# Patient Record
Sex: MALE | Race: WHITE | NOT HISPANIC OR LATINO | Employment: FULL TIME | URBAN - METROPOLITAN AREA
[De-identification: names, ages, dates, MRNs, and addresses within clinical notes are randomized per-mention and may not be internally consistent; named-entity substitution may affect disease eponyms.]

---

## 2017-01-20 ENCOUNTER — TELEPHONE (OUTPATIENT)
Dept: INTERNAL MEDICINE | Facility: CLINIC | Age: 27
End: 2017-01-20

## 2017-01-20 NOTE — TELEPHONE ENCOUNTER
Pt is curerntly at an insulin dose of 4 units he forgot to take this yesterday and did not notice much of a difference in his blood sugars from the high 70's to low 80's . Pt was told to contact MD prior to stopping the medication . Please advise.Edna Li RN

## 2017-01-23 NOTE — TELEPHONE ENCOUNTER
When are these # taken? Fasting? Pre-meal?  If am fasting take off 2 units - then check # bid to tid for a week on this dose and let me see these # via [employer] before completely stopping but looks as if that is the direction we are going.

## 2017-01-27 ENCOUNTER — MYC MEDICAL ADVICE (OUTPATIENT)
Dept: INTERNAL MEDICINE | Facility: CLINIC | Age: 27
End: 2017-01-27

## 2017-01-31 ENCOUNTER — DOCUMENTATION ONLY (OUTPATIENT)
Dept: LAB | Facility: CLINIC | Age: 27
End: 2017-01-31

## 2017-01-31 DIAGNOSIS — Z79.4 TYPE 2 DIABETES MELLITUS WITH HYPERGLYCEMIA, WITH LONG-TERM CURRENT USE OF INSULIN (H): Primary | ICD-10-CM

## 2017-01-31 DIAGNOSIS — E11.65 TYPE 2 DIABETES MELLITUS WITH HYPERGLYCEMIA, WITH LONG-TERM CURRENT USE OF INSULIN (H): Primary | ICD-10-CM

## 2017-02-02 DIAGNOSIS — Z79.4 TYPE 2 DIABETES MELLITUS WITH HYPERGLYCEMIA, WITH LONG-TERM CURRENT USE OF INSULIN (H): ICD-10-CM

## 2017-02-02 DIAGNOSIS — E11.65 TYPE 2 DIABETES MELLITUS WITH HYPERGLYCEMIA, WITH LONG-TERM CURRENT USE OF INSULIN (H): ICD-10-CM

## 2017-02-02 LAB
ANION GAP SERPL CALCULATED.3IONS-SCNC: 9 MMOL/L (ref 3–14)
BUN SERPL-MCNC: 13 MG/DL (ref 7–30)
CALCIUM SERPL-MCNC: 9.8 MG/DL (ref 8.5–10.1)
CHLORIDE SERPL-SCNC: 106 MMOL/L (ref 94–109)
CHOLEST SERPL-MCNC: 140 MG/DL
CO2 SERPL-SCNC: 26 MMOL/L (ref 20–32)
CREAT SERPL-MCNC: 0.98 MG/DL (ref 0.66–1.25)
CREAT UR-MCNC: 282 MG/DL
GFR SERPL CREATININE-BSD FRML MDRD: NORMAL ML/MIN/1.7M2
GLUCOSE SERPL-MCNC: 86 MG/DL (ref 70–99)
HBA1C MFR BLD: 5 % (ref 4.3–6)
HDLC SERPL-MCNC: 42 MG/DL
LDLC SERPL CALC-MCNC: 71 MG/DL
MICROALBUMIN UR-MCNC: 392 MG/L
MICROALBUMIN/CREAT UR: 139.01 MG/G CR (ref 0–17)
NONHDLC SERPL-MCNC: 98 MG/DL
POTASSIUM SERPL-SCNC: 4 MMOL/L (ref 3.4–5.3)
SODIUM SERPL-SCNC: 141 MMOL/L (ref 133–144)
TRIGL SERPL-MCNC: 135 MG/DL
TSH SERPL DL<=0.005 MIU/L-ACNC: 1.88 MU/L (ref 0.4–4)

## 2017-02-02 PROCEDURE — 36415 COLL VENOUS BLD VENIPUNCTURE: CPT | Performed by: INTERNAL MEDICINE

## 2017-02-02 PROCEDURE — 80061 LIPID PANEL: CPT | Performed by: INTERNAL MEDICINE

## 2017-02-02 PROCEDURE — 83036 HEMOGLOBIN GLYCOSYLATED A1C: CPT | Performed by: INTERNAL MEDICINE

## 2017-02-02 PROCEDURE — 80048 BASIC METABOLIC PNL TOTAL CA: CPT | Performed by: INTERNAL MEDICINE

## 2017-02-02 PROCEDURE — 82043 UR ALBUMIN QUANTITATIVE: CPT | Performed by: INTERNAL MEDICINE

## 2017-02-02 PROCEDURE — 84443 ASSAY THYROID STIM HORMONE: CPT | Performed by: INTERNAL MEDICINE

## 2017-09-01 ENCOUNTER — TRANSFERRED RECORDS (OUTPATIENT)
Dept: HEALTH INFORMATION MANAGEMENT | Facility: CLINIC | Age: 27
End: 2017-09-01

## 2017-09-06 ENCOUNTER — TRANSFERRED RECORDS (OUTPATIENT)
Dept: HEALTH INFORMATION MANAGEMENT | Facility: CLINIC | Age: 27
End: 2017-09-06

## 2017-10-17 ENCOUNTER — TELEPHONE (OUTPATIENT)
Dept: INTERNAL MEDICINE | Facility: CLINIC | Age: 27
End: 2017-10-17

## 2017-10-17 DIAGNOSIS — Z79.4 TYPE 2 DIABETES MELLITUS WITH HYPERGLYCEMIA, WITH LONG-TERM CURRENT USE OF INSULIN (H): Primary | ICD-10-CM

## 2017-10-17 DIAGNOSIS — E11.65 TYPE 2 DIABETES MELLITUS WITH HYPERGLYCEMIA, WITH LONG-TERM CURRENT USE OF INSULIN (H): Primary | ICD-10-CM

## 2017-10-17 NOTE — TELEPHONE ENCOUNTER
Patient stated that he is overdue for labs. His MyChart shows that he needs to get an A1C done but there are no standing orders. Please put in order as needed. If patient needs to see a provider, please call him to discuss.    Ansley FRANK  Central Scheduler

## 2017-10-18 NOTE — TELEPHONE ENCOUNTER
Yes- he needs to get labs done and then see me for follow up. Remind pt to bring meter- if not checking regularly do so prior to appt (1x/day) so we can have some # to review

## 2017-10-24 DIAGNOSIS — Z79.4 TYPE 2 DIABETES MELLITUS WITH HYPERGLYCEMIA, WITH LONG-TERM CURRENT USE OF INSULIN (H): ICD-10-CM

## 2017-10-24 DIAGNOSIS — E11.65 TYPE 2 DIABETES MELLITUS WITH HYPERGLYCEMIA, WITH LONG-TERM CURRENT USE OF INSULIN (H): ICD-10-CM

## 2017-10-24 LAB
ALT SERPL W P-5'-P-CCNC: 42 U/L (ref 0–70)
ANION GAP SERPL CALCULATED.3IONS-SCNC: 10 MMOL/L (ref 3–14)
BUN SERPL-MCNC: 20 MG/DL (ref 7–30)
CALCIUM SERPL-MCNC: 9.6 MG/DL (ref 8.5–10.1)
CHLORIDE SERPL-SCNC: 106 MMOL/L (ref 94–109)
CHOLEST SERPL-MCNC: 157 MG/DL
CO2 SERPL-SCNC: 23 MMOL/L (ref 20–32)
CREAT SERPL-MCNC: 1.22 MG/DL (ref 0.66–1.25)
CREAT UR-MCNC: 229 MG/DL
GFR SERPL CREATININE-BSD FRML MDRD: 70 ML/MIN/1.7M2
GLUCOSE SERPL-MCNC: 83 MG/DL (ref 70–99)
HBA1C MFR BLD: 5.1 % (ref 4.3–6)
HDLC SERPL-MCNC: 45 MG/DL
LDLC SERPL CALC-MCNC: 97 MG/DL
MICROALBUMIN UR-MCNC: 1210 MG/L
MICROALBUMIN/CREAT UR: 528.38 MG/G CR (ref 0–17)
NONHDLC SERPL-MCNC: 112 MG/DL
POTASSIUM SERPL-SCNC: 3.9 MMOL/L (ref 3.4–5.3)
SODIUM SERPL-SCNC: 139 MMOL/L (ref 133–144)
TRIGL SERPL-MCNC: 77 MG/DL

## 2017-10-24 PROCEDURE — 80061 LIPID PANEL: CPT | Performed by: INTERNAL MEDICINE

## 2017-10-24 PROCEDURE — 36415 COLL VENOUS BLD VENIPUNCTURE: CPT | Performed by: INTERNAL MEDICINE

## 2017-10-24 PROCEDURE — 80048 BASIC METABOLIC PNL TOTAL CA: CPT | Performed by: INTERNAL MEDICINE

## 2017-10-24 PROCEDURE — 84460 ALANINE AMINO (ALT) (SGPT): CPT | Performed by: INTERNAL MEDICINE

## 2017-10-24 PROCEDURE — 82043 UR ALBUMIN QUANTITATIVE: CPT | Performed by: INTERNAL MEDICINE

## 2017-10-24 PROCEDURE — 83036 HEMOGLOBIN GLYCOSYLATED A1C: CPT | Performed by: INTERNAL MEDICINE

## 2017-10-26 ENCOUNTER — OFFICE VISIT (OUTPATIENT)
Dept: INTERNAL MEDICINE | Facility: CLINIC | Age: 27
End: 2017-10-26
Payer: COMMERCIAL

## 2017-10-26 VITALS
OXYGEN SATURATION: 100 % | WEIGHT: 208.7 LBS | BODY MASS INDEX: 27.66 KG/M2 | TEMPERATURE: 98.1 F | SYSTOLIC BLOOD PRESSURE: 130 MMHG | DIASTOLIC BLOOD PRESSURE: 82 MMHG | HEART RATE: 88 BPM | HEIGHT: 73 IN

## 2017-10-26 DIAGNOSIS — I10 ESSENTIAL HYPERTENSION, BENIGN: ICD-10-CM

## 2017-10-26 DIAGNOSIS — Z86.39 HX OF DIABETES MELLITUS: ICD-10-CM

## 2017-10-26 DIAGNOSIS — R80.9 MICROALBUMINURIA: Primary | ICD-10-CM

## 2017-10-26 PROCEDURE — 99214 OFFICE O/P EST MOD 30 MIN: CPT | Performed by: INTERNAL MEDICINE

## 2017-10-26 RX ORDER — LISINOPRIL 10 MG/1
10 TABLET ORAL DAILY
Qty: 30 TABLET | Refills: 5 | Status: SHIPPED | OUTPATIENT
Start: 2017-10-26 | End: 2018-03-06

## 2017-10-26 NOTE — NURSING NOTE
"Chief Complaint   Patient presents with     Hypertension     Diabetes       Initial /76  Pulse 88  Temp 98.1  F (36.7  C) (Oral)  Ht 6' 1\" (1.854 m)  Wt 208 lb 11.2 oz (94.7 kg)  SpO2 100%  BMI 27.53 kg/m2 Estimated body mass index is 27.53 kg/(m^2) as calculated from the following:    Height as of this encounter: 6' 1\" (1.854 m).    Weight as of this encounter: 208 lb 11.2 oz (94.7 kg).  Medication Reconciliation: complete      "

## 2017-10-26 NOTE — PATIENT INSTRUCTIONS
Diet for Chronic Kidney Disease  Following a special diet when you have kidney disease can help you stay as healthy as possible. Your healthcare provider or dietitian should make a special diet plan just for you.    Eating right  Here are some good eating rules to follow:    Protein. Eating protein is important for your body. But too much protein can put a strain on your kidneys. Eating less protein may slow the progression of chronic kidney disease. Foods high in protein include meat, fish, eggs, cheese, and other dairy products. A registered dietitian can help you plan a diet that has the right amount of protein for you.    Sodium. Having too much salt in your diet can make your body hold onto (retain) water. Ask your provider or dietitian how much sodium per day you are allowed. This will help you avoid fluid buildup in your body (fluid retention). It can also help control high blood pressure. Learn to read food labels to know how much sodium is in one serving. Foods high in salt include processed meats, canned and boxed foods, sauces, salted chips and snacks, pickled foods, frozen dinners, and restaurant and fast food.    Fluids. If you have advanced kidney disease, you will need to limit the water and fluids you drink. If you don t, then too much water will build up in your body. The exact amount of fluid you can drink depends on how well your kidneys are working. Ask your provider how much water you can safely drink each day.    Potassium. In advanced kidney disease, your potassium level can go dangerously high. This affects your heart. It can cause an irregular heartbeat (arrhythmia). Ask your provider or dietitian if you should limit potassium in your diet. Foods high in potassium include dairy products (milk, yogurt, cheese), dried beans, bananas, oranges, potatoes, tomatoes, spinach, cantaloupe, honeydew melon, dried fruits, and nuts.     Calcium. Calcium is important to build strong bones. But foods  high in calcium are also high in phosphorus, which can take calcium from your bones. Limiting foods high in phosphorus will help keep calcium in your bones. Ask your provider how much calcium you should get each day.    Phosphorus. In advanced kidney disease, your phosphorus level can go dangerously high. This affects many systems in the body and can damage your heart. Limit your intake of phosphorus-rich foods. These include dried beans and peas, nuts, peanut butter, cocoa, beer, cola drinks, and dairy products.  Date Last Reviewed: 8/1/2016 2000-2017 [employer]. [address] [place] [address]. All rights reserved. This information is not intended as a substitute for professional medical care. Always follow your healthcare professional's instructions.

## 2017-10-26 NOTE — MR AVS SNAPSHOT
After Visit Summary   10/26/2017    Sarbjit Perez    MRN: [id number]           Patient Information     Date Of Birth          1990        Visit Information        Provider Department      10/26/2017 9:20 AM Aram Martin MD [facility]        Today's Diagnoses     Microalbuminuria    -  1    Essential hypertension, benign          Care Instructions      Diet for Chronic Kidney Disease  Following a special diet when you have kidney disease can help you stay as healthy as possible. Your healthcare provider or dietitian should make a special diet plan just for you.    Eating right  Here are some good eating rules to follow:    Protein. Eating protein is important for your body. But too much protein can put a strain on your kidneys. Eating less protein may slow the progression of chronic kidney disease. Foods high in protein include meat, fish, eggs, cheese, and other dairy products. A registered dietitian can help you plan a diet that has the right amount of protein for you.    Sodium. Having too much salt in your diet can make your body hold onto (retain) water. Ask your provider or dietitian how much sodium per day you are allowed. This will help you avoid fluid buildup in your body (fluid retention). It can also help control high blood pressure. Learn to read food labels to know how much sodium is in one serving. Foods high in salt include processed meats, canned and boxed foods, sauces, salted chips and snacks, pickled foods, frozen dinners, and restaurant and fast food.    Fluids. If you have advanced kidney disease, you will need to limit the water and fluids you drink. If you don t, then too much water will build up in your body. The exact amount of fluid you can drink depends on how well your kidneys are working. Ask your provider how much water you can safely drink each day.    Potassium. In advanced kidney disease, your potassium level can go dangerously high.  This affects your heart. It can cause an irregular heartbeat (arrhythmia). Ask your provider or dietitian if you should limit potassium in your diet. Foods high in potassium include dairy products (milk, yogurt, cheese), dried beans, bananas, oranges, potatoes, tomatoes, spinach, cantaloupe, honeydew melon, dried fruits, and nuts.     Calcium. Calcium is important to build strong bones. But foods high in calcium are also high in phosphorus, which can take calcium from your bones. Limiting foods high in phosphorus will help keep calcium in your bones. Ask your provider how much calcium you should get each day.    Phosphorus. In advanced kidney disease, your phosphorus level can go dangerously high. This affects many systems in the body and can damage your heart. Limit your intake of phosphorus-rich foods. These include dried beans and peas, nuts, peanut butter, cocoa, beer, cola drinks, and dairy products.  Date Last Reviewed: 8/1/2016 2000-2017 [employer]. [address] [place] [address]. All rights reserved. This information is not intended as a substitute for professional medical care. Always follow your healthcare professional's instructions.                Follow-ups after your visit        Future tests that were ordered for you today     Open Future Orders        Priority Expected Expires Ordered    Albumin Random Urine Quantitative with Creat Ratio Routine 12/25/2017 2/23/2018 10/26/2017            Who to contact     If you have questions or need follow up information about today's clinic visit or your schedule please contact [facility] directly at [phone].  Normal or non-critical lab and imaging results will be communicated to you by MyChart, letter or phone within 4 business days after the clinic has received the results. If you do not hear from us within 7 days, please contact the clinic through MyChart or phone. If you have a critical or abnormal  "lab result, we will notify you by phone as soon as possible.  Submit refill requests through [employer] or call your pharmacy and they will forward the refill request to us. Please allow 3 business days for your refill to be completed.          Additional Information About Your Visit        [employer] Information     [employer] gives you secure access to your electronic health record. If you see a primary care provider, you can also send messages to your care team and make appointments. If you have questions, please call your primary care clinic.  If you do not have a primary care provider, please call [phone] and they will assist you.        Care EveryWhere ID     This is your Care EveryWhere ID. This could be used by other organizations to access your [facility] medical records  [id number]        Your Vitals Were     Pulse Temperature Height Pulse Oximetry BMI (Body Mass Index)       88 98.1  F (36.7  C) (Oral) 6' 1\" (1.854 m) 100% 27.53 kg/m2        Blood Pressure from Last 3 Encounters:   10/26/17 140/76   12/20/16 132/76   11/22/16 114/70    Weight from Last 3 Encounters:   10/26/17 208 lb 11.2 oz (94.7 kg)   12/20/16 243 lb 4.8 oz (110.4 kg)   12/14/16 244 lb (110.7 kg)                 Where to get your medicines      These medications were sent to [facility]/pharmacy #[id number] - [facility] [address] [facility]  [address] [address] [facility] [address]     Phone:  [phone]     lisinopril 10 MG tablet          Primary Care Provider    Physician No Ref-Primary       NO REF-PRIMARY PHYSICIAN        Equal Access to Services     [facility]: Hadii aad ku hadasho Soomaali, waaxda luqadaha, qaybta kaalmada benji francis . So [facility] [phone].    ATENCIÓN: Si habla español, tiene a little disposición servicios gratuitos de asistencia lingüística. Llame al [phone].    We comply with applicable federal civil rights laws and [place] laws. We do not discriminate on the basis of race, " color, national origin, age, disability, sex, sexual orientation, or gender identity.            Thank you!     Thank you for choosing [facility]  for your care. Our goal is always to provide you with excellent care. Hearing back from our patients is one way we can continue to improve our services. Please take a few minutes to complete the written survey that you may receive in the mail after your visit with us. Thank you!             Your Updated Medication List - Protect others around you: Learn how to safely use, store and throw away your medicines at www.disposemymeds.org.          This list is accurate as of: 10/26/17 10:16 AM.  Always use your most recent med list.                   Brand Name Dispense Instructions for use Diagnosis    blood glucose calibration solution    no brand specified    1 each    Use to calibrate blood glucose monitor as directed.    Type 2 diabetes mellitus with hyperosmolarity without coma, with long-term current use of insulin (H)       blood glucose monitoring test strip    no brand specified    50 Box    Use to test blood sugar 2  times daily or as directed.    Type 2 diabetes mellitus with hyperosmolarity without coma, with long-term current use of insulin (H)       GLUCOSOURCE LANCETS Misc     1 each    1 each 2 times daily    Type 2 diabetes mellitus with hyperosmolarity without coma, with long-term current use of insulin (H)       lisinopril 10 MG tablet    PRINIVIL/ZESTRIL    30 tablet    Take 1 tablet (10 mg) by mouth daily    Essential hypertension, benign       multivitamin, therapeutic with minerals Tabs tablet      Take 1 tablet by mouth daily

## 2017-10-26 NOTE — PROGRESS NOTES
"  SUBJECTIVE:   Sarbjit Perez is a 27 year old male who presents to clinic today for the following health issues:      Diabetes Follow-up  - started post alcoholic pancreatitis. Initially on insulin but this was weaned off and then only on metformin until spring of this year at which time we stopped everything. Since then has been off all meds  Lab Results   Component Value Date    A1C 5.1 10/24/2017    A1C 5.0 02/02/2017    A1C 9.5 10/20/2016        Patient is checking blood sugars: once daily.  Results are as follows:         am - 65-80s (no meds)    Diabetic concerns: None     Symptoms of hypoglycemia (low blood sugar): none     Paresthesias (numbness or burning in feet) or sores: No     Date of last diabetic eye exam: 9/17      Hypertension Follow-up  Given 6 mo supply of meds in 12/06- staets has taken daily but only ran out of this supply a week or so ago  BP Readings from Last 3 Encounters:   10/26/17 140/76   12/20/16 132/76   11/22/16 114/70        Outpatient blood pressures are not being checked.    Low Salt Diet: low salt          Amount of exercise or physical activity: None    Problems taking medications regularly: No    Medication side effects: none  Diet: regular (no restrictions)      Problem list and histories reviewed & adjusted, as indicated.  Additional history: as documented    Labs reviewed in EPIC    Reviewed and updated as needed this visit by clinical staff     Reviewed and updated as needed this visit by Provider         ROS:  Constitutional, HEENT, cardiovascular, pulmonary, gi and gu systems are negative, except as otherwise noted.      OBJECTIVE:                                                    /76  Pulse 88  Temp 98.1  F (36.7  C) (Oral)  Ht 6' 1\" (1.854 m)  Wt 208 lb 11.2 oz (94.7 kg)  SpO2 100%  BMI 27.53 kg/m2  Body mass index is 27.53 kg/(m^2).  GENERAL APPEARANCE: healthy, alert and no distress  HENT: nose and mouth without ulcers or lesions  NECK: no adenopathy, no " asymmetry, masses, or scars and thyroid normal to palpation  RESP: lungs clear to auscultation - no rales, rhonchi or wheezes  CV: regular rates and rhythm, normal S1 S2, no S3 or S4 and no murmur, click or rub  MS: extremities normal- no gross deformities noted  SKIN: no suspicious lesions or rashes    Diagnostic test results:  Results for orders placed or performed in visit on 10/24/17   ALT   Result Value Ref Range    ALT 42 0 - 70 U/L   Albumin Random Urine Quantitative with Creat Ratio   Result Value Ref Range    Creatinine Urine 229 mg/dL    Albumin Urine mg/L 1210 mg/L    Albumin Urine mg/g Cr 528.38 (H) 0 - 17 mg/g Cr   Hemoglobin A1c   Result Value Ref Range    Hemoglobin A1C 5.1 4.3 - 6.0 %   Lipid panel reflex to direct LDL   Result Value Ref Range    Cholesterol 157 <200 mg/dL    Triglycerides 77 <150 mg/dL    HDL Cholesterol 45 >39 mg/dL    LDL Cholesterol Calculated 97 <100 mg/dL    Non HDL Cholesterol 112 <130 mg/dL   Basic metabolic panel   Result Value Ref Range    Sodium 139 133 - 144 mmol/L    Potassium 3.9 3.4 - 5.3 mmol/L    Chloride 106 94 - 109 mmol/L    Carbon Dioxide 23 20 - 32 mmol/L    Anion Gap 10 3 - 14 mmol/L    Glucose 83 70 - 99 mg/dL    Urea Nitrogen 20 7 - 30 mg/dL    Creatinine 1.22 0.66 - 1.25 mg/dL    GFR Estimate 70 >60 mL/min/1.7m2    GFR Estimate If Black 85 >60 mL/min/1.7m2    Calcium 9.6 8.5 - 10.1 mg/dL          ASSESSMENT/PLAN:                                                    1. Microalbuminuria  Worse since last checked. Restart ACEi and repeat in 3 mo. BP today is actually not bad even off med. If the proteinuria still noted in 3 mo would consider having see renal.   - Albumin Random Urine Quantitative with Creat Ratio; Future    2. Essential hypertension, benign  - lisinopril (PRINIVIL/ZESTRIL) 10 MG tablet; Take 1 tablet (10 mg) by mouth daily  Dispense: 30 tablet; Refill: 5    3. 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  Would recommend yearly a1c check but this seems resolved at this time.     Follow up with Provider - 3 mo labs , BP recheck 6 mo      Aram Martin MD  [facility]

## 2017-10-26 NOTE — LETTER
[address] [address]  [phone]        February 28, 2018    Sarbjit Perez  [address] [facility] [address]              Dear Sarbjit Perez    This is to remind you that your Urinalysis is due.    You may call our office at [phone] to schedule an appointment.    Please disregard this notice if you have already had your labs drawn or made an appointment.        Sincerely,        Aram Martin MD

## 2017-10-26 NOTE — Clinical Note
Please abstract the following data from this visit with this patient into the appropriate field in Epic:  Eye exam with ophthalmology on this date: 9-17

## 2018-03-06 ENCOUNTER — OFFICE VISIT (OUTPATIENT)
Dept: FAMILY MEDICINE | Facility: CLINIC | Age: 28
End: 2018-03-06
Payer: COMMERCIAL

## 2018-03-06 VITALS
WEIGHT: 206 LBS | TEMPERATURE: 98.4 F | HEIGHT: 73 IN | HEART RATE: 76 BPM | RESPIRATION RATE: 16 BRPM | SYSTOLIC BLOOD PRESSURE: 132 MMHG | BODY MASS INDEX: 27.3 KG/M2 | DIASTOLIC BLOOD PRESSURE: 82 MMHG

## 2018-03-06 DIAGNOSIS — I10 ESSENTIAL HYPERTENSION, BENIGN: ICD-10-CM

## 2018-03-06 DIAGNOSIS — K40.90 LEFT INGUINAL HERNIA: ICD-10-CM

## 2018-03-06 DIAGNOSIS — Z00.00 ROUTINE GENERAL MEDICAL EXAMINATION AT A HEALTH CARE FACILITY: Primary | ICD-10-CM

## 2018-03-06 DIAGNOSIS — R80.9 MICROALBUMINURIA: ICD-10-CM

## 2018-03-06 DIAGNOSIS — Z86.39 HX OF DIABETES MELLITUS: ICD-10-CM

## 2018-03-06 LAB
ANION GAP SERPL CALCULATED.3IONS-SCNC: 8 MMOL/L (ref 3–14)
BUN SERPL-MCNC: 15 MG/DL (ref 7–30)
CALCIUM SERPL-MCNC: 9.3 MG/DL (ref 8.5–10.1)
CHLORIDE SERPL-SCNC: 104 MMOL/L (ref 94–109)
CHOLEST SERPL-MCNC: 145 MG/DL
CO2 SERPL-SCNC: 27 MMOL/L (ref 20–32)
CREAT SERPL-MCNC: 0.98 MG/DL (ref 0.66–1.25)
CREAT UR-MCNC: 222 MG/DL
GFR SERPL CREATININE-BSD FRML MDRD: 90 ML/MIN/1.7M2
GLUCOSE SERPL-MCNC: 85 MG/DL (ref 70–99)
HBA1C MFR BLD: 5.1 % (ref 4.3–6)
HDLC SERPL-MCNC: 48 MG/DL
LDLC SERPL CALC-MCNC: 83 MG/DL
MICROALBUMIN UR-MCNC: 1200 MG/L
MICROALBUMIN/CREAT UR: 540.54 MG/G CR (ref 0–17)
NONHDLC SERPL-MCNC: 97 MG/DL
POTASSIUM SERPL-SCNC: 3.8 MMOL/L (ref 3.4–5.3)
SODIUM SERPL-SCNC: 139 MMOL/L (ref 133–144)
TRIGL SERPL-MCNC: 72 MG/DL

## 2018-03-06 PROCEDURE — 99213 OFFICE O/P EST LOW 20 MIN: CPT | Mod: 25 | Performed by: PHYSICIAN ASSISTANT

## 2018-03-06 PROCEDURE — 83036 HEMOGLOBIN GLYCOSYLATED A1C: CPT | Performed by: PHYSICIAN ASSISTANT

## 2018-03-06 PROCEDURE — 99395 PREV VISIT EST AGE 18-39: CPT | Performed by: PHYSICIAN ASSISTANT

## 2018-03-06 PROCEDURE — 36415 COLL VENOUS BLD VENIPUNCTURE: CPT | Performed by: PHYSICIAN ASSISTANT

## 2018-03-06 PROCEDURE — 80061 LIPID PANEL: CPT | Performed by: PHYSICIAN ASSISTANT

## 2018-03-06 PROCEDURE — 80048 BASIC METABOLIC PNL TOTAL CA: CPT | Performed by: PHYSICIAN ASSISTANT

## 2018-03-06 PROCEDURE — 82043 UR ALBUMIN QUANTITATIVE: CPT | Performed by: PHYSICIAN ASSISTANT

## 2018-03-06 RX ORDER — LISINOPRIL 10 MG/1
10 TABLET ORAL DAILY
Qty: 90 TABLET | Refills: 1 | Status: SHIPPED | OUTPATIENT
Start: 2018-03-06 | End: 2019-10-07

## 2018-03-06 NOTE — MR AVS SNAPSHOT
After Visit Summary   3/6/2018    Sarbjit Perez    MRN: [id number]           Patient Information     Date Of Birth          1990        Visit Information        Provider Department      3/6/2018 8:15 AM Bill Mg PA-C [facility]        Today's Diagnoses     Routine general medical examination at a health care facility    -  1    Hx of diabetes mellitus        Essential hypertension, benign        Microalbuminuria        Left inguinal hernia          Care Instructions      Preventive Health Recommendations  Male Ages 26 - 39    Yearly exam:             See your health care provider every year in order to  o   Review health changes.   o   Discuss preventive care.    o   Review your medicines if your doctor has prescribed any.    You should be tested each year for STDs (sexually transmitted diseases), if you re at risk.     After age 35, talk to your provider about cholesterol testing. If you are at risk for heart disease, have your cholesterol tested at least every 5 years.     If you are at risk for diabetes, you should have a diabetes test (fasting glucose).  Shots: Get a flu shot each year. Get a tetanus shot every 10 years.     Nutrition:    Eat at least 5 servings of fruits and vegetables daily.     Eat whole-grain bread, whole-wheat pasta and brown rice instead of white grains and rice.     Talk to your provider about Calcium and Vitamin D.     Lifestyle    Exercise for at least 150 minutes a week (30 minutes a day, 5 days a week). This will help you control your weight and prevent disease.     Limit alcohol to one drink per day.     No smoking.     Wear sunscreen to prevent skin cancer.     See your dentist every six months for an exam and cleaning.     Hernia (Adult)    A hernia can happen when there is a weakness or defect in the wall of the abdomen or groin. Intestines or nearby tissues may move from their usual location and push through the weakness in the  wall. This can cause a hernia (bulge) you may see or feel.  Causes and Risk Factors   A hernia may be present at birth. Or it may be caused by the wear and tear of daily living. Certain factors can make a hernia more likely. These can include:    Heavy lifting    Straining, whether from lifting, movement, or constipation    Chronic cough    Injury to the abdominal wall    Excess weight    Pregnancy    Prior surgery    Older age    Family history of hernia  Symptoms  Symptoms of a hernia may come on suddenly. Or they may appear slowly over time. Some common symptoms include:    Bulge in the groin area, around the navel, or in the scrotum (the bulge may get bigger when you stand and go away when you lie down)    Pain or pressure around the bulge    Pain during activities such as lifting, coughing, or sneezing    A feeling of weakness or pressure in the groin    Pain or swelling in the scrotum  Types of hernias  There are different types of hernia. The type you have depends on its location:    Inguinal: This type is in the groin or scrotum. It is more common in men.    Femoral: This type is in the groin, upper thigh (where the leg bends), or labia. It is more common in women.    Ventral: This type is in the abdominal wall.    Umbilical: This type occurs around the navel (belly button).    Incisional: This type occurs at the site of a previous surgery.  The condition of the hernia can help determine how urgently it needs to be treated.    Reducible: It goes back in by itself, or it can be pushed back in.    Irreducible: It can t be pushed back in.    Incarcerated/Strangulated: The intestine is trapped (incarcerated). If this happens, you won t be able to push the bulge back in. If the incarcerated hernia isn t treated, it may become strangulated. This means the area loses blood supply and the tissue may die. This requires emergency surgery! Treatment is needed right away!  In most cases, a hernia will not heal on its  own. Surgery is usually needed to repair the defect in the abdominal wall or groin. You ll be told more about surgery, if needed.  If your symptoms are not severe, treatment may sometimes be delayed. In such cases, regular follow-up visits with the provider will be needed. You ll be asked to keep track of your symptoms and to watch for signs of more serious problems. You may also be given guidelines similar to the home care instructions below.  Home Care  To help keep a hernia from getting worse, you may be advised to:    Avoid heavy lifting and straining as directed.    Take steps to prevent constipation, such as eating more fiber and drinking more water. This may help reduce straining that can occur when having a bowel movement. Reducing straining may help keep your symptoms from getting worse.    Maintain a healthy weight or lose excess weight. This can help reduce strain on abdominal muscles and tissues.    Stop smoking. This can help prevent coughing that may also strain abdominal muscles and tissues.  Follow-up care  Follow up with your healthcare provider, or as directed. If imaging tests were done, they will be reviewed a doctor. You will be told the results and any new findings that may affect your care.  When to seek medical advice  Call your healthcare provider right away if any of these occur:    Hernia hardens, swells, or grows larger    Hernia can no longer be pushed back in    Pain moves to the lower right abdomen (just below the waistline), or spreads to the back  Call 911  Call 911 right away if any of these occur:    Nausea and vomiting    Severe pain, redness, or tenderness in the area near the hernia    Pain worsens quickly and doesn t get better    Inability to have a bowel movement or pass gas    Fever of 100.4 F (38 C) or higher    Trouble breathing    Fainting    Rapid heart rate    Vomiting blood    Large amounts of blood in stool  Date Last Reviewed: 6/9/2015 2000-2017 [employer]. [address] [place] [address]. All rights reserved. This information is not intended as a substitute for professional medical care. Always follow your healthcare professional's instructions.                Follow-ups after your visit        Additional Services     GENERAL SURG ADULT REFERRAL       Your provider has referred you to: ASHELY: [facility] [place] [phone]   [email or web address]    Please be aware that coverage of these services is subject to the terms and limitations of your health insurance plan.  Call member services at your health plan with any benefit or coverage questions.      Please bring the following with you to your appointment:    (1) Any X-Rays, CTs or MRIs which have been performed.  Contact the facility where they were done to arrange for  prior to your scheduled appointment.   (2) List of current medications   (3) This referral request   (4) Any documents/labs given to you for this referral                  Who to contact     If you have questions or need follow up information about today's clinic visit or your schedule please contact [facility] directly at [phone].  Normal or non-critical lab and imaging results will be communicated to you by MyChart, letter or phone within 4 business days after the clinic has received the results. If you do not hear from us within 7 days, please contact the clinic through [employer] or phone. If you have a critical or abnormal lab result, we will notify you by phone as soon as possible.  Submit refill requests through [email or web address] or call your pharmacy and they will forward the refill request to us. Please allow 3 business days for your refill to be completed.          Additional Information About Your Visit        [employer] Information     [email or web address] gives you secure access to your electronic health record. If you see a primary care provider, you can  "also send messages to your care team and make appointments. If you have questions, please call your primary care clinic.  If you do not have a primary care provider, please call [phone] and they will assist you.        Care EveryWhere ID     This is your Care EveryWhere ID. This could be used by other organizations to access your [place] medical records  [id number]        Your Vitals Were     Pulse Temperature Respirations Height BMI (Body Mass Index)       76 98.4  F (36.9  C) (Oral) 16 6' 1\" (1.854 m) 27.18 kg/m2        Blood Pressure from Last 3 Encounters:   03/06/18 132/82   10/26/17 130/82   12/20/16 132/76    Weight from Last 3 Encounters:   03/06/18 206 lb (93.4 kg)   10/26/17 208 lb 11.2 oz (94.7 kg)   12/20/16 243 lb 4.8 oz (110.4 kg)              We Performed the Following     Albumin Random Urine Quantitative with Creat Ratio     Basic metabolic panel     GENERAL SURG ADULT REFERRAL     Hemoglobin A1c     Lipid panel reflex to direct LDL Fasting          Today's Medication Changes          These changes are accurate as of 3/6/18  9:04 AM.  If you have any questions, ask your nurse or doctor.               Stop taking these medicines if you haven't already. Please contact your care team if you have questions.     multivitamin, therapeutic with minerals Tabs tablet   Stopped by:  Bill Mg PA-C                    Primary Care Provider Office Phone # Fax #    Bill Mg PA-C [phone] [address] [facility] [address]        Equal Access to Services     [facility] : Hadii mann tuttleo [employer], waaxda luqadaha, qaybta kaalmada benji francis. So [facility] [phone].    ATENCIÓN: Si habla español, tiene a little disposición servicios gratuitos de asistencia lingüística. Llame al [phone].    We comply with applicable federal civil rights laws and [place] laws. We do not discriminate on the basis of race, " color, national origin, age, disability, sex, sexual orientation, or gender identity.            Thank you!     Thank you for choosing [facility]  for your care. Our goal is always to provide you with excellent care. Hearing back from our patients is one way we can continue to improve our services. Please take a few minutes to complete the written survey that you may receive in the mail after your visit with us. Thank you!             Your Updated Medication List - Protect others around you: Learn how to safely use, store and throw away your medicines at www.disposemymeds.org.          This list is accurate as of 3/6/18  9:04 AM.  Always use your most recent med list.                   Brand Name Dispense Instructions for use Diagnosis    blood glucose calibration solution    no brand specified    1 each    Use to calibrate blood glucose monitor as directed.    Type 2 diabetes mellitus with hyperosmolarity without coma, with long-term current use of insulin (H)       blood glucose monitoring test strip    no brand specified    50 Box    Use to test blood sugar 2  times daily or as directed.    Type 2 diabetes mellitus with hyperosmolarity without coma, with long-term current use of insulin (H)       GLUCOSOURCE LANCETS Misc     1 each    1 each 2 times daily    Type 2 diabetes mellitus with hyperosmolarity without coma, with long-term current use of insulin (H)       lisinopril 10 MG tablet    PRINIVIL/ZESTRIL    30 tablet    Take 1 tablet (10 mg) by mouth daily    Essential hypertension, benign       VITAMIN C PO

## 2018-03-06 NOTE — PATIENT INSTRUCTIONS
Preventive Health Recommendations  Male Ages 26 - 39    Yearly exam:             See your health care provider every year in order to  o   Review health changes.   o   Discuss preventive care.    o   Review your medicines if your doctor has prescribed any.    You should be tested each year for STDs (sexually transmitted diseases), if you re at risk.     After age 35, talk to your provider about cholesterol testing. If you are at risk for heart disease, have your cholesterol tested at least every 5 years.     If you are at risk for diabetes, you should have a diabetes test (fasting glucose).  Shots: Get a flu shot each year. Get a tetanus shot every 10 years.     Nutrition:    Eat at least 5 servings of fruits and vegetables daily.     Eat whole-grain bread, whole-wheat pasta and brown rice instead of white grains and rice.     Talk to your provider about Calcium and Vitamin D.     Lifestyle    Exercise for at least 150 minutes a week (30 minutes a day, 5 days a week). This will help you control your weight and prevent disease.     Limit alcohol to one drink per day.     No smoking.     Wear sunscreen to prevent skin cancer.     See your dentist every six months for an exam and cleaning.     Hernia (Adult)    A hernia can happen when there is a weakness or defect in the wall of the abdomen or groin. Intestines or nearby tissues may move from their usual location and push through the weakness in the wall. This can cause a hernia (bulge) you may see or feel.  Causes and Risk Factors   A hernia may be present at birth. Or it may be caused by the wear and tear of daily living. Certain factors can make a hernia more likely. These can include:    Heavy lifting    Straining, whether from lifting, movement, or constipation    Chronic cough    Injury to the abdominal wall    Excess weight    Pregnancy    Prior surgery    Older age    Family history of hernia  Symptoms  Symptoms of a hernia may come on suddenly. Or they may  appear slowly over time. Some common symptoms include:    Bulge in the groin area, around the navel, or in the scrotum (the bulge may get bigger when you stand and go away when you lie down)    Pain or pressure around the bulge    Pain during activities such as lifting, coughing, or sneezing    A feeling of weakness or pressure in the groin    Pain or swelling in the scrotum  Types of hernias  There are different types of hernia. The type you have depends on its location:    Inguinal: This type is in the groin or scrotum. It is more common in men.    Femoral: This type is in the groin, upper thigh (where the leg bends), or labia. It is more common in women.    Ventral: This type is in the abdominal wall.    Umbilical: This type occurs around the navel (belly button).    Incisional: This type occurs at the site of a previous surgery.  The condition of the hernia can help determine how urgently it needs to be treated.    Reducible: It goes back in by itself, or it can be pushed back in.    Irreducible: It can t be pushed back in.    Incarcerated/Strangulated: The intestine is trapped (incarcerated). If this happens, you won t be able to push the bulge back in. If the incarcerated hernia isn t treated, it may become strangulated. This means the area loses blood supply and the tissue may die. This requires emergency surgery! Treatment is needed right away!  In most cases, a hernia will not heal on its own. Surgery is usually needed to repair the defect in the abdominal wall or groin. You ll be told more about surgery, if needed.  If your symptoms are not severe, treatment may sometimes be delayed. In such cases, regular follow-up visits with the provider will be needed. You ll be asked to keep track of your symptoms and to watch for signs of more serious problems. You may also be given guidelines similar to the home care instructions below.  Home Care  To help keep a hernia from getting worse, you may be advised  to:    Avoid heavy lifting and straining as directed.    Take steps to prevent constipation, such as eating more fiber and drinking more water. This may help reduce straining that can occur when having a bowel movement. Reducing straining may help keep your symptoms from getting worse.    Maintain a healthy weight or lose excess weight. This can help reduce strain on abdominal muscles and tissues.    Stop smoking. This can help prevent coughing that may also strain abdominal muscles and tissues.  Follow-up care  Follow up with your healthcare provider, or as directed. If imaging tests were done, they will be reviewed a doctor. You will be told the results and any new findings that may affect your care.  When to seek medical advice  Call your healthcare provider right away if any of these occur:    Hernia hardens, swells, or grows larger    Hernia can no longer be pushed back in    Pain moves to the lower right abdomen (just below the waistline), or spreads to the back  Call 911  Call 911 right away if any of these occur:    Nausea and vomiting    Severe pain, redness, or tenderness in the area near the hernia    Pain worsens quickly and doesn t get better    Inability to have a bowel movement or pass gas    Fever of 100.4 F (38 C) or higher    Trouble breathing    Fainting    Rapid heart rate    Vomiting blood    Large amounts of blood in stool  Date Last Reviewed: 6/9/2015 2000-2017 [employer]. [address] [place] [address], [place] [address]. All rights reserved. This information is not intended as a substitute for professional medical care. Always follow your healthcare professional's instructions.

## 2018-03-06 NOTE — PROGRESS NOTES
SUBJECTIVE:   CC: aSrbjit Perez is an 27 year old male who presents for preventative health visit.     Physical   Annual:     Getting at least 3 servings of Calcium per day::  Yes    Bi-annual eye exam::  Yes    Dental care twice a year::  NO    Sleep apnea or symptoms of sleep apnea::  None    Diet::  Regular (no restrictions)    Frequency of exercise::  2-3 days/week    Duration of exercise::  30-45 minutes    Taking medications regularly::  Yes    Medication side effects::  None    Additional concerns today::  YES            Testicular mass left side. Comes and goes. Not present in the AM. At times has pain in stomach. No pain with urination or testicular pain.   Today's PHQ-2 Score:   PHQ-2 ( 1999 [employer]) 3/5/2018   Q1: Little interest or pleasure in doing things 0   Q2: Feeling down, depressed or hopeless 0   PHQ-2 Score 0   Q1: Little interest or pleasure in doing things Not at all   Q2: Feeling down, depressed or hopeless Not at all   PHQ-2 Score 0       Abuse: Current or Past(Physical, Sexual or Emotional)- No  Do you feel safe in your environment - Yes    Social History   Substance Use Topics     Smoking status: Never Smoker     Smokeless tobacco: Never Used     Alcohol use 0.0 oz/week     0 Standard drinks or equivalent per week      Comment: 1/2 L daily of rum.       No flowsheet data found.    Last PSA: No results found for: PSA    Reviewed orders with patient. Reviewed health maintenance and updated orders accordingly - Yes  BP Readings from Last 3 Encounters:   03/06/18 132/82   10/26/17 130/82   12/20/16 132/76    Wt Readings from Last 3 Encounters:   03/06/18 206 lb (93.4 kg)   10/26/17 208 lb 11.2 oz (94.7 kg)   12/20/16 243 lb 4.8 oz (110.4 kg)                  Patient Active Problem List   Diagnosis     Hx of diabetes mellitus     Alcohol dependence in remission (H)     Microalbuminuria     Essential hypertension, benign     History reviewed. No pertinent surgical history.    Social History    Substance Use Topics     Smoking status: Never Smoker     Smokeless tobacco: Never Used     Alcohol use 0.0 oz/week     0 Standard drinks or equivalent per week      Comment: 1/2 L daily of rum.       Family History   Problem Relation Age of Onset     Breast Cancer Mother      Hypertension Father      Type 2 Diabetes Paternal Grandfather          Current Outpatient Prescriptions   Medication Sig Dispense Refill     Ascorbic Acid (VITAMIN C PO)        lisinopril (PRINIVIL/ZESTRIL) 10 MG tablet Take 1 tablet (10 mg) by mouth daily 90 tablet 1     blood glucose monitoring (NO BRAND SPECIFIED) test strip Use to test blood sugar 2  times daily or as directed. 50 Box 1     GLUCOSOURCE LANCETS MISC 1 each 2 times daily 1 each 1     blood glucose calibration (NO BRAND SPECIFIED) solution Use to calibrate blood glucose monitor as directed. 1 each 1     [DISCONTINUED] lisinopril (PRINIVIL/ZESTRIL) 10 MG tablet Take 1 tablet (10 mg) by mouth daily 30 tablet 5     No Known Allergies    Reviewed and updated as needed this visit by clinical staff  Tobacco  Allergies  Meds  Med Hx  Surg Hx  Fam Hx  Soc Hx        Reviewed and updated as needed this visit by Provider        Past Medical History:   Diagnosis Date     Acute pancreatitis 10/20/2016     Alcohol abuse      Hx of diabetes mellitus 10/26/2016    Occurred post alcoholic pancreatitis. Resolved 1 yr later after alcohol cessation.        History reviewed. No pertinent surgical history.    Review of Systems: Other than above,   C: NEGATIVE for fever, chills, change in weight  I: NEGATIVE for worrisome rashes, moles or lesions  E: NEGATIVE for vision changes or irritation  ENT: NEGATIVE for ear, mouth and throat problems  R: NEGATIVE for significant cough or SOB  CV: NEGATIVE for chest pain, palpitations or peripheral edema  GI: NEGATIVE for nausea, abdominal pain, heartburn, or change in bowel habits   male: negative for dysuria, hematuria, decreased urinary stream,  "erectile dysfunction, urethral discharge  M: NEGATIVE for significant arthralgias or myalgia  N: NEGATIVE for weakness, dizziness or paresthesias  P: NEGATIVE for changes in mood or affect    OBJECTIVE:   /82 (BP Location: Right arm, Patient Position: Chair, Cuff Size: Adult Large)  Pulse 76  Temp 98.4  F (36.9  C) (Oral)  Resp 16  Ht 6' 1\" (1.854 m)  Wt 206 lb (93.4 kg)  BMI 27.18 kg/m2    Physical Exam  GENERAL: healthy, alert and no distress  EYES: Eyes grossly normal to inspection, PERRL and conjunctivae and sclerae normal  HENT: ear canals and TM's normal, nose and mouth without ulcers or lesions  NECK: no adenopathy, no asymmetry, masses, or scars and thyroid normal to palpation  RESP: lungs clear to auscultation - no rales, rhonchi or wheezes  CV: regular rate and rhythm, normal S1 S2, no S3 or S4, no murmur, click or rub, no peripheral edema and peripheral pulses strong  ABDOMEN: soft, nontender, no hepatosplenomegaly, no masses and bowel sounds normal   (male): testicles normal without atrophy or masses and large hernia on left.   MS: no gross musculoskeletal defects noted, no edema  SKIN: no suspicious lesions or rashes  NEURO: Normal strength and tone, mentation intact and speech normal  PSYCH: mentation appears normal, affect normal/bright  LYMPH: no cervical adenopathy    ASSESSMENT/PLAN:   (Z00.00) Routine general medical examination at a health care facility  (primary encounter diagnosis)  Comment: normal exam. Patient fasting today. Would like to have all labs rechecked.   Plan: Basic metabolic panel, Lipid panel reflex to         direct LDL Fasting            (Z86.39) Hx of diabetes mellitus  Comment: well controlled without medication. Will continue to monitor.   Plan: Albumin Random Urine Quantitative with Creat         Ratio, Basic metabolic panel, Hemoglobin A1c            (I10) Essential hypertension, benign  Comment: stable   Plan: Basic metabolic panel, lisinopril         " "(PRINIVIL/ZESTRIL) 10 MG tablet            (R80.9) Microalbuminuria  Comment: noted at last visit. Will recheck today. If still severely elevated, will have see nephrology.   Plan: Albumin Random Urine Quantitative with Creat         Ratio, Basic metabolic panel            (K40.90) Left inguinal hernia  Comment: noted on exam. Do not feel this is hydrocele. Will have see gen surg for evaluation.   Plan: GENERAL SURG ADULT REFERRAL              COUNSELING:   Reviewed preventive health counseling, as reflected in patient instructions       Regular exercise       Healthy diet/nutrition       Alcohol Use         reports that he has never smoked. He has never used smokeless tobacco.    Estimated body mass index is 27.18 kg/(m^2) as calculated from the following:    Height as of this encounter: 6' 1\" (1.854 m).    Weight as of this encounter: 206 lb (93.4 kg).   Weight management plan: Discussed healthy diet and exercise guidelines and patient will follow up in 12 months in clinic to re-evaluate.    Counseling Resources:  ATP IV Guidelines  Pooled Cohorts Equation Calculator  FRAX Risk Assessment  ICSI Preventive Guidelines  Dietary Guidelines for Americans, 2010  USDA's MyPlate  ASA Prophylaxis  Lung CA Screening    Bill Mg PA-C  [facility]  Answers for HPI/ROS submitted by the patient on 3/5/2018   PHQ-2 Score: 0    "

## 2018-03-07 ENCOUNTER — MYC MEDICAL ADVICE (OUTPATIENT)
Dept: FAMILY MEDICINE | Facility: CLINIC | Age: 28
End: 2018-03-07

## 2018-03-07 DIAGNOSIS — R80.9 MICROALBUMINURIA: Primary | ICD-10-CM

## 2018-03-07 NOTE — TELEPHONE ENCOUNTER
[facility]-see mychart message below.  Please advise.  Perla Schmitt RN    3/6/18  ASSESSMENT/PLAN:   (Z00.00) Routine general medical examination at a health care facility  (primary encounter diagnosis)  Comment: normal exam. Patient fasting today. Would like to have all labs rechecked.   Plan: Basic metabolic panel, Lipid panel reflex to         direct LDL Fasting         (Z86.39) Hx of diabetes mellitus  Comment: well controlled without medication. Will continue to monitor.   Plan: Albumin Random Urine Quantitative with Creat         Ratio, Basic metabolic panel, Hemoglobin A1c         (I10) Essential hypertension, benign  Comment: stable   Plan: Basic metabolic panel, lisinopril         (PRINIVIL/ZESTRIL) 10 MG tablet         (R80.9) Microalbuminuria  Comment: noted at last visit. Will recheck today. If still severely elevated, will have see nephrology.   Plan: Albumin Random Urine Quantitative with Creat         Ratio, Basic metabolic panel         (K40.90) Left inguinal hernia  Comment: noted on exam. Do not feel this is hydrocele. Will have see gen surg for evaluation.   Plan: GENERAL SURG ADULT REFERRAL

## 2018-03-12 ENCOUNTER — OFFICE VISIT (OUTPATIENT)
Dept: SURGERY | Facility: CLINIC | Age: 28
End: 2018-03-12
Payer: COMMERCIAL

## 2018-03-12 ENCOUNTER — TELEPHONE (OUTPATIENT)
Dept: SURGERY | Facility: CLINIC | Age: 28
End: 2018-03-12

## 2018-03-12 VITALS
HEART RATE: 77 BPM | OXYGEN SATURATION: 100 % | DIASTOLIC BLOOD PRESSURE: 68 MMHG | RESPIRATION RATE: 16 BRPM | BODY MASS INDEX: 27.3 KG/M2 | WEIGHT: 206 LBS | SYSTOLIC BLOOD PRESSURE: 124 MMHG | HEIGHT: 73 IN

## 2018-03-12 DIAGNOSIS — K40.90 LEFT INGUINAL HERNIA: Primary | ICD-10-CM

## 2018-03-12 PROCEDURE — 99203 OFFICE O/P NEW LOW 30 MIN: CPT | Performed by: SURGERY

## 2018-03-12 ASSESSMENT — ENCOUNTER SYMPTOMS: ARTHRALGIAS: 1

## 2018-03-12 NOTE — MR AVS SNAPSHOT
After Visit Summary   3/12/2018    Sarbjit Perez    MRN: [id number]           Patient Information     Date Of Birth          1990        Visit Information        Provider Department      3/12/2018 11:30 AM Eduard Amaral MD [facility] Hern[facility]      Today's Diagnoses     Left inguinal hernia    -  1       Follow-ups after your visit        Your next 10 appointments already scheduled     Mar 12, 2018 11:30 AM CDT   CONSULT with Eduard Amaral MD   [facility]    [address] [facility] [address]  [facility] [address]   [phone]            Mar 30, 2018  7:30 AM CDT   [facility] Same Day Surgery with Eduard Amaral MD   [facility]    [facility]   [phone]              Who to contact     If you have questions or need follow up information about today's clinic visit or your schedule please contact [facility] directly at [phone].  Normal or non-critical lab and imaging results will be communicated to you by LinguaSyshart, letter or phone within 4 business days after the clinic has received the results. If you do not hear from us within 7 days, please contact the clinic through [employer] or phone. If you have a critical or abnormal lab result, we will notify you by phone as soon as possible.  Submit refill requests through THE NOCKLIST or call your pharmacy and they will forward the refill request to us. Please allow 3 business days for your refill to be completed.          Additional Information About Your Visit        LinguaSyshart Information     THE [employer]OCKLIST gives you secure access to your electronic health record. If you see a primary care provider, you can also send messages to your care team and make appointments. If you have questions, please call your  "primary care clinic.  If you do not have a primary care provider, please call [phone] and they will assist you.        Care EveryWhere ID     This is your Care EveryWhere ID. This could be used by other organizations to access your [facility] medical records  [id number]        Your Vitals Were     Pulse Respirations Height Pulse Oximetry BMI (Body Mass Index)       77 16 6' 1\" (1.854 m) 100% 27.18 kg/m2        Blood Pressure from Last 3 Encounters:   03/12/18 124/68   03/06/18 132/82   10/26/17 130/82    Weight from Last 3 Encounters:   03/12/18 206 lb (93.4 kg)   03/06/18 206 lb (93.4 kg)   10/26/17 208 lb 11.2 oz (94.7 kg)              Today, you had the following     No orders found for display       Primary Care Provider Office Phone # Fax #    Bill Galindo Mg PA-C [phone]       [address] [facility] [address]        Equal Access to Services     [facility]: Hadii aad ku hadasho Soomaali, waaxda luqadaha, qaybta kaalmada adeegyada, benji sexton hayatif brown . So [facility] [phone].    ATENCIÓN: Si habla español, tiene a little disposición servicios gratuitos de asistencia lingüística. Llame al [phone].    We comply with applicable federal civil rights laws and [place] laws. We do not discriminate on the basis of race, color, national origin, age, disability, sex, sexual orientation, or gender identity.            Thank you!     Thank you for choosing [facility]  for your care. Our goal is always to provide you with excellent care. Hearing back from our patients is one way we can continue to improve our services. Please take a few minutes to complete the written survey that you may receive in the mail after your visit with us. Thank you!             Your Updated Medication List - Protect others around you: Learn how to safely use, store and throw away your medicines at www.disposemymeds.org.          This list is accurate as of 3/12/18 " 11:27 AM.  Always use your most recent med list.                   Brand Name Dispense Instructions for use Diagnosis    blood glucose calibration solution    no brand specified    1 each    Use to calibrate blood glucose monitor as directed.    Type 2 diabetes mellitus with hyperosmolarity without coma, with long-term current use of insulin (H)       blood glucose monitoring test strip    no brand specified    50 Box    Use to test blood sugar 2  times daily or as directed.    Type 2 diabetes mellitus with hyperosmolarity without coma, with long-term current use of insulin (H)       GLUCOSOURCE LANCETS Misc     1 each    1 each 2 times daily    Type 2 diabetes mellitus with hyperosmolarity without coma, with long-term current use of insulin (H)       lisinopril 10 MG tablet    PRINIVIL/ZESTRIL    90 tablet    Take 1 tablet (10 mg) by mouth daily    Essential hypertension, benign       VITAMIN C PO

## 2018-03-12 NOTE — PROGRESS NOTES
HPI:  Sarbjit is a 27 year old male who presents for evaluation of left groin bulge.  Symptoms began many years ago.  This is described as pressure.  The pain occurs with prolonged standing.  Associated symptoms include none. The patient has noticed a bulge. The patient has not had a previous herniorrhaphy in this location. Employment does not require heavy lifting.    Constipation: No  Colonoscopy: No   Dysuria: No  Cough: No  Diabetes: Borderline    Past Medical History:   has a past medical history of Acute pancreatitis (10/20/2016); Alcohol abuse; and diabetes mellitus (10/26/2016).    Past Surgical History:  No past surgical history on file.   Additional abdominal operations: none    Social History:  Social History     Social History     Marital status: Single     Spouse name: N/A     Number of children: 0     Years of education: N/A     Occupational History     controller      Social History Main Topics     Smoking status: Never Smoker     Smokeless tobacco: Never Used     Alcohol use 0.0 oz/week     0 Standard drinks or equivalent per week      Comment: 1/2 L daily of rum.       Drug use: No     Sexual activity: Yes     Partners: Female     Other Topics Concern     Not on file     Social History Narrative        Family History:  Family History   Problem Relation Age of Onset     Breast Cancer Mother      Hypertension Father      Type 2 Diabetes Paternal Grandfather      Hernias: No    ROS:  The 10 point review of systems is negative other than noted in the HPI and above.    PE:    General- Well-developed, well-nourished, patient able to get up on table without difficulty.  HEENT- Normocephalic and atraumatic. Pupils equal and round.  Mucous membranes moist.  Sclera are nonicteric.  Neck- No lymphadenopathy or masses   Respirations- are regular and non labored  Abdomen is abdomen is soft without significant tenderness, masses, organomegaly or guarding  Hernia- Left inguinal hernia is present, large, extending  down to the testicle              Right inguinal hernia is not present with valsalva              The hernia is reducible while supine, with effort              Testicles are normal              Varix- left present    Assesment: Large, reducible, left inguinal hernia    Plan:    We have discussed observation, reduction techniques and importance, incarceration and strangulation signs, symptoms and importance as well as need to seek emergency treatment.    We have discussed surgery in detail, including risk, benefits, complications, mesh, infection, nerve and cord damage and their sequelae including chronic pain and testicular loss, lifting and activity limits after surgery. He has been given literature to review. We will schedule surgery at patient's convenience.    Time spent with the patient with greater that 50% of the time in discussion was 30 minutes.     Eduard Amaral MD    Please route or send letter to:  Primary Care Provider (PCP) and Include Progress Note

## 2018-03-12 NOTE — TELEPHONE ENCOUNTER
Type of surgery: LEFT INGUINAL HERNIA REPAIR WITH MESH   Location of surgery: [facility]  Date and time of surgery: 3-30-18 AT 7:30 AM   Surgeon: DR. ARNOLD  Pre-Op Appt Date: PATIENT TO SCHEDULE   Post-Op Appt Date: PATIENT TO SCHEDULE    Packet sent out: GIVEN TO PATIENT   Pre-cert/Authorization completed:  Not Applicable  Date: 3-12-18       LEFT INGUINAL HERNIA REPAIR WITH MESH  GENERAL   PT INST TO HAVE H&P WITH DR. VILLAGOMEZ  2 HRS REQ   PA ASSIST CELINA

## 2018-03-12 NOTE — LETTER
2018    RE: Sarbjit Perez, : 1990        Sarbjit is a 27 year old male who presents for evaluation of left groin bulge.  Symptoms began many years ago.  This is described as pressure.  The pain occurs with prolonged standing.  Associated symptoms include none. The patient has noticed a bulge. The patient has not had a previous herniorrhaphy in this location. Employment does not require heavy lifting.     Constipation: No  Colonoscopy: No   Dysuria: No  Cough: No  Diabetes: Borderline     Past Medical History:   has a past medical history of Acute pancreatitis (10/20/2016); Alcohol abuse; and diabetes mellitus (10/26/2016).     Additional abdominal operations: none     Hernias: No     ROS:  The 10 point review of systems is negative other than noted in the HPI and above.     PE:    General- Well-developed, well-nourished, patient able to get up on table without difficulty.  HEENT- Normocephalic and atraumatic. Pupils equal and round.  Mucous membranes moist.  Sclera are nonicteric.  Neck- No lymphadenopathy or masses   Respirations- are regular and non labored  Abdomen is abdomen is soft without significant tenderness, masses, organomegaly or guarding  Hernia- Left inguinal hernia is present, large, extending down to the testicle              Right inguinal hernia is not present with valsalva              The hernia is reducible while supine, with effort                  Testicles are normal              Varix- left present     Assesment: Large, reducible, left inguinal hernia     Plan:    We have discussed observation, reduction techniques and importance, incarceration and strangulation signs, symptoms and importance as well as need to seek emergency treatment.    We have discussed surgery in detail, including risk, benefits, complications, mesh, infection, nerve and cord damage and their sequelae including chronic pain and testicular loss, lifting and activity limits after surgery. He has been  given literature to review. We will schedule surgery at patient's convenience.              Eduard Amaral MD

## 2018-03-12 NOTE — PROGRESS NOTES
HPI      ROS (Review of Systems):      Positive for diabetes and DM controlled with Diet.   Cardiovascular: Positive for hypertension.   MUSCULOSKELETAL: Positive for arthralgias.          Physical Exam

## 2018-03-20 ENCOUNTER — OFFICE VISIT (OUTPATIENT)
Dept: FAMILY MEDICINE | Facility: CLINIC | Age: 28
End: 2018-03-20
Payer: COMMERCIAL

## 2018-03-20 VITALS
TEMPERATURE: 98.1 F | DIASTOLIC BLOOD PRESSURE: 73 MMHG | HEART RATE: 67 BPM | SYSTOLIC BLOOD PRESSURE: 119 MMHG | BODY MASS INDEX: 27.31 KG/M2 | RESPIRATION RATE: 18 BRPM | WEIGHT: 207 LBS

## 2018-03-20 DIAGNOSIS — Z01.818 PREOP GENERAL PHYSICAL EXAM: Primary | ICD-10-CM

## 2018-03-20 DIAGNOSIS — K40.90 LEFT INGUINAL HERNIA: ICD-10-CM

## 2018-03-20 LAB — HGB BLD-MCNC: 12.9 G/DL (ref 13.3–17.7)

## 2018-03-20 PROCEDURE — 85018 HEMOGLOBIN: CPT | Performed by: PHYSICIAN ASSISTANT

## 2018-03-20 PROCEDURE — 99214 OFFICE O/P EST MOD 30 MIN: CPT | Performed by: PHYSICIAN ASSISTANT

## 2018-03-20 PROCEDURE — 36415 COLL VENOUS BLD VENIPUNCTURE: CPT | Performed by: PHYSICIAN ASSISTANT

## 2018-03-20 NOTE — PROGRESS NOTES
[facility]  [address] [facility] [address]  [phone]  Dept: [phone]    PRE-OP EVALUATION:  Today's date: 3/20/2018    Sarbjit Perez (: 1990) presents for pre-operative evaluation assessment as requested by Dr. Amaral.  He requires evaluation and anesthesia risk assessment prior to undergoing surgery/procedure for treatment of hernia .      Primary Physician: Bill Mg  Type of Anesthesia Anticipated: General    Patient has a Health Care Directive or Living Will:  NO    Preop Questions 3/20/2018   Who is doing your surgery? surgical consultants -Dr. Amaral   What are you having done? Hernia Surgery   Date of Surgery/Procedure:    Facility or Hospital where procedure/surgery will be performed: [facility]   1.  Do you have a history of Heart attack, stroke, stent, coronary bypass surgery, or other heart surgery? No   2.  Do you ever have any pain or discomfort in your chest? No   3.  Do you have a history of  Heart Failure? No   4.   Are you troubled by shortness of breath when:  walking on a level surface, or up a slight hill, or at night? No   5.  Do you currently have a cold, bronchitis or other respiratory infection? No   6.  Do you have a cough, shortness of breath, or wheezing? No   7.  Do you sometimes get pains in the calves of your legs when you walk? No   8. Do you or anyone in your family have previous history of blood clots? No   9.  Do you or does anyone in your family have a serious bleeding problem such as prolonged bleeding following surgeries or cuts? No   10. Have you ever had problems with anemia or been told to take iron pills? No   11. Have you had any abnormal blood loss such as black, tarry or bloody stools? No   12. Have you ever had a blood transfusion? No   13. Have you or any of your relatives ever had problems with anesthesia? No   14. Do you have sleep apnea, excessive snoring or daytime drowsiness? No   15.  Do you have any prosthetic heart valves? No   16. Do you have prosthetic joints? No         HPI:     HPI related to upcoming procedure: history of left inguinal hernia. The above procedure was deemed the next best step in management.       See problem list for active medical problems.  Problems all longstanding and stable, except as noted/documented.  See ROS for pertinent symptoms related to these conditions.                                                                                                  .    MEDICAL HISTORY:     Patient Active Problem List    Diagnosis Date Noted     Microalbuminuria 10/26/2017     Priority: Medium     Essential hypertension, benign 10/26/2017     Priority: Medium     Hx of diabetes mellitus 10/26/2016     Priority: Medium     Occurred post alcoholic pancreatitis. Resolved 1 yr later after alcohol cessation.         Alcohol dependence in remission (H) 10/26/2016     Priority: Medium      Past Medical History:   Diagnosis Date     Acute pancreatitis 10/20/2016     Alcohol abuse      Diabetes (H)      Hx of diabetes mellitus 10/26/2016    Occurred post alcoholic pancreatitis. Resolved 1 yr later after alcohol cessation.       History reviewed. No pertinent surgical history.  Current Outpatient Prescriptions   Medication Sig Dispense Refill     Ascorbic Acid (VITAMIN C PO)        lisinopril (PRINIVIL/ZESTRIL) 10 MG tablet Take 1 tablet (10 mg) by mouth daily 90 tablet 1     blood glucose monitoring (NO BRAND SPECIFIED) test strip Use to test blood sugar 2  times daily or as directed. 50 Box 1     GLUCOSOURCE LANCETS MISC 1 each 2 times daily 1 each 1     blood glucose calibration (NO BRAND SPECIFIED) solution Use to calibrate blood glucose monitor as directed. 1 each 1     OTC products: no recent use of OTC ASA, NSAIDS or Steroids    No Known Allergies   Latex Allergy: NO    Social History   Substance Use Topics     Smoking status: Never Smoker     Smokeless tobacco: Never Used      Alcohol use No      Comment: 1/2 L daily of rum.       History   Drug Use No       REVIEW OF SYSTEMS:   CONSTITUTIONAL: NEGATIVE for fever, chills, change in weight  INTEGUMENTARY/SKIN: NEGATIVE for worrisome rashes, moles or lesions  EYES: NEGATIVE for vision changes or irritation  ENT/MOUTH: NEGATIVE for ear, mouth and throat problems  RESP: NEGATIVE for significant cough or SOB  CV: NEGATIVE for chest pain, palpitations or peripheral edema  GI: NEGATIVE for nausea, abdominal pain, heartburn, or change in bowel habits  : left hernia. Otherwise, NEGATIVE for frequency, dysuria, or hematuria  MUSCULOSKELETAL: NEGATIVE for significant arthralgias or myalgia  NEURO: NEGATIVE for weakness, dizziness or paresthesias  ENDOCRINE: NEGATIVE for temperature intolerance, skin/hair changes  HEME: NEGATIVE for bleeding problems  PSYCHIATRIC: NEGATIVE for changes in mood or affect    EXAM:   /73 (BP Location: Right arm, Patient Position: Chair, Cuff Size: Adult Large)  Pulse 67  Temp 98.1  F (36.7  C) (Oral)  Resp 18  Wt 207 lb (93.9 kg)  BMI 27.31 kg/m2    GENERAL APPEARANCE: healthy, alert and no distress     EYES: EOMI,  PERRL     HENT: ear canals and TM's normal and nose and mouth without ulcers or lesions     NECK: no adenopathy, no asymmetry, masses, or scars and thyroid normal to palpation     RESP: lungs clear to auscultation - no rales, rhonchi or wheezes     CV: regular rates and rhythm, normal S1 S2, no S3 or S4 and no murmur, click or rub     ABDOMEN:  soft, nontender, no HSM or masses and bowel sounds normal     MS: extremities normal- no gross deformities noted, no evidence of inflammation in joints, FROM in all extremities.     SKIN: no suspicious lesions or rashes     NEURO: Normal strength and tone, sensory exam grossly normal, mentation intact and speech normal     PSYCH: mentation appears normal. and affect normal/bright     LYMPHATICS: No cervical adenopathy    DIAGNOSTICS:     EKG: Not indicated  due to non-vascular surgery and low risk of event (age <65 and without cardiac risk factors)  Labs Resulted Today:   Results for orders placed or performed in visit on 03/06/18   Albumin Random Urine Quantitative with Creat Ratio   Result Value Ref Range    Creatinine Urine 222 mg/dL    Albumin Urine mg/L 1200 mg/L    Albumin Urine mg/g Cr 540.54 (H) 0 - 17 mg/g Cr   Basic metabolic panel   Result Value Ref Range    Sodium 139 133 - 144 mmol/L    Potassium 3.8 3.4 - 5.3 mmol/L    Chloride 104 94 - 109 mmol/L    Carbon Dioxide 27 20 - 32 mmol/L    Anion Gap 8 3 - 14 mmol/L    Glucose 85 70 - 99 mg/dL    Urea Nitrogen 15 7 - 30 mg/dL    Creatinine 0.98 0.66 - 1.25 mg/dL    GFR Estimate 90 >60 mL/min/1.7m2    GFR Estimate If Black >90 >60 mL/min/1.7m2    Calcium 9.3 8.5 - 10.1 mg/dL   Lipid panel reflex to direct LDL Fasting   Result Value Ref Range    Cholesterol 145 <200 mg/dL    Triglycerides 72 <150 mg/dL    HDL Cholesterol 48 >39 mg/dL    LDL Cholesterol Calculated 83 <100 mg/dL    Non HDL Cholesterol 97 <130 mg/dL   Hemoglobin A1c   Result Value Ref Range    Hemoglobin A1C 5.1 4.3 - 6.0 %       Recent Labs   Lab Test  03/06/18   0909  10/24/17   0921   10/23/16   0905   10/21/16   0745   HGB   --    --    --   12.9*   --   14.2   PLT   --    --    --   205   --   152   NA  139  139   < >   --    < >  143   POTASSIUM  3.8  3.9   < >   --    < >  3.5   CR  0.98  1.22   < >   --    < >  0.89   A1C  5.1  5.1   < >   --    --    --     < > = values in this interval not displayed.        IMPRESSION:   Reason for surgery/procedure: left inguinal hernia  Diagnosis/reason for consult: preop exam for the above procedure.     The proposed surgical procedure is considered LOW risk.    REVISED CARDIAC RISK INDEX  The patient has the following serious cardiovascular risks for perioperative complications such as (MI, PE, VFib and 3  AV Block):  No serious cardiac risks  INTERPRETATION: 0 risks: Class I (very low risk - 0.4%  complication rate)    The patient has the following additional risks for perioperative complications:  No identified additional risks      ICD-10-CM    1. Preop general physical exam Z01.818 Hemoglobin   2. Left inguinal hernia K40.90 Hemoglobin       RECOMMENDATIONS:     NPO after midnight. No nsaids until after procedure.     --Patient is to take all scheduled medications on the day of surgery    APPROVAL GIVEN to proceed with proposed procedure, without further diagnostic evaluation       Signed Electronically by: Bill Mg PA-C    Copy of this evaluation report is provided to requesting physician.    [facility] Preop Guidelines

## 2018-03-20 NOTE — MR AVS SNAPSHOT
After Visit Summary   3/20/2018    Sarbjit Perez    MRN: [id number]           Patient Information     Date Of Birth          1990        Visit Information        Provider Department      3/20/2018 8:30 AM Bill Mg PA-C [facility]        Today's Diagnoses     Preop general physical exam    -  1    Left inguinal hernia          Care Instructions      Before Your Surgery      Call your surgeon if there is any change in your health. This includes signs of a cold or flu (such as a sore throat, runny nose, cough, rash or fever).    Do not smoke, drink alcohol or take over the counter medicine (unless your surgeon or primary care doctor tells you to) for the 24 hours before and after surgery.    If you take prescribed drugs: Follow your doctor s orders about which medicines to take and which to stop until after surgery.    Eating and drinking prior to surgery: follow the instructions from your surgeon    Take a shower or bath the night before surgery. Use the soap your surgeon gave you to gently clean your skin. If you do not have soap from your surgeon, use your regular soap. Do not shave or scrub the surgery site.  Wear clean pajamas and have clean sheets on your bed.           Follow-ups after your visit        Your next 10 appointments already scheduled     Mar 30, 2018  7:30 AM CDT   [facility] Same Day Surgery with Eduard Amaral MD, Marilyn Loomis PA-C   [facility]uling ([facility])    [facility] Surgery Scheduling ([facility])   [phone]            Mar 30, 2018   Procedure with Eduard Amaral MD   [facility]iOp [facility] (--)    [address] [facility] [id number] [phone]              Who to contact     If you have questions or need follow up information about today's clinic visit or your schedule please contact [facility] directly at  [phone].  Normal or non-critical lab and imaging results will be communicated to you by MyChart, letter or phone within 4 business days after the clinic has received the results. If you do not hear from us within 7 days, please contact the clinic through [email or web address] or phone. If you have a critical or abnormal lab result, we will notify you by phone as soon as possible.  Submit refill requests through [employer] or call your pharmacy and they will forward the refill request to us. Please allow 3 business days for your refill to be completed.          Additional Information About Your Visit        [email or web address] Information     [employer] gives you secure access to your electronic health record. If you see a primary care provider, you can also send messages to your care team and make appointments. If you have questions, please call your primary care clinic.  If you do not have a primary care provider, please call [phone] and they will assist you.        Care EveryWhere ID     This is your Care EveryWhere ID. This could be used by other organizations to access your [facility] medical records  [id number]        Your Vitals Were     Pulse Temperature Respirations BMI (Body Mass Index)          67 98.1  F (36.7  C) (Oral) 18 27.31 kg/m2         Blood Pressure from Last 3 Encounters:   03/20/18 119/73   03/12/18 124/68   03/06/18 132/82    Weight from Last 3 Encounters:   03/20/18 207 lb (93.9 kg)   03/12/18 206 lb (93.4 kg)   03/06/18 206 lb (93.4 kg)              We Performed the Following     Hemoglobin        Primary Care Provider Office Phone # Fax #    Bill Galindo Mg PA-C [phone] [address] [facility] [address]        Equal Access to Services     JONG BRADSHAW : Hadii mann Andino, casimiro garcia, qaakash verdugoalmatenisha francis, benji carranza. So [facility] [phone].    ATENCIÓN: Si habla español, tiene a little disposición servicios gratuitos de asistencia  lingüística. Jalil al [phone].    We comply with applicable federal civil rights laws and [place] laws. We do not discriminate on the basis of race, color, national origin, age, disability, sex, sexual orientation, or gender identity.            Thank you!     Thank you for choosing [facility]  for your care. Our goal is always to provide you with excellent care. Hearing back from our patients is one way we can continue to improve our services. Please take a few minutes to complete the written survey that you may receive in the mail after your visit with us. Thank you!             Your Updated Medication List - Protect others around you: Learn how to safely use, store and throw away your medicines at www.disposemymeds.org.          This list is accurate as of 3/20/18  8:49 AM.  Always use your most recent med list.                   Brand Name Dispense Instructions for use Diagnosis    blood glucose calibration solution    no brand specified    1 each    Use to calibrate blood glucose monitor as directed.    Type 2 diabetes mellitus with hyperosmolarity without coma, with long-term current use of insulin (H)       blood glucose monitoring test strip    no brand specified    50 Box    Use to test blood sugar 2  times daily or as directed.    Type 2 diabetes mellitus with hyperosmolarity without coma, with long-term current use of insulin (H)       GLUCOSOURCE LANCETS Misc     1 each    1 each 2 times daily    Type 2 diabetes mellitus with hyperosmolarity without coma, with long-term current use of insulin (H)       lisinopril 10 MG tablet    PRINIVIL/ZESTRIL    90 tablet    Take 1 tablet (10 mg) by mouth daily    Essential hypertension, benign       VITAMIN C PO

## 2018-03-29 NOTE — H&P (VIEW-ONLY)
[facility]  [address] [facility] [address]  [phone]  Dept: [phone]    PRE-OP EVALUATION:  Today's date: 3/20/2018    Sarbjit Perez (: 1990) presents for pre-operative evaluation assessment as requested by Dr. Amaral.  He requires evaluation and anesthesia risk assessment prior to undergoing surgery/procedure for treatment of hernia .      Primary Physician: Bill Mg  Type of Anesthesia Anticipated: General    Patient has a Health Care Directive or Living Will:  NO    Preop Questions 3/20/2018   Who is doing your surgery? surgical consultants -Dr. Amaral   What are you having done? Hernia Surgery   Date of Surgery/Procedure:    Facility or Hospital where procedure/surgery will be performed: [facility]   1.  Do you have a history of Heart attack, stroke, stent, coronary bypass surgery, or other heart surgery? No   2.  Do you ever have any pain or discomfort in your chest? No   3.  Do you have a history of  Heart Failure? No   4.   Are you troubled by shortness of breath when:  walking on a level surface, or up a slight hill, or at night? No   5.  Do you currently have a cold, bronchitis or other respiratory infection? No   6.  Do you have a cough, shortness of breath, or wheezing? No   7.  Do you sometimes get pains in the calves of your legs when you walk? No   8. Do you or anyone in your family have previous history of blood clots? No   9.  Do you or does anyone in your family have a serious bleeding problem such as prolonged bleeding following surgeries or cuts? No   10. Have you ever had problems with anemia or been told to take iron pills? No   11. Have you had any abnormal blood loss such as black, tarry or bloody stools? No   12. Have you ever had a blood transfusion? No   13. Have you or any of your relatives ever had problems with anesthesia? No   14. Do you have sleep apnea, excessive snoring or daytime drowsiness? No   15.  Do you have any prosthetic heart valves? No   16. Do you have prosthetic joints? No         HPI:     HPI related to upcoming procedure: history of left inguinal hernia. The above procedure was deemed the next best step in management.       See problem list for active medical problems.  Problems all longstanding and stable, except as noted/documented.  See ROS for pertinent symptoms related to these conditions.                                                                                                  .    MEDICAL HISTORY:     Patient Active Problem List    Diagnosis Date Noted     Microalbuminuria 10/26/2017     Priority: Medium     Essential hypertension, benign 10/26/2017     Priority: Medium     Hx of diabetes mellitus 10/26/2016     Priority: Medium     Occurred post alcoholic pancreatitis. Resolved 1 yr later after alcohol cessation.         Alcohol dependence in remission (H) 10/26/2016     Priority: Medium      Past Medical History:   Diagnosis Date     Acute pancreatitis 10/20/2016     Alcohol abuse      Diabetes (H)      Hx of diabetes mellitus 10/26/2016    Occurred post alcoholic pancreatitis. Resolved 1 yr later after alcohol cessation.       History reviewed. No pertinent surgical history.  Current Outpatient Prescriptions   Medication Sig Dispense Refill     Ascorbic Acid (VITAMIN C PO)        lisinopril (PRINIVIL/ZESTRIL) 10 MG tablet Take 1 tablet (10 mg) by mouth daily 90 tablet 1     blood glucose monitoring (NO BRAND SPECIFIED) test strip Use to test blood sugar 2  times daily or as directed. 50 Box 1     GLUCOSOURCE LANCETS MISC 1 each 2 times daily 1 each 1     blood glucose calibration (NO BRAND SPECIFIED) solution Use to calibrate blood glucose monitor as directed. 1 each 1     OTC products: no recent use of OTC ASA, NSAIDS or Steroids    No Known Allergies   Latex Allergy: NO    Social History   Substance Use Topics     Smoking status: Never Smoker     Smokeless tobacco: Never Used      Alcohol use No      Comment: 1/2 L daily of rum.       History   Drug Use No       REVIEW OF SYSTEMS:   CONSTITUTIONAL: NEGATIVE for fever, chills, change in weight  INTEGUMENTARY/SKIN: NEGATIVE for worrisome rashes, moles or lesions  EYES: NEGATIVE for vision changes or irritation  ENT/MOUTH: NEGATIVE for ear, mouth and throat problems  RESP: NEGATIVE for significant cough or SOB  CV: NEGATIVE for chest pain, palpitations or peripheral edema  GI: NEGATIVE for nausea, abdominal pain, heartburn, or change in bowel habits  : left hernia. Otherwise, NEGATIVE for frequency, dysuria, or hematuria  MUSCULOSKELETAL: NEGATIVE for significant arthralgias or myalgia  NEURO: NEGATIVE for weakness, dizziness or paresthesias  ENDOCRINE: NEGATIVE for temperature intolerance, skin/hair changes  HEME: NEGATIVE for bleeding problems  PSYCHIATRIC: NEGATIVE for changes in mood or affect    EXAM:   /73 (BP Location: Right arm, Patient Position: Chair, Cuff Size: Adult Large)  Pulse 67  Temp 98.1  F (36.7  C) (Oral)  Resp 18  Wt 207 lb (93.9 kg)  BMI 27.31 kg/m2    GENERAL APPEARANCE: healthy, alert and no distress     EYES: EOMI,  PERRL     HENT: ear canals and TM's normal and nose and mouth without ulcers or lesions     NECK: no adenopathy, no asymmetry, masses, or scars and thyroid normal to palpation     RESP: lungs clear to auscultation - no rales, rhonchi or wheezes     CV: regular rates and rhythm, normal S1 S2, no S3 or S4 and no murmur, click or rub     ABDOMEN:  soft, nontender, no HSM or masses and bowel sounds normal     MS: extremities normal- no gross deformities noted, no evidence of inflammation in joints, FROM in all extremities.     SKIN: no suspicious lesions or rashes     NEURO: Normal strength and tone, sensory exam grossly normal, mentation intact and speech normal     PSYCH: mentation appears normal. and affect normal/bright     LYMPHATICS: No cervical adenopathy    DIAGNOSTICS:     EKG: Not indicated  due to non-vascular surgery and low risk of event (age <65 and without cardiac risk factors)  Labs Resulted Today:   Results for orders placed or performed in visit on 03/06/18   Albumin Random Urine Quantitative with Creat Ratio   Result Value Ref Range    Creatinine Urine 222 mg/dL    Albumin Urine mg/L 1200 mg/L    Albumin Urine mg/g Cr 540.54 (H) 0 - 17 mg/g Cr   Basic metabolic panel   Result Value Ref Range    Sodium 139 133 - 144 mmol/L    Potassium 3.8 3.4 - 5.3 mmol/L    Chloride 104 94 - 109 mmol/L    Carbon Dioxide 27 20 - 32 mmol/L    Anion Gap 8 3 - 14 mmol/L    Glucose 85 70 - 99 mg/dL    Urea Nitrogen 15 7 - 30 mg/dL    Creatinine 0.98 0.66 - 1.25 mg/dL    GFR Estimate 90 >60 mL/min/1.7m2    GFR Estimate If Black >90 >60 mL/min/1.7m2    Calcium 9.3 8.5 - 10.1 mg/dL   Lipid panel reflex to direct LDL Fasting   Result Value Ref Range    Cholesterol 145 <200 mg/dL    Triglycerides 72 <150 mg/dL    HDL Cholesterol 48 >39 mg/dL    LDL Cholesterol Calculated 83 <100 mg/dL    Non HDL Cholesterol 97 <130 mg/dL   Hemoglobin A1c   Result Value Ref Range    Hemoglobin A1C 5.1 4.3 - 6.0 %       Recent Labs   Lab Test  03/06/18   0909  10/24/17   0921   10/23/16   0905   10/21/16   0745   HGB   --    --    --   12.9*   --   14.2   PLT   --    --    --   205   --   152   NA  139  139   < >   --    < >  143   POTASSIUM  3.8  3.9   < >   --    < >  3.5   CR  0.98  1.22   < >   --    < >  0.89   A1C  5.1  5.1   < >   --    --    --     < > = values in this interval not displayed.        IMPRESSION:   Reason for surgery/procedure: left inguinal hernia  Diagnosis/reason for consult: preop exam for the above procedure.     The proposed surgical procedure is considered LOW risk.    REVISED CARDIAC RISK INDEX  The patient has the following serious cardiovascular risks for perioperative complications such as (MI, PE, VFib and 3  AV Block):  No serious cardiac risks  INTERPRETATION: 0 risks: Class I (very low risk - 0.4%  complication rate)    The patient has the following additional risks for perioperative complications:  No identified additional risks      ICD-10-CM    1. Preop general physical exam Z01.818 Hemoglobin   2. Left inguinal hernia K40.90 Hemoglobin       RECOMMENDATIONS:     NPO after midnight. No nsaids until after procedure.     --Patient is to take all scheduled medications on the day of surgery    APPROVAL GIVEN to proceed with proposed procedure, without further diagnostic evaluation       Signed Electronically by: Bill Mg PA-C    Copy of this evaluation report is provided to requesting physician.    [facility] Preop Guidelines

## 2018-03-30 ENCOUNTER — ANESTHESIA (OUTPATIENT)
Dept: SURGERY | Facility: CLINIC | Age: 28
End: 2018-03-30
Payer: COMMERCIAL

## 2018-03-30 ENCOUNTER — ANESTHESIA EVENT (OUTPATIENT)
Dept: SURGERY | Facility: CLINIC | Age: 28
End: 2018-03-30
Payer: COMMERCIAL

## 2018-03-30 ENCOUNTER — HOSPITAL ENCOUNTER (OUTPATIENT)
Facility: CLINIC | Age: 28
Discharge: HOME | End: 2018-03-30
Attending: SURGERY | Admitting: SURGERY
Payer: COMMERCIAL

## 2018-03-30 ENCOUNTER — APPOINTMENT (OUTPATIENT)
Dept: SURGERY | Facility: PHYSICIAN GROUP | Age: 28
End: 2018-03-30
Payer: COMMERCIAL

## 2018-03-30 ENCOUNTER — SURGERY (OUTPATIENT)
Age: 28
End: 2018-03-30

## 2018-03-30 VITALS
TEMPERATURE: 97.5 F | WEIGHT: 202 LBS | HEIGHT: 73 IN | OXYGEN SATURATION: 98 % | BODY MASS INDEX: 26.77 KG/M2 | SYSTOLIC BLOOD PRESSURE: 139 MMHG | DIASTOLIC BLOOD PRESSURE: 88 MMHG | RESPIRATION RATE: 16 BRPM

## 2018-03-30 DIAGNOSIS — K40.90 LEFT INGUINAL HERNIA: Primary | ICD-10-CM

## 2018-03-30 LAB
GLUCOSE BLDC GLUCOMTR-MCNC: 86 MG/DL (ref 70–99)
GLUCOSE BLDC GLUCOMTR-MCNC: 93 MG/DL (ref 70–99)
INTERPRETATION ECG - MUSE: NORMAL

## 2018-03-30 PROCEDURE — 37000009 ZZH ANESTHESIA TECHNICAL FEE, EACH ADDTL 15 MIN: Performed by: SURGERY

## 2018-03-30 PROCEDURE — 36000052 ZZH SURGERY LEVEL 2 EA 15 ADDTL MIN: Performed by: SURGERY

## 2018-03-30 PROCEDURE — C1781 MESH (IMPLANTABLE): HCPCS | Performed by: SURGERY

## 2018-03-30 PROCEDURE — 37000008 ZZH ANESTHESIA TECHNICAL FEE, 1ST 30 MIN: Performed by: SURGERY

## 2018-03-30 PROCEDURE — 49505 PRP I/HERN INIT REDUC >5 YR: CPT | Mod: AS | Performed by: PHYSICIAN ASSISTANT

## 2018-03-30 PROCEDURE — 49505 PRP I/HERN INIT REDUC >5 YR: CPT | Mod: LT | Performed by: SURGERY

## 2018-03-30 PROCEDURE — 40000306 ZZH STATISTIC PRE PROC ASSESS II: Performed by: SURGERY

## 2018-03-30 PROCEDURE — 25000125 ZZHC RX 250: Performed by: SURGERY

## 2018-03-30 PROCEDURE — 25000128 H RX IP 250 OP 636: Performed by: NURSE ANESTHETIST, CERTIFIED REGISTERED

## 2018-03-30 PROCEDURE — 93010 ELECTROCARDIOGRAM REPORT: CPT | Performed by: INTERNAL MEDICINE

## 2018-03-30 PROCEDURE — 27210794 ZZH OR GENERAL SUPPLY STERILE: Performed by: SURGERY

## 2018-03-30 PROCEDURE — 25000566 ZZH SEVOFLURANE, EA 15 MIN: Performed by: SURGERY

## 2018-03-30 PROCEDURE — 71000027 ZZH RECOVERY PHASE 2 EACH 15 MINS: Performed by: SURGERY

## 2018-03-30 PROCEDURE — 36000050 ZZH SURGERY LEVEL 2 1ST 30 MIN: Performed by: SURGERY

## 2018-03-30 PROCEDURE — 25000125 ZZHC RX 250: Performed by: NURSE ANESTHETIST, CERTIFIED REGISTERED

## 2018-03-30 PROCEDURE — 71000012 ZZH RECOVERY PHASE 1 LEVEL 1 FIRST HR: Performed by: SURGERY

## 2018-03-30 PROCEDURE — 25000128 H RX IP 250 OP 636: Performed by: ANESTHESIOLOGY

## 2018-03-30 PROCEDURE — 25000128 H RX IP 250 OP 636: Performed by: SURGERY

## 2018-03-30 PROCEDURE — 82962 GLUCOSE BLOOD TEST: CPT

## 2018-03-30 DEVICE — MESH ULTRAPRO HERNIA 2.4X4.7" LARGE UHSL: Type: IMPLANTABLE DEVICE | Site: INGUINAL | Status: FUNCTIONAL

## 2018-03-30 RX ORDER — PROPOFOL 10 MG/ML
INJECTION, EMULSION INTRAVENOUS PRN
Status: DISCONTINUED | OUTPATIENT
Start: 2018-03-30 | End: 2018-03-30

## 2018-03-30 RX ORDER — LIDOCAINE HYDROCHLORIDE 10 MG/ML
INJECTION, SOLUTION INFILTRATION; PERINEURAL PRN
Status: DISCONTINUED | OUTPATIENT
Start: 2018-03-30 | End: 2018-03-30

## 2018-03-30 RX ORDER — BUPIVACAINE HYDROCHLORIDE AND EPINEPHRINE 2.5; 5 MG/ML; UG/ML
30 INJECTION, SOLUTION EPIDURAL; INFILTRATION; INTRACAUDAL; PERINEURAL ONCE
Status: COMPLETED | OUTPATIENT
Start: 2018-03-30 | End: 2018-03-30

## 2018-03-30 RX ORDER — CEFAZOLIN SODIUM 1 G/3ML
1 INJECTION, POWDER, FOR SOLUTION INTRAMUSCULAR; INTRAVENOUS SEE ADMIN INSTRUCTIONS
Status: DISCONTINUED | OUTPATIENT
Start: 2018-03-30 | End: 2018-03-30 | Stop reason: HOSPADM

## 2018-03-30 RX ORDER — OXYCODONE HYDROCHLORIDE 5 MG/1
5 TABLET ORAL
Status: COMPLETED | OUTPATIENT
Start: 2018-03-30 | End: 2018-03-30

## 2018-03-30 RX ORDER — NEOSTIGMINE METHYLSULFATE 1 MG/ML
VIAL (ML) INJECTION PRN
Status: DISCONTINUED | OUTPATIENT
Start: 2018-03-30 | End: 2018-03-30

## 2018-03-30 RX ORDER — GLYCOPYRROLATE 0.2 MG/ML
INJECTION, SOLUTION INTRAMUSCULAR; INTRAVENOUS PRN
Status: DISCONTINUED | OUTPATIENT
Start: 2018-03-30 | End: 2018-03-30

## 2018-03-30 RX ORDER — FENTANYL CITRATE 50 UG/ML
25-50 INJECTION, SOLUTION INTRAMUSCULAR; INTRAVENOUS
Status: DISCONTINUED | OUTPATIENT
Start: 2018-03-30 | End: 2018-03-30 | Stop reason: HOSPADM

## 2018-03-30 RX ORDER — HYDROMORPHONE HYDROCHLORIDE 1 MG/ML
.3-.5 INJECTION, SOLUTION INTRAMUSCULAR; INTRAVENOUS; SUBCUTANEOUS EVERY 10 MIN PRN
Status: DISCONTINUED | OUTPATIENT
Start: 2018-03-30 | End: 2018-03-30 | Stop reason: HOSPADM

## 2018-03-30 RX ORDER — FENTANYL CITRATE 50 UG/ML
INJECTION, SOLUTION INTRAMUSCULAR; INTRAVENOUS PRN
Status: DISCONTINUED | OUTPATIENT
Start: 2018-03-30 | End: 2018-03-30

## 2018-03-30 RX ORDER — DEXAMETHASONE SODIUM PHOSPHATE 4 MG/ML
INJECTION, SOLUTION INTRA-ARTICULAR; INTRALESIONAL; INTRAMUSCULAR; INTRAVENOUS; SOFT TISSUE PRN
Status: DISCONTINUED | OUTPATIENT
Start: 2018-03-30 | End: 2018-03-30

## 2018-03-30 RX ORDER — NALOXONE HYDROCHLORIDE 0.4 MG/ML
.1-.4 INJECTION, SOLUTION INTRAMUSCULAR; INTRAVENOUS; SUBCUTANEOUS
Status: DISCONTINUED | OUTPATIENT
Start: 2018-03-30 | End: 2018-03-30 | Stop reason: HOSPADM

## 2018-03-30 RX ORDER — OXYCODONE HYDROCHLORIDE 5 MG/1
5-10 TABLET ORAL
Qty: 30 TABLET | Refills: 0 | Status: SHIPPED | OUTPATIENT
Start: 2018-03-30 | End: 2018-04-16

## 2018-03-30 RX ORDER — CEFAZOLIN SODIUM 2 G/100ML
2 INJECTION, SOLUTION INTRAVENOUS
Status: COMPLETED | OUTPATIENT
Start: 2018-03-30 | End: 2018-03-30

## 2018-03-30 RX ORDER — SODIUM CHLORIDE, SODIUM LACTATE, POTASSIUM CHLORIDE, CALCIUM CHLORIDE 600; 310; 30; 20 MG/100ML; MG/100ML; MG/100ML; MG/100ML
INJECTION, SOLUTION INTRAVENOUS CONTINUOUS
Status: DISCONTINUED | OUTPATIENT
Start: 2018-03-30 | End: 2018-03-30 | Stop reason: HOSPADM

## 2018-03-30 RX ORDER — MEPERIDINE HYDROCHLORIDE 50 MG/ML
12.5 INJECTION INTRAMUSCULAR; INTRAVENOUS; SUBCUTANEOUS
Status: DISCONTINUED | OUTPATIENT
Start: 2018-03-30 | End: 2018-03-30 | Stop reason: HOSPADM

## 2018-03-30 RX ORDER — ONDANSETRON 2 MG/ML
INJECTION INTRAMUSCULAR; INTRAVENOUS PRN
Status: DISCONTINUED | OUTPATIENT
Start: 2018-03-30 | End: 2018-03-30

## 2018-03-30 RX ORDER — ONDANSETRON 4 MG/1
4 TABLET, ORALLY DISINTEGRATING ORAL EVERY 30 MIN PRN
Status: DISCONTINUED | OUTPATIENT
Start: 2018-03-30 | End: 2018-03-30 | Stop reason: HOSPADM

## 2018-03-30 RX ORDER — LABETALOL HYDROCHLORIDE 5 MG/ML
10 INJECTION, SOLUTION INTRAVENOUS
Status: DISCONTINUED | OUTPATIENT
Start: 2018-03-30 | End: 2018-03-30 | Stop reason: HOSPADM

## 2018-03-30 RX ORDER — ONDANSETRON 2 MG/ML
4 INJECTION INTRAMUSCULAR; INTRAVENOUS EVERY 30 MIN PRN
Status: DISCONTINUED | OUTPATIENT
Start: 2018-03-30 | End: 2018-03-30 | Stop reason: HOSPADM

## 2018-03-30 RX ORDER — HYDRALAZINE HYDROCHLORIDE 20 MG/ML
2.5-5 INJECTION INTRAMUSCULAR; INTRAVENOUS EVERY 10 MIN PRN
Status: DISCONTINUED | OUTPATIENT
Start: 2018-03-30 | End: 2018-03-30 | Stop reason: HOSPADM

## 2018-03-30 RX ORDER — LIDOCAINE 40 MG/G
CREAM TOPICAL
Status: DISCONTINUED | OUTPATIENT
Start: 2018-03-30 | End: 2018-03-30 | Stop reason: HOSPADM

## 2018-03-30 RX ORDER — GLYCINE 1.5 G/100ML
SOLUTION IRRIGATION PRN
Status: DISCONTINUED | OUTPATIENT
Start: 2018-03-30 | End: 2018-03-30 | Stop reason: HOSPADM

## 2018-03-30 RX ADMIN — GLYCOPYRROLATE 0.4 MG: 0.2 INJECTION, SOLUTION INTRAMUSCULAR; INTRAVENOUS at 08:47

## 2018-03-30 RX ADMIN — GLYCOPYRROLATE 0.2 MG: 0.2 INJECTION, SOLUTION INTRAMUSCULAR; INTRAVENOUS at 07:31

## 2018-03-30 RX ADMIN — BUPIVACAINE HYDROCHLORIDE AND EPINEPHRINE BITARTRATE 34 ML: 2.5; .0091 INJECTION, SOLUTION EPIDURAL; INFILTRATION; INTRACAUDAL; PERINEURAL at 08:40

## 2018-03-30 RX ADMIN — FENTANYL CITRATE 50 MCG: 50 INJECTION, SOLUTION INTRAMUSCULAR; INTRAVENOUS at 07:45

## 2018-03-30 RX ADMIN — Medication 3 MG: at 08:47

## 2018-03-30 RX ADMIN — CEFAZOLIN SODIUM 2 G: 2 INJECTION, SOLUTION INTRAVENOUS at 07:37

## 2018-03-30 RX ADMIN — FENTANYL CITRATE 100 MCG: 50 INJECTION, SOLUTION INTRAMUSCULAR; INTRAVENOUS at 07:40

## 2018-03-30 RX ADMIN — GLYCINE 50 ML: 1.5 SOLUTION IRRIGATION at 08:44

## 2018-03-30 RX ADMIN — ONDANSETRON 4 MG: 2 INJECTION INTRAMUSCULAR; INTRAVENOUS at 08:45

## 2018-03-30 RX ADMIN — GLYCOPYRROLATE 0.2 MG: 0.2 INJECTION, SOLUTION INTRAMUSCULAR; INTRAVENOUS at 07:56

## 2018-03-30 RX ADMIN — OXYCODONE HYDROCHLORIDE 5 MG: 5 TABLET ORAL at 09:22

## 2018-03-30 RX ADMIN — SODIUM CHLORIDE, POTASSIUM CHLORIDE, SODIUM LACTATE AND CALCIUM CHLORIDE: 600; 310; 30; 20 INJECTION, SOLUTION INTRAVENOUS at 07:00

## 2018-03-30 RX ADMIN — MIDAZOLAM 2 MG: 1 INJECTION INTRAMUSCULAR; INTRAVENOUS at 07:27

## 2018-03-30 RX ADMIN — DEXAMETHASONE SODIUM PHOSPHATE 4 MG: 4 INJECTION, SOLUTION INTRA-ARTICULAR; INTRALESIONAL; INTRAMUSCULAR; INTRAVENOUS; SOFT TISSUE at 07:31

## 2018-03-30 RX ADMIN — PROPOFOL 180 MG: 10 INJECTION, EMULSION INTRAVENOUS at 07:31

## 2018-03-30 RX ADMIN — ROCURONIUM BROMIDE 40 MG: 10 INJECTION INTRAVENOUS at 07:31

## 2018-03-30 RX ADMIN — LIDOCAINE HYDROCHLORIDE 40 MG: 10 INJECTION, SOLUTION INFILTRATION; PERINEURAL at 07:31

## 2018-03-30 RX ADMIN — FENTANYL CITRATE 100 MCG: 50 INJECTION, SOLUTION INTRAMUSCULAR; INTRAVENOUS at 07:31

## 2018-03-30 NOTE — OP NOTE
General Surgery Operative Note      Pre-operative diagnosis: Left inguinal hernia   Post-operative diagnosis: Left inguinal hernia, indirect    Procedure: Indirect left inguinal hernia repair with Ultrapro hernia system (large size)   Surgeon: Eduard Amaral MD   Assistant(s): Brian Cornelius PA-C  A PA was medically necessary for their expertise in prepping, retracting, exposure, and suctioning.   Anesthesia: General    Estimated blood loss:  Complications: 5 cc  None   Specimens: * No specimens in log *     DESCRIPTION OF PROCEDURE:  The patient was placed on the table in supine position.  General endotracheal anesthesia was induced and the abdomen was prepped and draped in sterile fashion.  A pause was performed, the site had been marked with the patient in pre-induction.  An inguinal incision was made on the left side and was carried down through the subcutaneous tissue.  The external oblique aponeurosis was cleared of subcutaneous tissue.  The external oblique aponeurosis was incised parallel to its fibers through the external ring.  Flaps were developed superomedially and inferolaterally.  The cord was encircled with a penrose drain at the level of the pubic tubercle. This allowed for good exposure of the inguinal canal.  This revealed an intact floor of the inguinal canal.  We then performed a thorough evaluation of the spermatic cord. Cremaster fibers were split and the nerve was preserved.  There was a large indirect hernia defect noted.  The sac was mobilized from normal cord structures and divided. The distal sac was slit but was not dissected as it extended to the testis and extensive dissection allong the cord would increase the risk of ischemic orchitis. The proximal sac was closed with 2-0 PDS after returning a large portion of the omentum to the peritoneal cavity.  The pre-peritoneal space was developed to accept the inner layer of the Ultrapro hernia system. The operative field and the Ultrapro  mesh were irrigated with irricept and rinsed. The inner layer of the mesh was carefully deployed in the preperitoneal space. The outer layer was deployed. A slit had been cut for egress of the cord and nerve. This was coapted adjacent to the cord, thus re-enforcing the internal ring. The medial portion of the outer layer was sutured to the inguinal ligament (wrapping the ligament with the mesh), overlapped and sutured adjacent to the pubic tubercle and then sutured to the anterior rectus sheath as far medially as possible. The other portion of the outer layer was tucked up under the external oblique aponeurosis.  The cord was evaluated with a sterile doppler and excellent arterial and venous signals were noted. There was no venous engorgement of the cord.   Local anesthetic was injected for post op pain relief. .  We inspected for hemostasis and irrigated with sterile saline.  We closed the external oblique aponeurosis with a 2-0 PDS suture in running fashion.  The Anjana's fascia was closed with 2-0 PDS sutures.  The skin was closed with a running 4-0 Vicryl subcuticular suture and dressings were applied.  The patient tolerated the procedure well.  Sponge, needle and instrument counts were correct at the end of the case. The testicle position was verified after the procedure.    Eduard Amaral MD

## 2018-03-30 NOTE — DISCHARGE INSTRUCTIONS
HOME CARE FOLLOWING INGUINAL/FEMORAL HERNIA REPAIR  SRINIVAS Spaulding, MYRA Salinas, CHERYL Aguilar    DIET:  No restrictions.  Increased fluid intake is recommended. While taking pain medications, increase dietary fiber or add a fiber supplementation like Metamucil or Citrucel to help prevent constipation - a possible side effect of pain medications.    NAUSEA:  If nauseated from the anesthetic/pain meds; rest in bed, get up cautiously with assistance, and drink clear liquids (juice, tea, broth).    ACTIVITY:  Light Activity -- you may immediately be up and about as tolerated.  Driving -- you may drive when comfortable and off narcotic pain medications.  Light Work -- resume when comfortable off pain medications.  (If you can drive, you probably can work.)  Strenuous Work/Activity -- limit lifting to 20 pounds for 3 weeks.  Active Sports (running, biking, etc.) -- cautiously resume after 4 weeks.    INCISIONAL CARE:    If you have a dressing in place, keep clean and dry for 48 hours; you may replace the gauze if it becomes soiled.    After 48 hours you may remove the dressing and shower.  Do not submerse incision in water for 1 week.    Sutures will absorb and need not be removed.    If present, leave the steri-strips (white paper tapes) in place for 14 days after surgery.    Expect a variable amount of swelling/black and blue discoloration that may involve the penis/scrotum or labia.    Some numbness around the incision is common.    A lump/ridge under the incision is normal and will gradually resolve.    DISCOMFORT:  Local anesthetic placed at surgery should provide relief for 4-8 hours.  Begin taking pain pills before discomfort is severe.  Take the pain medication with some food, when possible, to minimize side effects.  Intermittent use of ice packs to the hernia repair site may help during the first 1-3 weeks after surgery.  Expect gradual improvement.    Over-the-counter anti-inflammatory  medications (i.e. Ibuprofen/Advil/Motrin or Naprosyn/Aleve) may be used per package instructions in addition to or while tapering off the narcotic pain medications to decrease swelling and sensitivity at the repair site.  DO NOT TAKE these Anti-inflammatory medications if your primary physician has advised against doing so, or if you have acid reflux, ulcer, or bleeding disorder, or take blood-thinner medications.  Call your primary physician or the surgery office if you have medication questions.    RETURN APPOINTMENT:  Schedule a follow-up visit 2-3 weeks post-op.  Office Phone:  [phone]     CONTACT US IF THE FOLLOWING DEVELOPS:   1. A fever that is above 101     2. If there is a large amount of drainage, bleeding, or swelling.   3. Severe pain that is not relieved by your prescription.   4. Drainage that is thick, cloudy, yellow, green or white.   5. Any other questions not answered by  Frequently Asked Questions  sheet.      FREQUENTLY ASKED QUESTIONS:    Q:  How should my incision look?    A:  Normally your incision will appear slightly swollen with light redness directly along the incision itself as it heals.  It may feel like a bump or ridge as the healing/scarring happens, and over time (3-4 months) this bump or ridge feeling should slowly go away.  In general, clear or pink watery drainage can be normal at first as your incision heals, but should decrease over time.    Q:  How do I know if my incision is infected?  A:  Look at your incision for signs of infection, like redness around the incision spreading to surrounding skin, or drainage of cloudy or foul-smelling drainage.  If you feel warm, check your temperature to see if you are running a fever.    **If any of these things occur, please notify the nurse at our office.  We may need you to come into the office for an incision check.      Q:  How do I take care of my incision?  A:  If you have a dressing in place - Starting the day after surgery,  replace the dressing 1-2 times a day until there is no further drainage from the incision.  At that time, a dressing is no longer needed.  Try to minimize tape on the skin if irritation is occurring at the tape sites.  If you have significant irritation from tape on the skin, please call the office to discuss other method of dressing your incision.    Small pieces of tape called  steri-strips  may be present directly overlying your incision; these may be removed 10 days after surgery unless otherwise specified by your surgeon.  If these tapes start to loosen at the ends, you may trim them back until they fall off or are removed.      Q:  There is a piece of tape or a sticky  lead  still on my skin.  Can I remove this?  A:  Sometimes the sticky  leads  used for monitoring during surgery or for evaluation in the emergency department are not all removed while you are in the hospital.  These sometimes have a tab or metal dot on them.  You can easily remove these on your own, like taking off a band-aid.  If there is a gel substance under the  lead , simply wipe/clean it off with a washcloth or paper towel.      Q:  What can I do to minimize constipation (very hard stools, or lack of stools)?  A:  Stay well hydrated.  Increase your dietary fiber intake or take a fiber supplement -with plenty of water.  Walk around frequently.  You may consider an over-the-counter stool-softener.  Your Pharmacist can assist you with choosing one that is stocked at your pharmacy.  Constipation is also one of the most common side effects of pain medication.  If you are using pain medication, be pro-active and try to PREVENT problems with constipation by taking the steps above BEFORE constipation becomes a problem.    Q:  What do I do if I need more pain medications?  A:  Call the office to receive refills.  Be aware that certain pain meds cannot be called into a pharmacy and actually require a paper prescription.  A change may be made in  your pain med as you progress thru your recovery period or if you have side effects to certain meds.    --Pain meds are NOT refilled after 5pm on weekdays, and NOT AT ALL on the weekends, so please look ahead to prevent problems.      Q:  Why am I having a hard time sleeping now that I am at home?  A:  Many medications you receive while you are in the hospital can impact your sleep for a number of days after your surgery/hospitalization.  Decreased level of activity and naps during the day may also make sleeping at night difficult.  Try to minimize day-time naps, and get up frequently during the day to walk around your home during your recovery time.  Sleep aides may be of some help, but are not recommended for long-term use.      Q:  I am having some back discomfort.  What should I do?  A:  This may be related to certain positioning that was required for your surgery, extended periods of time in bed, or other changes in your overall activity level.  You may try ice, heat, acetaminophen, or ibuprofen to treat this temporarily.  Note that many pain medications have acetaminophen in them and would state this on the prescription bottle.  Be sure not to exceed the maximum of 4000mg per day of acetaminophen.     **If the pain you are having does not resolve, is severe, or is a flare of back pain you have had on other occasions prior to surgery, please contact your primary physician for further recommendations or for an appointment to be examined at their office.    Q:  Why am I having headaches?  A:  Headaches can be caused by many things:  caffeine withdrawal, use of pain meds, dehydration, high blood pressure, lack of sleep, over-activity/exhaustion, flare-up of usual migraine headaches.  If you feel this is related to muscle tension (a band-like feeling around the head, or a pressure at the low-back of the head) you may try ice or heat to this area.  You may need to drink more fluids (try electrolyte drink like  Gatorade), rest, or take your usual migraine medications.   **If your headaches do not resolve, worsen, are accompanied by other symptoms, or if your blood pressure is high, please call your primary physician for recommendation and/or examination.    Q:  I am unable to urinate.  What do I do?  A:  A small percentage of people can have difficulty urinating initially after surgery.  This includes being able to urinate only a very small amount at a time and feeling discomfort or pressure in the very low abdomen.  This is called  urinary retention , and is actually an urgent situation.  Proceed to your nearest Emergency department for evaluation (not an Urgent Care Center).  Sometimes the bladder does not work correctly after certain medications you receive during surgery, or related to certain procedures.  You may need to have a catheter placed until your bladder recovers.  When planning to go to an Emergency department, it may help to call the ER to let them know you are coming in for this problem after a surgery.  This may help you get in quicker to be evaluated.  **If you have symptoms of a urinary tract infection, please contact your primary physician for the proper evaluation and treatment.          If you have other questions, please call the office Monday thru Friday between 8am and 5pm to discuss with the nurse or physician assistant.  #[phone]    There is a surgeon ON CALL on weekday evenings and over the weekend in case of urgent need only, and may be contacted at the same number.    If you are having an emergency, call 911 or proceed to your nearest emergency department.    GENERAL ANESTHESIA OR SEDATION ADULT DISCHARGE INSTRUCTIONS   SPECIAL PRECAUTIONS FOR 24 HOURS AFTER SURGERY    IT IS NOT UNUSUAL TO FEEL LIGHT-HEADED OR FAINT, UP TO 24 HOURS AFTER SURGERY OR WHILE TAKING PAIN MEDICATION.  IF YOU HAVE THESE SYMPTOMS; SIT FOR A FEW MINUTES BEFORE STANDING AND HAVE SOMEONE ASSIST YOU WHEN YOU GET UP  TO WALK OR USE THE BATHROOM.    YOU SHOULD REST AND RELAX FOR THE NEXT 24 HOURS AND YOU MUST MAKE ARRANGEMENTS TO HAVE SOMEONE STAY WITH YOU FOR AT LEAST 24 HOURS AFTER YOUR DISCHARGE.  AVOID HAZARDOUS AND STRENUOUS ACTIVITIES.  DO NOT MAKE IMPORTANT DECISIONS FOR 24 HOURS.    DO NOT DRIVE ANY VEHICLE OR OPERATE MECHANICAL EQUIPMENT FOR 24 HOURS FOLLOWING THE END OF YOUR SURGERY.  EVEN THOUGH YOU MAY FEEL NORMAL, YOUR REACTIONS MAY BE AFFECTED BY THE MEDICATION YOU HAVE RECEIVED.    DO NOT DRINK ALCOHOLIC BEVERAGES FOR 24 HOURS FOLLOWING YOUR SURGERY.    DRINK CLEAR LIQUIDS (APPLE JUICE, GINGER ALE, 7-UP, BROTH, ETC.).  PROGRESS TO YOUR REGULAR DIET AS YOU FEEL ABLE.    YOU MAY HAVE A DRY MOUTH, A SORE THROAT, MUSCLES ACHES OR TROUBLE SLEEPING.  THESE SHOULD GO AWAY AFTER 24 HOURS.    CALL YOUR DOCTOR FOR ANY OF THE FOLLOWING:  SIGNS OF INFECTION (FEVER, GROWING TENDERNESS AT THE SURGERY SITE, A LARGE AMOUNT OF DRAINAGE OR BLEEDING, SEVERE PAIN, FOUL-SMELLING DRAINAGE, REDNESS OR SWELLING.    IT HAS BEEN OVER 8 TO 10 HOURS SINCE SURGERY AND YOU ARE STILL NOT ABLE TO URINATE (PASS WATER).

## 2018-03-30 NOTE — IP AVS SNAPSHOT
[facility] PreOP/PostOP    [address]    [facility] [address]    Phone:  [phone]    Fax:  [phone]                                       After Visit Summary   3/30/2018    Sarbjit Perez    MRN: [id number]           After Visit Summary Signature Page     I have received my discharge instructions, and my questions have been answered. I have discussed any challenges I see with this plan with the nurse or doctor.    ..........................................................................................................................................  Patient/Patient Representative Signature      ..........................................................................................................................................  Patient Representative Print Name and Relationship to Patient    ..................................................               ................................................  Date                                            Time    ..........................................................................................................................................  Reviewed by Signature/Title    ...................................................              ..............................................  Date                                                            Time

## 2018-03-30 NOTE — IP AVS SNAPSHOT
MRN:[id number]                      After Visit Summary   3/30/2018    Sarbjit Perez    MRN: [id number]           Thank you!     Thank you for choosing [facility] for your care. Our goal is always to provide you with excellent care. Hearing back from our patients is one way we can continue to improve our services. Please take a few minutes to complete the written survey that you may receive in the mail after you visit. If you would like to speak to someone directly about your visit please contact Patient Relations at [phone]. Thank you!          Patient Information     Date Of Birth          1990        About your hospital stay     You were admitted on:  March 30, 2018 You last received care in the:  [facility] PreOP/PostOP    You were discharged on:  March 30, 2018       Who to Call     For medical emergencies, please call 911.  For non-urgent questions about your medical care, please call your primary care provider or clinic, [phone]  For questions related to your surgery, please call your surgery clinic        Attending Provider     Provider Specialty    Eduard Amaral MD General Surgery       Primary Care Provider Office Phone # Fax #    Bill Galindo Mg PA-C [phone]      Further instructions from your care team       HOME CARE FOLLOWING INGUINAL/FEMORAL HERNIA REPAIR  SRINIVAS Spaulding, MYRA Salinas, CHERYL Aguilar    DIET:  No restrictions.  Increased fluid intake is recommended. While taking pain medications, increase dietary fiber or add a fiber supplementation like Metamucil or Citrucel to help prevent constipation - a possible side effect of pain medications.    NAUSEA:  If nauseated from the anesthetic/pain meds; rest in bed, get up cautiously with assistance, and drink clear liquids (juice, tea, broth).    ACTIVITY:  Light Activity -- you may immediately be up and about as tolerated.  Driving -- you may drive  Outside orders.    when comfortable and off narcotic pain medications.  Light Work -- resume when comfortable off pain medications.  (If you can drive, you probably can work.)  Strenuous Work/Activity -- limit lifting to 20 pounds for 3 weeks.  Active Sports (running, biking, etc.) -- cautiously resume after 4 weeks.    INCISIONAL CARE:    If you have a dressing in place, keep clean and dry for 48 hours; you may replace the gauze if it becomes soiled.    After 48 hours you may remove the dressing and shower.  Do not submerse incision in water for 1 week.    Sutures will absorb and need not be removed.    If present, leave the steri-strips (white paper tapes) in place for 14 days after surgery.    Expect a variable amount of swelling/black and blue discoloration that may involve the penis/scrotum or labia.    Some numbness around the incision is common.    A lump/ridge under the incision is normal and will gradually resolve.    DISCOMFORT:  Local anesthetic placed at surgery should provide relief for 4-8 hours.  Begin taking pain pills before discomfort is severe.  Take the pain medication with some food, when possible, to minimize side effects.  Intermittent use of ice packs to the hernia repair site may help during the first 1-3 weeks after surgery.  Expect gradual improvement.    Over-the-counter anti-inflammatory medications (i.e. Ibuprofen/Advil/Motrin or Naprosyn/Aleve) may be used per package instructions in addition to or while tapering off the narcotic pain medications to decrease swelling and sensitivity at the repair site.  DO NOT TAKE these Anti-inflammatory medications if your primary physician has advised against doing so, or if you have acid reflux, ulcer, or bleeding disorder, or take blood-thinner medications.  Call your primary physician or the surgery office if you have medication questions.    RETURN APPOINTMENT:  Schedule a follow-up visit 2-3 weeks post-op.  Office Phone:  [phone]     CONTACT US IF THE  FOLLOWING DEVELOPS:   1. A fever that is above 101     2. If there is a large amount of drainage, bleeding, or swelling.   3. Severe pain that is not relieved by your prescription.   4. Drainage that is thick, cloudy, yellow, green or white.   5. Any other questions not answered by  Frequently Asked Questions  sheet.      FREQUENTLY ASKED QUESTIONS:    Q:  How should my incision look?    A:  Normally your incision will appear slightly swollen with light redness directly along the incision itself as it heals.  It may feel like a bump or ridge as the healing/scarring happens, and over time (3-4 months) this bump or ridge feeling should slowly go away.  In general, clear or pink watery drainage can be normal at first as your incision heals, but should decrease over time.    Q:  How do I know if my incision is infected?  A:  Look at your incision for signs of infection, like redness around the incision spreading to surrounding skin, or drainage of cloudy or foul-smelling drainage.  If you feel warm, check your temperature to see if you are running a fever.    **If any of these things occur, please notify the nurse at our office.  We may need you to come into the office for an incision check.      Q:  How do I take care of my incision?  A:  If you have a dressing in place - Starting the day after surgery, replace the dressing 1-2 times a day until there is no further drainage from the incision.  At that time, a dressing is no longer needed.  Try to minimize tape on the skin if irritation is occurring at the tape sites.  If you have significant irritation from tape on the skin, please call the office to discuss other method of dressing your incision.    Small pieces of tape called  steri-strips  may be present directly overlying your incision; these may be removed 10 days after surgery unless otherwise specified by your surgeon.  If these tapes start to loosen at the ends, you may trim them back until they fall off or are  removed.      Q:  There is a piece of tape or a sticky  lead  still on my skin.  Can I remove this?  A:  Sometimes the sticky  leads  used for monitoring during surgery or for evaluation in the emergency department are not all removed while you are in the hospital.  These sometimes have a tab or metal dot on them.  You can easily remove these on your own, like taking off a band-aid.  If there is a gel substance under the  lead , simply wipe/clean it off with a washcloth or paper towel.      Q:  What can I do to minimize constipation (very hard stools, or lack of stools)?  A:  Stay well hydrated.  Increase your dietary fiber intake or take a fiber supplement -with plenty of water.  Walk around frequently.  You may consider an over-the-counter stool-softener.  Your Pharmacist can assist you with choosing one that is stocked at your pharmacy.  Constipation is also one of the most common side effects of pain medication.  If you are using pain medication, be pro-active and try to PREVENT problems with constipation by taking the steps above BEFORE constipation becomes a problem.    Q:  What do I do if I need more pain medications?  A:  Call the office to receive refills.  Be aware that certain pain meds cannot be called into a pharmacy and actually require a paper prescription.  A change may be made in your pain med as you progress thru your recovery period or if you have side effects to certain meds.    --Pain meds are NOT refilled after 5pm on weekdays, and NOT AT ALL on the weekends, so please look ahead to prevent problems.      Q:  Why am I having a hard time sleeping now that I am at home?  A:  Many medications you receive while you are in the hospital can impact your sleep for a number of days after your surgery/hospitalization.  Decreased level of activity and naps during the day may also make sleeping at night difficult.  Try to minimize day-time naps, and get up frequently during the day to walk around your home  during your recovery time.  Sleep aides may be of some help, but are not recommended for long-term use.      Q:  I am having some back discomfort.  What should I do?  A:  This may be related to certain positioning that was required for your surgery, extended periods of time in bed, or other changes in your overall activity level.  You may try ice, heat, acetaminophen, or ibuprofen to treat this temporarily.  Note that many pain medications have acetaminophen in them and would state this on the prescription bottle.  Be sure not to exceed the maximum of 4000mg per day of acetaminophen.     **If the pain you are having does not resolve, is severe, or is a flare of back pain you have had on other occasions prior to surgery, please contact your primary physician for further recommendations or for an appointment to be examined at their office.    Q:  Why am I having headaches?  A:  Headaches can be caused by many things:  caffeine withdrawal, use of pain meds, dehydration, high blood pressure, lack of sleep, over-activity/exhaustion, flare-up of usual migraine headaches.  If you feel this is related to muscle tension (a band-like feeling around the head, or a pressure at the low-back of the head) you may try ice or heat to this area.  You may need to drink more fluids (try electrolyte drink like Gatorade), rest, or take your usual migraine medications.   **If your headaches do not resolve, worsen, are accompanied by other symptoms, or if your blood pressure is high, please call your primary physician for recommendation and/or examination.    Q:  I am unable to urinate.  What do I do?  A:  A small percentage of people can have difficulty urinating initially after surgery.  This includes being able to urinate only a very small amount at a time and feeling discomfort or pressure in the very low abdomen.  This is called  urinary retention , and is actually an urgent situation.  Proceed to your nearest Emergency department for  evaluation (not an Urgent Care Center).  Sometimes the bladder does not work correctly after certain medications you receive during surgery, or related to certain procedures.  You may need to have a catheter placed until your bladder recovers.  When planning to go to an Emergency department, it may help to call the ER to let them know you are coming in for this problem after a surgery.  This may help you get in quicker to be evaluated.  **If you have symptoms of a urinary tract infection, please contact your primary physician for the proper evaluation and treatment.          If you have other questions, please call the office Monday thru Friday between 8am and 5pm to discuss with the nurse or physician assistant.  #[phone]    There is a surgeon ON CALL on weekday evenings and over the weekend in case of urgent need only, and may be contacted at the same number.    If you are having an emergency, call 911 or proceed to your nearest emergency department.    GENERAL ANESTHESIA OR SEDATION ADULT DISCHARGE INSTRUCTIONS   SPECIAL PRECAUTIONS FOR 24 HOURS AFTER SURGERY    IT IS NOT UNUSUAL TO FEEL LIGHT-HEADED OR FAINT, UP TO 24 HOURS AFTER SURGERY OR WHILE TAKING PAIN MEDICATION.  IF YOU HAVE THESE SYMPTOMS; SIT FOR A FEW MINUTES BEFORE STANDING AND HAVE SOMEONE ASSIST YOU WHEN YOU GET UP TO WALK OR USE THE BATHROOM.    YOU SHOULD REST AND RELAX FOR THE NEXT 24 HOURS AND YOU MUST MAKE ARRANGEMENTS TO HAVE SOMEONE STAY WITH YOU FOR AT LEAST 24 HOURS AFTER YOUR DISCHARGE.  AVOID HAZARDOUS AND STRENUOUS ACTIVITIES.  DO NOT MAKE IMPORTANT DECISIONS FOR 24 HOURS.    DO NOT DRIVE ANY VEHICLE OR OPERATE MECHANICAL EQUIPMENT FOR 24 HOURS FOLLOWING THE END OF YOUR SURGERY.  EVEN THOUGH YOU MAY FEEL NORMAL, YOUR REACTIONS MAY BE AFFECTED BY THE MEDICATION YOU HAVE RECEIVED.    DO NOT DRINK ALCOHOLIC BEVERAGES FOR 24 HOURS FOLLOWING YOUR SURGERY.    DRINK CLEAR LIQUIDS (APPLE JUICE, GINGER ALE, 7-UP, BROTH, ETC.).  PROGRESS  "TO YOUR REGULAR DIET AS YOU FEEL ABLE.    YOU MAY HAVE A DRY MOUTH, A SORE THROAT, MUSCLES ACHES OR TROUBLE SLEEPING.  THESE SHOULD GO AWAY AFTER 24 HOURS.    CALL YOUR DOCTOR FOR ANY OF THE FOLLOWING:  SIGNS OF INFECTION (FEVER, GROWING TENDERNESS AT THE SURGERY SITE, A LARGE AMOUNT OF DRAINAGE OR BLEEDING, SEVERE PAIN, FOUL-SMELLING DRAINAGE, REDNESS OR SWELLING.    IT HAS BEEN OVER 8 TO 10 HOURS SINCE SURGERY AND YOU ARE STILL NOT ABLE TO URINATE (PASS WATER).         Pending Results     No orders found from 3/28/2018 to 3/31/2018.            Admission Information     Date & Time Provider Department Dept. Phone    3/30/2018 Eduard Amaral MD [facility] PreOP/PostOP [phone]      Your Vitals Were     Blood Pressure Temperature Respirations Height Weight Pulse Oximetry    140/77 98.2  F (36.8  C) (Temporal) 16 1.854 m (6' 0.99\") 91.6 kg (202 lb) 98%    BMI (Body Mass Index)                   26.66 kg/m2           [facility] Information     [employer] gives you secure access to your electronic health record. If you see a primary care provider, you can also send messages to your care team and make appointments. If you have questions, please call your primary care clinic.  If you do not have a primary care provider, please call [phone] and they will assist you.        Care EveryWhere ID     This is your Care EveryWhere ID. This could be used by other organizations to access your [facility] medical records  [id number]        Equal Access to Services     GAIL BRADSHAW : Hadii mann tuttleo Soletyali, waaxda luqadaha, qaybta kaalmada tito, benji carranza. So [facility] [phone].    ATENCIÓN: Si habla denys, tiene a little disposición servicios gratuitos de asistencia lingüística. Llame al [phone].    We comply with applicable federal civil rights laws and [place] laws. We do not discriminate on the basis of race, color, national origin, age, disability, sex, sexual orientation, " or gender identity.               Review of your medicines      START taking        Dose / Directions    oxyCODONE IR 5 MG tablet   Commonly known as:  ROXICODONE   Used for:  Left inguinal hernia        Dose:  5-10 mg   Take 1-2 tablets (5-10 mg) by mouth every 3 hours as needed for pain or other (Moderate to Severe)   Quantity:  30 tablet   Refills:  0         CONTINUE these medicines which have NOT CHANGED        Dose / Directions    blood glucose calibration solution   Commonly known as:  no brand specified   Used for:  Type 2 diabetes mellitus with hyperosmolarity without coma, with long-term current use of insulin (H)        Use to calibrate blood glucose monitor as directed.   Quantity:  1 each   Refills:  1       blood glucose monitoring test strip   Commonly known as:  no brand specified   Used for:  Type 2 diabetes mellitus with hyperosmolarity without coma, with long-term current use of insulin (H)        Use to test blood sugar 2  times daily or as directed.   Quantity:  50 Box   Refills:  1       GLUCOSOURCE LANCETS Misc   Used for:  Type 2 diabetes mellitus with hyperosmolarity without coma, with long-term current use of insulin (H)        Dose:  1 each   1 each 2 times daily   Quantity:  1 each   Refills:  1       lisinopril 10 MG tablet   Commonly known as:  PRINIVIL/ZESTRIL   Used for:  Essential hypertension, benign        Dose:  10 mg   Take 1 tablet (10 mg) by mouth daily   Quantity:  90 tablet   Refills:  1            Where to get your medicines      Some of these will need a paper prescription and others can be bought over the counter. Ask your nurse if you have questions.     Bring a paper prescription for each of these medications     oxyCODONE IR 5 MG tablet                Protect others around you: Learn how to safely use, store and throw away your medicines at www.disposemymeds.org.        Information about OPIOIDS     PRESCRIPTION OPIOIDS: WHAT YOU NEED TO KNOW    Prescription opioids can  be used to help relieve moderate to severe pain and are often prescribed following a surgery or injury, or for certain health conditions. These medications can be an important part of treatment but also come with serious risks. It is important to work with your health care provider to make sure you are getting the safest, most effective care.    WHAT ARE THE RISKS AND SIDE EFFECTS OF OPIOID USE?  Prescription opioids carry serious risks of addiction and overdose, especially with prolonged use. An opioid overdose, often marked by slowed breathing can cause sudden death. The use of prescription opioids can have a number of side effects as well, even when taken as directed:      Tolerance - meaning you might need to take more of a medication for the same pain relief    Physical dependence - meaning you have symptoms of withdrawal when a medication is stopped    Increased sensitivity to pain    Constipation    Nausea, vomiting, and dry mouth    Sleepiness and dizziness    Confusion    Depression    Low levels of testosterone that can result in lower sex drive, energy, and strength    Itching and sweating    RISKS ARE GREATER WITH:    History of drug misuse, substance use disorder, or overdose    Mental health conditions (such as depression or anxiety)    Sleep apnea    Older age (65 years or older)    Pregnancy    Avoid alcohol while taking prescription opioids.   Also, unless specifically advised by your health care provider, medications to avoid include:    Benzodiazepines (such as Xanax or Valium)    Muscle relaxants (such as Soma or Flexeril)    Hypnotics (such as Ambien or Lunesta)    Other prescription opioids    KNOW YOUR OPTIONS:  Talk to your health care provider about ways to manage your pain that do not involve prescription opioids. Some of these options may actually work better and have fewer risks and side effects:    Pain relievers such as acetaminophen, ibuprofen, and naproxen    Some medications that are  also used for depression or seizures    Physical therapy and exercise    Cognitive behavioral therapy, a psychological, goal-directed approach, in which patients learn how to modify physical, behavioral, and emotional triggers of pain and stress    IF YOU ARE PRESCRIBED OPIOIDS FOR PAIN:    Never take opioids in greater amounts or more often than prescribed    Follow up with your primary health care provider and work together to create a plan on how to manage your pain.    Talk about ways to help manage your pain that do not involve prescription opioids    Talk about all concerns and side effects    Help prevent misuse and abuse    Never sell or share prescription opioids    Never use another person's prescription opioids    Store prescription opioids in a secure place and out of reach of others (this may include visitors, children, friends, and family)    Visit www.cdc.gov/drugoverdose to learn about risks of opioid abuse and overdose    If you believe you may be struggling with addiction, tell your health care provider and ask for guidance or call [facility]'s National Helpline at 1-227-753-HELP    LEARN MORE / www.cdc.gov/drugoverdose/prescribing/guideline.html    Safely dispose of unused prescription opioids: Find your local drug take-back programs and more information about the importance of safe disposal at www.doseofreality.mn.gov             Medication List: This is a list of all your medications and when to take them. Check marks below indicate your daily home schedule. Keep this list as a reference.      Medications           Morning Afternoon Evening Bedtime As Needed    blood glucose calibration solution   Commonly known as:  no brand specified   Use to calibrate blood glucose monitor as directed.                                blood glucose monitoring test strip   Commonly known as:  no brand specified   Use to test blood sugar 2  times daily or as directed.                                [employer]  LANCETS Misc   1 each 2 times daily                                lisinopril 10 MG tablet   Commonly known as:  PRINIVIL/ZESTRIL   Take 1 tablet (10 mg) by mouth daily                                oxyCODONE IR 5 MG tablet   Commonly known as:  ROXICODONE   Take 1-2 tablets (5-10 mg) by mouth every 3 hours as needed for pain or other (Moderate to Severe)   Last time this was given:  5 mg on 3/30/2018  9:22 AM

## 2018-03-30 NOTE — ANESTHESIA PREPROCEDURE EVALUATION
Anesthesia Evaluation     . Pt has had prior anesthetic.            ROS/MED HX    ENT/Pulmonary:       Neurologic:       Cardiovascular:     (+) hypertension----. : . . . :. .       METS/Exercise Tolerance:     Hematologic:         Musculoskeletal:         GI/Hepatic:     (+) Other GI/Hepatic h/o etoh, pancreatitis      Renal/Genitourinary:         Endo:     (+) type II DM .      Psychiatric:         Infectious Disease:         Malignancy:         Other:                     Physical Exam  Normal systems: cardiovascular and pulmonary    Airway   Mallampati: II  TM distance: >3 FB  Neck ROM: full    Dental     Cardiovascular       Pulmonary                     Anesthesia Plan      History & Physical Review  History and physical reviewed and following examination; no interval change.    ASA Status:  2 .    NPO Status:  > 8 hours    Plan for General and ETT with Intravenous and Propofol induction. Maintenance will be Balanced.    PONV prophylaxis:  Ondansetron (or other 5HT-3) and Dexamethasone or Solumedrol       Postoperative Care  Postoperative pain management:  IV analgesics.      Consents  Anesthetic plan, risks, benefits and alternatives discussed with:  Patient.  Use of blood products discussed: Yes.   Use of blood products discussed with Patient.  Consented to blood products.  .                          .

## 2018-03-30 NOTE — ANESTHESIA POSTPROCEDURE EVALUATION
Patient: Sarbjit Naslund    Procedure(s):  Left Inguinal hernia repair with Mesh  - Wound Class: I-Clean    Diagnosis:Inguinal hernia   Diagnosis Additional Information: Pre-operative diagnosis: Left inguinal hernia  Post-operative diagnosis: Left inguinal hernia, indirect   Procedure: Indirect left inguinal hernia repair with Ultrapro hernia system (large size)  Surgeon: Eduard Amaral MD  Assistant(s): eve Baldwin PA-C  A PA was medically necessary for their expertise in prepping, retracting, exposure, and suctioning.  Anesthesia: General   Estimated blood loss:  Complications: 5 cc  None  Specimens: * No specimens in log *  DESCRIPTION OF PROCEDURE:      Anesthesia Type:  General, ETT    Note:  Anesthesia Post Evaluation    Patient location during evaluation: PACU  Patient participation: Able to fully participate in evaluation  Level of consciousness: awake  Pain management: adequate  Airway patency: patent  Cardiovascular status: acceptable  Respiratory status: acceptable  Hydration status: acceptable  PONV: none     Anesthetic complications: None          Last vitals:  Vitals:    03/30/18 0905 03/30/18 0910 03/30/18 0915   BP: 142/89  140/77   Resp: 13 12 12   Temp:      SpO2: 100% 96% 97%         Electronically Signed By: Alex Granado MD  March 30, 2018  9:18 AM

## 2018-03-30 NOTE — ANESTHESIA CARE TRANSFER NOTE
Patient: Sarbjit Naslund    Procedure(s):  Left Inguinal hernia repair with Mesh  - Wound Class: I-Clean    Diagnosis: Inguinal hernia   Diagnosis Additional Information: No value filed.    Anesthesia Type:   General, ETT     Note:  Airway :Face Mask  Patient transferred to:PACU  Comments: Report and sign off to RN in PACU.  Good resps, skin pink, monitors on, VSS,  O2 via Face Tent.Handoff Report: Identifed the Patient, Identified the Reponsible Provider, Reviewed the pertinent medical history, Discussed the surgical course, Reviewed Intra-OP anesthesia mangement and issues during anesthesia, Set expectations for post-procedure period and Allowed opportunity for questions and acknowledgement of understanding      Vitals: (Last set prior to Anesthesia Care Transfer)    CRNA VITALS  3/30/2018 0827 - 3/30/2018 0903      3/30/2018             Pulse: 113    SpO2: 99 %    Resp Rate (observed): 12                Electronically Signed By: IGGY Mackey CRNA  March 30, 2018  9:03 AM

## 2018-04-16 ENCOUNTER — OFFICE VISIT (OUTPATIENT)
Dept: SURGERY | Facility: CLINIC | Age: 28
End: 2018-04-16
Payer: COMMERCIAL

## 2018-04-16 VITALS
DIASTOLIC BLOOD PRESSURE: 82 MMHG | HEART RATE: 80 BPM | SYSTOLIC BLOOD PRESSURE: 128 MMHG | WEIGHT: 200 LBS | HEIGHT: 73 IN | BODY MASS INDEX: 26.51 KG/M2

## 2018-04-16 DIAGNOSIS — Z09 SURGERY FOLLOW-UP: Primary | ICD-10-CM

## 2018-04-16 PROCEDURE — 99024 POSTOP FOLLOW-UP VISIT: CPT | Performed by: PHYSICIAN ASSISTANT

## 2018-04-16 NOTE — PROGRESS NOTES
2018    Re:  Sarbjit Perez   :  1990      Dear Dr. Mg,    I had the pleasure of seeing Sarbjit today in follow-up after his left inguinal  hernia repair with mesh on 3/30/2018 by Dr. Amaral. The patient tolerated the procedure well. Sarbjit is happy to report that his preoperative symptoms have improved. He is now tolerating a regular diet and having normal bowel movements. He denies abdominal pain today.    On exam, the patient's incision is healing well without signs of infection. A normal healing ridge is present, as expected. His abdomen is soft and nontender.     Sarbjit is recovering well postoperatively. We will be happy to see him in the future as needed. He was encouraged to call us with any questions or concerns.      Sincerely,           Cleopatra Lazo PA-C      Please route or send letter to:  Primary Care Provider (PCP)

## 2018-04-16 NOTE — MR AVS SNAPSHOT
"              After Visit Summary   4/16/2018    Sarbjit Perez    MRN: [id number]           Patient Information     Date Of Birth          1990        Visit Information        Provider Department      4/16/2018 1:30 PM Cleopatra Lazo PA-C [facility]      Today's Diagnoses     Surgery follow-up    -  1       Follow-ups after your visit        Who to contact     If you have questions or need follow up information about today's clinic visit or your schedule please contact [facility] directly at [phone].  Normal or non-critical lab and imaging results will be communicated to you by WemoLabhart, letter or phone within 4 business days after the clinic has received the results. If you do not hear from us within 7 days, please contact the clinic through Pockett or phone. If you have a critical or abnormal lab result, we will notify you by phone as soon as possible.  Submit refill requests through Mondokio or call your pharmacy and they will forward the refill request to us. Please allow 3 business days for your refill to be completed.          Additional Information About Your Visit        MyChart Information     Mondokio gives you secure access to your electronic health record. If you see a primary care provider, you can also send messages to your care team and make appointments. If you have questions, please call your primary care clinic.  If you do not have a primary care provider, please call [phone] and they will assist you.        Care EveryWhere ID     This is your Care EveryWhere ID. This could be used by other organizations to access your [facility] medical records  [id number]        Your Vitals Were     Pulse Height BMI (Body Mass Index)             80 1.854 m (6' 1\") 26.39 kg/m2          Blood Pressure from Last 3 Encounters:   04/16/18 128/82   03/30/18 139/88   03/20/18 119/73    Weight from Last 3 Encounters: "   04/16/18 90.7 kg (200 lb)   03/30/18 91.6 kg (202 lb)   03/20/18 93.9 kg (207 lb)              Today, you had the following     No orders found for display         Today's Medication Changes          These changes are accurate as of 4/16/18  1:44 PM.  If you have any questions, ask your nurse or doctor.               Stop taking these medicines if you haven't already. Please contact your care team if you have questions.     blood glucose calibration solution   Commonly known as:  no brand specified   Stopped by:  Cleopatra Lazo PA-C           blood glucose monitoring test strip   Commonly known as:  no brand specified   Stopped by:  Cleopatra Lazo PA-C           GLUCOSOURCE LANCETS Misc   Stopped by:  Cleopatra Lazo PA-C           oxyCODONE IR 5 MG tablet   Commonly known as:  ROXICODONE   Stopped by:  Cleopatra Lazo PA-C                    Primary Care Provider Office Phone # Fax #    Bill Medley MAYELA Mg [phone]  [phone]        Equal Access to Services     [facility]: Hadii aad ku hadasho Soomaali, waaxda luqadaha, qaybta kaalmada adeegyada, benji brown . So [facility] [place] [facility] [phone].    ATENCIÓN: Si habla español, tiene a little disposición servicios gratuitos de asistencia lingüística. Jalil al [phone].    We comply with applicable federal civil rights laws and [place] laws. We do not discriminate on the basis of race, color, national origin, age, disability, sex, sexual orientation, or gender identity.            Thank you!     Thank you for choosing [facility]  for your care. Our goal is always to provide you with excellent care. Hearing back from our patients is one way we can continue to improve our services. Please take a few minutes to complete the written survey that you may receive in the mail after your visit with us. Thank you!             Your Updated  Medication List - Protect others around you: Learn how to safely use, store and throw away your medicines at www.disposemymeds.org.          This list is accurate as of 4/16/18  1:44 PM.  Always use your most recent med list.                   Brand Name Dispense Instructions for use Diagnosis    lisinopril 10 MG tablet    PRINIVIL/ZESTRIL    90 tablet    Take 1 tablet (10 mg) by mouth daily    Essential hypertension, benign

## 2018-06-04 ENCOUNTER — MYC MEDICAL ADVICE (OUTPATIENT)
Dept: FAMILY MEDICINE | Facility: CLINIC | Age: 28
End: 2018-06-04

## 2018-06-08 ENCOUNTER — HOSPITAL ENCOUNTER (OUTPATIENT)
Dept: LAB | Facility: CLINIC | Age: 28
Discharge: HOME | End: 2018-06-08
Attending: PHYSICIAN ASSISTANT | Admitting: INTERNAL MEDICINE
Payer: COMMERCIAL

## 2018-06-08 ENCOUNTER — RADIANT APPOINTMENT (OUTPATIENT)
Dept: GENERAL RADIOLOGY | Facility: CLINIC | Age: 28
End: 2018-06-08
Attending: PHYSICIAN ASSISTANT
Payer: COMMERCIAL

## 2018-06-08 ENCOUNTER — TRANSFERRED RECORDS (OUTPATIENT)
Dept: HEALTH INFORMATION MANAGEMENT | Facility: CLINIC | Age: 28
End: 2018-06-08

## 2018-06-08 ENCOUNTER — OFFICE VISIT (OUTPATIENT)
Dept: FAMILY MEDICINE | Facility: CLINIC | Age: 28
End: 2018-06-08
Payer: COMMERCIAL

## 2018-06-08 VITALS
HEART RATE: 68 BPM | HEIGHT: 73 IN | DIASTOLIC BLOOD PRESSURE: 68 MMHG | BODY MASS INDEX: 25.71 KG/M2 | RESPIRATION RATE: 12 BRPM | TEMPERATURE: 98.3 F | WEIGHT: 194 LBS | SYSTOLIC BLOOD PRESSURE: 110 MMHG

## 2018-06-08 DIAGNOSIS — E11.9 DIABETES MELLITUS (H): ICD-10-CM

## 2018-06-08 DIAGNOSIS — M25.552 HIP PAIN, LEFT: ICD-10-CM

## 2018-06-08 DIAGNOSIS — Z86.39 HX OF DIABETES MELLITUS: ICD-10-CM

## 2018-06-08 DIAGNOSIS — I10 ESSENTIAL HYPERTENSION, MALIGNANT: Primary | ICD-10-CM

## 2018-06-08 DIAGNOSIS — M25.552 HIP PAIN, LEFT: Primary | ICD-10-CM

## 2018-06-08 DIAGNOSIS — I10 ESSENTIAL HYPERTENSION, BENIGN: ICD-10-CM

## 2018-06-08 DIAGNOSIS — R80.9 MICROALBUMINURIA: ICD-10-CM

## 2018-06-08 DIAGNOSIS — F10.21 ALCOHOL DEPENDENCE IN REMISSION (H): ICD-10-CM

## 2018-06-08 LAB
ALBUMIN SERPL-MCNC: 4.2 G/DL (ref 3.4–5)
ALBUMIN UR-MCNC: 100 MG/DL
ANION GAP SERPL CALCULATED.3IONS-SCNC: 11 MMOL/L (ref 3–14)
APPEARANCE UR: CLEAR
BILIRUB UR QL STRIP: NEGATIVE
BUN SERPL-MCNC: 15 MG/DL (ref 7–30)
CALCIUM SERPL-MCNC: 9.3 MG/DL (ref 8.5–10.1)
CHLORIDE SERPL-SCNC: 103 MMOL/L (ref 94–109)
CO2 SERPL-SCNC: 26 MMOL/L (ref 20–32)
COLOR UR AUTO: YELLOW
CREAT SERPL-MCNC: 0.92 MG/DL (ref 0.66–1.25)
CREAT UR-MCNC: 200 MG/DL
GFR SERPL CREATININE-BSD FRML MDRD: >90 ML/MIN/1.7M2
GLUCOSE SERPL-MCNC: 76 MG/DL (ref 70–99)
GLUCOSE UR STRIP-MCNC: NEGATIVE MG/DL
HGB UR QL STRIP: ABNORMAL
KETONES UR STRIP-MCNC: 80 MG/DL
LEUKOCYTE ESTERASE UR QL STRIP: NEGATIVE
NITRATE UR QL: NEGATIVE
PH UR STRIP: 6 PH (ref 5–7)
PHOSPHATE SERPL-MCNC: 3.2 MG/DL (ref 2.5–4.5)
POTASSIUM SERPL-SCNC: 4.1 MMOL/L (ref 3.4–5.3)
PROT UR-MCNC: 1.2 G/L
PROT/CREAT 24H UR: 0.6 G/G CR (ref 0–0.2)
RBC #/AREA URNS AUTO: 8 /HPF (ref 0–2)
SODIUM SERPL-SCNC: 140 MMOL/L (ref 133–144)
SOURCE: ABNORMAL
SP GR UR STRIP: 1.02 (ref 1–1.03)
URATE SERPL-MCNC: 5.9 MG/DL (ref 3.5–7.2)
UROBILINOGEN UR STRIP-MCNC: NORMAL MG/DL (ref 0–2)
WBC #/AREA URNS AUTO: 2 /HPF (ref 0–5)
YEAST #/AREA URNS HPF: ABNORMAL /HPF

## 2018-06-08 PROCEDURE — 99214 OFFICE O/P EST MOD 30 MIN: CPT | Performed by: PHYSICIAN ASSISTANT

## 2018-06-08 PROCEDURE — 36415 COLL VENOUS BLD VENIPUNCTURE: CPT | Performed by: INTERNAL MEDICINE

## 2018-06-08 PROCEDURE — 84156 ASSAY OF PROTEIN URINE: CPT | Performed by: INTERNAL MEDICINE

## 2018-06-08 PROCEDURE — 84550 ASSAY OF BLOOD/URIC ACID: CPT | Performed by: INTERNAL MEDICINE

## 2018-06-08 PROCEDURE — 73502 X-RAY EXAM HIP UNI 2-3 VIEWS: CPT

## 2018-06-08 PROCEDURE — 81001 URINALYSIS AUTO W/SCOPE: CPT | Performed by: INTERNAL MEDICINE

## 2018-06-08 PROCEDURE — 80069 RENAL FUNCTION PANEL: CPT | Performed by: INTERNAL MEDICINE

## 2018-06-08 RX ORDER — NAPROXEN 500 MG/1
500 TABLET ORAL 2 TIMES DAILY WITH MEALS
Qty: 60 TABLET | Refills: 1 | Status: SHIPPED | OUTPATIENT
Start: 2018-06-08 | End: 2019-10-04

## 2018-06-08 NOTE — PATIENT INSTRUCTIONS
Tendonitis  A tendon is the thick fibrous cord that joins muscle to bone and allows joints to move. When a tendon becomes inflamed, it is called tendonitis. This can occur from overuse, injury, or infection. This usually involves the shoulders, forearm, wrist, hands and foot. Symptoms include pain, swelling and tenderness to the touch. Moving the joint increases the pain.  It takes 4 to 6 weeks for tendonitis to heal. It is treated by preventing motion of the tendon with a splint or brace and the use of anti-inflammatory medicine.  Home care    Some people find relief with ice packs. These can be crushed or cubed ice in a plastic bag or a bag of frozen vegetables wrapped in a thin towel. Other people get better relief with heat. This can include a hot shower, hot bath, or a moist towel warmed in a microwave. Try each and use the method that feels best, for 15 to 20 minutes several times a day.    Rest the inflamed joint and protect it from movement.    You may use over-the-counter ibuprofen or naproxen to treat pain and inflammation, unless another medicine was prescribed. If you can't take these medicines, acetaminophen may help with the pain, but does not treat inflammation. If you have chronic liver or kidney disease or ever had a stomach ulcer or gastrointestinal bleeding, talk with your doctor before using these medicines.    As your symptoms improve, begin gradual motion at the involved joint.  Follow-up care  Follow up with your healthcare provider if you are not improving after 5 days of treatment.  When to seek medical advice  Call your healthcare provider right away if any of these occur:    Redness over the painful area    Increasing pain or swelling at the joint    Fever (1 degree above your normal temperature) lasting 24 to 48 hours Or, whatever your healthcare provider told you to report based on your condition  Date Last Reviewed: 11/21/2015 2000-2017 [employer]. [address], [place] [address]. All rights reserved. This information is not intended as a substitute for professional medical care. Always follow your healthcare professional's instructions.

## 2018-06-08 NOTE — PROGRESS NOTES
SUBJECTIVE:   Sarbjit Perez is a 28 year old male who presents to clinic today for the following health issues:      Joint Pain    Onset: x 2 months      Description:   Location: left hip  Character: Sharp    Intensity: moderate    Progression of Symptoms: worse - last week    Accompanying Signs & Symptoms:  Other symptoms: radiation of pain to knee    History:   Previous similar pain: no       Precipitating factors:   Trauma or overuse: no     Alleviating factors:  Improved by: nothing    Worse after sleeping and going to standing and also with long sitting and going to standing. Improves with activity after seconds.     Therapies Tried and outcome: tylenol - no relief      Of note, did have left inguinal hernia surgery 3 months prior.       Problem list and histories reviewed & adjusted, as indicated.  Additional history: as documented    Patient Active Problem List   Diagnosis     Hx of diabetes mellitus     Alcohol dependence in remission (H)     Microalbuminuria     Essential hypertension, benign     Past Surgical History:   Procedure Laterality Date     HERNIORRHAPHY INGUINAL Left 3/30/2018    Procedure: HERNIORRHAPHY INGUINAL;  Left Inguinal hernia repair with Mesh ;  Surgeon: Eduard Amaral MD;  Location: [facility] OR     wisdom teeth         Social History   Substance Use Topics     Smoking status: Never Smoker     Smokeless tobacco: Never Used     Alcohol use No     Family History   Problem Relation Age of Onset     Breast Cancer Mother      Hypertension Father      Type 2 Diabetes Paternal Grandfather      Hypertension Paternal Grandfather      DIABETES Paternal Grandfather      Other Cancer Maternal Grandmother          Current Outpatient Prescriptions   Medication Sig Dispense Refill     lisinopril (PRINIVIL/ZESTRIL) 10 MG tablet Take 1 tablet (10 mg) by mouth daily 90 tablet 1     naproxen (NAPROSYN) 500 MG tablet Take 1 tablet (500 mg) by mouth 2 times daily (with meals) 60 tablet 1     No Known  "Allergies  BP Readings from Last 3 Encounters:   06/08/18 110/68   04/16/18 128/82   03/30/18 139/88    Wt Readings from Last 3 Encounters:   06/08/18 194 lb (88 kg)   04/16/18 200 lb (90.7 kg)   03/30/18 202 lb (91.6 kg)                    Reviewed and updated as needed this visit by clinical staff  Tobacco  Allergies  Meds  Problems  Med Hx  Surg Hx  Fam Hx  Soc Hx        Reviewed and updated as needed this visit by Provider  Allergies  Meds  Problems         ROS:  Constitutional, HEENT, cardiovascular, pulmonary, gi and gu systems are negative, except as otherwise noted.    OBJECTIVE:     /68 (BP Location: Right arm, Patient Position: Chair, Cuff Size: Adult Large)  Pulse 68  Temp 98.3  F (36.8  C) (Oral)  Resp 12  Ht 6' 1\" (1.854 m)  Wt 194 lb (88 kg)  BMI 25.6 kg/m2  Body mass index is 25.6 kg/(m^2).  GENERAL APPEARANCE: healthy, alert and no distress   (male): testicles normal without atrophy or masses and no hernias  ORTHO: left Hip Exam: Palpation: Tender:   left iliac crest at origin of sartorius   Non-tender:  left greater trochanter, left gluteus medius, left proximal hamstring attachment  Range of Motion:  Left Hip  flexion   full, extension  full, external rotation  full, internal rotation  decreased, painful, adduction  full, abduction  full  Strength:  full strength  Special tests:  no IT band tightness, EMILIANA negative     L Knee Exam: Inspection: AP/lateral alignment normal  Tender: none   Non-tender: lateral patellar facet, medial patellar facet, inferior pole patella, patella tendon, MCL, LCL, lateral joint line, medial joint line, prepatellar bursa, popliteal region, pes anserine bursa  Active Range of Motion: full flexion and extension  Strength: quad  5/5 and Hamstrings  5/5  Special tests: normal Valgus stress test, normal Varus, no apprehension with lateral stress of the patella    Lumber/Thoracic Spine Exam: Tender:  Mild over left SI joint  Non-tender:  lumbar spinous " processes, lumbar facet joints, left para lumbar muscles, right para lumbar muscles, right SI joint, left sciatic notch, right sciatic notch  Range of Motion:  lumbar flexion  full, lumbar extension  full, left lateral lumbar bending  full, right lateral lumbar bending  full, left lateral lumbar rotation  full, right lateral lumbar rotation  full  Strength:  able to heel walk, able to toe walk, gastrocsoleus   5/5, hamstrings  5/5, quadriceps  5/5, tibialis anterior  5/5      PSYCH: mentation appears normal and affect normal/bright    Diagnostic Test Results:  Xray - left hip and pelvis two views: negative     ASSESSMENT/PLAN:       (M25.552) Hip pain, left  (primary encounter diagnosis)  Comment: radiographs negative or bony/arthritic changes. Given description and exam, possible tendonitis of hip flexor. Recommending scheduled nsaids and physical therapy given course length. If symptoms fail to improve in 2-4 weeks, will have see ortho. Of note, this is same side of previous inguinal hernia repair. It is also possible this may be due to scar tissue build up. No recurrent herniation present.   Plan: XR Pelvis w Hip Left G/E 2 Views, naproxen         (NAPROSYN) 500 MG tablet, [facility] PT, HAND, AND         CHIROPRACTIC REFERRAL              Follow up: as above     Bill Mg PA-C  [facility]

## 2018-06-08 NOTE — MR AVS SNAPSHOT
After Visit Summary   6/8/2018    Sarbjit Perez    MRN: [id number]           Patient Information     Date Of Birth          1990        Visit Information        Provider Department      6/8/2018 10:30 AM Bill Mg PA-C [facility]        Today's Diagnoses     Hip pain, left    -  1      Care Instructions      Tendonitis  A tendon is the thick fibrous cord that joins muscle to bone and allows joints to move. When a tendon becomes inflamed, it is called tendonitis. This can occur from overuse, injury, or infection. This usually involves the shoulders, forearm, wrist, hands and foot. Symptoms include pain, swelling and tenderness to the touch. Moving the joint increases the pain.  It takes 4 to 6 weeks for tendonitis to heal. It is treated by preventing motion of the tendon with a splint or brace and the use of anti-inflammatory medicine.  Home care    Some people find relief with ice packs. These can be crushed or cubed ice in a plastic bag or a bag of frozen vegetables wrapped in a thin towel. Other people get better relief with heat. This can include a hot shower, hot bath, or a moist towel warmed in a microwave. Try each and use the method that feels best, for 15 to 20 minutes several times a day.    Rest the inflamed joint and protect it from movement.    You may use over-the-counter ibuprofen or naproxen to treat pain and inflammation, unless another medicine was prescribed. If you can't take these medicines, acetaminophen may help with the pain, but does not treat inflammation. If you have chronic liver or kidney disease or ever had a stomach ulcer or gastrointestinal bleeding, talk with your doctor before using these medicines.    As your symptoms improve, begin gradual motion at the involved joint.  Follow-up care  Follow up with your healthcare provider if you are not improving after 5 days of treatment.  When to seek medical advice  Call your healthcare  provider right away if any of these occur:    Redness over the painful area    Increasing pain or swelling at the joint    Fever (1 degree above your normal temperature) lasting 24 to 48 hours Or, whatever your healthcare provider told you to report based on your condition  Date Last Reviewed: 11/21/2015 2000-2017 [employer]. [address] [place] [address]. All rights reserved. This information is not intended as a substitute for professional medical care. Always follow your healthcare professional's instructions.                Follow-ups after your visit        Additional Services     [facility] PT, HAND, AND CHIROPRACTIC REFERRAL       **This order will print in the [facility] Scheduling Office**    Physical Therapy, Hand Therapy and Chiropractic Care are available through:    *[facility]  *[facility]  *[facility]    Call one number to schedule at any of the above locations: [phone].    Your provider has referred you to: Physical Therapy at [facility] or [facility]    Indication/Reason for Referral: left hip pain  Onset of Illness: 2 months  Therapy Orders: Evaluate and Treat  Special Programs: None  Special Request: Dulce Vasquez      Additional Comments for the Therapist or Chiropractor:     Please be aware that coverage of these services is subject to the terms and limitations of your health insurance plan.  Call member services at your health plan with any benefit or coverage questions.      Please bring the following to your appointment:    *Your personal calendar for scheduling future appointments  *Comfortable clothing                  Who to contact     If you have questions or need follow up information about today's clinic visit or your schedule please contact [facility] directly at [phone].  Normal or non-critical lab and imaging results will be communicated to you by MyChart, letter or phone within 4  "business days after the clinic has received the results. If you do not hear from us within 7 days, please contact the clinic through Naytev or phone. If you have a critical or abnormal lab result, we will notify you by phone as soon as possible.  Submit refill requests through Naytev or call your pharmacy and they will forward the refill request to us. Please allow 3 business days for your refill to be completed.          Additional Information About Your Visit        Naytev Information     Naytev gives you secure access to your electronic health record. If you see a primary care provider, you can also send messages to your care team and make appointments. If you have questions, please call your primary care clinic.  If you do not have a primary care provider, please call [phone] and they will assist you.        Care EveryWhere ID     This is your Care EveryWhere ID. This could be used by other organizations to access your [facility] medical records  [id number]        Your Vitals Were     Pulse Temperature Respirations Height BMI (Body Mass Index)       68 98.3  F (36.8  C) (Oral) 12 6' 1\" (1.854 m) 25.6 kg/m2        Blood Pressure from Last 3 Encounters:   06/08/18 110/68   04/16/18 128/82   03/30/18 139/88    Weight from Last 3 Encounters:   06/08/18 194 lb (88 kg)   04/16/18 200 lb (90.7 kg)   03/30/18 202 lb (91.6 kg)              We Performed the Following     [facility] PT, HAND, AND CHIROPRACTIC REFERRAL          Today's Medication Changes          These changes are accurate as of 6/8/18 11:21 AM.  If you have any questions, ask your nurse or doctor.               Start taking these medicines.        Dose/Directions    naproxen 500 MG tablet   Commonly known as:  NAPROSYN   Used for:  Hip pain, left   Started by:  Bill Mg PA-C        Dose:  500 mg   Take 1 tablet (500 mg) by mouth 2 times daily (with meals)   Quantity:  60 tablet   Refills:  1            Where to get your medicines      These " medications were sent to [facility]/pharmacy #[id number] - [place], [place] - [address]  [address] [facility], [facility] [address]     Phone:  [phone]     naproxen 500 MG tablet                Primary Care Provider Office Phone # Fax #    Bill Galindo Mg PA-C [phone] [address]  [facility] [address]        Equal Access to Services     [facility] : Hadii aad ku hadasho Soomaali, waaxda luqadaha, qaybta kaalmada adeegyada, waxay idiin hayaan adeeg khrubash lasuzie . So [facility] [phone].    ATENCIÓN: Si habla espelizabeth, tiene a little disposición servicios gratuitos de asistencia lingüística. [facility] [phone].    We comply with applicable federal civil rights laws and [place] laws. We do not discriminate on the basis of race, color, national origin, age, disability, sex, sexual orientation, or gender identity.            Thank you!     Thank you for choosing [facility]  for your care. Our goal is always to provide you with excellent care. Hearing back from our patients is one way we can continue to improve our services. Please take a few minutes to complete the written survey that you may receive in the mail after your visit with us. Thank you!             Your Updated Medication List - Protect others around you: Learn how to safely use, store and throw away your medicines at www.disposemymeds.org.          This list is accurate as of 6/8/18 11:21 AM.  Always use your most recent med list.                   Brand Name Dispense Instructions for use Diagnosis    lisinopril 10 MG tablet    PRINIVIL/ZESTRIL    90 tablet    Take 1 tablet (10 mg) by mouth daily    Essential hypertension, benign       naproxen 500 MG tablet    NAPROSYN    60 tablet    Take 1 tablet (500 mg) by mouth 2 times daily (with meals)    Hip pain, left

## 2018-06-17 ENCOUNTER — HOSPITAL ENCOUNTER (EMERGENCY)
Facility: CLINIC | Age: 28
Discharge: HOME | End: 2018-06-17
Attending: EMERGENCY MEDICINE | Admitting: EMERGENCY MEDICINE
Payer: COMMERCIAL

## 2018-06-17 ENCOUNTER — APPOINTMENT (OUTPATIENT)
Dept: CT IMAGING | Facility: CLINIC | Age: 28
End: 2018-06-17
Attending: EMERGENCY MEDICINE
Payer: COMMERCIAL

## 2018-06-17 VITALS
OXYGEN SATURATION: 97 % | TEMPERATURE: 97.9 F | SYSTOLIC BLOOD PRESSURE: 142 MMHG | BODY MASS INDEX: 25.31 KG/M2 | WEIGHT: 191 LBS | RESPIRATION RATE: 16 BRPM | DIASTOLIC BLOOD PRESSURE: 88 MMHG | HEIGHT: 73 IN | HEART RATE: 100 BPM

## 2018-06-17 DIAGNOSIS — M87.052 AVASCULAR NECROSIS OF LEFT FEMORAL HEAD (H): ICD-10-CM

## 2018-06-17 LAB
ALBUMIN UR-MCNC: 30 MG/DL
ANION GAP SERPL CALCULATED.3IONS-SCNC: 10 MMOL/L (ref 3–14)
APPEARANCE UR: CLEAR
BASOPHILS # BLD AUTO: 0 10E9/L (ref 0–0.2)
BASOPHILS NFR BLD AUTO: 0.4 %
BILIRUB UR QL STRIP: NEGATIVE
BUN SERPL-MCNC: 18 MG/DL (ref 7–30)
CALCIUM SERPL-MCNC: 9.7 MG/DL (ref 8.5–10.1)
CHLORIDE SERPL-SCNC: 105 MMOL/L (ref 94–109)
CO2 SERPL-SCNC: 24 MMOL/L (ref 20–32)
COLOR UR AUTO: YELLOW
CREAT SERPL-MCNC: 0.98 MG/DL (ref 0.66–1.25)
DIFFERENTIAL METHOD BLD: ABNORMAL
EOSINOPHIL # BLD AUTO: 0.1 10E9/L (ref 0–0.7)
EOSINOPHIL NFR BLD AUTO: 0.9 %
ERYTHROCYTE [DISTWIDTH] IN BLOOD BY AUTOMATED COUNT: 13.5 % (ref 10–15)
GFR SERPL CREATININE-BSD FRML MDRD: >90 ML/MIN/1.7M2
GLUCOSE SERPL-MCNC: 85 MG/DL (ref 70–99)
GLUCOSE UR STRIP-MCNC: NEGATIVE MG/DL
HCT VFR BLD AUTO: 39.2 % (ref 40–53)
HGB BLD-MCNC: 12.9 G/DL (ref 13.3–17.7)
HGB UR QL STRIP: ABNORMAL
IMM GRANULOCYTES # BLD: 0 10E9/L (ref 0–0.4)
IMM GRANULOCYTES NFR BLD: 0.3 %
KETONES UR STRIP-MCNC: 5 MG/DL
LEUKOCYTE ESTERASE UR QL STRIP: NEGATIVE
LYMPHOCYTES # BLD AUTO: 1.4 10E9/L (ref 0.8–5.3)
LYMPHOCYTES NFR BLD AUTO: 14.7 %
MCH RBC QN AUTO: 28.4 PG (ref 26.5–33)
MCHC RBC AUTO-ENTMCNC: 32.9 G/DL (ref 31.5–36.5)
MCV RBC AUTO: 86 FL (ref 78–100)
MONOCYTES # BLD AUTO: 0.6 10E9/L (ref 0–1.3)
MONOCYTES NFR BLD AUTO: 6.1 %
MUCOUS THREADS #/AREA URNS LPF: PRESENT /LPF
NEUTROPHILS # BLD AUTO: 7.2 10E9/L (ref 1.6–8.3)
NEUTROPHILS NFR BLD AUTO: 77.6 %
NITRATE UR QL: NEGATIVE
NRBC # BLD AUTO: 0 10*3/UL
NRBC BLD AUTO-RTO: 0 /100
PH UR STRIP: 6 PH (ref 5–7)
PLATELET # BLD AUTO: 178 10E9/L (ref 150–450)
POTASSIUM SERPL-SCNC: 3.8 MMOL/L (ref 3.4–5.3)
RBC # BLD AUTO: 4.55 10E12/L (ref 4.4–5.9)
RBC #/AREA URNS AUTO: 3 /HPF (ref 0–2)
SODIUM SERPL-SCNC: 139 MMOL/L (ref 133–144)
SOURCE: ABNORMAL
SP GR UR STRIP: 1.01 (ref 1–1.03)
UROBILINOGEN UR STRIP-MCNC: 0 MG/DL (ref 0–2)
WBC # BLD AUTO: 9.2 10E9/L (ref 4–11)
WBC #/AREA URNS AUTO: 1 /HPF (ref 0–5)

## 2018-06-17 PROCEDURE — 80048 BASIC METABOLIC PNL TOTAL CA: CPT | Performed by: EMERGENCY MEDICINE

## 2018-06-17 PROCEDURE — 99285 EMERGENCY DEPT VISIT HI MDM: CPT | Mod: 25

## 2018-06-17 PROCEDURE — 81001 URINALYSIS AUTO W/SCOPE: CPT | Performed by: EMERGENCY MEDICINE

## 2018-06-17 PROCEDURE — 96374 THER/PROPH/DIAG INJ IV PUSH: CPT

## 2018-06-17 PROCEDURE — 25000128 H RX IP 250 OP 636: Performed by: EMERGENCY MEDICINE

## 2018-06-17 PROCEDURE — 85025 COMPLETE CBC W/AUTO DIFF WBC: CPT | Performed by: EMERGENCY MEDICINE

## 2018-06-17 PROCEDURE — 74176 CT ABD & PELVIS W/O CONTRAST: CPT

## 2018-06-17 PROCEDURE — 96375 TX/PRO/DX INJ NEW DRUG ADDON: CPT

## 2018-06-17 PROCEDURE — 96376 TX/PRO/DX INJ SAME DRUG ADON: CPT

## 2018-06-17 RX ORDER — KETOROLAC TROMETHAMINE 15 MG/ML
15 INJECTION, SOLUTION INTRAMUSCULAR; INTRAVENOUS ONCE
Status: COMPLETED | OUTPATIENT
Start: 2018-06-17 | End: 2018-06-17

## 2018-06-17 RX ORDER — HYDROMORPHONE HYDROCHLORIDE 1 MG/ML
0.5 INJECTION, SOLUTION INTRAMUSCULAR; INTRAVENOUS; SUBCUTANEOUS
Status: COMPLETED | OUTPATIENT
Start: 2018-06-17 | End: 2018-06-17

## 2018-06-17 RX ORDER — HYDROCODONE BITARTRATE AND ACETAMINOPHEN 5; 325 MG/1; MG/1
TABLET ORAL
Qty: 18 TABLET | Refills: 0 | Status: SHIPPED | OUTPATIENT
Start: 2018-06-17 | End: 2019-10-04

## 2018-06-17 RX ADMIN — KETOROLAC TROMETHAMINE 15 MG: 15 INJECTION, SOLUTION INTRAMUSCULAR; INTRAVENOUS at 15:13

## 2018-06-17 RX ADMIN — Medication 0.5 MG: at 16:19

## 2018-06-17 RX ADMIN — Medication 0.5 MG: at 15:13

## 2018-06-17 ASSESSMENT — ENCOUNTER SYMPTOMS
NAUSEA: 0
WEAKNESS: 0
COUGH: 0
VOMITING: 0
NUMBNESS: 0
HEMATURIA: 0
FEVER: 0

## 2018-06-17 NOTE — DISCHARGE INSTRUCTIONS
For pain: take naproxen according to package directions, with food. For pain beyond that, you can use the prescribed medication as instructed for uncontrolled pain. If you use this medication, you should not drive or perform other activities that require full attention as this medication may make you drowsy. All opiate pain medications are potentially habit forming and you should only use the minimum amount necessary to control your pain.    While taking any opiate pain medication (such as oxycodone, hydrocodone, hydromorphone, Vicodin, Percocet, etc) you can develop constipation. If you develop constipation, take over the counter Miralax daily according to package directions.      Understanding Osteonecrosis   Osteonecrosis is a disease of the bones. It s when blood flow to a bone is slowed or cut off. The cells in the bone start to die from a lack of blood. Over time, the bone breaks down. You may develop arthritis and have pain and trouble moving.   Osteonecrosis is most often found in the hip, knee, or shoulder. But it can affect any bone in the body, even the jaw.   What causes osteonecrosis?  There are many possible causes of osteonecrosis. These are:    Certain health problems, such as lupus, sickle-cell disease, or leukemia    Trauma, such as a broken bone    Alcohol abuse    Radiation treatment for cancer    High doses of some steroids   Osteonecrosis of the jaw is often caused by taking bisphosphonates. These medicines treat cancer and osteoporosis. Taking good care of your teeth can help prevent it.  Symptoms of osteonecrosis  The main symptom is pain in the affected bone. Once you feel pain, the bone has already started to decay. At first, you may feel the pain only when you move the bone. In time, it may hurt even when you are resting. The pain may make it hard to move. If you have osteonecrosis in your hip, you may start to limp. If it s in the jaw, you may have tooth loss, infection, pain, and  swelling.   Treatment for osteonecrosis  Treatment works best when it is started during the early stages of the disease. But often osteonecrosis is found late, after you already feel pain. So treatment focuses on slowing down bone loss and lessening pain. It may include:     Limits on movement. If you have osteonecrosis in a lower limb, you may feel better if you don t put weight on the bone. You may need crutches or bed rest.    Pain relievers. These medicines may ease pain and help keep you active.    Other medicines. Depending upon the cause of your osteonecrosis, medicines such as statins or blood thinners may slow bone loss. Or you may have to stop taking a certain medicine if it is the cause of the disease. People with osteonecrosis of the jaw may need to stop using bisphosphonates. They may instead take antibiotics and use a medicated mouthwash.    Surgery. Removing parts of the bone or replacing it with a graft may restore blood flow and lessen pain. In some cases, you may need joint replacement surgery.   Possible complications of osteonecrosis   Complications depend on what part of the bone is affected, how large an area is involved, and how well the bone rebuilds itself.  Some common complications include:     Arthritis    Loss of movement    Chronic pain      When to call your healthcare provider   Call your healthcare provider right away if you have any of these:    Fever of 100.4 F (38 C) or higher, or as directed by your healthcare provider    Redness, swelling, or fluid leaking from a surgical incision or wound that gets worse    Pain that gets worse    Symptoms that don t get better, or get worse    New symptoms   Date Last Reviewed: 9/1/2017 2000-2017 [employer]. [address] [place] [address], [place] [address]. All rights reserved. This information is not intended as a substitute for professional medical care. Always follow your healthcare professional's instructions.

## 2018-06-17 NOTE — ED AVS SNAPSHOT
[facility] Emergency Department    [address] [facility] [address]    Phone:  [phone]    Fax:  [phone]                                       Sarbjit Perez   MRN: [id number]    Department:  [facility]   Date of Visit:  6/17/2018           Patient Information     Date Of Birth          1990        Your diagnoses for this visit were:     Avascular necrosis of left femoral head (H)        You were seen by Galindo Lyon MD and Sonja Nassar MD.      Follow-up Information     Follow up with [facility]. Schedule an appointment as soon as possible for a visit in 1 day.    Why:  With a hip specialist for further management    Contact information:    [address], [address] [address]  [phone]          Go to [facility] Emergency Department.    Specialty:  EMERGENCY MEDICINE    Why:  As needed, If symptoms worsen    Contact information:    [address] [facility] [id number] [phone]        Discharge Instructions       For pain: take naproxen according to package directions, with food. For pain beyond that, you can use the prescribed medication as instructed for uncontrolled pain. If you use this medication, you should not drive or perform other activities that require full attention as this medication may make you drowsy. All opiate pain medications are potentially habit forming and you should only use the minimum amount necessary to control your pain.    While taking any opiate pain medication (such as oxycodone, hydrocodone, hydromorphone, Vicodin, Percocet, etc) you can develop constipation. If you develop constipation, take over the counter Miralax daily according to package directions.      Understanding Osteonecrosis   Osteonecrosis is a disease of the bones. It s when blood flow to a bone is slowed or cut off. The cells in the bone start to die from a  lack of blood. Over time, the bone breaks down. You may develop arthritis and have pain and trouble moving.   Osteonecrosis is most often found in the hip, knee, or shoulder. But it can affect any bone in the body, even the jaw.   What causes osteonecrosis?  There are many possible causes of osteonecrosis. These are:    Certain health problems, such as lupus, sickle-cell disease, or leukemia    Trauma, such as a broken bone    Alcohol abuse    Radiation treatment for cancer    High doses of some steroids   Osteonecrosis of the jaw is often caused by taking bisphosphonates. These medicines treat cancer and osteoporosis. Taking good care of your teeth can help prevent it.  Symptoms of osteonecrosis  The main symptom is pain in the affected bone. Once you feel pain, the bone has already started to decay. At first, you may feel the pain only when you move the bone. In time, it may hurt even when you are resting. The pain may make it hard to move. If you have osteonecrosis in your hip, you may start to limp. If it s in the jaw, you may have tooth loss, infection, pain, and swelling.   Treatment for osteonecrosis  Treatment works best when it is started during the early stages of the disease. But often osteonecrosis is found late, after you already feel pain. So treatment focuses on slowing down bone loss and lessening pain. It may include:     Limits on movement. If you have osteonecrosis in a lower limb, you may feel better if you don t put weight on the bone. You may need crutches or bed rest.    Pain relievers. These medicines may ease pain and help keep you active.    Other medicines. Depending upon the cause of your osteonecrosis, medicines such as statins or blood thinners may slow bone loss. Or you may have to stop taking a certain medicine if it is the cause of the disease. People with osteonecrosis of the jaw may need to stop using bisphosphonates. They may instead take antibiotics and use a medicated  mouthwash.    Surgery. Removing parts of the bone or replacing it with a graft may restore blood flow and lessen pain. In some cases, you may need joint replacement surgery.   Possible complications of osteonecrosis   Complications depend on what part of the bone is affected, how large an area is involved, and how well the bone rebuilds itself.  Some common complications include:     Arthritis    Loss of movement    Chronic pain      When to call your healthcare provider   Call your healthcare provider right away if you have any of these:    Fever of 100.4 F (38 C) or higher, or as directed by your healthcare provider    Redness, swelling, or fluid leaking from a surgical incision or wound that gets worse    Pain that gets worse    Symptoms that don t get better, or get worse    New symptoms   Date Last Reviewed: 9/1/2017 2000-2017 [employer]. [address] [place], [place] [id number]. All rights reserved. This information is not intended as a substitute for professional medical care. Always follow your healthcare professional's instructions.          Your next 10 appointments already scheduled     Jun 25, 2018  7:10 AM CDT   (Arrive by 6:55 AM)   [facility] with Rolando Torres, PT   [facility] ([facility]    [address]  [facility] [address] [phone]              24 Hour Appointment Hotline       To make an appointment at any [facility], call [phone] ([phone]). If you don't have a family doctor or clinic, we will help you find one. [place] clinics are conveniently located to serve the needs of you and your family.             Review of your medicines      START taking        Dose / Directions Last dose taken    HYDROcodone-acetaminophen 5-325 MG per tablet   Commonly known as:  NORCO   Quantity:  18 tablet        1-2 tabs by mouth up to every 6 hours as needed for pain.   Refills:  0           Our records show that you are taking the medicines listed below. If these are incorrect, please call your family doctor or clinic.        Dose / Directions Last dose taken    lisinopril 10 MG tablet   Commonly known as:  PRINIVIL/ZESTRIL   Dose:  10 mg   Quantity:  90 tablet        Take 1 tablet (10 mg) by mouth daily   Refills:  1        naproxen 500 MG tablet   Commonly known as:  NAPROSYN   Dose:  500 mg   Quantity:  60 tablet        Take 1 tablet (500 mg) by mouth 2 times daily (with meals)   Refills:  1                Information about OPIOIDS     PRESCRIPTION OPIOIDS: WHAT YOU NEED TO KNOW   We gave you an opioid (narcotic) pain medicine. It is important to manage your pain, but opioids are not always the best choice. You should first try all the other options your care team gave you. Take this medicine for as short a time (and as few doses) as possible.     These medicines have risks:    DO NOT drive when on new or higher doses of pain medicine. These medicines can affect your alertness and reaction times, and you could be arrested for driving under the influence (DUI). If you need to use opioids long-term, talk to your care team about driving.    DO NOT operate heave machinery    DO NOT do any other dangerous activities while taking these medicines.     DO NOT drink any alcohol while taking these medicines.      If the opioid prescribed includes acetaminophen, DO NOT take with any other medicines that contain acetaminophen. Read all labels carefully. Look for the word  acetaminophen  or  Tylenol.  Ask your pharmacist if you have questions or are unsure.    You can get addicted to pain medicines, especially if you have a history of addiction (chemical, alcohol or substance dependence). Talk to your care team about ways to reduce this risk.    Store your pills in a secure place, locked if possible. We will not replace any lost or stolen medicine. If you don t finish your medicine, please throw away (dispose)  as directed by your pharmacist. The [place] [employer] has more information about safe disposal: https://www.pca.state.[place]/living-green/managing-unwanted-medications.     All opioids tend to cause constipation. Drink plenty of water and eat foods that have a lot of fiber, such as fruits, vegetables, prune juice, apple juice and high-fiber cereal. Take a laxative (Miralax, milk of magnesia, Colace, Senna) if you don t move your bowels at least every other day.         Prescriptions were sent or printed at these locations (1 Prescription)                   Other Prescriptions                Printed at Department/Unit printer (1 of 1)         HYDROcodone-acetaminophen (NORCO) 5-325 MG per tablet                Procedures and tests performed during your visit     Basic metabolic panel    CBC with platelets differential    CT Abdomen Pelvis w/o Contrast    Peripheral IV catheter    UA reflex to Microscopic and Culture      Orders Needing Specimen Collection     None      Pending Results     No orders found from 6/15/2018 to 6/18/2018.            Pending Culture Results     No orders found from 6/15/2018 to 6/18/2018.            Pending Results Instructions     If you had any lab results that were not finalized at the time of your Discharge, you can call the ED Lab Result RN at [phone]. You will be contacted by this team for any positive Lab results or changes in treatment. The nurses are available 7 days a week from 10A to 6:30P.  You can leave a message 24 hours per day and they will return your call.        Test Results From Your Hospital Stay        6/17/2018  3:18 PM      Component Results     Component Value Ref Range & Units Status    Color Urine Yellow  Final    Appearance Urine Clear  Final    Glucose Urine Negative NEG^Negative mg/dL Final    Bilirubin Urine Negative NEG^Negative Final    Ketones Urine 5 (A) NEG^Negative mg/dL Final    Specific Gravity Urine 1.010 1.003 - 1.035 Final     Blood Urine Moderate (A) NEG^Negative Final    pH Urine 6.0 5.0 - 7.0 pH Final    Protein Albumin Urine 30 (A) NEG^Negative mg/dL Final    Urobilinogen mg/dL 0.0 0.0 - 2.0 mg/dL Final    Nitrite Urine Negative NEG^Negative Final    Leukocyte Esterase Urine Negative NEG^Negative Final    Source Midstream Urine  Final    RBC Urine 3 (H) 0 - 2 /HPF Final    WBC Urine 1 0 - 5 /HPF Final    Mucous Urine Present (A) NEG^Negative /LPF Final         6/17/2018  3:18 PM      Component Results     Component Value Ref Range & Units Status    WBC 9.2 4.0 - 11.0 10e9/L Final    RBC Count 4.55 4.4 - 5.9 10e12/L Final    Hemoglobin 12.9 (L) 13.3 - 17.7 g/dL Final    Hematocrit 39.2 (L) 40.0 - 53.0 % Final    MCV 86 78 - 100 fl Final    MCH 28.4 26.5 - 33.0 pg Final    MCHC 32.9 31.5 - 36.5 g/dL Final    RDW 13.5 10.0 - 15.0 % Final    Platelet Count 178 150 - 450 10e9/L Final    Diff Method Automated Method  Final    % Neutrophils 77.6 % Final    % Lymphocytes 14.7 % Final    % Monocytes 6.1 % Final    % Eosinophils 0.9 % Final    % Basophils 0.4 % Final    % Immature Granulocytes 0.3 % Final    Nucleated RBCs 0 0 /100 Final    Absolute Neutrophil 7.2 1.6 - 8.3 10e9/L Final    Absolute Lymphocytes 1.4 0.8 - 5.3 10e9/L Final    Absolute Monocytes 0.6 0.0 - 1.3 10e9/L Final    Absolute Eosinophils 0.1 0.0 - 0.7 10e9/L Final    Absolute Basophils 0.0 0.0 - 0.2 10e9/L Final    Abs Immature Granulocytes 0.0 0 - 0.4 10e9/L Final    Absolute Nucleated RBC 0.0  Final         6/17/2018  3:37 PM      Component Results     Component Value Ref Range & Units Status    Sodium 139 133 - 144 mmol/L Final    Potassium 3.8 3.4 - 5.3 mmol/L Final    Chloride 105 94 - 109 mmol/L Final    Carbon Dioxide 24 20 - 32 mmol/L Final    Anion Gap 10 3 - 14 mmol/L Final    Glucose 85 70 - 99 mg/dL Final    Urea Nitrogen 18 7 - 30 mg/dL Final    Creatinine 0.98 0.66 - 1.25 mg/dL Final    GFR Estimate >90 >60 mL/min/1.7m2 Final    Non African American GFR Calc     GFR Estimate If Black >90 >60 mL/min/1.7m2 Final    African American GFR Calc    Calcium 9.7 8.5 - 10.1 mg/dL Final         6/17/2018  5:22 PM      Narrative     CT ABDOMEN PELVIS WITHOUT CONTRAST   6/17/2018 4:10 PM     HISTORY: Left hip pain.    TECHNIQUE: Axial images are obtained from the lung bases to the  symphysis without oral or IV contrast. Coronal reformatted images are  also generated. Radiation dose for this scan was reduced using  automated exposure control, adjustment of the mA and/or kV according  to patient size, or iterative reconstruction technique.    FINDINGS: The lung bases are clear.    Abdomen: No urinary tract calculi or hydronephrosis. Upper abdominal  organs are otherwise within normal limits allowing for the noncontrast  technique including the liver, spleen, gallbladder, pancreas and  adrenal glands. No enlarged lymph nodes. The bowel is normal in  caliber without obstruction, diverticulitis or appendicitis.    Pelvis: The bladder, prostate and rectum are unremarkable. No enlarged  pelvic lymph nodes or free fluid. Patient is status post left inguinal  hernia repair. Bone window examination demonstrates changes suspicious  for AVN of the right and left femoral head, worse on the left. There  is some cortical collapse involving the articular surface of the left  femoral head likely accounting for patient's symptoms.        Impression     IMPRESSION:  1. Suspected AVN of the right and left femoral head, worse on the left  possibly accounting for patient's pain. Follow-up MRI of the hips  suggested for confirmation.  2. No urinary tract calculi or hydronephrosis. No bowel obstruction or  diverticulitis. Prior left inguinal hernia repair.    DAVID LAMB MD                Clinical Quality Measure: Blood Pressure Screening     Your blood pressure was checked while you were in the emergency department today. The last reading we obtained was  BP: 142/88 . Please read the guidelines below about  what these numbers mean and what you should do about them.  If your systolic blood pressure (the top number) is less than 120 and your diastolic blood pressure (the bottom number) is less than 80, then your blood pressure is normal. There is nothing more that you need to do about it.  If your systolic blood pressure (the top number) is 120-139 or your diastolic blood pressure (the bottom number) is 80-89, your blood pressure may be higher than it should be. You should have your blood pressure rechecked within a year by a primary care provider.  If your systolic blood pressure (the top number) is 140 or greater or your diastolic blood pressure (the bottom number) is 90 or greater, you may have high blood pressure. High blood pressure is treatable, but if left untreated over time it can put you at risk for heart attack, stroke, or kidney failure. You should have your blood pressure rechecked by a primary care provider within the next 4 weeks.  If your provider in the emergency department today gave you specific instructions to follow-up with your doctor or provider even sooner than that, you should follow that instruction and not wait for up to 4 weeks for your follow-up visit.        Thank you for choosing [facility]       Thank you for choosing [facility] for your care. Our goal is always to provide you with excellent care. Hearing back from our patients is one way we can continue to improve our services. Please take a few minutes to complete the written survey that you may receive in the mail after you visit with us. Thank you!        [employer] Information     [employer] gives you secure access to your electronic health record. If you see a primary care provider, you can also send messages to your care team and make appointments. If you have questions, please call your primary care clinic.  If you do not have a primary care provider, please call [phone] and they will assist you.        Care EveryWhere ID     This is  your Care EveryWhere ID. This could be used by other organizations to access your [facility] medical records  [id number]        Equal Access to Services     GAIL BRADSHAW : Dino Andino, casimiro garcia, mo francis, benji carranza. So [facility] [phone].    ATENCIÓN: Si habla español, tiene a little disposición servicios gratuitos de asistencia lingüística. Llame al [phone].    We comply with applicable federal civil rights laws and [place] laws. We do not discriminate on the basis of race, color, national origin, age, disability, sex, sexual orientation, or gender identity.            After Visit Summary       This is your record. Keep this with you and show to your community pharmacist(s) and doctor(s) at your next visit.

## 2018-06-17 NOTE — ED PROVIDER NOTES
"  History     Chief Complaint:  Hip Pain      HPI   Sarbjit Perez is a 28 year old male who presents with hip pain. The patient states that a few months ago he started having intermittent left groin pain. He states that he normally has the pain daily but notes that it would improve after walking around. Today, he went for a mile run and notes that it was the first time he has done this. A few hours later while shopping, he had increased left sided groin and hip pain. His pain has begun to shoot down his posterior leg to his knee and into his fifth toe. The patient states that he has never had the shooting pain in the past. At 1230, he did take one flexeril. He denies any groin or rectal numbness, urgency, hematuria, bowel/bladder incontinence,  fevers, nausea, vomiting, cough or cold symptoms.  On review of the patient's records, he was seen on 6/8 in clinic and had an x-ray completed. At that time, they diagnosed him with tendonitis.     Allergies:  No known drug allergies.     Medications:    Lisinopril  Naprosyn     Past Medical History:    Acute pancreatitis   Alcohol abuse  History of diabetes mellitus    Past Surgical History:    Left herniorrhaphy inguinal  [place] teeth     Family History:    Breast Cancer-Mother  Hypertension-Father    Social History:  Marital Status: Single  Presents to the ED with significant other  Tobacco Use: Never Used  Alcohol Use: No  PCP: Bill Mg      Review of Systems   Constitutional: Negative for fever.   Respiratory: Negative for cough.    Gastrointestinal: Negative for nausea and vomiting.   Genitourinary: Negative for hematuria and urgency.   Musculoskeletal:        Positive for left groin and hip pain.    Neurological: Negative for weakness and numbness.   All other systems reviewed and are negative.    Physical Exam   First Vitals:  BP: 123/84  Pulse: 100  Heart Rate: 87  Temp: 98.5  F (36.9  C)  Resp: 18  Height: 185.4 cm (6' 1\")  Weight: 86.6 kg (191 lb)  SpO2: " 100 %      Physical Exam  Constitutional:  Well developed, Well nourished, mild distress secondary to pain.    HENT:  Bilateral external ears normal, Mucous membranes moist, Nose normal. Neck- Normal range of motion, Supple  Respiratory:  Normal breath sounds, No respiratory distress, No wheezing,  Cardiovascular:  Normal heart rate, Normal rhythm, No murmurs,    GI:  Bowel sounds normal, Soft, No significant tenderness, no inguinal mass or tenderness.    Musculoskeletal:  Intact distal pulses, No edema, no tenderness over the lumbar spine, paraspinous muscles, or left SI joint, patient has pain with any range of motion of the left hip, right hip has no tenderness or pain with range of motion   Integument:  Warm, Dry   Neurologic:  Alert, attentive and appropriately oriented, neurovascularly intact in bilateral lower extremities  Psychiatric:  Anxious.     Emergency Department Course   Imaging:  CT Abdomen Pelvis without contrast:   1. Suspected AVN of the right and left femoral head, worse on the left  possibly accounting for patient's pain. Follow-up MRI of the hips  suggested for confirmation.  2. No urinary tract calculi or hydronephrosis. No bowel obstruction or  diverticulitis. Prior left inguinal hernia repair.  Preliminary radiology read.     Radiographic findings were communicated with the patient who voiced understanding of the findings.    Laboratory:  CBC:  WBC 9.2, HGB 12.9 (L),   BMP:  WNL (Creatinine 0.98)     UA: Clear yellow urine, ketones 5, Blood moderate, Protein 30, RBC 3 (H), Mucous present, otherwise WNL     Interventions:  (1513) Toradol, 15 mg, IV   (1513) Dilaudid, 0.5 mg, IV injection   (1619) Dilaudid, 0.5 mg, IV injection      Emergency Department Course:  Nursing notes and vitals reviewed.  (5724) I performed an exam of the patient as documented above.    A peripheral IV was established. Blood was drawn from the patient. This was sent for laboratory testing, findings above.     Urine sample was obtained and sent for laboratory analysis, findings above.   The patient was sent for a CT Abdomen pelvis while in the emergency department, findings above.    (1651) I consulted with Dr. Krause of orthopedics regarding the patient.   (1709) I updated the patient on results   Findings and plan explained to the patient. Patient discharged home with instructions regarding supportive care, medications, and reasons to return. The importance of close follow-up was reviewed. The patient was prescribed Norco  I personally reviewed the laboratory results with the patient and answered all related questions prior to discharge.       Impression & Plan      Medical Decision Making:  Sarbjit Perez is a 28 year old male presents for evaluation of left hip pain.   A broad differential was considered including sprain, strain, fracture, tendon rupture, nerve impingement/compromise, referred pain from kidney stone or intraabdominal process, sciatica. Ultimately, workup was significant for avascular necrosis. There is cortical collapse along the articular surface, but no fracture on the left side. Supportive outpatient management is indicated.  NWB, NSAIDs, close followup with ortho for further management was discussed with the patient. Also discussed precautions and indications for return. He and his wife were comfortable with plan.     Diagnosis:    ICD-10-CM    1. Avascular necrosis of left femoral head (H) M87.052        Disposition:  discharged to home    Discharge Medications:  New Prescriptions    HYDROCODONE-ACETAMINOPHEN (NORCO) 5-325 MG PER TABLET    1-2 tabs by mouth up to every 6 hours as needed for pain.         I, Yelitza Archuleta, am serving as a scribe on 6/17/2018 at 2:44 PM to personally document services performed by Dr. Nassar based on my observations and the provider's statements to me.    6/17/2018   [facility] EMERGENCY DEPARTMENT       Sonja Nassar MD  06/19/18 1802

## 2018-06-17 NOTE — ED AVS SNAPSHOT
[facility] Emergency Department    [address]    [place] [address]    Phone:  [phone]    Fax:  [phone]                                       Sarbjit Perez   MRN: [id number]    Department:  [facility] Emergency Department   Date of Visit:  6/17/2018           After Visit Summary Signature Page     I have received my discharge instructions, and my questions have been answered. I have discussed any challenges I see with this plan with the nurse or doctor.    ..........................................................................................................................................  Patient/Patient Representative Signature      ..........................................................................................................................................  Patient Representative Print Name and Relationship to Patient    ..................................................               ................................................  Date                                            Time    ..........................................................................................................................................  Reviewed by Signature/Title    ...................................................              ..............................................  Date                                                            Time

## 2018-06-17 NOTE — ED TRIAGE NOTES
ABCs intact. Pt c/o L hip pain for the past few months. Pt went for a mile run today. Pt c/o increased L hip pain radiating down to L knee. Pt took flexeril about 1230 PTA.

## 2018-06-17 NOTE — LETTER
June 17, 2018      To Whom It May Concern:      Sarbjit Perez was seen in our Emergency Department today, 06/17/18.  I expect his condition to improve over the next 2 days.  He may return to work/school when improved.    Sincerely,        Grant Valenzuela RN

## 2018-06-25 ENCOUNTER — HOSPITAL ENCOUNTER (OUTPATIENT)
Dept: ULTRASOUND IMAGING | Facility: CLINIC | Age: 28
Discharge: HOME | End: 2018-06-25
Attending: INTERNAL MEDICINE | Admitting: INTERNAL MEDICINE
Payer: COMMERCIAL

## 2018-06-25 DIAGNOSIS — R31.9 HEMATURIA, UNSPECIFIED TYPE: ICD-10-CM

## 2018-06-25 DIAGNOSIS — R80.9 PROTEINURIA, UNSPECIFIED TYPE: ICD-10-CM

## 2018-06-25 PROCEDURE — 76770 US EXAM ABDO BACK WALL COMP: CPT

## 2018-11-30 ENCOUNTER — TRANSFERRED RECORDS (OUTPATIENT)
Dept: HEALTH INFORMATION MANAGEMENT | Facility: CLINIC | Age: 28
End: 2018-11-30

## 2019-03-12 ENCOUNTER — TELEPHONE (OUTPATIENT)
Dept: FAMILY MEDICINE | Facility: CLINIC | Age: 29
End: 2019-03-12

## 2019-03-12 NOTE — TELEPHONE ENCOUNTER
Panel Management Review      Patient has the following on his problem list:   Diabetes    ASA: passed    Last A1C  Lab Results   Component Value Date    A1C 5.1 03/06/2018    A1C 5.1 10/24/2017    A1C 5.0 02/02/2017    A1C 9.5 10/20/2016     A1C tested: Passed    Last LDL:    Lab Results   Component Value Date    CHOL 145 03/06/2018     Lab Results   Component Value Date    HDL 48 03/06/2018     Lab Results   Component Value Date    LDL 83 03/06/2018     Lab Results   Component Value Date    TRIG 72 03/06/2018     No results found for: CHOLHDLRATIO  Lab Results   Component Value Date    NHDL 97 03/06/2018       Is the patient on a Statin? NO             Is the patient on Aspirin? NO        Last three blood pressure readings:  BP Readings from Last 3 Encounters:   06/17/18 142/88   06/08/18 110/68   04/16/18 128/82       Date of last diabetes office visit: 2017     Tobacco History:     History   Smoking Status     Never Smoker   Smokeless Tobacco     Never Used           Composite cancer screening  Chart review shows that this patient is due/due soon for the following None  Summary:    Patient is due/failing the following:   Patient has hx of diabetes    Action needed:   None needed  A1C up to date and passing.   Eye Exam passing    Type of outreach:    sent to abstract    Questions for provider review:    None                                                                                                                                    LASHONDA Matias       Chart routed to Care Team .

## 2019-04-26 ENCOUNTER — TRANSFERRED RECORDS (OUTPATIENT)
Dept: HEALTH INFORMATION MANAGEMENT | Facility: CLINIC | Age: 29
End: 2019-04-26

## 2019-05-23 ENCOUNTER — MYC MEDICAL ADVICE (OUTPATIENT)
Dept: FAMILY MEDICINE | Facility: CLINIC | Age: 29
End: 2019-05-23

## 2019-05-28 ENCOUNTER — TRANSFERRED RECORDS (OUTPATIENT)
Dept: HEALTH INFORMATION MANAGEMENT | Facility: CLINIC | Age: 29
End: 2019-05-28

## 2019-05-28 LAB
RETINOPATHY: NEGATIVE
RETINOPATHY: NEGATIVE

## 2019-06-04 NOTE — TELEPHONE ENCOUNTER
I did have an eye exam last year in August and I have another appointment scheduled for Tuesday, May 28th.  Currently, I visit [facility] at [address] , [place] [address].  The results were normal and there were no changes to my prescription.     Micaela Hennessy/[facility]  [place]---[facility]

## 2019-10-04 RX ORDER — NAPROXEN SODIUM 220 MG
220 TABLET ORAL DAILY PRN
COMMUNITY
End: 2019-10-07

## 2019-10-04 ASSESSMENT — ENCOUNTER SYMPTOMS
NAUSEA: 0
FREQUENCY: 0
ARTHRALGIAS: 0
HEARTBURN: 0
CONSTIPATION: 0
HEMATOCHEZIA: 0
HEMATURIA: 0
SORE THROAT: 0
SHORTNESS OF BREATH: 0
JOINT SWELLING: 1
WEAKNESS: 0
ABDOMINAL PAIN: 0
PARESTHESIAS: 0
DIZZINESS: 0
HEADACHES: 0
MYALGIAS: 1
EYE PAIN: 0
DYSURIA: 0
FEVER: 0
DIARRHEA: 0
NERVOUS/ANXIOUS: 0
COUGH: 0
CHILLS: 0
PALPITATIONS: 0

## 2019-10-04 NOTE — PROGRESS NOTES
"Pre-Visit Planning     Future Appointments   Date Time Provider Department Center   10/7/2019  7:00 AM Bill Mg PA-C [facility]     Appointment Notes for this encounter:   PLEASE DRAW A1C AND JIC----px-3/16/18    Patient preferred phone number: [phone]    Patient contacted.   Spoke to patient via phone.  Fasting for annual physical labs.   Declined HIV   Yes flu shot   Sergey, review pneumonia vaccine   Using MyChart. Thank you!      Visit is preventive. Reviewed purpose of visit with patient.     Health Maintenance  Health Maintenance Due   Topic Date Due     DIABETIC FOOT EXAM  1990     ANNUAL REVIEW OF HM ORDERS  1989     PNEUMOCOCCAL IMMUNIZATION 19-64 MEDIUM RISK (1 of 1 - PPSV23) 01/17/2017     A1C  09/06/2018     PHQ-2  01/01/2019     TSH W/FREE T4 REFLEX  02/02/2019     PREVENTIVE CARE VISIT  03/06/2019     LIPID  03/06/2019     MICROALBUMIN  03/06/2019     BMP  06/17/2019     INFLUENZA VACCINE (1) 09/01/2019     Call Summary  \"Thank you for your time today.  If anything comes up before your appointment, please feel free to contact us at Dept: [phone].\"  Cory Pearce RN    "

## 2019-10-07 ENCOUNTER — OFFICE VISIT (OUTPATIENT)
Dept: FAMILY MEDICINE | Facility: CLINIC | Age: 29
End: 2019-10-07
Payer: COMMERCIAL

## 2019-10-07 VITALS
HEART RATE: 73 BPM | BODY MASS INDEX: 27.59 KG/M2 | HEIGHT: 74 IN | RESPIRATION RATE: 20 BRPM | SYSTOLIC BLOOD PRESSURE: 124 MMHG | WEIGHT: 215 LBS | TEMPERATURE: 97.8 F | DIASTOLIC BLOOD PRESSURE: 77 MMHG

## 2019-10-07 DIAGNOSIS — Z23 NEED FOR PROPHYLACTIC VACCINATION AND INOCULATION AGAINST INFLUENZA: ICD-10-CM

## 2019-10-07 DIAGNOSIS — F43.0 ACUTE REACTION TO STRESS: ICD-10-CM

## 2019-10-07 DIAGNOSIS — Z86.39 HX OF DIABETES MELLITUS: ICD-10-CM

## 2019-10-07 DIAGNOSIS — Z13.29 SCREENING FOR THYROID DISORDER: ICD-10-CM

## 2019-10-07 DIAGNOSIS — Z13.220 LIPID SCREENING: ICD-10-CM

## 2019-10-07 DIAGNOSIS — Z00.00 ENCOUNTER FOR PREVENTIVE HEALTH EXAMINATION: Primary | ICD-10-CM

## 2019-10-07 DIAGNOSIS — R80.9 MICROALBUMINURIA: ICD-10-CM

## 2019-10-07 DIAGNOSIS — I10 ESSENTIAL HYPERTENSION, BENIGN: ICD-10-CM

## 2019-10-07 LAB
ANION GAP SERPL CALCULATED.3IONS-SCNC: 5 MMOL/L (ref 3–14)
BUN SERPL-MCNC: 12 MG/DL (ref 7–30)
CALCIUM SERPL-MCNC: 9.4 MG/DL (ref 8.5–10.1)
CHLORIDE SERPL-SCNC: 106 MMOL/L (ref 94–109)
CHOLEST SERPL-MCNC: 160 MG/DL
CO2 SERPL-SCNC: 27 MMOL/L (ref 20–32)
CREAT SERPL-MCNC: 0.95 MG/DL (ref 0.66–1.25)
GFR SERPL CREATININE-BSD FRML MDRD: >90 ML/MIN/{1.73_M2}
GLUCOSE SERPL-MCNC: 86 MG/DL (ref 70–99)
HBA1C MFR BLD: 5 % (ref 0–5.6)
HDLC SERPL-MCNC: 43 MG/DL
LDLC SERPL CALC-MCNC: 97 MG/DL
NONHDLC SERPL-MCNC: 117 MG/DL
POTASSIUM SERPL-SCNC: 3.9 MMOL/L (ref 3.4–5.3)
SODIUM SERPL-SCNC: 138 MMOL/L (ref 133–144)
TRIGL SERPL-MCNC: 100 MG/DL
TSH SERPL DL<=0.005 MIU/L-ACNC: 2.58 MU/L (ref 0.4–4)

## 2019-10-07 PROCEDURE — 84443 ASSAY THYROID STIM HORMONE: CPT | Performed by: PHYSICIAN ASSISTANT

## 2019-10-07 PROCEDURE — 99395 PREV VISIT EST AGE 18-39: CPT | Mod: 25 | Performed by: PHYSICIAN ASSISTANT

## 2019-10-07 PROCEDURE — 36415 COLL VENOUS BLD VENIPUNCTURE: CPT | Performed by: PHYSICIAN ASSISTANT

## 2019-10-07 PROCEDURE — 83036 HEMOGLOBIN GLYCOSYLATED A1C: CPT | Performed by: PHYSICIAN ASSISTANT

## 2019-10-07 PROCEDURE — 80061 LIPID PANEL: CPT | Performed by: PHYSICIAN ASSISTANT

## 2019-10-07 PROCEDURE — 80048 BASIC METABOLIC PNL TOTAL CA: CPT | Performed by: PHYSICIAN ASSISTANT

## 2019-10-07 PROCEDURE — 90686 IIV4 VACC NO PRSV 0.5 ML IM: CPT | Performed by: PHYSICIAN ASSISTANT

## 2019-10-07 PROCEDURE — 90471 IMMUNIZATION ADMIN: CPT | Performed by: PHYSICIAN ASSISTANT

## 2019-10-07 RX ORDER — LISINOPRIL 10 MG/1
5 TABLET ORAL DAILY
Qty: 90 TABLET | Refills: 1
Start: 2019-10-07 | End: 2020-02-04

## 2019-10-07 ASSESSMENT — ENCOUNTER SYMPTOMS
SHORTNESS OF BREATH: 0
DIARRHEA: 0
ABDOMINAL PAIN: 0
EYE PAIN: 0
FEVER: 0
DIZZINESS: 0
MYALGIAS: 1
PARESTHESIAS: 0
NERVOUS/ANXIOUS: 0
CONSTIPATION: 0
CHILLS: 0
SORE THROAT: 0
PALPITATIONS: 0
HEMATOCHEZIA: 0
WEAKNESS: 0
JOINT SWELLING: 1
HEADACHES: 0
HEARTBURN: 0
NAUSEA: 0
HEMATURIA: 0
FREQUENCY: 0
ARTHRALGIAS: 0
COUGH: 0
DYSURIA: 0

## 2019-10-07 ASSESSMENT — MIFFLIN-ST. JEOR: SCORE: 2004.98

## 2019-10-07 NOTE — PROGRESS NOTES
SUBJECTIVE:   CC: Sarbjit Perez is an 29 year old male who presents for preventative health visit.     Healthy Habits:     Getting at least 3 servings of Calcium per day:  Yes    Bi-annual eye exam:  Yes    Dental care twice a year:  NO    Sleep apnea or symptoms of sleep apnea:  None    Diet:  Carbohydrate counting    Frequency of exercise:  4-5 days/week    Duration of exercise:  30-45 minutes    Taking medications regularly:  Yes    Medication side effects:  Lightheadedness    PHQ-2 Total Score: 0    Additional concerns today:  Yes          Hypertension Follow-up      Do you check your blood pressure regularly outside of the clinic? Yes     Are you following a low salt diet? Yes    Are your blood pressures ever more than 140 on the top number (systolic) OR more   than 90 on the bottom number (diastolic), for example 140/90? No    History of diabetes induced by acute pancreatitis from alcohol abuse. Remains sober for years and has lost significant weight. Has not been diabetic for years. Follows nephrology yearly for proteinuria. On 10 mg of lisinoprirl. Feeling dizzy at times for this with standing or when doing exercises.     Also years of mild anxiety when out with friends. States this was reason for previous alcohol use. Patient states spoke to father and brother about this who have similar symptoms. No known family history of depression or anxiety.    Ongoing AVN of hip. Monitored by ortho.     Today's PHQ-2 Score:   PHQ-2 ( 1999 Pfizer) 10/7/2019   Q1: Little interest or pleasure in doing things 0   Q2: Feeling down, depressed or hopeless 0   PHQ-2 Score 0   Q1: Little interest or pleasure in doing things Not at all   Q2: Feeling down, depressed or hopeless Not at all   PHQ-2 Score 0       Abuse: Current or Past(Physical, Sexual or Emotional)- No  Do you feel safe in your environment? Yes    Social History     Tobacco Use     Smoking status: Never Smoker     Smokeless tobacco: Never Used   Substance Use  Topics     Alcohol use: No     Alcohol/week: 0.0 standard drinks     Frequency: Never     Drinks per session: Patient refused     Binge frequency: Never     If you drink alcohol do you typically have >3 drinks per day or >7 drinks per week? No    Alcohol Use 10/4/2019   Prescreen: >3 drinks/day or >7 drinks/week? Not Applicable   No flowsheet data found.    Last PSA: No results found for: PSA    Reviewed orders with patient. Reviewed health maintenance and updated orders accordingly - Yes  BP Readings from Last 3 Encounters:   10/07/19 124/77   06/17/18 142/88   06/08/18 110/68    Wt Readings from Last 3 Encounters:   10/07/19 97.5 kg (215 lb)   06/17/18 86.6 kg (191 lb)   06/08/18 88 kg (194 lb)                  Patient Active Problem List   Diagnosis     Hx of diabetes mellitus     Alcohol dependence in remission (H)     Microalbuminuria     Essential hypertension, benign     Acute reaction to stress     Past Surgical History:   Procedure Laterality Date     HERNIORRHAPHY INGUINAL Left 3/30/2018    Procedure: HERNIORRHAPHY INGUINAL;  Left Inguinal hernia repair with Mesh ;  Surgeon: Eduard Amaral MD;  Location: [facility] OR     wisdom teeth         Social History     Tobacco Use     Smoking status: Never Smoker     Smokeless tobacco: Never Used   Substance Use Topics     Alcohol use: No     Alcohol/week: 0.0 standard drinks     Frequency: Never     Drinks per session: Patient refused     Binge frequency: Never     Family History   Problem Relation Age of Onset     Breast Cancer Mother      Hypertension Father      Diabetes Type 2  Paternal Grandfather      Hypertension Paternal Grandfather      Diabetes Paternal Grandfather      Other Cancer Maternal Grandmother          Current Outpatient Medications   Medication Sig Dispense Refill     lisinopril (PRINIVIL/ZESTRIL) 10 MG tablet Take 0.5 tablets (5 mg) by mouth daily 90 tablet 1     No Known Allergies  Recent Labs   Lab Test 10/07/19  0700 06/17/18  1327  06/08/18  0930 03/06/18  0909 10/24/17  0921 02/02/17  0845  10/22/16  0929  10/20/16  0245   A1C 5.0  --   --  5.1 5.1 5.0  --   --   --  9.5*   LDL  --   --   --  83 97 71   < >  --   --   --    HDL  --   --   --  48 45 42  --   --   --   --    TRIG  --   --   --  72 77 135   < >  --   --   --    ALT  --   --   --   --  42  --   --  93*  --  286*   CR  --  0.98 0.92 0.98 1.22 0.98   < > 0.87   < > 0.82   GFRESTIMATED  --  >90 >90 90 70 >90  Non  GFR Calc     < > >90  Non  GFR Calc     < > >90  Non  GFR Calc     GFRESTBLACK  --  >90 >90 >90 85 >90  African American GFR Calc     < > >90   GFR Calc     < > >90   GFR Calc     POTASSIUM  --  3.8 4.1 3.8 3.9 4.0   < > 3.7   < > 3.7   TSH  --   --   --   --   --  1.88  --   --   --   --     < > = values in this interval not displayed.        Reviewed and updated as needed this visit by clinical staff  Tobacco  Allergies  Meds  Problems  Med Hx  Surg Hx  Fam Hx  Soc Hx          Reviewed and updated as needed this visit by Provider  Tobacco  Allergies  Meds  Problems  Med Hx  Surg Hx  Fam Hx        Past Medical History:   Diagnosis Date     Acute pancreatitis 10/20/2016     Alcohol abuse      Hx of diabetes mellitus 10/26/2016    Occurred post alcoholic pancreatitis. Resolved 1 yr later after alcohol cessation.        Past Surgical History:   Procedure Laterality Date     HERNIORRHAPHY INGUINAL Left 3/30/2018    Procedure: HERNIORRHAPHY INGUINAL;  Left Inguinal hernia repair with Mesh ;  Surgeon: Eduard Amaral MD;  Location: [facility] OR     wisdom teeth         Review of Systems   Constitutional: Negative for chills and fever.   HENT: Negative for congestion, ear pain, hearing loss and sore throat.    Eyes: Negative for pain and visual disturbance.   Respiratory: Negative for cough and shortness of breath.    Cardiovascular: Negative for chest pain, palpitations and peripheral edema.  "  Gastrointestinal: Negative for abdominal pain, constipation, diarrhea, heartburn, hematochezia and nausea.   Genitourinary: Negative for discharge, dysuria, frequency, genital sores, hematuria, impotence and urgency.   Musculoskeletal: Positive for joint swelling and myalgias. Negative for arthralgias.   Skin: Negative for rash.   Neurological: Negative for dizziness, weakness, headaches and paresthesias.   Psychiatric/Behavioral: Negative for mood changes. The patient is not nervous/anxious.        OBJECTIVE:   /77 (BP Location: Right arm, Patient Position: Chair, Cuff Size: Adult Large)   Pulse 73   Temp 97.8  F (36.6  C) (Oral)   Resp 20   Ht 1.88 m (6' 2\")   Wt 97.5 kg (215 lb)   BMI 27.60 kg/m      Physical Exam  GENERAL: healthy, alert and no distress  EYES: Eyes grossly normal to inspection, PERRL and conjunctivae and sclerae normal  HENT: ear canals and TM's normal, nose and mouth without ulcers or lesions  NECK: no adenopathy, no asymmetry, masses, or scars and thyroid normal to palpation  RESP: lungs clear to auscultation - no rales, rhonchi or wheezes  CV: regular rate and rhythm, normal S1 S2, no S3 or S4, no murmur, click or rub, no peripheral edema and peripheral pulses strong  ABDOMEN: soft, nontender, no hepatosplenomegaly, no masses and bowel sounds normal   (male): normal male genitalia without lesions or urethral discharge, no hernia  MS: no gross musculoskeletal defects noted, no edema  SKIN: no suspicious lesions or rashes  NEURO: Normal strength and tone, mentation intact and speech normal  PSYCH: mentation appears normal, affect normal/bright  LYMPH: no cervical adenopathy    Diagnostic Test Results:  Lab Results   Component Value Date    A1C 5.0 10/07/2019    A1C 5.1 03/06/2018    A1C 5.1 10/24/2017    A1C 5.0 02/02/2017    A1C 9.5 10/20/2016         ASSESSMENT/PLAN:   (Z00.00) Encounter for preventive health examination  (primary encounter diagnosis)  Comment: normal exam. " Fasting today.   Plan:     (Z86.39) Hx of diabetes mellitus  Comment: history of this. a1c controlled still. Maintain yearly monitoring. However, further routine diabetes visits not needed.   Plan: HEMOGLOBIN A1C            (R80.9) Microalbuminuria  Comment: followed by nephrology as well. microalbumin pending today. Given control of bp and dizziness side effect. Recommending cutting 10 mg of lisinopril to 5 mg and maintain nephrology follow up in 1 month.   Plan: Albumin Random Urine Quantitative with Creat         Ratio, lisinopril (PRINIVIL/ZESTRIL) 10 MG         tablet        -Medication use and side effects discussed with the patient. Patient is in complete understanding and agreement with plan.       (I10) Essential hypertension, benign  Comment: as above   Plan: BASIC METABOLIC PANEL, lisinopril         (PRINIVIL/ZESTRIL) 10 MG tablet            (Z13.220) Lipid screening  Comment:   Plan: Lipid panel reflex to direct LDL Fasting            (Z13.29) Screening for thyroid disorder  Comment:   Plan: TSH WITH FREE T4 REFLEX            (Z23) Need for prophylactic vaccination and inoculation against influenza  Comment:   Plan: INFLUENZA VACCINE IM > 6 MONTHS VALENT IIV4         [40033], Vaccine Administration, Initial         [37349]            (F43.0) Acute reaction to stress  Comment: evident on history. Mild symptoms. Discussed therapy vs medication. Recommending therapy prior to medication given mild symptoms and situational symptoms. Follow up prn as needed otherwise, yearly recheck.   Plan: MENTAL HEALTH REFERRAL  - Adult; Outpatient         Treatment; Individual/Couples/Family/Group         The[place]/Health Psychology; [facility] [place] [facility] [phone]; We will         contact you to schedule the appointment or         please call with any questions              COUNSELING:   Reviewed preventive health counseling, as reflected in patient instructions       Regular exercise       Healthy  "diet/nutrition       self testicular exams    Estimated body mass index is 27.6 kg/m  as calculated from the following:    Height as of this encounter: 1.88 m (6' 2\").    Weight as of this encounter: 97.5 kg (215 lb).     Weight management plan: Discussed healthy diet and exercise guidelines     reports that he has never smoked. He has never used smokeless tobacco.      Counseling Resources:  ATP IV Guidelines  Pooled Cohorts Equation Calculator  FRAX Risk Assessment  ICSI Preventive Guidelines  Dietary Guidelines for Americans, 2010  USDA's MyPlate  ASA Prophylaxis  Lung CA Screening    Bill Mg PA-C  [facility]  "

## 2019-10-07 NOTE — Clinical Note
Please abstract the following data from this visit with this patient into the appropriate field in Epic:Tests that can be patient reported without a hard copy:Eye exam with ophthalmology on this date: 06/2019 at  eye clinicOther Tests found in the patient's chart through Chart Review/Care Everywhere:Note to Abstraction: If this section is blank, no results were found via Chart Review/Care Everywhere.

## 2019-10-31 ENCOUNTER — TRANSFERRED RECORDS (OUTPATIENT)
Dept: HEALTH INFORMATION MANAGEMENT | Facility: CLINIC | Age: 29
End: 2019-10-31

## 2019-11-26 ENCOUNTER — OFFICE VISIT (OUTPATIENT)
Dept: BEHAVIORAL HEALTH | Facility: CLINIC | Age: 29
End: 2019-11-26
Attending: PHYSICIAN ASSISTANT
Payer: COMMERCIAL

## 2019-11-26 DIAGNOSIS — F41.1 GAD (GENERALIZED ANXIETY DISORDER): Primary | ICD-10-CM

## 2019-11-26 PROCEDURE — 90791 PSYCH DIAGNOSTIC EVALUATION: CPT | Performed by: COUNSELOR

## 2019-11-26 ASSESSMENT — COLUMBIA-SUICIDE SEVERITY RATING SCALE - C-SSRS
ATTEMPT PAST THREE MONTHS: NO
2. HAVE YOU ACTUALLY HAD ANY THOUGHTS OF KILLING YOURSELF LIFETIME?: NO
1. IN THE PAST MONTH, HAVE YOU WISHED YOU WERE DEAD OR WISHED YOU COULD GO TO SLEEP AND NOT WAKE UP?: NO
ATTEMPT LIFETIME: NO
2. HAVE YOU ACTUALLY HAD ANY THOUGHTS OF KILLING YOURSELF?: NO
1. IN THE PAST MONTH, HAVE YOU WISHED YOU WERE DEAD OR WISHED YOU COULD GO TO SLEEP AND NOT WAKE UP?: NO

## 2019-11-26 ASSESSMENT — PATIENT HEALTH QUESTIONNAIRE - PHQ9
SUM OF ALL RESPONSES TO PHQ QUESTIONS 1-9: 3
5. POOR APPETITE OR OVEREATING: SEVERAL DAYS

## 2019-11-26 ASSESSMENT — ANXIETY QUESTIONNAIRES
5. BEING SO RESTLESS THAT IT IS HARD TO SIT STILL: NOT AT ALL
3. WORRYING TOO MUCH ABOUT DIFFERENT THINGS: NEARLY EVERY DAY
2. NOT BEING ABLE TO STOP OR CONTROL WORRYING: NEARLY EVERY DAY
6. BECOMING EASILY ANNOYED OR IRRITABLE: NOT AT ALL
1. FEELING NERVOUS, ANXIOUS, OR ON EDGE: MORE THAN HALF THE DAYS
7. FEELING AFRAID AS IF SOMETHING AWFUL MIGHT HAPPEN: SEVERAL DAYS
GAD7 TOTAL SCORE: 10

## 2019-11-26 NOTE — Clinical Note
Hi Dr. Mg-Today I saw Sarbjit for an initial therapy intake session. Sarbjit presents with symptoms if anxiety. He will engage in 2x monthly therapy. I look forward to collaborating as necessary. Thank you!

## 2019-11-26 NOTE — PROGRESS NOTES
Adult Intake Structured Interview  Standard Diagnostic Assessment      CLIENT'S NAME: Sarbjit Perez  MRN:   [id number]  :   1990  ACCT. NUMBER: [id number]  DATE OF SERVICE: 19  VIDEO VISIT: No    Identifying Information:  Client is a 29 year old, ,  male. Client was referred for counseling by Dr. [facility]. Client is currently employed full time and reports @HIS@ is able to function appropriately at work. 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  Client attended the session alone. Client reported he lives in [place], [place] with his wife.       Client's Statement of Presenting Concern:  Client reports the reason for seeking therapy at this time as feeling anxious. Client reported he and his dad have explained it like crippling self doubt. Client reported that he feels constantly in his head thinking out every scenario and going with the worst one. Client stated that his symptoms have resulted in the following functional impairments: social interactions and work / vocational responsibilities. Client reported the symptoms impact social interactions as he used to depend on alcohol to help him. Client reported he finds social situations awkward. Client reported the symptoms impact work as he will second guess himself and when looking for other work will tell himself he is not qualified.       History of Presenting Concern:  Client reports that these problem(s) began three years ago when the client quit drinking. Client reported he was drinking heavily and it was a problem at the time. Client reported the drinking eased some of the nervousness and anxiety.  Client has attempted to resolve these concerns in the past through medical issues so he stopped drinking, meditation, talked to family. Client reports  that other professional(s) are involved in providing support / services.       Social History:  Client reported he grew up in [place], [place]. They were the second born of 4 children. Client reported he has one older half brother and then two younger brothers.  This is an intact family and parents remain . Client reported that his childhood was good and a lot of family and friends. Client reported he engaged in a lot of activities. Client described his current relationships with family of origin as good.Client reported his parents still live up [place] and he speaks to them weekly and texts often with his brothers. Client reported his brothers live in [place] and one in [place].     Client reported a history of 1 marriages. Client has been  for 1 years. Client reported they have been together for 13 years and met in high school. Client reported having 0 children. Client identified some stable and meaningful social connections. Client reported that his two closest friends one is a teacher who lives in [place], they talk regularly and sees him a few times a year. Client reported his other friend is a  for [employer] and lives in [place] and they see one another every few months and talk weekly. Client reported that he has not been involved with the legal system.  Client's highest education level was college graduate. Client did identify the following learning problems: concentration. There are no ethnic, cultural or Quaker factors that may be relevant for therapy. Client identified his preferred language to be English. Client reported he does not need the assistance of an  or other support involved in therapy. Modifications will not be used to assist communication in therapy. Client did not serve in the .     Client reports family history includes Breast Cancer in his mother; Diabetes in his paternal grandfather; Diabetes Type 2  in his paternal grandfather; Hypertension in his father and  paternal grandfather; Other Cancer in his maternal grandmother.    Mental Health History:  Client reported no family history of mental health issues.  Client has not been previously diagnosed with a mental health diagnosis.  Client has not received mental health services in the past.  Hospitalizations: None.  Client is not currently receiving any mental health services.      Chemical Health History:  Client reported no family history of chemical health issues. Client has not received chemical dependency treatment in the past. Client is not currently receiving any chemical dependency treatment. Client reported the following problems as a result of drinking: health issues. Client reported he had pancreatitis from untreated diabetes, high blood pressure. Client reported that his hip collapsed from avascular necrosis while he was running.     Client Reports:  Client denies using alcohol.  Client denies using tobacco.  Client denies using marijuana.  Client reports using caffeine 4 times per day and drinks 1 at a time. Patient started using caffeine at age 11.  Client denies using street drugs.  Client denies the non-medical use of prescription or over the counter drugs.     CAGE: None of the patient's responses to the CAGE screening were positive / Negative CAGE score   Based on the negative Cage-Aid score and clinical interview there  are not indications of drug or alcohol abuse.    Discussed the general effects of drugs and alcohol on health and well-being. Therapist gave client printed information about the effects of chemical use on his health and well being.      Significant Losses / Trauma / Abuse / Neglect Issues:  There are indications or report of significant loss, trauma, abuse or neglect issues related to: major medical problems pancreatitis, necrosis, diabetes.    Issues of possible neglect are not present.      Medical Issues:  Client has had a physical exam to rule out medical causes for current symptoms.  Date of last physical exam was within the past year. Client was encouraged to follow up with PCP if symptoms were to develop. The client has a [facility], who is named Bill Mg. The client reports not having a psychiatrist. Client reports the following current medical concerns: hip pain . The client reports the presence of chronic or episodic pain in the form of daily. The pain level is moderate and has a frequency of daily. There are not significant nutritional concerns.     Patient reports current meds as:   No outpatient medications have been marked as taking for the 11/26/19 encounter (Office Visit) with Madeleine Giraldo [facility].       Client Allergies:  No Known Allergies  no allergies to medications    Medical History:  Past Medical History:   Diagnosis Date     Acute pancreatitis 10/20/2016     Alcohol abuse      Hx of diabetes mellitus 10/26/2016    Occurred post alcoholic pancreatitis. Resolved 1 yr later after alcohol cessation.           Medication Adherence:  N/A - Client does not have prescribed psychiatric medications.    Client was provided recommendation to follow-up with prescribing physician.    Mental Status Assessment:  Appearance:   Appropriate   Eye Contact:   Good   Psychomotor Behavior: Normal   Attitude:   Cooperative  Guarded   Orientation:   All  Speech   Rate / Production: Normal    Volume:  Normal   Mood:    Anxious  Normal  Affect:    Appropriate   Thought Content:  Clear   Thought Form:  Coherent  Logical   Insight:    Fair       Review of Symptoms:  Depression: No symptoms Ruminations  Stephany:  No symptoms  Psychosis: No symptoms  Anxiety: Worries Nervousness muscle tension(hip) rumination, trouble controlling worry, worry a lot about different things, on edge in large crowds, concentration int he past    Panic:  No symptoms  Post Traumatic Stress Disorder: No symptoms  Obsessive Compulsive Disorder: No symptoms  Eating Disorder: No  symptoms  Oppositional Defiant Disorder: No symptoms  ADD / ADHD: No symptoms  Conduct Disorder: No symptoms      Safety Assessment:    History of Safety Concerns:   Client denied a history of suicidal ideation.    Client denied a history of suicide attempts.    Client denied a history of homicidal ideation.    Client denied a history of self-injurious ideation and behaviors.    Client denied a history of personal safety concerns.    Client denied a history of assaultive behaviors.        Current Safety Concerns:  Client denies current suicidal ideation.    Client denies current homicidal ideation and behaviors.  Client denies current self-injurious ideation and behaviors.    Client denies current concerns for personal safety.    Client reports the following protective factors: positive relationships positive social network, sober network and positive family connections, forward/future oriented thinking, restricted access to lethal means no gun in the home, dedication to family/friends, safe and stable environment, regular sleep, effectively controls impulses, regular physical activity, purpose  , abstinence from substances, living with other people, daily obligations, structured day, effective problem-solving skills, committment to well-being, positive social skills, financial stability, access to a variety of clinical interventions and pets    Client reports there are no firearms in the house.     Plan for Safety and Risk Management:  Recommended that patient call 911 or go to the local ED should there be a change in any of these risk factors.    Client's Strengths and Limitations:  Client identified the following strengths or resources that will help him succeed in counseling: friends / good social support, family support, intelligence, motivation and work ethic. Client identified the following supports: family and Adventist / spirituality. Things that may interfere with the client's success in counseling  include: none reported .      Diagnostic Criteria:  A. Excessive anxiety and worry about a number of events or activities (such as work or school performance).   B. The person finds it difficult to control the worry.  C. Select 3 or more symptoms (required for diagnosis). Only one item is required in children.   - Restlessness or feeling keyed up or on edge.    - Being easily fatigued.    - Difficulty concentrating or mind going blank.    - Muscle tension.       Functional Status:  Client's symptoms are causing reduced functional status in the following areas: Activities of Daily Living - rumination  Occupational / Vocational - self doubt  Social / Relational - nervousness      DSM5 Diagnoses: (Sustained by DSM5 Criteria Listed Above)  Diagnoses: 300.02 (F41.1) Generalized Anxiety Disorder  Psychosocial & Contextual Factors: social, work, health   WHODAS 2.0 (12 item)            This questionnaire asks about difficulties due to health conditions. Health conditions  include  disease or illnesses, other health problems that may be short or long lasting,  injuries, mental health or emotional problems, and problems with alcohol or drugs.                     Think back over the past 30 days and answer these questions, thinking about how much  difficulty you had doing the following activities. For each question, please [place] only  one response.    S1 Standing for long periods such as 30 minutes? None =         1   S2 Taking care of household responsibilities? None =         1   S3 Learning a new task, for example, learning how to get to a new place? None =         1   S4 How much of a problem do you have joining community activities (for example, festivals, Cheondoism or other activities) in the same way as anyone else can? Moderate =   3   S5 How much have you been emotionally affected by your health problems? Moderate =   3     In the past 30 days, how much difficulty did you have in:   S6 Concentrating on doing  something for ten minutes? None =         1   S7 Walking a long distance such as a kilometer (or equivalent)? Mild =           2   S8 Washing your whole body? None =         1   S9 Getting dressed? Mild =           2   S10 Dealing with people you do not know? Moderate =   3   S11 Maintaining a friendship? None =         1   S12 Your day to day work? None =         1     H1 Overall, in the past 30 days, how many days were these difficulties present? Record number of days 15   H2 In the past 30 days, for how many days were you totally unable to carry out your usual activities or work because of any health condition? Record number of days  0   H3 In the past 30 days, not counting the days that you were totally unable, for how many days did you cut back or reduce your usual activities or work because of any health condition? Record number of days 0     Attendance Agreement:  Client has signed Attendance Agreement:Yes      Collaboration:  Collaboration / coordination of treatment will be initiated with the following support professionals: primary care physician.      Preliminary Treatment Plan:  The client reports no currently identified Anabaptism, ethnic or cultural issues relevant to therapy.     services are not indicated.    Modifications to assist communication are not indicated.    The concerns identified by the client will be addressed in therapy.    Initial Treatment will focus on: Anxiety -  .    As a preliminary treatment goal, client will experience a reduction in anxiety, will develop more effective coping skills to manage anxiety symptoms, will develop healthy cognitive patterns and beliefs and will increase ability to function adaptively.    The focus of initial interventions will be to alleviate anxiety, facilitate appropriate expression of feelings, increase ability to function adaptively, increase coping skills, increase self esteem, provide homework to reinforce skill development, provide  psychoeduction regarding anxiety, teach CBT skills, teach emotional regulation, teach mindfulness skills, teach relaxation strategies, teach social skills and teach stress mangement techniques.    Referral to another professional/service is not indicated at this time..    A Release of Information is not needed at this time.    Report to child / adult protection services was NA.    Patient will have open access to their mental health medical record.    Madeleine Giraldo, [facility]  November 26, 2019

## 2019-11-27 ASSESSMENT — ANXIETY QUESTIONNAIRES: GAD7 TOTAL SCORE: 10

## 2019-12-12 ENCOUNTER — OFFICE VISIT (OUTPATIENT)
Dept: BEHAVIORAL HEALTH | Facility: CLINIC | Age: 29
End: 2019-12-12
Payer: COMMERCIAL

## 2019-12-12 DIAGNOSIS — F41.1 GAD (GENERALIZED ANXIETY DISORDER): Primary | ICD-10-CM

## 2019-12-12 PROCEDURE — 90834 PSYTX W PT 45 MINUTES: CPT | Performed by: COUNSELOR

## 2019-12-12 ASSESSMENT — PATIENT HEALTH QUESTIONNAIRE - PHQ9
SUM OF ALL RESPONSES TO PHQ QUESTIONS 1-9: 0
5. POOR APPETITE OR OVEREATING: SEVERAL DAYS

## 2019-12-12 ASSESSMENT — ANXIETY QUESTIONNAIRES
3. WORRYING TOO MUCH ABOUT DIFFERENT THINGS: SEVERAL DAYS
6. BECOMING EASILY ANNOYED OR IRRITABLE: NOT AT ALL
2. NOT BEING ABLE TO STOP OR CONTROL WORRYING: SEVERAL DAYS
5. BEING SO RESTLESS THAT IT IS HARD TO SIT STILL: NOT AT ALL
GAD7 TOTAL SCORE: 5
7. FEELING AFRAID AS IF SOMETHING AWFUL MIGHT HAPPEN: SEVERAL DAYS
1. FEELING NERVOUS, ANXIOUS, OR ON EDGE: SEVERAL DAYS

## 2019-12-12 NOTE — PROGRESS NOTES
Progress Note    Patient Name: Sarbjit Perez  Date: 12/12/2019           Service Type: Individual  Video Visit: No     Session Start Time: 9:01am  Session End Time: 9:44am     Session Length: 43 mins     Session #: 1    Attendees: Client attended alone     Treatment Plan Last Reviewed: to be completed by session 3  PHQ-9 / SERA-7 : P=0 and G=5       DATA  Interactive Complexity: No  Crisis: No       Progress Since Last Session (Related to Symptoms / Goals / Homework):   Symptoms: No change Client reported no change since last session(DA)    Homework: NA no hw assigned at DA      Episode of Care Goals: No improvement - ACTION (Actively working towards change); Intervened by reinforcing change plan / affirming steps taken     Current / Ongoing Stressors and Concerns:   Social- Client reported he often has increased anxious thoughts when engaging in social activity     Work- Client reported he started a new career recently and can work up to 6 days a week doing 12 hour days and feels like he is doing something wrong if he is not working even when he has completed his tasks    Health- Client reported int he past he has dealt with a lot of health issues related to past alcohol abuse and currently has a collapsed hip that causes pain      Treatment Objective(s) Addressed in This Session:   Introduction  to therapy ( CBT, ACT)   Information gathering about anxious thoughts and impact on clients life   Amygdala over reaction and use of calming to turn it off    Intervention:   CBT: How thoughts connect to feelings and behavior    Use of deep breathing to help calm the amygdala reactions to thoughts         ASSESSMENT: Current Emotional / Mental Status (status of significant symptoms):   Risk status (Self / Other harm or suicidal ideation)   Patient denies current fears or concerns for personal safety.   Patient denies current or recent suicidal ideation or  behaviors.   Patientdenies current or recent homicidal ideation or behaviors.   Patient denies current or recent self injurious behavior or ideation.   Patient denies other safety concerns.   Patient Patient reports there has been no change in risk factors since their last session.     PatientPatient reports there has been no change in protective factors since their last session.     Recommended that patient call 911 or go to the local ED should there be a change in any of these risk factors.     Appearance:   Appropriate    Eye Contact:   Good    Psychomotor Behavior: Normal    Attitude:   Cooperative  Guarded    Orientation:   All   Speech    Rate / Production: Normal     Volume:  Normal    Mood:    Anxious  Normal   Affect:    Appropriate    Thought Content:  Clear  Rumination    Thought Form:  Coherent  Logical    Insight:    Poor      Medication Review:   No current psychiatric medications prescribed     Medication Compliance:   NA     Changes in Health Issues:   None reported     Chemical Use Review:   Substance Use: Chemical use reviewed, no active concerns identified      Tobacco Use: No current tobacco use.      Diagnosis:  1. SERA (generalized anxiety disorder)        Collateral Reports Completed:   Not Applicable    PLAN: (Patient Tasks / Therapist Tasks / Other)  Provider assigned client to use CBT triangle hand out to document connections between his thoughts, feelings and behaviors   Provider assigned client to practice deep breathing daily       Next- goals, party metaphor, thought labeling  Madeleine Giraldo, [facility] 12/12/2019

## 2019-12-13 ASSESSMENT — ANXIETY QUESTIONNAIRES: GAD7 TOTAL SCORE: 5

## 2020-01-02 ENCOUNTER — OFFICE VISIT (OUTPATIENT)
Dept: BEHAVIORAL HEALTH | Facility: CLINIC | Age: 30
End: 2020-01-02
Payer: COMMERCIAL

## 2020-01-02 DIAGNOSIS — F41.1 GAD (GENERALIZED ANXIETY DISORDER): Primary | ICD-10-CM

## 2020-01-02 PROCEDURE — 90834 PSYTX W PT 45 MINUTES: CPT | Performed by: COUNSELOR

## 2020-01-02 NOTE — PROGRESS NOTES
Progress Note    Patient Name: Sarbjit Perez  Date: 1/2/2020           Service Type: Individual  Video Visit: No     Session Start Time: 8:00am  Session End Time: 8:43am     Session Length: 43 mins     Session #: 2    Attendees: Client attended alone     Treatment Plan Last Reviewed: 1/2/2020    PHQ-9 / SERA-7 : P=1 and G=6    DATA  Interactive Complexity: No  Crisis: No       Progress Since Last Session (Related to Symptoms / Goals / Homework):   Symptoms: No change Assessment score is similar to last session score. Client reported no change    Homework: Achieved / completed to satisfaction Provider assigned client to practice deep breathing daily       Episode of Care Goals: Minimal progress - ACTION (Actively working towards change); Intervened by reinforcing change plan / affirming steps taken     Current / Ongoing Stressors and Concerns:   Social- Client reported he often has increased anxious thoughts when engaging in social activity               Work- Client reported he started a new career recently and can work up to 6 days a week doing 12 hour days and feels like he is doing something wrong if he is not working even when he has completed his tasks               Health- Client reported int he past he has dealt with a lot of health issues related to past alcohol abuse and currently has a collapsed hip that causes pain     Treatment Objective(s) Addressed in This Session:   identify and lablel fears / thoughts that contribute to feeling anxious  Challenging cognitive distortions      Intervention:   CBT: Provider educated client on how the brain attempts to problem solve and process emotions can cause rumination and increased mental health symptoms. Provider introduced client to self talk and how it impacts our emotions. Provider modeled use of labeling thoughts helpful or unhelpful based on how they impact mood and behavior/ quality of life         ASSESSMENT:  Current Emotional / Mental Status (status of significant symptoms):   Risk status (Self / Other harm or suicidal ideation)   Patient denies current fears or concerns for personal safety.   Patient denies current or recent suicidal ideation or behaviors.   Patientdenies current or recent homicidal ideation or behaviors.   Patient denies current or recent self injurious behavior or ideation.   Patient denies other safety concerns.   Patient Patient reports there has been no change in risk factors since their last session.     PatientPatient reports there has been no change in protective factors since their last session.     Recommended that patient call 911 or go to the local ED should there be a change in any of these risk factors.     Appearance:   Appropriate    Eye Contact:   Good    Psychomotor Behavior: Normal    Attitude:   Cooperative  Guarded    Orientation:   All   Speech    Rate / Production: Normal     Volume:  Normal    Mood:    Anxious  Normal   Affect:    Appropriate    Thought Content:  Clear  Rumination    Thought Form:  Coherent  Logical    Insight:    Fair      Medication Review:   No current psychiatric medications prescribed     Medication Compliance:   NA     Changes in Health Issues:   None reported     Chemical Use Review:   Substance Use: Chemical use reviewed, no active concerns identified      Tobacco Use: No current tobacco use.      Diagnosis:  1. SERA (generalized anxiety disorder)        Collateral Reports Completed:   Not Applicable    PLAN: (Patient Tasks / Therapist Tasks / Other)  Provider assigned client to label thoughts helpful vs unhelpful   Provider assigned client to think about goals for tx       Next- thought record, goals and neuroplasticity, check in on deep breathing, work/life balance since work at home, guilt, cog distrotions   Madeleine Giraldo, [facility] 1/2/2020

## 2020-01-16 ENCOUNTER — OFFICE VISIT (OUTPATIENT)
Dept: BEHAVIORAL HEALTH | Facility: CLINIC | Age: 30
End: 2020-01-16
Payer: COMMERCIAL

## 2020-01-16 DIAGNOSIS — F41.1 GAD (GENERALIZED ANXIETY DISORDER): Primary | ICD-10-CM

## 2020-01-16 PROCEDURE — 90834 PSYTX W PT 45 MINUTES: CPT | Performed by: COUNSELOR

## 2020-01-16 NOTE — PROGRESS NOTES
Progress Note    Patient Name: Sarbjit Perez  Date: 1/16/2020           Service Type: Individual  Video Visit: No     Session Start Time: 9:00am  Session End Time: 9:45am     Session Length: 45 mins     Session #: 3    Attendees: Client attended alone     Treatment Plan Last Reviewed: 1/16/2020    PHQ-9 / SERA-7 : review in future session    DATA  Interactive Complexity: No  Crisis: No       Progress Since Last Session (Related to Symptoms / Goals / Homework):   Symptoms: No change Client reported no change since last session     Homework: Achieved / completed to satisfaction Provider assigned client to label thoughts helpful vs unhelpful   Provider assigned client to think about goals for tx   Client came up with goals and reported he attempted thought labeling practice           Episode of Care Goals: Satisfactory progress - PREPARATION (Decided to change - considering how); Intervened by negotiating a change plan and determining options / strategies for behavior change, identifying triggers, exploring social supports, and working towards setting a date to begin behavior change     Current / Ongoing Stressors and Concerns:             Social- Client reported he often has increased anxious thoughts when engaging in social activity               Work- Client reported he started a new career recently and can work up to 6 days a week doing 12 hour days and feels like he is doing something wrong if he is not working even when he has completed his tasks               Health- Client reported int he past he has dealt with a lot of health issues related to past alcohol abuse and currently has a collapsed hip that causes pain     Treatment Objective(s) Addressed in This Session:   use cognitive strategies identified in therapy to challenge anxious thoughts  Work/life boundaries   Neuroplasticity and our thoughts      Intervention:   CBT: Provider educated client about  neurplasticity and how neurons fire and form connection in the brain. Provider modeled use of thought record in session for client.   Motivational Interviewing    MI Intervention: Expressed Empathy/Understanding, Open-ended questions and Change talk (evoked)     Change Talk Expressed by the Patient: Desire to change Ability to change Reasons to change Committment to change    Provider Response to Change Talk: E - Evoked more info from patient about behavior change          ASSESSMENT: Current Emotional / Mental Status (status of significant symptoms):   Risk status (Self / Other harm or suicidal ideation)   Patient denies current fears or concerns for personal safety.   Patient denies current or recent suicidal ideation or behaviors.   Patientdenies current or recent homicidal ideation or behaviors.   Patient denies current or recent self injurious behavior or ideation.   Patient denies other safety concerns.   Patient reports there has been no change in risk factors since their last session.     Patientreports there has been no change in protective factors since their last session.     Recommended that patient call 911 or go to the local ED should there be a change in any of these risk factors.     Appearance:   Appropriate    Eye Contact:   Good    Psychomotor Behavior: Normal    Attitude:   Cooperative  Guarded    Orientation:   All   Speech    Rate / Production: Normal     Volume:  Normal    Mood:    Anxious  Normal   Affect:    Appropriate    Thought Content:  Clear  Rumination    Thought Form:  Coherent  Logical    Insight:    Fair      Medication Review:   No current psychiatric medications prescribed     Medication Compliance:   NA     Changes in Health Issues:   None reported     Chemical Use Review:   Substance Use: Chemical use reviewed, no active concerns identified      Tobacco Use: No current tobacco use.      Diagnosis:  1. SERA (generalized anxiety disorder)        Collateral Reports Completed:   Not  Applicable    PLAN: (Patient Tasks / Therapist Tasks / Other)  Provider assigned client to use thought record  Provider assigned client to think about work/life balance strategies       Next-ACT, mindfulness, perspective, guided imagery   Madeleine Giraldo, [facility] 1/16/2020                                                           ______________________________________________________________________    Treatment Plan    Patient's Name: Sarbjit Perez  YOB: 1990    Date: 1/16/2020        DSM5 Diagnoses: 300.02 (F41.1) Generalized Anxiety Disorder  Psychosocial / Contextual Factors: social, work, health   WHODAS: 20    Referral / Collaboration:  Referral to another professional/service is not indicated at this time.    Anticipated number of session or this episode of care: 8      MeasurableTreatment Goal(s) related to diagnosis / functional impairment(s)  Goal 1: Patient will score 3 or less on GAD7 (last one 6)     I will know I've met my goal when I am able to enjoy weekends and not worry about work. I would feel more self confident. I would like to be able to go with change without it feeling so impactful on my mood      Objective #A (Patient Action)    Patient will set limits and boundaries to help with work/life separation.  Status: New - Date: 1/16/2020     Intervention(s)  Therapist will teach about healthy boundaries. Provider will guide client in creating ways to separate work and personal life when working from home. Provider will assign client to practice these boundaries outside of session and report back. Provider will guide client in recognizing benefits that come from boundaries .    Objective #B  Patient will use cognitive strategies identified in therapy to challenge anxious thoughts.  Status: New - Date: 1/16/2020     Intervention(s)  Therapist will teach emotional regulation skills. Provider will utilize CBT to teach client thought labeling, use of a thought record, re-framing and  shifting perspectives.    Objective #C  Patient will identify 5 traits or charcteristics you like about yourself.  Status: New - Date: 1/16/2020     Intervention(s)  Therapist will help the client identify distorted negative beliefs about himself and the world.    Patient has reviewed and agreed to the above plan.      Madeleine Giraldo, [facility]  January 16, 2020

## 2020-02-04 ENCOUNTER — MYC REFILL (OUTPATIENT)
Dept: FAMILY MEDICINE | Facility: CLINIC | Age: 30
End: 2020-02-04

## 2020-02-04 ENCOUNTER — OFFICE VISIT (OUTPATIENT)
Dept: BEHAVIORAL HEALTH | Facility: CLINIC | Age: 30
End: 2020-02-04
Payer: COMMERCIAL

## 2020-02-04 DIAGNOSIS — R80.9 MICROALBUMINURIA: ICD-10-CM

## 2020-02-04 DIAGNOSIS — F41.1 GAD (GENERALIZED ANXIETY DISORDER): Primary | ICD-10-CM

## 2020-02-04 DIAGNOSIS — I10 ESSENTIAL HYPERTENSION, BENIGN: ICD-10-CM

## 2020-02-04 PROCEDURE — 90834 PSYTX W PT 45 MINUTES: CPT | Performed by: COUNSELOR

## 2020-02-04 ASSESSMENT — ANXIETY QUESTIONNAIRES
3. WORRYING TOO MUCH ABOUT DIFFERENT THINGS: SEVERAL DAYS
5. BEING SO RESTLESS THAT IT IS HARD TO SIT STILL: MORE THAN HALF THE DAYS
GAD7 TOTAL SCORE: 8
2. NOT BEING ABLE TO STOP OR CONTROL WORRYING: SEVERAL DAYS
6. BECOMING EASILY ANNOYED OR IRRITABLE: SEVERAL DAYS
7. FEELING AFRAID AS IF SOMETHING AWFUL MIGHT HAPPEN: NOT AT ALL
1. FEELING NERVOUS, ANXIOUS, OR ON EDGE: SEVERAL DAYS

## 2020-02-04 ASSESSMENT — PATIENT HEALTH QUESTIONNAIRE - PHQ9
SUM OF ALL RESPONSES TO PHQ QUESTIONS 1-9: 0
5. POOR APPETITE OR OVEREATING: MORE THAN HALF THE DAYS

## 2020-02-04 NOTE — PROGRESS NOTES
Progress Note    Patient Name: Sarbjit Perez  Date: 2/4/2020           Service Type: Individual  Video Visit: No     Session Start Time: 9:00am  Session End Time: 9:45am     Session Length: 45 mins     Session #: 4    Attendees: Client attended alone     Treatment Plan Last Reviewed: Jan 2020   PHQ-9 / SERA-7 : P= 0 and G= 8     DATA  Interactive Complexity: No  Crisis: No       Progress Since Last Session (Related to Symptoms / Goals / Homework):   Symptoms: Improving Client reported with use of thought record he was able to see his thoughts as distorted instead of as real     Homework: Achieved / completed to satisfaction Provider assigned client to use thought record  Provider assigned client to think about work/life balance strategies   Client reported use of thought record and attempting to not work weekends       Episode of Care Goals: Satisfactory progress - ACTION (Actively working towards change); Intervened by reinforcing change plan / affirming steps taken     Current / Ongoing Stressors and Concerns:       Social- Client reported he often has increased anxious thoughts when engaging in social activity               Work- Client reported he started a new career recently and can work up to 6 days a week doing 12 hour days and feels like he is doing something wrong if he is not working even when he has completed his tasks. Client is currently trying to ask for a raise              Health- Client reported int he past he has dealt with a lot of health issues related to past alcohol abuse and currently has a collapsed hip that causes pain    Self esteem- Client reported feeling as though he is not good enough often      Treatment Objective(s) Addressed in This Session:   use cognitive strategies identified in therapy to challenge anxious thoughts  identify two areas of life that you would like to have improved functioning       Intervention:   CBT: Provider assisted  client in discussions about challenging distorted thinking and habits that contribute to increased anxiety     Motivational Interviewing    MI Intervention: Expressed Empathy/Understanding, Open-ended questions and Change talk (evoked)     Change Talk Expressed by the Patient: Desire to change Ability to change    Provider Response to Change Talk: E - Evoked more info from patient about behavior change          ASSESSMENT: Current Emotional / Mental Status (status of significant symptoms):   Risk status (Self / Other harm or suicidal ideation)   Patient denies current fears or concerns for personal safety.   Patient denies current or recent suicidal ideation or behaviors.   Patientdenies current or recent homicidal ideation or behaviors.   Patient denies current or recent self injurious behavior or ideation.   Patient denies other safety concerns.   Patient reports there has been no change in risk factors since their last session.     Patientreports there has been no change in protective factors since their last session.     Recommended that patient call 911 or go to the local ED should there be a change in any of these risk factors.     Appearance:   Appropriate    Eye Contact:   Good    Psychomotor Behavior: Normal    Attitude:   Cooperative  Guarded    Orientation:   All   Speech    Rate / Production: Normal     Volume:  Normal    Mood:    Anxious  Normal   Affect:    Appropriate    Thought Content:  Clear  Rumination    Thought Form:  Coherent    Insight:    Fair      Medication Review:   No current psychiatric medications prescribed     Medication Compliance:   NA     Changes in Health Issues:   Yes: Pain, Associated Psychological Distress     Chemical Use Review:   Substance Use: Chemical use reviewed, no active concerns identified      Tobacco Use: No current tobacco use.      Diagnosis:  1. SERA (generalized anxiety disorder)        Collateral Reports Completed:   Not Applicable    PLAN: (Patient Tasks /  Therapist Tasks / Other)  Provider assigned client to use SUDS to document and score worry   Provider assigned client to practice no checking work from 6pm Friday until Monday morning  Provider assigned client to cont use of thought record and questions       Mindfulness, helicopter view, breathing? Been using? Guided imagery, self care, journaling for self esteem?  Madeleine Giraldo [facility] 2/4/2020                                                           ______________________________________________________________________     Treatment Plan     Patient's Name: Sarbjit Perez                      YOB: 1990     Date: 1/16/2020           DSM5 Diagnoses: 300.02 (F41.1) Generalized Anxiety Disorder  Psychosocial / Contextual Factors: social, work, health   WHODAS: 20     Referral / Collaboration:  Referral to another professional/service is not indicated at this time.     Anticipated number of session or this episode of care: 8        MeasurableTreatment Goal(s) related to diagnosis / functional impairment(s)  Goal 1: Patient will score 3 or less on GAD7 (last one 6)     I will know I've met my goal when I am able to enjoy weekends and not worry about work. I would feel more self confident. I would like to be able to go with change without it feeling so impactful on my mood       Objective #A (Patient Action)                          Patient will set limits and boundaries to help with work/life separation.  Status: New - Date: 1/16/2020      Intervention(s)  Therapist will teach about healthy boundaries. Provider will guide client in creating ways to separate work and personal life when working from home. Provider will assign client to practice these boundaries outside of session and report back. Provider will guide client in recognizing benefits that come from boundaries .     Objective #B  Patient will use cognitive strategies identified in therapy to challenge anxious thoughts.  Status: New - Date:  1/16/2020      Intervention(s)  Therapist will teach emotional regulation skills. Provider will utilize CBT to teach client thought labeling, use of a thought record, re-framing and shifting perspectives.     Objective #C  Patient will identify 5 traits or charcteristics you like about yourself.  Status: New - Date: 1/16/2020      Intervention(s)  Therapist will help the client identify distorted negative beliefs about himself and the world.     Patient has reviewed and agreed to the above plan.        Madeleine Giraldo, [facility]                  January 16, 2020

## 2020-02-05 RX ORDER — LISINOPRIL 10 MG/1
5 TABLET ORAL DAILY
Qty: 90 TABLET | Refills: 2 | Status: SHIPPED | OUTPATIENT
Start: 2020-02-05 | End: 2020-06-24

## 2020-02-05 ASSESSMENT — ANXIETY QUESTIONNAIRES: GAD7 TOTAL SCORE: 8

## 2020-02-05 NOTE — TELEPHONE ENCOUNTER
"  Prescription approved per [facility], [facility] or [facility] refill protocol.  Maisha RIOS - Registered Nurse  [facility]  Acute and Diagnostic Services        Requested Prescriptions   Pending Prescriptions Disp Refills     lisinopril (PRINIVIL/ZESTRIL) 10 MG tablet 90 tablet 1     Sig: Take 0.5 tablets (5 mg) by mouth daily       ACE Inhibitors (Including Combos) Protocol Passed - 2/4/2020  4:15 PM        Passed - Blood pressure under 140/90 in past 12 months     BP Readings from Last 3 Encounters:   10/07/19 124/77   06/17/18 142/88   06/08/18 110/68                 Passed - Recent (12 mo) or future (30 days) visit within the authorizing provider's specialty     Patient has had an office visit with the authorizing provider or a provider within the authorizing providers department within the previous 12 mos or has a future within next 30 days. See \"Patient Info\" tab in inbasket, or \"Choose Columns\" in Meds & Orders section of the refill encounter.              Passed - Medication is active on med list        Passed - Patient is age 18 or older        Passed - Normal serum creatinine on file in past 12 months     Recent Labs   Lab Test 10/07/19  0700   CR 0.95             Passed - Normal serum potassium on file in past 12 months     Recent Labs   Lab Test 10/07/19  0700   POTASSIUM 3.9               "

## 2020-02-28 ENCOUNTER — OFFICE VISIT (OUTPATIENT)
Dept: BEHAVIORAL HEALTH | Facility: CLINIC | Age: 30
End: 2020-02-28
Payer: COMMERCIAL

## 2020-02-28 DIAGNOSIS — F41.1 GAD (GENERALIZED ANXIETY DISORDER): Primary | ICD-10-CM

## 2020-02-28 PROCEDURE — 90834 PSYTX W PT 45 MINUTES: CPT | Performed by: COUNSELOR

## 2020-02-28 NOTE — PROGRESS NOTES
Progress Note    Patient Name: Sarbjit Perez  Date: 2/28/2020           Service Type: Individual  Video Visit: No     Session Start Time: 10:00am  Session End Time: 10:43am     Session Length: 43 mins     Session #: 5    Attendees: Client attended alone     Treatment Plan Last Reviewed: Jan 2020   PHQ-9 / SERA-7 : P=0 and G=3    DATA  Interactive Complexity: No  Crisis: No       Progress Since Last Session (Related to Symptoms / Goals / Homework):   Symptoms: Improving Clients assessment scores are in none range. Client reported less anxiety symptoms both mentally and physically . Client reported when he was having anxious thoughts he was able to use coping skills to extinguish them     Homework: Achieved / completed to satisfaction Provider assigned client to use SUDS to document and score worry   Provider assigned client to practice no checking work from 6pm Friday until Monday morning  Provider assigned client to cont use of thought record and questions   Client reported highest SUDS was when worried about flight and this was 4-5   Client reported he has not been checking email during the weekend   Client reported using thought record when worried about work stressor         Episode of Care Goals: Satisfactory progress - ACTION (Actively working towards change); Intervened by reinforcing change plan / affirming steps taken     Current / Ongoing Stressors and Concerns:        Social- Client reported he often has increased anxious thoughts when engaging in social activity               Work- Client reported he started a new career recently and can work up to 6 days a week doing 12 hour days and feels like he is doing something wrong if he is not working even when he has completed his tasks. Client is currently trying to ask for a raise              Health- Client reported int he past he has dealt with a lot of health issues related to past alcohol abuse and currently has a  collapsed hip that causes pain              Self esteem- Client reported feeling as though he is not good enough often         Treatment Objective(s) Addressed in This Session:   use cognitive strategies identified in therapy to challenge anxious thoughts  Practicing self care        Intervention:   CBT: Client reported he has been able to use coping skills to recognize unhelpful thoughts . Provider modeled use of helicopter view to assist client with seeing outside perspectives to help engage his logical mind   Emotion Focused Therapy: Provider discussed self care with client and its impact on emotional and physical health         ASSESSMENT: Current Emotional / Mental Status (status of significant symptoms):   Risk status (Self / Other harm or suicidal ideation)   Patient denies current fears or concerns for personal safety.   Patient denies current or recent suicidal ideation or behaviors.   Patientdenies current or recent homicidal ideation or behaviors.   Patient denies current or recent self injurious behavior or ideation.   Patient denies other safety concerns.   Patient reports there has been no change in risk factors since their last session.     Patientreports there has been no change in protective factors since their last session.     Recommended that patient call 911 or go to the local ED should there be a change in any of these risk factors.     Appearance:   Appropriate    Eye Contact:   Good    Psychomotor Behavior: Normal    Attitude:   Cooperative    Orientation:   All   Speech    Rate / Production: Normal     Volume:  Normal    Mood:    Normal   Affect:    Appropriate    Thought Content:  Clear    Thought Form:  Coherent  Logical    Insight:    Good      Medication Review:   No current psychiatric medications prescribed     Medication Compliance:   NA     Changes in Health Issues:   None reported     Chemical Use Review:   Substance Use: Chemical use reviewed, no active concerns identified       Tobacco Use: No current tobacco use.      Diagnosis:  1. SERA (generalized anxiety disorder)        Collateral Reports Completed:   Not Applicable    PLAN: (Patient Tasks / Therapist Tasks / Other)  Provider assigned client to make a list of self care activities he can engage in       NEXT=Guided imagery, self esteem, enjoying weekends? Mindfulness   Madeleine Giraldo, [facility] 2/28/2020                                                           ______________________________________________________________________     Treatment Plan     Patient's Name: Sarbjit Perez                      YOB: 1990     Date: 1/16/2020           DSM5 Diagnoses: 300.02 (F41.1) Generalized Anxiety Disorder  Psychosocial / Contextual Factors: social, work, health   WHODAS: 20     Referral / Collaboration:  Referral to another professional/service is not indicated at this time.     Anticipated number of session or this episode of care: 8        MeasurableTreatment Goal(s) related to diagnosis / functional impairment(s)  Goal 1: Patient will score 3 or less on GAD7 (last one 6)     I will know I've met my goal when I am able to enjoy weekends and not worry about work. I would feel more self confident. I would like to be able to go with change without it feeling so impactful on my mood       Objective #A (Patient Action)                          Patient will set limits and boundaries to help with work/life separation.  Status: New - Date: 1/16/2020      Intervention(s)  Therapist will teach about healthy boundaries. Provider will guide client in creating ways to separate work and personal life when working from home. Provider will assign client to practice these boundaries outside of session and report back. Provider will guide client in recognizing benefits that come from boundaries .     Objective #B  Patient will use cognitive strategies identified in therapy to challenge anxious thoughts.  Status: New -  Date: 1/16/2020      Intervention(s)  Therapist will teach emotional regulation skills. Provider will utilize CBT to teach client thought labeling, use of a thought record, re-framing and shifting perspectives.     Objective #C  Patient will identify 5 traits or charcteristics you like about yourself.  Status: New - Date: 1/16/2020      Intervention(s)  Therapist will help the client identify distorted negative beliefs about himself and the world.     Patient has reviewed and agreed to the above plan.        Madeleine Giraldo, [facility]                  January 16, 2020

## 2020-03-17 ENCOUNTER — VIRTUAL VISIT (OUTPATIENT)
Dept: BEHAVIORAL HEALTH | Facility: CLINIC | Age: 30
End: 2020-03-17
Payer: COMMERCIAL

## 2020-03-17 DIAGNOSIS — F41.1 GAD (GENERALIZED ANXIETY DISORDER): Primary | ICD-10-CM

## 2020-03-17 PROCEDURE — 90832 PSYTX W PT 30 MINUTES: CPT | Mod: TEL | Performed by: COUNSELOR

## 2020-03-17 NOTE — PROGRESS NOTES
"Sarbjit Perez is a 29 year old male who is being evaluated via a billable telephone visit.      The patient has been notified of following:     \"This telephone visit will be conducted via a call between you and your physician/provider. We have found that certain health care needs can be provided without the need for a physical exam.  This service lets us provide the care you need with a short phone conversation.  If a prescription is necessary we can send it directly to your pharmacy.  If lab work is needed we can place an order for that and you can then stop by our lab to have the test done at a later time.    If during the course of the call the physician/provider feels a telephone visit is not appropriate, you will not be charged for this service.\"       Sarbjit Perez complains of  No chief complaint on file.      I have reviewed and updated the patient's Past Medical History, Social History, Family History and Medication List.    ALLERGIES  Patient has no known allergies.    DATA:     presenting problem addressed --     interventions provided in call - Provider used CBT and EFT therapy. Provider assisted client in recognizing unhelpful thought patterns and re-framing them and provider validated clients emotions related [place] airport due [place]      Assessment:     safety concerns- no safety concerns reported     change in status since previous visit/contact - Client reported he feels anxiety has improved and that he is more able to use self talk to self soothe than previously     follow through on homework/plan - Client shared he has gotten back into reading which is something he enjoys as provider had assigned self care homework at last session   Plan:   o homework assigned - Provider assigned client to use guided imagery during breaks from work at home and to contact insurance company to inquire about ADHD testing     Next- self esteem         Additional provider notes: clients wife " job has been impacted by COVID outbreak     Assessment/Plan:  (F41.1) SERA (generalized anxiety disorder)  (primary encounter diagnosis)      I have reviewed the note as documented above.  This accurately captures the substance of my conversation with the patient.        Phone call contact time  Call Started at 9:00am  Call Ended at 9:30qam    Madeleine Giraldo [facility]

## 2020-04-10 ENCOUNTER — VIRTUAL VISIT (OUTPATIENT)
Dept: BEHAVIORAL HEALTH | Facility: CLINIC | Age: 30
End: 2020-04-10
Payer: COMMERCIAL

## 2020-04-10 DIAGNOSIS — F41.1 GAD (GENERALIZED ANXIETY DISORDER): Primary | ICD-10-CM

## 2020-04-10 PROCEDURE — 90834 PSYTX W PT 45 MINUTES: CPT | Mod: 95 | Performed by: COUNSELOR

## 2020-04-10 ASSESSMENT — PATIENT HEALTH QUESTIONNAIRE - PHQ9
5. POOR APPETITE OR OVEREATING: SEVERAL DAYS
SUM OF ALL RESPONSES TO PHQ QUESTIONS 1-9: 0

## 2020-04-10 ASSESSMENT — ANXIETY QUESTIONNAIRES
3. WORRYING TOO MUCH ABOUT DIFFERENT THINGS: NOT AT ALL
1. FEELING NERVOUS, ANXIOUS, OR ON EDGE: SEVERAL DAYS
6. BECOMING EASILY ANNOYED OR IRRITABLE: NOT AT ALL
GAD7 TOTAL SCORE: 3
7. FEELING AFRAID AS IF SOMETHING AWFUL MIGHT HAPPEN: NOT AT ALL
5. BEING SO RESTLESS THAT IT IS HARD TO SIT STILL: NOT AT ALL
2. NOT BEING ABLE TO STOP OR CONTROL WORRYING: SEVERAL DAYS

## 2020-04-10 NOTE — LETTER
The practice of mindfulness--directing all of your attention and awareness to the present--can bring many benefits to your emotional and physical health, as well as to the relationships in your life.    Among its many benefits, practicing everyday mindfulness can:    Pull you out of the negative downward spiral that can be caused by too much daily stress, too many bad moods, or the habit of rumination.  Help you make fewer errors when processing your experiences.  Help you put stressful events into perspective and build resilience so you're less overwhelmed by them in the future.    And while there are many mindfulness exercises you can practice on a regular basis, learning how to be present in the moment is also a way of life. With practice, you can learn to live a more mindful life that allows you to become more conscious of everything you are doing.    Practice Mindful Eating  Whether you eat while you scroll through your phone or you reach for food for emotional comfort, mindless eating is common. And it can contribute to a host of problems, like overeating and consuming too much sugar.    Practice becoming more mindful about how you fuel your body. Resist the urge to multi-task while you eat. When you eat, be present with your food.      Pay attention to each bite that you're taking. Chew your food slowly and savor the taste.    Notice your body's signals about when you're getting full and pay more attention to what's on your plate. When you become more intentional about what you're eating, you'll be better equipped to focus on fueling your body with the nutrition it needs.      Be Mindful in Your Interactions  Whether you're interacting with your partner, your children, or a colleague, mindful interactions are important.    Mindfulness in a relationship is about observing what the other person is doing in a non-judgmental way. It's also about staying present in the moment during your conversations.    So  rather than scroll through your phone while you're with someone, give them your undivided attention. And instead of crafting your rebuttal while they're sharing their opinion, seek to really hear their message.      You can become more mindful by paying attention to the way you're feeling, listening carefully, and learning to respond to others in a more mindful manner (rather than reacting out of anger).      Engage in Activities Mindfully  Do you ever have trouble recalling whether you washed your hair already when you're in the shower? Or do you sometimes forget why you walked into a certain room? Those are signs that you have a lot of things going on in your mind and you aren't being mindful.    Fortunately, you can improve at this and there are many opportunities to practice throughout the day.    Walking, gardening, eating chocolate, and many other activities can be opportunities to practice mindfulness. You just have to perform them with a heightened sense of awareness.    This means focusing on the present moment, tuning into physical sensations, being fully aware of everything you do, and letting go of thoughts of the future or anxiety over the past.    Take cleaning the house, for example. Start by viewing your work as a positive event--an exercise in self-understanding and stress relief, rather than simply as a chore. Then, as you clean, focus on what you are doing as you are doing it--and nothing else.    Feel the warm, soapy water on your hands as you wash dishes; experience the vibrations of the vacuum  as you push it over the floor; enjoy the warmth of fresh-from-the-dryer laundry as you fold it; feel the freedom of letting go of unneeded objects as you put them in a box for donation.    Another opportunity to practice mindfulness in your everyday life is when you're listening to music. Really focus on the sound and vibration of each note, the feelings that the music stirs up within you, and  other in-the-moment sensations.    Throughout your day, look for opportunities to be more mindful. Whether you're riding in the subway or you're taking a hot shower, try to be fully of aware of what you're doing and what's happening around you.    When your mind wanders, congratulate yourself for noticing and gently bring your attention back to the current moment.    Pause Throughout the Day  As you move from one activity to the next throughout the day, it can be tough to stay mindful. You can get back on track by pausing throughout the day to practice a few basic mindfulness exercises.    You might make it a habit to spend a few minutes being mindful at certain times of the day, like during meals or when you're getting the car. Or, you might schedule a time to practice meditation or yoga.    You can also make it a habit to practice focus on your breathing when you're upset or anxious. Breathing techniques can have a calming effect and help you stay grounded in the present moment.    Progressive muscle relaxation is another exercise you might practice throughout the day. Simply, work on tensing and relaxing your muscles, one muscle group at a time. With practice, you'll learn to recognize when you're tensing up certain parts of your body.

## 2020-04-10 NOTE — PROGRESS NOTES
Progress Note    Patient Name: Sarbjit Perez  Date: 4/10/2020           Service Type: Individual      Session Start Time: 8:00am  Session End Time: 8:40am     Session Length: 40 mins     Session #: 6    Attendees: Client attended alone    Telemedicine Visit: The patient's condition can be safely assessed and treated via synchronous audio and visual telemedicine encounter.      Reason for Telemedicine Visit: Services only offered telehealth    Originating Site (Patient Location): Patient's home    Distant Site (Provider Location): Provider Remote Setting    Consent:  The patient/guardian has verbally consented to: the potential risks and benefits of telemedicine (video visit) versus in person care; bill my insurance or make self-payment for services provided; and responsibility for payment of non-covered services.      Treatment Plan Last Reviewed: Jan 2020   PHQ-9 / SERA-7 : P=0 and G=3    DATA  Interactive Complexity: No  Crisis: No       Progress Since Last Session (Related to Symptoms / Goals / Homework):   Symptoms: Improving Client reported less symptoms of anxety int he last 3 weeks    Homework: Achieved / completed to satisfaction      Episode of Care Goals: Satisfactory progress - ACTION (Actively working towards change); Intervened by reinforcing change plan / affirming steps taken     Current / Ongoing Stressors and Concerns:   Wife pregnant (high risk pregnancy)   Social- Client reported he often has increased anxious thoughts when engaging in social activity                   Health- Client reported int he past he has dealt with a lot of health issues related to past alcohol abuse and currently has a collapsed hip that causes pain              Self esteem- Client reported feeling as though he is not good enough often        Treatment Objective(s) Addressed in This Session:   use cognitive strategies identified in therapy to challenge anxious  thoughts       Intervention:   Emotion Focused Therapy: Provider guided client in discussion about wifes high risk pregnancy and assisted client in processing and expressing emotions.         ASSESSMENT: Current Emotional / Mental Status (status of significant symptoms):   Risk status (Self / Other harm or suicidal ideation)   Patient denies current fears or concerns for personal safety.   Patient denies current or recent suicidal ideation or behaviors.   Patient denies current or recent homicidal ideation or behaviors.   Patient denies current or recent self injurious behavior or ideation.   Patient denies other safety concerns.   Patient reports there has been no change in risk factors since their last session.     Patient reports there has been no change in protective factors since their last session.     Recommended that patient call 911 or go to the local ED should there be a change in any of these risk factors.     Appearance:   Appropriate    Eye Contact:   Good    Psychomotor Behavior: Normal    Attitude:   Cooperative    Orientation:   All   Speech    Rate / Production: Normal     Volume:  Normal    Mood:    Normal   Affect:    Appropriate    Thought Content:  Clear    Thought Form:  Coherent  Logical    Insight:    Good      Medication Review:   No current psychiatric medications prescribed     Medication Compliance:   NA     Changes in Health Issues:   None reported     Chemical Use Review:   Substance Use: Chemical use reviewed, no active concerns identified      Tobacco Use: No current tobacco use.      Diagnosis:  1. SERA (generalized anxiety disorder)        Collateral Reports Completed:   Not Applicable    PLAN: (Patient Tasks / Therapist Tasks / Other)  Provider assigned client to look at mindfulness worksheet sent via [employer]       Self esteem? Managing own anxiety? Support for wife, work review/raise?   Madeleine Giraldo, [facility] 4/10/2020                                                          ______________________________________________________________________     Treatment Plan     Patient's Name: Sarbjit Perez                      YOB: 1990     Date: 1/16/2020           DSM5 Diagnoses: 300.02 (F41.1) Generalized Anxiety Disorder  Psychosocial / Contextual Factors: social, work, health   WHODAS: 20     Referral / Collaboration:  Referral to another professional/service is not indicated at this time.     Anticipated number of session or this episode of care: 8        MeasurableTreatment Goal(s) related to diagnosis / functional impairment(s)  Goal 1: Patient will score 3 or less on GAD7 (last one 6)     I will know I've met my goal when I am able to enjoy weekends and not worry about work. I would feel more self confident. I would like to be able to go with change without it feeling so impactful on my mood       Objective #A (Patient Action)                          Patient will set limits and boundaries to help with work/life separation.  Status: New - Date: 1/16/2020      Intervention(s)  Therapist will teach about healthy boundaries. Provider will guide client in creating ways to separate work and personal life when working from home. Provider will assign client to practice these boundaries outside of session and report back. Provider will guide client in recognizing benefits that come from boundaries .     Objective #B  Patient will use cognitive strategies identified in therapy to challenge anxious thoughts.  Status: New - Date: 1/16/2020      Intervention(s)  Therapist will teach emotional regulation skills. Provider will utilize CBT to teach client thought labeling, use of a thought record, re-framing and shifting perspectives.     Objective #C  Patient will identify 5 traits or charcteristics you like about yourself.  Status: New - Date: 1/16/2020      Intervention(s)  Therapist will help the client identify distorted negative beliefs about himself and the world.     Patient has  reviewed and agreed to the above plan.        Madeleine Giraldo, [facility]                  January 16, 2020

## 2020-04-11 ASSESSMENT — ANXIETY QUESTIONNAIRES: GAD7 TOTAL SCORE: 3

## 2020-04-23 ENCOUNTER — VIRTUAL VISIT (OUTPATIENT)
Dept: BEHAVIORAL HEALTH | Facility: CLINIC | Age: 30
End: 2020-04-23
Payer: COMMERCIAL

## 2020-04-23 DIAGNOSIS — F41.1 GAD (GENERALIZED ANXIETY DISORDER): Primary | ICD-10-CM

## 2020-04-23 PROCEDURE — 90834 PSYTX W PT 45 MINUTES: CPT | Mod: 95 | Performed by: COUNSELOR

## 2020-04-23 NOTE — PROGRESS NOTES
Progress Note    Patient Name: Sarbjit Perez  Date: 4/23/2020           Service Type: Individual      Session Start Time: 9:00am  Session End Time: 9:48am     Session Length: 48 mins     Session #: 7    Attendees: Client attended alone    Telemedicine Visit: The patient's condition can be safely assessed and treated via synchronous audio and visual telemedicine encounter.      Reason for Telemedicine Visit: Services only offered telehealth    Originating Site (Patient Location): Patient's home    Distant Site (Provider Location): Provider Remote Setting providers home office     Consent:  The patient/guardian has verbally consented to: the potential risks and benefits of telemedicine (video visit) versus in person care; bill my insurance or make self-payment for services provided; and responsibility for payment of non-covered services.      Treatment Plan Last Reviewed: 4/23/2020    PHQ-9 / SERA-7 : review a next session     DATA  Interactive Complexity: No  Crisis: No       Progress Since Last Session (Related to Symptoms / Goals / Homework):   Symptoms: Improving Client reported he feels his anxiety has been under control. Client reported some struggles with self esteem related to work     Homework: Achieved / completed to satisfaction      Episode of Care Goals: Satisfactory progress - ACTION (Actively working towards change); Intervened by reinforcing change plan / affirming steps taken     Current / Ongoing Stressors and Concerns:      Wife pregnant (high risk pregnancy)   Social- Client reported he often has increased anxious thoughts when engaging in social activity                   Health- Client reported int he past he has dealt with a lot of health issues related to past alcohol abuse and currently has a collapsed hip that causes pain              Self esteem- Client reported feeling as though he is not good enough often   CV19- shelter in place, cannot attend  wifes appts      Treatment Objective(s) Addressed in This Session:   identify and write down 2 positive experiences a day, bring to therapy to share with therapist  Treatment goal update      Intervention:   Emotion Focused Therapy: Provider guided client in discussion about what causes insecurity in client and client was able to identify work. Provider assisted client in talking through this an offering different perspective         ASSESSMENT: Current Emotional / Mental Status (status of significant symptoms):   Risk status (Self / Other harm or suicidal ideation)   Patient denies current fears or concerns for personal safety.   Patient denies current or recent suicidal ideation or behaviors.   Patient denies current or recent homicidal ideation or behaviors.   Patient denies current or recent self injurious behavior or ideation.   Patient denies other safety concerns.   Patient reports there has been no change in risk factors since their last session.     Patient reports there has been no change in protective factors since their last session.     Recommended that patient call 911 or go to the local ED should there be a change in any of these risk factors.     Appearance:   Appropriate    Eye Contact:   Good    Psychomotor Behavior: Normal    Attitude:   Cooperative    Orientation:   All   Speech    Rate / Production: Normal     Volume:  Normal    Mood:    Anxious  Normal   Affect:    Appropriate    Thought Content:  Clear  Rumination    Thought Form:  Coherent  Logical    Insight:    Good      Medication Review:   No current psychiatric medications prescribed     Medication Compliance:   NA     Changes in Health Issues:   None reported     Chemical Use Review:   Substance Use: Chemical use reviewed, no active concerns identified      Tobacco Use: No current tobacco use.      Diagnosis:  1. SERA (generalized anxiety disorder)        Collateral Reports Completed:   Not Applicable    PLAN: (Patient Tasks / Therapist  Tasks / Other)  Provider assigned client to document 1 good thing he does at work each day  Provider assigned client to think of any goals updates  Provider assigned client to have something small he can eat for next session(mindfulness exercise)      Do mindfulness, add/upate goals   Madeleine Giraldo [facility] 4/23/2020                                                                                                          ______________________________________________________________________     Treatment Plan     Patient's Name: Sarbjit Perez                      YOB: 1990     Date: 1/16/2020           DSM5 Diagnoses: 300.02 (F41.1) Generalized Anxiety Disorder  Psychosocial / Contextual Factors: social, work, health   WHODAS: 16     Referral / Collaboration:  Referral to another professional/service is not indicated at this time.     Anticipated number of session or this episode of care: 8        MeasurableTreatment Goal(s) related to diagnosis / functional impairment(s)  Goal 1: Patient will score 3 or less on GAD7 (last one 6)     I will know I've met my goal when I am able to enjoy weekends and not worry about work. I would feel more self confident. I would like to be able to go with change without it feeling so impactful on my mood       Objective #A (Patient Action)                          Patient will set limits and boundaries to help with work/life separation.  Status: New - Date: 1/16/2020      Intervention(s)  Therapist will teach about healthy boundaries. Provider will guide client in creating ways to separate work and personal life when working from home. Provider will assign client to practice these boundaries outside of session and report back. Provider will guide client in recognizing benefits that come from boundaries .     Objective #B  Patient will use cognitive strategies identified in therapy to challenge anxious thoughts.  Status: New -  Date: 1/16/2020      Intervention(s)  Therapist will teach emotional regulation skills. Provider will utilize CBT to teach client thought labeling, use of a thought record, re-framing and shifting perspectives.     Objective #C  Patient will identify 5 traits or charcteristics you like about yourself.  Status: New - Date: 1/16/2020      Intervention(s)  Therapist will help the client identify distorted negative beliefs about himself and the world.     Patient has reviewed and agreed to the above plan.        Madeleine Giraldo, [facility]                  January 16, 2020

## 2020-05-08 ENCOUNTER — VIRTUAL VISIT (OUTPATIENT)
Dept: BEHAVIORAL HEALTH | Facility: CLINIC | Age: 30
End: 2020-05-08
Payer: COMMERCIAL

## 2020-05-08 DIAGNOSIS — F41.1 GAD (GENERALIZED ANXIETY DISORDER): Primary | ICD-10-CM

## 2020-05-08 PROCEDURE — 90834 PSYTX W PT 45 MINUTES: CPT | Mod: 95 | Performed by: COUNSELOR

## 2020-05-08 ASSESSMENT — PATIENT HEALTH QUESTIONNAIRE - PHQ9
SUM OF ALL RESPONSES TO PHQ QUESTIONS 1-9: 0
5. POOR APPETITE OR OVEREATING: SEVERAL DAYS

## 2020-05-08 ASSESSMENT — ANXIETY QUESTIONNAIRES
GAD7 TOTAL SCORE: 4
3. WORRYING TOO MUCH ABOUT DIFFERENT THINGS: SEVERAL DAYS
5. BEING SO RESTLESS THAT IT IS HARD TO SIT STILL: SEVERAL DAYS
2. NOT BEING ABLE TO STOP OR CONTROL WORRYING: NOT AT ALL
7. FEELING AFRAID AS IF SOMETHING AWFUL MIGHT HAPPEN: NOT AT ALL
6. BECOMING EASILY ANNOYED OR IRRITABLE: NOT AT ALL
1. FEELING NERVOUS, ANXIOUS, OR ON EDGE: SEVERAL DAYS

## 2020-05-08 NOTE — PROGRESS NOTES
Progress Note    Patient Name: Sarbjit Perez  Date: 5/8/2020           Service Type: Individual      Session Start Time: 9:00am  Session End Time: 9:40am     Session Length: 40 mins     Session #: 8    Attendees: Client attended alone    Telemedicine Visit: The patient's condition can be safely assessed and treated via synchronous audio and visual telemedicine encounter.      Reason for Telemedicine Visit: Services only offered telehealth    Originating Site (Patient Location): Patient's home    Distant Site (Provider Location): Provider Remote Setting    Consent:  The patient/guardian has verbally consented to: the potential risks and benefits of telemedicine (video visit) versus in person care; bill my insurance or make self-payment for services provided; and responsibility for payment of non-covered services.      Treatment Plan Last Reviewed: reviewed 5/8/2020    PHQ-9 / SERA-7 : P= 0 and G= 4    DATA  Interactive Complexity: No  Crisis: No       Progress Since Last Session (Related to Symptoms / Goals / Homework):   Symptoms: Improving Client reported feeling better about ability at work with last weeks exercise    Homework: Achieved / completed to satisfaction      Episode of Care Goals: Satisfactory progress - MAINTENANCE (Working to maintain change, with risk of relapse); Intervened by continuing to positively reinforce healthy behavior choice      Current / Ongoing Stressors and Concerns:   Wife high risk pregnancy 15 weeks   Upcoming hip surgery and replacement    High stress work environment    [facility] [address] in [facility]      Treatment Objective(s) Addressed in This Session:   Minfulness and quality of life- reduction of rumination        Intervention:   Emotion Focused Therapy: Provider modeled use of mindfulness using mindful eating exercise         ASSESSMENT: Current Emotional / Mental Status (status of significant symptoms):   Risk status (Self / Other harm or  suicidal ideation)   Patient denies current fears or concerns for personal safety.   Patient denies current or recent suicidal ideation or behaviors.   Patient denies current or recent homicidal ideation or behaviors.   Patient denies current or recent self injurious behavior or ideation.   Patient denies other safety concerns.   Patient reports there has been no change in risk factors since their last session.     Patient reports there has been no change in protective factors since their last session.     Recommended that patient call 911 or go to the local ED should there be a change in any of these risk factors.     Appearance:   Appropriate    Eye Contact:   Good    Psychomotor Behavior: Normal    Attitude:   Cooperative    Orientation:   All   Speech    Rate / Production: Normal     Volume:  Normal    Mood:    Normal   Affect:    Appropriate    Thought Content:  Clear    Thought Form:  Coherent  Logical    Insight:    Good      Medication Review:   No current psychiatric medications prescribed     Medication Compliance:   NA     Changes in Health Issues:   None reported     Chemical Use Review:   Substance Use: Chemical use reviewed, no active concerns identified      Tobacco Use: No current tobacco use.      Diagnosis:  1. SERA (generalized anxiety disorder)        Collateral Reports Completed:   Not Applicable    PLAN: (Patient Tasks / Therapist Tasks / Other)  Provider assigned client to practice daily mindfulness      How does anxiety currently impact functioning? How did it before? Social interaction   Madeleine Giraldo, [facility]                                                                                                        ______________________________________________________________________     Treatment Plan     Patient's Name: Sarbjit Perez                      YOB: 1990     Date: cont 5/8/2020 by request of client due to upcoming situational stressors of birth of twins and hip  replacement client wants to practice maintaining current goals            DSM5 Diagnoses: 300.02 (F41.1) Generalized Anxiety Disorder  Psychosocial / Contextual Factors: social, work, health   WHODAS: 16     Referral / Collaboration:  Referral to another professional/service is not indicated at this time.     Anticipated number of session or this episode of care: 8        MeasurableTreatment Goal(s) related to diagnosis / functional impairment(s)  Goal 1: Patient will score 3 or less on GAD7 (last one 6)     I will know I've met my goal when I am able to enjoy weekends and not worry about work. I would feel more self confident. I would like to be able to go with change without it feeling so impactful on my mood  Client would like top be able to maintain current mental health during birth of twins and hip replacement surgery      Objective #A (Patient Action)                          Patient will set limits and boundaries to help with work/life separation.  Status: cont 5/8/2020       Intervention(s)  Therapist will teach about healthy boundaries. Provider will guide client in creating ways to separate work and personal life when working from home. Provider will assign client to practice these boundaries outside of session and report back. Provider will guide client in recognizing benefits that come from boundaries .     Objective #B  Patient will use cognitive strategies identified in therapy to challenge anxious thoughts.  Status: cont 5/8/2020       Intervention(s)  Therapist will teach emotional regulation skills. Provider will utilize CBT to teach client thought labeling, use of a thought record, re-framing and shifting perspectives.     Objective #C  Patient will identify 5 traits or charcteristics you like about yourself.  Status: cont 5/8/2020       Intervention(s)  Therapist will help the client identify distorted negative beliefs about himself and the world.     Patient has reviewed and agreed to the above  plan.        Madeleine Giraldo, [facility]         5/8/2020

## 2020-05-09 ASSESSMENT — ANXIETY QUESTIONNAIRES: GAD7 TOTAL SCORE: 4

## 2020-05-26 ENCOUNTER — VIRTUAL VISIT (OUTPATIENT)
Dept: BEHAVIORAL HEALTH | Facility: CLINIC | Age: 30
End: 2020-05-26
Payer: COMMERCIAL

## 2020-05-26 DIAGNOSIS — R80.9 MICROALBUMINURIA: ICD-10-CM

## 2020-05-26 DIAGNOSIS — F41.1 GAD (GENERALIZED ANXIETY DISORDER): Primary | ICD-10-CM

## 2020-05-26 DIAGNOSIS — I10 ESSENTIAL HYPERTENSION, BENIGN: ICD-10-CM

## 2020-05-26 PROCEDURE — 90834 PSYTX W PT 45 MINUTES: CPT | Mod: 95 | Performed by: COUNSELOR

## 2020-05-26 ASSESSMENT — ANXIETY QUESTIONNAIRES
1. FEELING NERVOUS, ANXIOUS, OR ON EDGE: NOT AT ALL
6. BECOMING EASILY ANNOYED OR IRRITABLE: NOT AT ALL
GAD7 TOTAL SCORE: 3
7. FEELING AFRAID AS IF SOMETHING AWFUL MIGHT HAPPEN: NOT AT ALL
2. NOT BEING ABLE TO STOP OR CONTROL WORRYING: NOT AT ALL
3. WORRYING TOO MUCH ABOUT DIFFERENT THINGS: SEVERAL DAYS
5. BEING SO RESTLESS THAT IT IS HARD TO SIT STILL: SEVERAL DAYS

## 2020-05-26 ASSESSMENT — PATIENT HEALTH QUESTIONNAIRE - PHQ9
5. POOR APPETITE OR OVEREATING: SEVERAL DAYS
SUM OF ALL RESPONSES TO PHQ QUESTIONS 1-9: 0

## 2020-05-26 NOTE — PATIENT INSTRUCTIONS
5 things I can see   4 things I can hear   3 things I can touch or feel   2 things I can smell or like the smell of   1 slow deep breath or taste 1 thing

## 2020-05-26 NOTE — PROGRESS NOTES
Progress Note    Patient Name: Sarbjit Perez  Date: 5/26/2020           Service Type: Individual      Session Start Time: 9:00am  Session End Time: 9:41am     Session Length: 41 mins     Session #: 9    Attendees: Client attended alone    Telemedicine Visit: The patient's condition can be safely assessed and treated via synchronous audio and visual telemedicine encounter.      Reason for Telemedicine Visit: Services only offered telehealth    Originating Site (Patient Location): Patient's home    Distant Site (Provider Location): Provider Remote Setting    Consent:  The patient/guardian has verbally consented to: the potential risks and benefits of telemedicine (video visit) versus in person care; bill my insurance or make self-payment for services provided; and responsibility for payment of non-covered services.      Treatment Plan Last Reviewed: 5/8/2020  PHQ-9 / SERA-7 : P=0 and G=3    DATA  Interactive Complexity: No  Crisis: No       Progress Since Last Session (Related to Symptoms / Goals / Homework):   Symptoms: Improving assessment scores in none and mild range     Homework: Achieved / completed to satisfaction      Episode of Care Goals: Satisfactory progress - MAINTENANCE (Working to maintain change, with risk of relapse); Intervened by continuing to positively reinforce healthy behavior choice      Current / Ongoing Stressors and Concerns:   Wife high risk pregnancy 15 weeks              Upcoming hip surgery and replacement               High stress work environment               CV-[address]      Treatment Objective(s) Addressed in This Session:   Grounding for clausterphobia concerns       Intervention:   somatic therapy: down regulation using [id number]        ASSESSMENT: Current Emotional / Mental Status (status of significant symptoms):   Risk status (Self / Other harm or suicidal ideation)   Patient denies current fears or concerns for personal  safety.   Patient denies current or recent suicidal ideation or behaviors.   Patient denies current or recent homicidal ideation or behaviors.   Patient denies current or recent self injurious behavior or ideation.   Patient denies other safety concerns.   Patient reports there has been no change in risk factors since their last session.     Patient reports there has been no change in protective factors since their last session.     Recommended that patient call 911 or go to the local ED should there be a change in any of these risk factors.     Appearance:   Appropriate    Eye Contact:   Good    Psychomotor Behavior: Normal    Attitude:   Cooperative    Orientation:   All   Speech    Rate / Production: Normal     Volume:  Normal    Mood:    Normal   Affect:    Appropriate    Thought Content:  Clear    Thought Form:  Coherent  Logical    Insight:    Good      Medication Review:   No current psychiatric medications prescribed     Medication Compliance:   NA     Changes in Health Issues:   None reported     Chemical Use Review:   Substance Use: Chemical use reviewed, no active concerns identified      Tobacco Use: No current tobacco use.      Diagnosis:  1. SERA (generalized anxiety disorder)        Collateral Reports Completed:   Not Applicable    PLAN: (Patient Tasks / Therapist Tasks / Other)  Provider assigned client to use [id number] grounding strategy         Madeleine Giraldo, [facility] 5/26/2020                                                                                                          ______________________________________________________________________     Treatment Plan     Patient's Name: Sarbjit Perez                      YOB: 1990     Date: cont 5/8/2020 by request of client due to upcoming situational stressors of birth of twins and hip replacement client wants to practice maintaining current goals            DSM5 Diagnoses: 300.02 (F41.1) Generalized Anxiety Disorder  Psychosocial /  Contextual Factors: social, work, health   WHODAS: 16     Referral / Collaboration:  Referral to another professional/service is not indicated at this time.     Anticipated number of session or this episode of care: 8        MeasurableTreatment Goal(s) related to diagnosis / functional impairment(s)  Goal 1: Patient will score 3 or less on GAD7 (last one 6)     I will know I've met my goal when I am able to enjoy weekends and not worry about work. I would feel more self confident. I would like to be able to go with change without it feeling so impactful on my mood  Client would like top be able to maintain current mental health during birth of twins and hip replacement surgery      Objective #A (Patient Action)                          Patient will set limits and boundaries to help with work/life separation.  Status: cont 5/8/2020        Intervention(s)  Therapist will teach about healthy boundaries. Provider will guide client in creating ways to separate work and personal life when working from home. Provider will assign client to practice these boundaries outside of session and report back. Provider will guide client in recognizing benefits that come from boundaries .     Objective #B  Patient will use cognitive strategies identified in therapy to challenge anxious thoughts.  Status: cont 5/8/2020        Intervention(s)  Therapist will teach emotional regulation skills. Provider will utilize CBT to teach client thought labeling, use of a thought record, re-framing and shifting perspectives.     Objective #C  Patient will identify 5 traits or charcteristics you like about yourself.  Status: cont 5/8/2020        Intervention(s)  Therapist will help the client identify distorted negative beliefs about himself and the world.     Patient has reviewed and agreed to the above plan.        Madeleine Giraldo, [facility]         5/8/2020

## 2020-05-27 RX ORDER — LISINOPRIL 10 MG/1
TABLET ORAL
Qty: 45 TABLET | Refills: 5 | OUTPATIENT
Start: 2020-05-27

## 2020-05-27 ASSESSMENT — ANXIETY QUESTIONNAIRES: GAD7 TOTAL SCORE: 3

## 2020-05-27 NOTE — TELEPHONE ENCOUNTER
Patient has refills remaining with requesting pharmacy.  Maisha RIOS - Registered Nurse  [facility]  Acute and Diagnostic Services

## 2020-06-01 ENCOUNTER — VIRTUAL VISIT (OUTPATIENT)
Dept: PSYCHOLOGY | Facility: CLINIC | Age: 30
End: 2020-06-01
Payer: COMMERCIAL

## 2020-06-01 DIAGNOSIS — F41.1 GENERALIZED ANXIETY DISORDER: Primary | ICD-10-CM

## 2020-06-01 PROCEDURE — 90791 PSYCH DIAGNOSTIC EVALUATION: CPT | Mod: 95 | Performed by: PSYCHOLOGIST

## 2020-06-01 ASSESSMENT — COLUMBIA-SUICIDE SEVERITY RATING SCALE - C-SSRS: 1. IN THE PAST MONTH, HAVE YOU WISHED YOU WERE DEAD OR WISHED YOU COULD GO TO SLEEP AND NOT WAKE UP?: NO

## 2020-06-01 NOTE — PROGRESS NOTES
"  [facility]  Evaluator Name:  Cleopatra Ann      Credentials:  Jorge JADE    PATIENT'S NAME: Sarbjit Perez  PREFERRED NAME: Sarbjit   PREFERRED PRONOUNS:    He/him  MRN:   [id number]  :   1990   ACCT. NUMBER: [id number]  DATE OF SERVICE: 20  START TIME: 9:34AM  END TIME: 10:28AM  PREFERRED PHONE: [phone]  May we leave a program related message: Yes    STANDARD ADULT DIAGNOSTIC ASSESSMENT    Telemedicine Visit: The patient's condition can be safely assessed and treated via synchronous audio and visual telemedicine encounter.      Reason for Telemedicine Visit: due to distancing due to COVID-19    Originating Site (Patient Location): Patient's home    Distant Site (Provider Location): Provider Remote Setting    Consent:  The patient/guardian has verbally consented to: the potential risks and benefits of telemedicine (video visit) versus in person care; bill my insurance or make self-payment for services provided; and responsibility for payment of non-covered services.     Mode of Communication:  Video Conference via [employer]    As the provider I attest to compliance with applicable laws and regulations related to telemedicine.    Identifying Information:  Patient is a 30 year old, .  The pronoun use throughout this assessment reflects the patient's chosen pronoun.  Patient was referred for an assessment by mental health therapist, Madeleine Giraldo [facility].  Patient attended the session alone.     Chief Complaint:   The reason for seeking services at this time is: \" focus, difficulty completing tasks, distracted, and difficulty following a conversation if multiple people are speaking. \"   The problem(s) began in high school. Patient has not attempted to resolve these concerns in the past. Patient reported that he uses lists and starts with small \"achievable goals.\"     Does the client have any condition that is currently presenting as a potential to harm themselves or others " "(severe withdrawal, serious medical condition, severe emotional/behavioral problem)? No.  Proceed with assessment.    Social/Family History:  Patient reported they grew up in [place], [place].  They were raised by biological parents.   Parents remain . Patient reported that he has two younger brother and one older half brother through his father.   Patient reported that his childhood was \"great.\" His parents \"stressed the importance of school and sports.\"  Patient described their current relationships with family of origin as \"really good.\" Patient reported that he communicates with his siblings and parents at least weekly.      Patient reported that he started college at [employer] and that he experienced difficulty adjusting to \"studying more\" when he had \"new found freedoms.\" He went to [employer] for one year and then he went to a [employer] college for two years. Then he transferred back to [employer] for one semester and then he transferred to [employer] and graduated with a degree in BS in Business Administration and Finance. Patient reported that many of his classes were \"advanced classes.\" Patient reported that he struggled with math through college and then the \"switch flipped\" and he was able to understand finances and accounting. Patient reported that even though math was difficult, he earned As. However, he said \"my high school is kind of a joke.\" Patient reported that he works remotely and can function at his job, but thinks that \"there is room for improvement.\"      The patient describes their cultural background [place] .  Cultural influences and impact on patient's life structure, values, norms, and healthcare: Spiritual Beliefs: prefers science over Jewish and Health Beliefs and the endorsement of OR engagement in Culturally Specific Healing Practices: prefers Western medicine.  Patient reported that he grew up in a \"liberal\" Mosque family; " "however, he no longer attends Baptism and he is on the \"liberal\" side of the spectrum. Contextual influences on patient's health include: Family Factors \"anxiety runs in family and it has been largly ignored\" and Health- Seeking Factors his mother is a NP and encoruages him to go to medical appoitments.    These factors will be addressed in the Preliminary Treatment plan.  Patient identified their preferred language to be English. Patient reported they does not need the assistance of an  or other support involved in therapy.     Patient reported had no significant delays in developmental tasks.   Patient's highest education level was college graduate. Patient identified the following learning problems: concentration and reading.  Patient reported that he struggles with reading due to distractions, so he prefers reading news articles and short pieces.  Modifications will not be used to assist communication in therapy.   Patient reports they are  able to understand written materials.    Patient reported the following relationship history, that he has been with his wife for 14 years. He reported that he has been with his partner since he was 16 years old and in high school.  Patient's current relationship status is  for 2 years.   Patient identified their sexual orientation as heterosexual.  Patient reported having no children; however, his wife is pregnant with twins that are due in September.    Patient's current living/housing situation involves staying in own home/apartment.  They live with his wife and they report that housing is stable. Patient identified partner, parents, siblings, friends and therapist as part of their support system.  Patient identified the quality of these relationships as stable and meaningful.      Patient is currently employed full time and reports they are able to function appropriately at work..  Patient reports their finances are obtained through employment and spouse.  " "Patient does not identify finances as a current stressor.      Patient reported that they have not been involved with the legal system.   Patient denies being on probation / parole / under the jurisdiction of the court.        Patient's Strengths and Limitations:  Patient identified the following strengths or resources that will help them succeed in treatment: commitment to health and well being, friends / good social support, family support, intelligence and sense of humor. Things that may interfere with the patient's success in treatment include: none noted.   _______________________________________________  Personal and Family Medical History:   Patient did report a family history of mental health concerns. Patient reported that he thinks that his brother and father have undiagnosed anxiety.  Patient reports family history includes Breast Cancer in his mother; Diabetes in his paternal grandfather; Diabetes Type 2  in his paternal grandfather; Hypertension in his father and paternal grandfather; Other Cancer in his maternal grandmother..     Patient reported that he has high blood pressure and used to have diabetes and alcohol use disorder. Patient reported that he \"drank heavily\" from age 19 to age 25. 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 Patient reported that he stopped drinking alcohol around age 26 and that he did not go to a formal substance use and is not in AA.       Patient reported the following previous diagnoses which include(s): an Anxiety Disorder.  Patient reported symptoms began when he started college.   Patient has received mental health services in the past: therapy with Madeleine Giraldo [facility].  Psychiatric Hospitalizations: None.  Patient denies a history of civil commitment.  Currently, patient is receiving other mental health services.  These include " psychotherapy with Madeleine Giraldo [facility].   Patient has had a physical exam to rule out medical causes for current symptoms.  Date of last physical exam was greater than a year ago and client was encouraged to schedule an exam with PCP. The patient has a [facility] Provider, who is named Bill Mg..  Patient reports the following current medical concerns: high blood pressure and a potential issue with his kidneys. The patient also reported a history of diabetes.  There are not significant appetite / nutritional concerns / weight changes.   Patient does not report a history of head injury / trauma / cognitive impairment.    Patient reported that he lost approximately 100 pounds when he was 26 years old and stopped drinking alcohol.     Patient reports current meds as:   lisinopril (PRINIVIL/ZESTRIL) 10 MG tablet    Medication Adherence:  Patient reports He is not prescribed psychotropic medications at this time.    Patient Allergies:  No Known Allergies    Medical History:    Past Medical History:   Diagnosis Date     Acute pancreatitis 10/20/2016     Alcohol abuse      Hx of diabetes mellitus 10/26/2016    Occurred post alcoholic pancreatitis. Resolved 1 yr later after alcohol cessation.           Current Mental Status Exam:   Appearance:  Appropriate    Eye Contact:  Good   Psychomotor:  Normal       Gait / station:  Unknown due to virtual visit  Attitude / Demeanor: Cooperative   Speech      Rate / Production: Normal/ Responsive      Volume:  Normal  volume      Language:  intact  Mood:   Normal  Affect:   Appropriate    Thought Content: Clear   Thought Process: Coherent  Goal Directed       Associations: No loosening of associations  Insight:   Good   Judgment:  Intact   Orientation:  All  Attention/concentration: Short and Easily Distracted    Rating Scales:    PHQ9:    PHQ-9 SCORE 5/8/2020 5/26/2020 6/1/2020   PHQ-9 Total Score MyChart - - 0   PHQ-9 Total Score 0 0 0   ;    GAD7:    SERA-7  SCORE 5/8/2020 5/26/2020 6/1/2020   Total Score - - 3 (minimal anxiety)   Total Score 4 3 3     CGI:     First:Considering your total clinical experience with this particular patient population, how severe are the patient's symptoms at this time?: 3 (6/1/2020 10:23 AM)  ;    Most recentCompared to the patient's condition at the START of treatment, this patient's condition is: 4 (6/1/2020 10:23 AM)      Substance Use:  Patient did not report a family history of substance use concerns; see medical history section for details.  Patient has not received chemical dependency treatment in the past.  Patient has not ever been to detox.      Patient is not currently receiving any chemical dependency treatment. Patient reported the following problems as a result of their substance use: academic.    Patient denies using alcohol.  Patient denies using tobacco.  Patient denies using marijuana.  Patient reports using caffeine 3 times per day and drinks 2 at a time. Patient started using caffeine at age 11.  Patient reports using/abusing the following substance(s). Patient reported no other substance use.     CAGE- AID:    CAGE-AID Total Score 6/1/2020   Total Score 0        Substance Use: No symptoms From substance use currently. In his past, he abused alcohol.     Based on the negative CAGE score and clinical interview there  are not indications of drug or alcohol abuse.      Significant Losses / Trauma / Abuse / Neglect Issues:   Patient did not serve in the .  There are indications or report of significant loss, trauma, abuse or neglect issues related to: none noted.  Concerns for possible neglect are not present.     Safety Assessment:   Current Safety Concerns:  [place] Suicide Severity Rating Scale (Lifetime/Recent)  [place] Suicide Severity Rating (Lifetime/Recent) 11/26/2019 6/1/2020   1. Wish to be Dead (Lifetime) No No   1. Wish to be Dead (Recent) No -   2. Non-Specific Active Suicidal Thoughts (Lifetime) No  -   2. Non-Specific Active Suicidal Thoughts (Recent) No -   Actual Attempt (Lifetime) No -   Actual Attempt (Past 3 Months) No -   Has subject engaged in non-suicidal self-injurious behavior? (Lifetime) No -   Has subject engaged in non-suicidal self-injurious behavior? (Past 3 Months) No -     Patient denies current homicidal ideation and behaviors.  Patient denies current self-injurious ideation and behaviors.    Patient denied risk behaviors associated with substance use.  Patient denies any high risk behaviors associated with mental health symptoms.  Patient reports the following current concerns for their personal safety: None.  Patient reports there are firearms in the house. No firearms in his home. .     History of Safety Concerns:  Patient denied a history of homicidal ideation.     Patient denied a history of personal safety concerns.    Patient denied a history of assaultive behaviors.    Patient denied a history of assaultive behaviors.     Patient denied a history of risk behaviors associated with substance use.  Patient denies any history of high risk behaviors associated with mental health symptoms.  Patient reports the following protective factors: forward/future oriented thinking, dedication to family/friends, safe and stable environment and regular sleep    Risk Plan:  See Preliminary Treatment Plan for Safety and Risk Management Plan    Review of Symptoms per patient report:  Depression: No symptoms  Stephany:  Distractibility  Psychosis: No Symptoms  Anxiety: Excessive worry, Nervousness and Poor concentration  Panic:  No symptoms  Post Traumatic Stress Disorder:  No Symptoms   Eating Disorder: No Symptoms  ADD / ADHD:  Inattentive, Difficulties listening, Poor task completion, Distractibility, Impulsive, Restlessness/fidgety and Hyperactive  Conduct Disorder: No symptoms  Autism Spectrum Disorder: No symptoms  Obsessive Compulsive Disorder: No Symptoms      Diagnostic Criteria:   ASyd Valdez  anxiety and worry about a number of events or activities (such as work or school performance).   B. The person finds it difficult to control the worry.  C. Select 3 or more symptoms (required for diagnosis). Only one item is required in children.   - Restlessness or feeling keyed up or on edge.    - Difficulty concentrating or mind going blank.    - Muscle tension.   D. The focus of the anxiety and worry is not confined to features of an Axis I disorder.  E. The anxiety, worry, or physical symptoms cause clinically significant distress or impairment in social, occupational, or other important areas of functioning.   F. The disturbance is not due to the direct physiological effects of a substance (e.g., a drug of abuse, a medication) or a general medical condition (e.g., hyperthyroidism) and does not occur exclusively during a Mood Disorder, a Psychotic Disorder, or a Pervasive Developmental Disorder.    - The aformentioned symptoms began 13 year(s) ago and occurs 7 days per week and is experienced as mild.      Functional Status:  Patient reports the following functional impairments: academic performance and work / vocational responsibilities.     WHODAS:   WHODAS 2.0 Total Score 4/23/2020 6/1/2020   Total Score 16 13   Total Score MyChart - 13       Clinical Summary:  1. Reason for assessment: ADHD Evaluation  .  2. Psychosocial, Cultural and Contextual Factors: Prefers Western Healing Traditions. Comes from a family with a mother who is a NP.  3. As evidenced by self report and criteria, client meets the following DSM5 Diagnoses:   (Sustained by DSM5 Criteria Listed Above)  300.02 (F41.1) Generalized Anxiety Disorder.  4. R/O: Attention-Deficit/Hyperactivity Disorder  314.01 (F90.2) Combined presentation due to reports of inattention and a referral from his individual therapist.   5. Prognosis: Expect Improvement if diagnosed with attention deficit and hyperactivity disorder and receives treatment.   7. Likely  consequences of symptoms if not treated: Maintain current function in roles or potentially deteriorate if lack of sleep with twins being born in September impacts his attention.  8. Client strengths include:  caring, committed to sobriety, educated, employed and support of family, friends and providers .     Recommendations:     1. Plan for Safety and Risk Management:Recommended that patient call 911 or go to the local ED should there be a change in any of these risk factors..  Report to child / adult protection services was NA.     2. Patient's identified no cultural concerns to be addressed in the Evaluation unless he brings up a concern. .     3. Initial Treatment will focus on: Completion of ADHD Evaluation.     4. Resources/Service Plan:       services are not indicated.     Modifications to assist communication are not indicated.     Additional disability accommodations are not indicated.      5. Collaboration:  Collaboration / coordination of treatment will be initiated with the following support professionals: outpatient therapist and PCP will be notified of final diagnoses and recommendations.      6. No ROIs needed.     7. DOT: DOT:  Discussed the general effects of drugs and alcohol on health and well-being. Provider gave patient printed information about the effects of chemical use on their health and well being. Recommendations:  No current issues.     8. Records were reviewed at time of assessment.  Information in this assessment was obtained from the medical record and provided by patient who is a good historian.   Patient will have open access to their mental health medical record.      Eval type:  Mental Health      Patient was informed that he will be emailed the self and collaborative rating scales to be completed. Depression and anxiety rating scales were completed. Copies of previous report cards were requested.  Patient was informed that he will be contact via [facility] to schedule the  next appointment 1-2 weeks after the questionnaires are returned. Patient gave consent to email the questionnaires to [email or web address].      Staff Name/Credentials: 6/1/2020 June 1, 2020

## 2020-06-08 ENCOUNTER — VIRTUAL VISIT (OUTPATIENT)
Dept: PSYCHOLOGY | Facility: CLINIC | Age: 30
End: 2020-06-08
Payer: COMMERCIAL

## 2020-06-08 DIAGNOSIS — F41.1 GENERALIZED ANXIETY DISORDER: Primary | ICD-10-CM

## 2020-06-08 PROCEDURE — 90834 PSYTX W PT 45 MINUTES: CPT | Mod: 95 | Performed by: PSYCHOLOGIST

## 2020-06-08 NOTE — PROGRESS NOTES
Progress Note      Progress Note     Patient Name: Sarbjit Perez  Date: 6/8/2020          Service Type: Individual for ADHD Evaluation      Session Start Time: 4:00PM  Session End Time: 4:52PM    Session Length: 52 minutes     Session #: 2    Attendees: Client attended alone    Telemedicine Visit: The patient's condition can be safely assessed and treated via synchronous audio and visual telemedicine encounter.      Reason for Telemedicine Visit: due to distancing due to COVID-19    Originating Site (Patient Location): Patient's home    Distant Site (Provider Location): Provider Remote Setting    Consent:  The patient/guardian has verbally consented to: the potential risks and benefits of telemedicine (video visit) versus in person care; bill my insurance or make self-payment for services provided; and responsibility for payment of non-covered services.     Mode of Communication:  Video Conference via [employer]    As the provider I attest to compliance with applicable laws and regulations related to telemedicine.     DATA  Interactive Complexity: No  Crisis: No     Identifying Information:  Client is a 30 year old, ,  male. Client was referred for a diagnostic assessment by his individual therapist ANDRIY Drake and his PCP, Bill gM PA-C.  The purpose of this evaluation is to: provide treatment recommendations and clarify diagnosis. Client is currently employed full time and reports he is able to function appropriately at work.   However, he thinks that he could be more productive than he is now. Client attended the session alone.       Client's Statement of Presenting Concern:  Client reported seeking services at this time for diagnostic assessment and recommendations for treatment.  Client's presenting concerns include: focus, difficulty completing tasks, poor attention to detail, distracted, and difficulty following a conversation if  "multiple people are speaking. The problem(s) began in high school. Patient has not attempted to resolve these concerns in the past. Patient reported that he uses lists and starts with small \"achievable goals.\"   Client stated that symptoms have resulted in the following functional impairments: academic performance, chronic disease management, educational activities, health maintenance and work / vocational responsibilities.      Patient reported that he \"makes a lot of dumb, silly mistakes\" at work. Patient reported that he frequently makes mistakes due to poor attention to detail. Patient reported difficulty recalling information if he does not write them down.     History of Presenting Concern:  Client reported that he has not completed a previous ADHD diagnostic assessment.  Client has received a previous diagnosis of Generalized Anxiety Disorder.  Client has not been prescribed medication to address these problems. Client reported that these problem(s) began in high school and the beginning of college. Client has attempted to resolve these concerns in the past through counseling for anxiety.  Client reported that other professional(s) are involved in providing support / services. Patient sees Madeleine Giraldo [facility] for therapy and his PCP.       Social History:  As a child, client reported that he failed to complete assigned chores in the home environment, had problems getting ready for school in the morning, had problems with organization and keeping track of items and needed frequent reminders by parents to be motivated or to complete work. Client reported no difficulty with childhood peer relationships.  As a child, client reported having sleep disturbance, including: frequent dreams / nightmares, insomnia and sleep walking .  Client reported currently experiencing regular and consistent sleep patterns.  Client reported sleeping approximately 7-8 hours per night.  Client reported that from the ages of [age], he " "had \"sleep paralysis\" approximately once a month. Client reported that he has not completed a sleep study.  Client reported having a well balanced diet.  There are not significant nutritional concerns.  Client reported engaging in regular exercise.    Client's highest education level was college graduate. Client graduated high school in 2007 with a 3.7 GPA. He estimated that he  obtained mostly As and Bs in high school. The patient went to three different colleges to earn his bachelor's degree. He reported that his College GPA was approximately 2.9.  During the elementary, middle, and high school years, patient recalls academic strengths in the area of reading, writing and math. Client reported experiencing academic problems in geometry. Client did not identify any learning problems. Client did not receive tutoring services during the school years. Client did not receive special education services. Client reported no particular problems during the school years. Client did attend post-secondary school.     Patient reported that his first year of college at [employer] he struggled with attendance and completing homework. Patient then went to a two year college and obtained mostly As. He then transferred back to [employer] and earned mostly Cs for one year. He then transferred to [employer] for two years and earned mostly As and Bs. Patient reported that his attendance improved to approximately 75% the second time he went to [employer]. 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 The patient was placed on academic probation after his first semester at [employer] and then after his second semester, [employer] stated that he needed to take a leave of absence or possibly an \"academic suspension.\"     Client reported that they are currently employed. Client reported that the current job is a good fit for his skills and " "personality.  Client reported that he frequently made mistakes with poor attention to detail, often felt bored and problems learning new materials .      The client's work history includes: , , kitchen and , controller, and  for a  company.  The longest period of employment has been 6 years.  Client has not been terminated from a place of employment.     Patient reported some difficulty in conversations with people, specifically coworkers. He reported that it takes him \"a really long time\" to figure out what other people are saying, process what they are saying, and then come up with something useful to say.\" He stated \"I want to be right and not say something wrong and have to go back on that.\"     Patient played baseball as a kid and engaged in rituals when he went up to bat. Patient reported that he likes \"routine.\" Patient reported that he does not like unexpected changes to plans.Patient stated that he \"really bugs\" him if someone does not do something that they say they will do.     Risk Taking Behaviors:  Client reported a history of the following risk taking behaviors: excessive spending, gambling, impulsive decision making and substance use. Patient reported that he currently has impulsive decision making. Patient sometimes impulsively spends and cleans.     Motor Vehicle Operation:  Client has received a 's license.  Client has received moving violations, including: accidents due to inattention and one speeding tickets.  Patient reported that he was in a \"fender schaffer\" and received a ticket for inattentive driving. Client reported the following driving habits: attentive and cautious and experiencces road rage.  According to client, other people are comfortable riding as a passenger when he is driving.        Functional Status:  Client's symptoms are causing reduced functional status in the following areas: Follow through with Medical " recommendations - patient often forgets to take his blood pressure medicine  Occupational / Vocational - patient makes mistakes at work    Interventions:  CBT: socratic questioning, normalizing  MI: open ended questions      ASSESSMENT: Current Emotional / Mental Status (status of significant symptoms):   Risk status (Self / Other harm or suicidal ideation)   Patient denies current fears or concerns for personal safety.   Patient denies current or recent suicidal ideation or behaviors.   Patient denies current or recent homicidal ideation or behaviors.   Patient denies current or recent self injurious behavior or ideation.   Patient denies other safety concerns.   Patient reports there has been no change in risk factors since their last session.     Patient reports there has been no change in protective factors since their last session.     Recommended that patient call 911 or go to the local ED should there be a change in any of these risk factors.     Appearance:   Appropriate    Eye Contact:   Good  When not distracted   Psychomotor Behavior: Normal  At times looked away and appeared distracted   Attitude:   Cooperative    Orientation:   All   Speech    Rate / Production: Normal     Volume:  Normal    Mood:    Normal   Affect:    Appropriate    Thought Content:  Clear    Thought Form:  Coherent  Logical    Insight:    Good      Medication Review:   No current psychiatric medications prescribed     Medication Compliance:   NA     Changes in Health Issues:   None reported     Chemical Use Review:   Substance Use: Chemical use reviewed, no active concerns identified      Tobacco Use: No current tobacco use.      Diagnosis:  1. Generalized anxiety disorder        Collateral Reports Completed:   Not Applicable    PLAN: (Patient Tasks / Therapist Tasks / Other)  Client was given self and collaborative rating scales and he reported that he sent them in yesterday. Depression and anxiety rating scales were completed at  the last appointment.  Copies of previous report cards were requested.  A second appointment was not scheduled at this time.  Patient was informed that he will be notified of the next step in the evaluation process.      Cleopatra Ann PsyD, LP  6/8/2020     [facility] used.                                                                                        _

## 2020-06-09 ENCOUNTER — VIRTUAL VISIT (OUTPATIENT)
Dept: BEHAVIORAL HEALTH | Facility: CLINIC | Age: 30
End: 2020-06-09
Payer: COMMERCIAL

## 2020-06-09 DIAGNOSIS — F41.1 GAD (GENERALIZED ANXIETY DISORDER): Primary | ICD-10-CM

## 2020-06-09 PROCEDURE — 90832 PSYTX W PT 30 MINUTES: CPT | Mod: 95 | Performed by: COUNSELOR

## 2020-06-09 NOTE — PROGRESS NOTES
Progress Note    Patient Name: Sarbjit Perez  Date: 6/9/2020           Service Type: Individual      Session Start Time: 9:08am (tech issues)  Session End Time: 9:41pm     Session Length: 33 mins     Session #: 10    Attendees: Client attended alone    Telemedicine Visit: The patient's condition can be safely assessed and treated via synchronous audio and visual telemedicine encounter.      Reason for Telemedicine Visit: Services only offered telehealth    Originating Site (Patient Location): Patient's home    Distant Site (Provider Location): Provider Remote Setting    Consent:  The patient/guardian has verbally consented to: the potential risks and benefits of telemedicine (video visit) versus in person care; bill my insurance or make self-payment for services provided; and responsibility for payment of non-covered services.      Treatment Plan Last Reviewed: 5/8/2020  PHQ-9 / SERA-7 : P=0 and G=3    DATA  Interactive Complexity: No  Crisis: No       Progress Since Last Session (Related to Symptoms / Goals / Homework):   Symptoms: Improving PHQ9 and GAD7 stable and client reported symptoms to be stable and managable     Homework: Achieved / completed to satisfaction      Episode of Care Goals: Satisfactory progress - MAINTENANCE (Working to maintain change, with risk of relapse); Intervened by continuing to positively reinforce healthy behavior choice      Current / Ongoing Stressors and Concerns:   Wife high risk pregnancy   Upcoming hip replacement      Treatment Objective(s) Addressed in This Session:   use cognitive strategies identified in therapy to challenge anxious thoughts       Intervention:   CBT: Provider educated client how anxiety, anger and other emotions trigger same area in the brain and so same skills can be applied        ASSESSMENT: Current Emotional / Mental Status (status of significant symptoms):   Risk status (Self / Other harm or suicidal  ideation)   Patient denies current fears or concerns for personal safety.   Patient denies current or recent suicidal ideation or behaviors.   Patient denies current or recent homicidal ideation or behaviors.   Patient denies current or recent self injurious behavior or ideation.   Patient denies other safety concerns.   Patient reports there has been no change in risk factors since their last session.     Patient reports there has been no change in protective factors since their last session.     Recommended that patient call 911 or go to the local ED should there be a change in any of these risk factors.     Appearance:   Appropriate    Eye Contact:   Good    Psychomotor Behavior: Normal    Attitude:   Cooperative    Orientation:   All   Speech    Rate / Production: Normal     Volume:  Normal    Mood:    Normal   Affect:    Appropriate    Thought Content:  Clear    Thought Form:  Coherent  Logical    Insight:    Good      Medication Review:   No current psychiatric medications prescribed     Medication Compliance:   Yes     Changes in Health Issues:   None reported     Chemical Use Review:   Substance Use: Chemical use reviewed, no active concerns identified      Tobacco Use: No current tobacco use.      Diagnosis:  1. SERA (generalized anxiety disorder)        Collateral Reports Completed:   Not Applicable    PLAN: (Patient Tasks / Therapist Tasks / Other)  Provider assigned client to talk about particulars with wife and look into chai Giraldo, [facility] 6/9/2020                                                       ______________________________________________________________________     Treatment Plan     Patient's Name: Sarbjit Perez                      YOB: 1990     Date: cont 5/8/2020 by request of client due to upcoming situational stressors of birth of twins and hip replacement client wants to practice maintaining current goals            DSM5 Diagnoses: 300.02 (F41.1)  Generalized Anxiety Disorder  Psychosocial / Contextual Factors: social, work, health   WHODAS: 16     Referral / Collaboration:  Referral to another professional/service is not indicated at this time.     Anticipated number of session or this episode of care: 8        MeasurableTreatment Goal(s) related to diagnosis / functional impairment(s)  Goal 1: Patient will score 3 or less on GAD7 (last one 6)     I will know I've met my goal when I am able to enjoy weekends and not worry about work. I would feel more self confident. I would like to be able to go with change without it feeling so impactful on my mood  Client would like top be able to maintain current mental health during birth of twins and hip replacement surgery      Objective #A (Patient Action)                          Patient will set limits and boundaries to help with work/life separation.  Status: cont 5/8/2020        Intervention(s)  Therapist will teach about healthy boundaries. Provider will guide client in creating ways to separate work and personal life when working from home. Provider will assign client to practice these boundaries outside of session and report back. Provider will guide client in recognizing benefits that come from boundaries .     Objective #B  Patient will use cognitive strategies identified in therapy to challenge anxious thoughts.  Status: cont 5/8/2020        Intervention(s)  Therapist will teach emotional regulation skills. Provider will utilize CBT to teach client thought labeling, use of a thought record, re-framing and shifting perspectives.     Objective #C  Patient will identify 5 traits or charcteristics you like about yourself.  Status: cont 5/8/2020        Intervention(s)  Therapist will help the client identify distorted negative beliefs about himself and the world.     Patient has reviewed and agreed to the above plan.        Madeleine Giraldo, [facility]         5/8/2020

## 2020-06-12 ENCOUNTER — DOCUMENTATION ONLY (OUTPATIENT)
Dept: PSYCHOLOGY | Facility: CLINIC | Age: 30
End: 2020-06-12
Payer: COMMERCIAL

## 2020-06-12 PROCEDURE — 99207 ZZC NO CHARGE VISIT/PATIENT NOT SEEN: CPT | Performed by: PSYCHOLOGIST

## 2020-06-12 NOTE — PROGRESS NOTES
"  [facility]  ADHD Evaluation         Patient: Sarbjit Perez   YOB: 1990  MRN: [id number]  Date(s) of assessment:  Kj self-report and collateral measures scored and interpreted 06/12/2020      Assessment tools:      Kj Adult ADHD Rating Scale-IV: Self and Other Reports (BAARS-IV), Kj Functional Impairment Scale: Self and Other Reports (BFIS) and Kj Deficits in Executive Functioning Scale: Self and Other Reports (BDEFS)    Assessment Results:      Kj Adult ADHD Rating Scale-IV: Self and Other Reports (BAARS-IV)  The BAARS-IV assesses for symptoms of ADHD that are experienced in one's daily life. This assessment measure includes self and collateral rating scales designed to provide information regarding current and childhood symptoms of ADHD including inattention, hyperactivity, and impulsivity. Self-report scores are reported as percentiles. Scores at the 76th-83rd percentile are considered marginal, scores at the 84th-92nd percentile are considered borderline, scores at the 93rd-95th percentile are considered mild, scores at the 96th-98th percentile are considered moderate, and those at the 99th percentile are considered severe. Collateral or \"other\" rating scales are reported as number of symptoms observed in comparison to those reported by the client. Norms and percentile scores are not available for collateral reports.     Current Symptoms Scale--Self Report:   Client completed the self-report inventory of current symptoms. The results indicate that the client's Total ADHD Score was 31 which places him in the 79th percentile for overall ADHD symptoms. In addition, the client endorsed 1/9 (86th percentile) Inattention symptoms, 2/9 (88th percentile) Hyperactivity-Impulsivity symptoms, and 2/9 (85th percentile) Sluggish Cognitive Tempo symptoms. Client indicated that the reported symptoms have resulted in impaired functioning in school and work. Overall, the " results suggest the client is experiencing Marginal to Borderline ADHD symptoms.     Current Symptoms Scale--Other Report:  Client's wife completed the collateral report inventory of current symptoms. Based on the collateral contact's observation of symptoms, the client demonstrates 0/9 Inattention symptoms, 1/5 Hyperactivity symptoms, 0/4 Impulsivity symptoms, and 0/9 Sluggish Cognitive Tempo symptoms. The client's Total ADHD Score was 24. The collateral contact indicated the client demonstrates impaired functioning in work} The collateral- and self-report scores are not significantly different.     Childhood Symptoms Scale--Self-Report:  Client completed the self-report inventory of childhood symptoms. The results indicate that the client's Total ADHD Score was 22 which places him in the 1st-50th percentile for overall ADHD symptoms in childhood. In addition, the client endorsed 0/9 (1st-75th percentile) Inattention symptoms and  0/9 (1st-75th percentile) Hyperactivity-Impulsivity symptoms. Client indicated that the reported symptoms resulted in impaired functioning in school Overall, the results suggest the client experienced  Marginal symptoms of ADHD as a child.     Childhood Symptoms Scale--Other Report:  Client's mother completed the collateral report inventory of childhood symptoms. Based on the collateral contact's recollection of client's childhood symptoms, the client demonstrated 1/9 Inattention symptoms and 0/9 Hyperactivity-Impulsivity symptoms. The client's Total ADHD Score was 24. The collateral contact indicated the client demonstrates impaired functioning in school and homeThe collateral- and self-report scores are not significantly different.                           Kj Functional Impairment Scale: Self and Other Reports (BFIS)  The BFIS is used to assess an individuals' psychosocial impairment in major life/daily activities that may be due to a mental health disorder. This assessment  "measure includes self and collateral rating scales. Self-report scores are reported as percentiles. Scores at the 76th-83rd percentile are considered marginal, scores at the 84th-92nd percentile are considered borderline, scores at the 93rd-95th percentile are considered mild, scores at the 96th-98th percentile are considered moderate, and those at the 99th percentile are considered severe.Collateral or \"other\" rating scales are reported as number of symptoms observed in comparison to those reported by the client. Norms and percentile scores are not available for collateral reports.     Results indicate the client do no identify any areas of impairment (scores at or greater than 93rd percentile). The client's Mean Impairment Score was 1 (1st-50th percentile) indicating the client is reporting Marginal impairment in functioning across domains. Client's wife completed the collateral rating scale, which indicated similar results. The collateral contact's scores were generally higher than the client's report.     Kj Deficits in Executive Functioning Scale (BDEFS)  The BDEFS is a measure used for evaluating dimensions of adult executive functioning in daily life.This assessment measure includes self and collateral rating scales. Self-report scores are reported as percentiles. Scores at the 76th-83rd percentile are considered marginal, scores at the 84th-92nd percentile are considered borderline, scores at the 93rd-95th percentile are considered mild, scores at the 96th-98th percentile are considered moderate, and those at the 99th percentile are considered severe.Collateral or \"other\" rating scales are reported as number of symptoms observed in comparison to those reported by the client. Norms and percentile scores are not available for collateral reports.     Results indicate the client's Total Executive Functioning Score was 145  (1st-75th percentile). The ADHD-Executive Functioning Index score was 18 (51st-75th " percentile). These scores suggest the client has Marginal deficits in executive functioning. These deficits may be due to ADHD. Results indicate the client identified no areas of significant deficits. Client's wife completed the collateral rating scale, which indicated similar results. The collateral contact's scores were generally lower than the client's report.      Next Steps: Patient will be asked to complete the MMPI.     Cleopatra Ann PsyD LP 6/12/2020

## 2020-06-17 ENCOUNTER — DOCUMENTATION ONLY (OUTPATIENT)
Dept: PSYCHOLOGY | Facility: CLINIC | Age: 30
End: 2020-06-17
Payer: COMMERCIAL

## 2020-06-18 ENCOUNTER — FCC EXTENDED DOCUMENTATION (OUTPATIENT)
Dept: PSYCHOLOGY | Facility: CLINIC | Age: 30
End: 2020-06-18

## 2020-06-18 NOTE — PROGRESS NOTES
Summary of MMPI-2 Results      Client completed the [place] Multiphasic Personality Inventory-2 (MMPI-2), a self-report personality inventory, as a part of the psychological assessment for ruling out Attention Deficit disorders.  Validity scales indicate that the client responded in an open and consistent manner, resulting in a valid profile.  His responses yielded a profile that is consistent with people who report depression, anxiety, somatic complaints, social withdrawal, and difficulty with concentration.     Patient responded similarly to people who report depression, worries, anxiety and somatic complaints. Moreover, they tend to have fatigue, insomnia, guilt, perfectionism, and often feel unaccepted. Furthermore, people like this often avoid social situations.     People who had similar answers to the patient also reported rumination, excessive worrying, sensitivity toward criticism, perceive criticism where none is intended, and are preoccupied with self-perceived failure.    Results of testing were somewhat consistent with his report upon direct interview; however, he did not endorse depression in person and he his history of substance abuse was not evident on the MMPI.       Next Steps: Feedback Session.     Cleopatra Ann PsyD LP 6/17/2020

## 2020-06-19 NOTE — PROGRESS NOTES
[facility]  ADHD Evaluation       Patient: Sarbjit Perez   YOB: 1990  MRN: [id number]  Date(s) of assessment:  Diagnostic Assessment 6/1/2020, Kj self-report and collateral measures scored and interpreted 6/12/2020 and MMPI 6/18/2020    Information about appointment:  Client attended two  sessions to aid in determining client's mental health diagnosis or diagnoses and treatment recommendations that best address client concerns. Client records including medical were reviewed. A diagnostic assessment was conducted at the initial appointment. Client completed several rating scales to assist in assessing attention-related and other mental health symptoms that may be causing impairments in functioning. Rating scales were also completed by a collateral contact.    Assessment tools:      Kj Adult ADHD Rating Scale-IV: Self and Other Reports (BAARS-IV), Kj Functional Impairment Scale: Self and Other Reports (BFIS), Kj Deficits in Executive Functioning Scale: Self and Other Reports (BDEFS), Patient Health Questionnaire-9 (PHQ-9), Generalized Anxiety Disorder-7 (SEAR-7) and [place] Multiphasic Personality Inventory (MMPI)    Assessment Results:    Behavioral Observations:  The patient arrived on time for all appointments and scheduled follow-up appointments efficiently. The patient appeared motivated to complete the assessment and was polite in interactions. He was oriented by three. He appeared to be free of difficulty with attention and hyperactivity/impulsivity during sessions. The following results are likely to be an accurate reflection of Client's current functioning.       Kj Adult ADHD Rating Scale-IV: Self and Other Reports (BAARS-IV)  The BAARS-IV assesses for symptoms of ADHD that are experienced in one's daily life. This assessment measure includes self and collateral rating scales designed to provide information regarding current and childhood symptoms of  "ADHD including inattention, hyperactivity, and impulsivity. Self-report scores are reported as percentiles. Scores at the 76th-83rd percentile are considered marginal, scores at the 84th-92nd percentile are considered borderline, scores at the 93rd-95th percentile are considered mild, scores at the 96th-98th percentile are considered moderate, and those at the 99th percentile are considered severe. Collateral or \"other\" rating scales are reported as number of symptoms observed in comparison to those reported by the client. Norms and percentile scores are not available for collateral reports.      Current Symptoms Scale--Self Report:   Client completed the self-report inventory of current symptoms. The results indicate that the client's Total ADHD Score was 31 which places him in the 79th percentile for overall ADHD symptoms. In addition, the client endorsed 1/9 (86th percentile) Inattention symptoms, 2/9 (88th percentile) Hyperactivity-Impulsivity symptoms, and 2/9 (85th percentile) Sluggish Cognitive Tempo symptoms. Client indicated that the reported symptoms have resulted in impaired functioning in school and work. Overall, the results suggest the client is experiencing Marginal to Borderline ADHD symptoms.      Current Symptoms Scale--Other Report:  Client's wife completed the collateral report inventory of current symptoms. Based on the collateral contact's observation of symptoms, the client demonstrates 0/9 Inattention symptoms, 1/5 Hyperactivity symptoms, 0/4 Impulsivity symptoms, and 0/9 Sluggish Cognitive Tempo symptoms. The client's Total ADHD Score was 24. The collateral contact indicated the client demonstrates impaired functioning in work} The collateral- and self-report scores are not significantly different.      Childhood Symptoms Scale--Self-Report:  Client completed the self-report inventory of childhood symptoms. The results indicate that the client's Total ADHD Score was 22 which places him in " "the 1st-50th percentile for overall ADHD symptoms in childhood. In addition, the client endorsed 0/9 (1st-75th percentile) Inattention symptoms and  0/9 (1st-75th percentile) Hyperactivity-Impulsivity symptoms. Client indicated that the reported symptoms resulted in impaired functioning in school Overall, the results suggest the client experienced  Marginal symptoms of ADHD as a child.      Childhood Symptoms Scale--Other Report:  Client's mother completed the collateral report inventory of childhood symptoms. Based on the collateral contact's recollection of client's childhood symptoms, the client demonstrated 1/9 Inattention symptoms and 0/9 Hyperactivity-Impulsivity symptoms. The client's Total ADHD Score was 24. The collateral contact indicated the client demonstrates impaired functioning in school and homeThe collateral- and self-report scores are not significantly different.                           Kj Functional Impairment Scale: Self and Other Reports (BFIS)  The BFIS is used to assess an individuals' psychosocial impairment in major life/daily activities that may be due to a mental health disorder. This assessment measure includes self and collateral rating scales. Self-report scores are reported as percentiles. Scores at the 76th-83rd percentile are considered marginal, scores at the 84th-92nd percentile are considered borderline, scores at the 93rd-95th percentile are considered mild, scores at the 96th-98th percentile are considered moderate, and those at the 99th percentile are considered severe.Collateral or \"other\" rating scales are reported as number of symptoms observed in comparison to those reported by the client. Norms and percentile scores are not available for collateral reports.      Results indicate the client do no identify any areas of impairment (scores at or greater than 93rd percentile). The client's Mean Impairment Score was 1 (1st-50th percentile) indicating the client is " "reporting Marginal impairment in functioning across domains. Client's wife completed the collateral rating scale, which indicated similar results. The collateral contact's scores were generally higher than the client's report.     Kj Deficits in Executive Functioning Scale (BDEFS)  The BDEFS is a measure used for evaluating dimensions of adult executive functioning in daily life.This assessment measure includes self and collateral rating scales. Self-report scores are reported as percentiles. Scores at the 76th-83rd percentile are considered marginal, scores at the 84th-92nd percentile are considered borderline, scores at the 93rd-95th percentile are considered mild, scores at the 96th-98th percentile are considered moderate, and those at the 99th percentile are considered severe.Collateral or \"other\" rating scales are reported as number of symptoms observed in comparison to those reported by the client. Norms and percentile scores are not available for collateral reports.      Results indicate the client's Total Executive Functioning Score was 145  (1st-75th percentile). The ADHD-Executive Functioning Index score was 18 (51st-75th percentile). These scores suggest the client has Marginal deficits in executive functioning. These deficits may be due to ADHD. Results indicate the client identified no areas of significant deficits. Client's wife completed the collateral rating scale, which indicated similar results. The collateral contact's scores were generally lower than the client's report.     Summary of MMPI-2 Results      Client completed the [place] Multiphasic Personality Inventory-2 (MMPI-2), a self-report personality inventory, as a part of the psychological assessment for ruling out Attention Deficit disorders.  Validity scales indicate that the client responded in an open and consistent manner, resulting in a valid profile.  His responses yielded a profile that is consistent with people who report " "depression, anxiety, somatic complaints, social withdrawal, and difficulty with concentration.      Patient responded similarly to people who report depression, worries, anxiety and somatic complaints. Moreover, they tend to have fatigue, insomnia, guilt, perfectionism, and often feel unaccepted. Furthermore, people like this often avoid social situations.      People who had similar answers to the patient also reported rumination, excessive worrying, sensitivity toward criticism, perceive criticism where none is intended, and are preoccupied with self-perceived failure.     Results of testing were somewhat consistent with his report upon direct interview; however, he did not endorse depression in person and he his history of substance abuse was not evident on the MMPI.     Generalized Anxiety Disorder Questionnaire (SERA-7)  This questionnaire is designed to assess for anxiety in adults.  Based on the score, he is experiencing mild symptoms of anxiety. Client identified the following symptoms of anxiety: feeling on edge/nervous/anxious, difficulty controlling worry and being restless    Patient Health Questionnaire- 9 (PHQ-9)   This questionnaire is designed to assess for depression in adults.  Based on the score, he is experiencing no symptoms of depression. Client identified the following symptoms of depression: no symptoms.         Summary (based on clinical interview, review of records, test results):      Patient is a 30 year old, .  The pronoun use throughout this assessment reflects the patient's chosen pronoun.  Patient was referred for an assessment by mental health therapist, Madeleine Giraldo [facility].  Patient attended the session alone. The reason for seeking services at this time is: \" focus, difficulty completing tasks, distracted, and difficulty following a conversation if multiple people are speaking. \"   The problem(s) began in high school. Patient has not attempted to resolve these concerns in " "the past. Patient reported that he uses lists and starts with small \"achievable goals.\"      Client reported seeking services at this time for diagnostic assessment and recommendations for treatment.  Client's presenting concerns include: focus, difficulty completing tasks, poor attention to detail, distracted, and difficulty following a conversation if multiple people are speaking. The problem(s) began in high school. Patient has not attempted to resolve these concerns in the past. Patient reported that he uses lists and starts with small \"achievable goals.\"   Client stated that symptoms have resulted in the following functional impairments: academic performance, chronic disease management, educational activities, health maintenance and work / vocational responsibilities. Patient reported that he \"makes a lot of dumb, silly mistakes\" at work. Patient reported that he frequently makes mistakes due to poor attention to detail. Patient reported difficulty recalling information if he does not write them down. Client reported that he has not completed a previous ADHD diagnostic assessment.  Client has received a previous diagnosis of Generalized Anxiety Disorder.  Client has not been prescribed medication to address these problems. Client reported that these problem(s) began in high school and the beginning of college. Client has attempted to resolve these concerns in the past through counseling for anxiety.  Client reported that other professional(s) are involved in providing support / services. Patient sees Madeleine Giraldo [facility] for therapy and his PCP.      Patient reported they grew up in [place], [place].  They were raised by biological parents.   Parents remain . Patient reported that he has two younger brother and one older half brother through his father.   Patient reported that his childhood was \"great.\" His parents \"stressed the importance of school and sports.\"  Patient described their current relationships " "with family of origin as \"really good.\" Patient reported that he communicates with his siblings and parents at least weekly.            The patient describes their cultural background [place] .  Cultural influences and impact on patient's life structure, values, norms, and healthcare: Spiritual Beliefs: prefers science over Adventist and Health Beliefs and the endorsement of OR engagement in Culturally Specific Healing Practices: prefers Western medicine.  Patient reported that he grew up in a \"liberal\" Amish family; however, he no longer attends Denominational and he is on the \"liberal\" side of the spectrum. Contextual influences on patient's health include: Family Factors \"anxiety runs in family and it has been largely ignored\" and Health- Seeking Factors his mother is a NP and encourages him to go to medical appointments.    These factors will be addressed in the Preliminary Treatment plan.  Patient identified their preferred language to be English. Patient reported they does not need the assistance of an  or other support involved in therapy.  Patient reported had no significant delays in developmental tasks.   Patient's highest education level was college graduate. Patient identified the following learning problems: concentration and reading.  Patient reported that he struggles with reading due to distractions, so he prefers reading news articles and short pieces.  Modifications will not be used to assist communication in therapy.   Patient reports they are  able to understand written materials.     Patient reported the following relationship history, that he has been with his wife for 14 years. He reported that he has been with his partner since he was 16 years old and in high school.  Patient's current relationship status is  for 2 years.   Patient identified their sexual orientation as heterosexual.  Patient reported having no " "children; however, his wife is pregnant with twins that are due in September. Patient's current living/housing situation involves staying in own home/apartment.  They live with his wife and they report that housing is stable. Patient identified partner, parents, siblings, friends and therapist as part of their support system.  Patient identified the quality of these relationships as stable and meaningful.     Patient is currently employed full time and reports they are able to function appropriately at work..  Patient reports their finances are obtained through employment and spouse.  Patient does not identify finances as a current stressor.  Patient reported that they have not been involved with the legal system.   Patient denies being on probation / parole / under the jurisdiction of the court.    As a child, client reported that he failed to complete assigned chores in the home environment, had problems getting ready for school in the morning, had problems with organization and keeping track of items and needed frequent reminders by parents to be motivated or to complete work. Client reported no difficulty with childhood peer relationships.  As a child, client reported having sleep disturbance, including: frequent dreams / nightmares, insomnia and sleep walking .  Client reported currently experiencing regular and consistent sleep patterns.  Client reported sleeping approximately 7-8 hours per night.  Client reported that from the ages of [age], he had \"sleep paralysis\" approximately once a month. Client reported that he has not completed a sleep study.  Client reported having a well balanced diet.  There are not significant nutritional concerns.  Client reported engaging in regular exercise.     Client's highest education level was college graduate. Client graduated high school in 2007 with a 3.7 GPA. He estimated that he  obtained mostly As and Bs in high school. The patient went to three different colleges to " "earn his bachelor's degree. He reported that his College GPA was approximately 2.9.  During the elementary, middle, and high school years, patient recalls academic strengths in the area of reading, writing and math. Client reported experiencing academic problems in geometry. Client did not identify any learning problems. Client did not receive tutoring services during the school years. Client did not receive special education services. Client reported no particular problems during the school years. Client did attend post-secondary school. Patient reported that his first year of college at [employer] he struggled with attendance and completing homework. Patient then went to a two year college and obtained mostly As. He then transferred back to [employer] and earned mostly Cs for one year. He then transferred to [employer] for two years and earned mostly As and Bs. Patient reported that his attendance improved to approximately 75% the second time he went to [employer]. 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 The patient was placed on academic probation after his first semester at [employer] and then after his second semester, [employer] stated that he needed to take a leave of absence or possibly an \"academic suspension.\"     Patient reported that he started college at [employer] and that he experienced difficulty adjusting to \"studying more\" when he had \"new found freedoms.\" He went to [employer] for one year and then he went to a small [employer] college for two years. Then he transferred back to [employer] for one semester and then he transferred to [employer] and graduated with a degree in BS in Business Administration and Finance. Patient reported that many of his classes were \"advanced classes.\" Patient reported that he struggled with math through college and then the \"switch flipped\" and he was able to understand finances and " "accounting. Patient reported that even though math was difficult, he earned As. However, he said \"my high school is kind of a joke.\" Patient reported that he works remotely and can function at his job, but thinks that \"there is room for improvement.\"       Client reported that they are currently employed. Client reported that the current job is a good fit for his skills and personality.  Client reported that he frequently made mistakes with poor attention to detail, often felt bored and problems learning new materials.  The client's work history includes: , , kitchen and , controller, and  for a  company.  The longest period of employment has been 6 years.  Client has not been terminated from a place of employment. Patient reported some difficulty in conversations with people, specifically coworkers. He reported that it takes him \"a really long time\" to figure out what other people are saying, process what they are saying, and then come up with something useful to say.\" He stated \"I want to be right and not say something wrong and have to go back on that.\"      Patient played baseball as a kid and engaged in rituals when he went up to bat. Patient reported that he likes \"routine.\" Patient reported that he does not like unexpected changes to plans.Patient stated that he \"really bugs\" him if someone does not do something that they say they will do.      Client reported a history of the following risk taking behaviors: excessive spending, gambling, impulsive decision making and substance use. Patient reported that he currently has impulsive decision making. Patient sometimes impulsively spends and cleans. Client has received a 's license.  Client has received moving violations, including: accidents due to inattention and one speeding tickets.  Patient reported that he was in a \"fender schaffer\" and received a ticket for inattentive driving. Client " "reported the following driving habits: attentive and cautious and experiences road rage.  According to client, other people are comfortable riding as a passenger when he is driving.      Patient identified the following strengths or resources that will help them succeed in treatment: commitment to health and well being, friends / good social support, family support, intelligence and sense of humor. Things that may interfere with the patient's success in treatment include: none noted.  Patient did report a family history of mental health concerns. Patient reported that he thinks that his brother and father have undiagnosed anxiety.  Patient reports family history includes Breast Cancer in his mother; Diabetes in his paternal grandfather; Diabetes Type 2  in his paternal grandfather; Hypertension in his father and paternal grandfather; Other Cancer in his maternal grandmother. Patient reported that he has high blood pressure and used to have diabetes and alcohol use disorder. Patient reported that he \"drank heavily\" from age 19 to age 25. 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 Patient reported that he stopped drinking alcohol around age 26 and that he did not go to a formal substance use and is not in AA.        Patient reported the following previous diagnoses which include(s): an Anxiety Disorder.  Patient reported symptoms began when he started college.   Patient has received mental health services in the past: therapy with ANDRIY Piña.  Psychiatric Hospitalizations: None.  Patient denies a history of civil commitment.  Currently, patient is receiving other mental health services.  These include psychotherapy with ANDRIY Piña.   Patient has had a physical exam to rule out medical causes for current symptoms.  Date of last physical exam was greater than a year " ago and client was encouraged to schedule an exam with PCP. The patient has a [facility] Provider, who is named Bill Mg..  Patient reports the following current medical concerns: high blood pressure and a potential issue with his kidneys. The patient also reported a history of diabetes.  There are not significant appetite / nutritional concerns / weight changes.   Patient does not report a history of head injury / trauma / cognitive impairment.    Patient reported that he lost approximately 100 pounds when he was 26 years old and stopped drinking alcohol.      Patient reports current meds as:   lisinopril (PRINIVIL/ZESTRIL) 10 MG tablet     Medication Adherence:  Patient reports He is not prescribed psychotropic medications at this time.     Patient Allergies:  No Known Allergies     Medical History:    Past Medical History        Past Medical History:   Diagnosis Date     Acute pancreatitis 10/20/2016     Alcohol abuse       Hx of diabetes mellitus 10/26/2016     Occurred post alcoholic pancreatitis. Resolved 1 yr later after alcohol cessation.              PHQ9:    PHQ-9 SCORE 5/8/2020 5/26/2020 6/1/2020   PHQ-9 Total Score MyChart - - 0   PHQ-9 Total Score 0 0 0   ;    GAD7:    SERA-7 SCORE 5/8/2020 5/26/2020 6/1/2020   Total Score - - 3 (minimal anxiety)   Total Score 4 3 3       Patient did not report a family history of substance use concerns; see medical history section for details.  Patient has not received chemical dependency treatment in the past.  Patient has not ever been to detox.  Patient is not currently receiving any chemical dependency treatment. Patient reported the following problems as a result of their substance use: academic.     Patient denies using alcohol.  Patient denies using tobacco.  Patient denies using marijuana.  Patient reports using caffeine 3 times per day and drinks 2 at a time. Patient started using caffeine at age 11.  Patient reports using/abusing the  following substance(s). Patient reported no other substance use.      CAGE- AID:    CAGE-AID Total Score 6/1/2020   Total Score 0       Substance Use: No symptoms From substance use currently. In his past, he abused alcohol.   Based on the negative CAGE score and clinical interview there  are not indications of drug or alcohol abuse.    Patient did not serve in the .  There are indications or report of significant loss, trauma, abuse or neglect issues related to: none noted.  Concerns for possible neglect are not present.      Safety Assessment:   Current Safety Concerns:  Huson Suicide Severity Rating Scale (Lifetime/Recent)  Huson Suicide Severity Rating (Lifetime/Recent) 11/26/2019 6/1/2020   1. Wish to be Dead (Lifetime) No No   1. Wish to be Dead (Recent) No -   2. Non-Specific Active Suicidal Thoughts (Lifetime) No -   2. Non-Specific Active Suicidal Thoughts (Recent) No -   Actual Attempt (Lifetime) No -   Actual Attempt (Past 3 Months) No -   Has subject engaged in non-suicidal self-injurious behavior? (Lifetime) No -   Has subject engaged in non-suicidal self-injurious behavior? (Past 3 Months) No -     Patient denies current homicidal ideation and behaviors.  Patient denies current self-injurious ideation and behaviors.    Patient denied risk behaviors associated with substance use.  Patient denies any high risk behaviors associated with mental health symptoms.  Patient reports the following current concerns for their personal safety: None.  Patient reports there are firearms in the house. No firearms in his home. .      History of Safety Concerns:  Patient denied a history of homicidal ideation.     Patient denied a history of personal safety concerns.    Patient denied a history of assaultive behaviors.    Patient denied a history of assaultive behaviors.     Patient denied a history of risk behaviors associated with substance use.  Patient denies any history of high risk behaviors  associated with mental health symptoms.  Patient reports the following protective factors: forward/future oriented thinking, dedication to family/friends, safe and stable environment and regular sleep        Diagnostic Criteria:   A. Excessive anxiety and worry about a number of events or activities (such as work or school performance).   B. The person finds it difficult to control the worry.  C. Select 3 or more symptoms (required for diagnosis). Only one item is required in children.   - Restlessness or feeling keyed up or on edge.    - Difficulty concentrating or mind going blank.    - Muscle tension.   D. The focus of the anxiety and worry is not confined to features of an Axis I disorder.  E. The anxiety, worry, or physical symptoms cause clinically significant distress or impairment in social, occupational, or other important areas of functioning.   F. The disturbance is not due to the direct physiological effects of a substance (e.g., a drug of abuse, a medication) or a general medical condition (e.g., hyperthyroidism) and does not occur exclusively during a Mood Disorder, a Psychotic Disorder, or a Pervasive Developmental Disorder.    - The aformentioned symptoms began 13 year(s) ago and occurs 7 days per week and is experienced as mild.     Client's symptoms are causing reduced functional status in the following areas: Follow through with Medical recommendations - patient often forgets to take his blood pressure medicine  Occupational / Vocational - patient makes mistakes at work,  academic performance     In conclusion, results of testing were significant for anxiety and inattention. Rating scales suggested that the client is currently experiencing no significant symptoms of an inattention or hyperactivity/impulsivity. Neither the patient nor his collateral reporters reported statistically significant symptoms for his current abilities or in his childhood. The client reported scores of no functional  impairment or executive functioning difficulties. This means that the client has likely made accommodations to help him function with his current abilities. The client is likely able to make decisions and execute the functions needed in his daily life activities. The client and his collateral reporters reported similar scores on the rating scales. The client reported symptoms of forgetfulness, distractibility, and making mistake due to poor attention to detail. Patient reported that him and his family members tend to minimize mental health symptoms; hence, he thinks that this is why his scores were not higher on the questionnaires. Personality testing was suggestive of the client having depression, anxiety, somatic complaints, social withdrawal, and difficulty with concentration. Patient denied depression and reported that the depression questions on the Personality test are correlated with him feeling like he is inadequate compared to other people with completing tasks and making decisions. Additionally, the client is experiencing symptoms of anxiety, which was reported on questionnaires.    DSM5 Diagnoses: (Sustained by DSM5 Criteria Listed Above)  Diagnoses: Provisional Attention-Deficit/Hyperactivity Disorder  314.01 (F90.2) Combined presentation  300.02 (F41.1) Generalized Anxiety Disorder;   Psychosocial & Contextual Factors: Working from home due to distancing during COVID-19. Wife is pregnant with twins.   WHODAS 2.0 (12 item) Raw Score: 13      Recommendations:    1. Schedule an appointment with your physician to discuss a medication evaluation.  2. Access resources through websites, books, and articles such as those provided  in the handout.  3. Consider working with an ADHD  or individual therapist to learn skills to  assist with symptom management, as well as ways to improve relationships,  etc that may have been impacted by your symptoms.  4. Consider attending workshops or support groups through  [facility] ([email or web address]).  5. Individual therapy is recommended for the treatment of anxiety. Therapies focusing on identifying and challenging problematic thought processes can be beneficial.  6. Schedule a follow-up appointment with me in about eight weeks to review symptoms, treatment involvement, and struggles and/or successes.  7. If symptoms do not improve with medication, then consider completing the WAIS in the future to confirm or rule out ADHD.     Cleopatra Ann PsyD LP  6/22/2020     Psychological Testing   Billing/Services Summary       Testing Evaluation Services Base: 16210  (1st 60 mins) Add-on: 70447  (each addtl 60 mins)   Record Review and Clarify Referral Question  6/1/2020 9:20-9:30AM (10) 10 minutes   Clinical Decision Making/Battery Modification   6/17/2020  9:50PM-10:05PM. (15min)  6/18/2020 8:30-8:45AM consulted with Evan, PhD (15) 30 minutes   Integration/Report Generation   6/12/2020 8:30-9:30AM Eric (60 min)  6/17/2020 9:00-9:50 MMPI (50 min)  6/18/2020 8:45-9:15PM (30) Final Integrated Report  6/22/2020 8:50-9:20PM (30)  Final Integrated Report 170 minutes   Interactive Feedback Session  6/22/2020 11:32AM-12:24PM (52 min) 52 minutes   Post-Service Work   6/22/2020 9:20-9:35PM (15 min)   15 minutes   Total Time: 277 minutes (4 hours, 37 minutes)   Total Units: 1 4           Diagnosis(es): (ICD-10) Provisional Attention-Deficit/Hyperactivity Disorder  314.01 (F90.2) Combined presentation  300.02 (F41.1) Generalized Anxiety Disorder;

## 2020-06-22 ENCOUNTER — VIRTUAL VISIT (OUTPATIENT)
Dept: PSYCHOLOGY | Facility: CLINIC | Age: 30
End: 2020-06-22
Payer: COMMERCIAL

## 2020-06-22 ENCOUNTER — DOCUMENTATION ONLY (OUTPATIENT)
Dept: PSYCHOLOGY | Facility: CLINIC | Age: 30
End: 2020-06-22

## 2020-06-22 DIAGNOSIS — F41.1 GENERALIZED ANXIETY DISORDER: Primary | ICD-10-CM

## 2020-06-22 DIAGNOSIS — F90.2 ATTENTION DEFICIT HYPERACTIVITY DISORDER (ADHD), COMBINED TYPE: ICD-10-CM

## 2020-06-22 PROCEDURE — 96130 PSYCL TST EVAL PHYS/QHP 1ST: CPT | Mod: 95 | Performed by: PSYCHOLOGIST

## 2020-06-22 PROCEDURE — 96131 PSYCL TST EVAL PHYS/QHP EA: CPT | Mod: 95 | Performed by: PSYCHOLOGIST

## 2020-06-22 NOTE — Clinical Note
Ketan Khan,     I completed the patient's ADHD evaluation and have diagnosed him with Provisional ADHD and Generalized Anxiety Disorder.  The patient verbally endorsed many ADHD criteria, but his scores on the questionnaires were not significant. I encouraged the patient to talk with you about medication options to see if that helps his symptoms. If his symptoms are not better in a couple of months, then I can consider doing an intelligence test to further assess for ADHD. In person testing is currently suspended due to COVID-19.     Please let me know if you have any questions.   Cleopatra Ann PsyD LP

## 2020-06-22 NOTE — PROGRESS NOTES
Progress Note    Patient Name: Sarbjit Perez  Date: 6/22/2020          Service Type: Individual (ADHD Evaluation Feedback session)  Video Visit: No     Session Start Time: 11:32AM  Session End Time: 12:24PM     Session Length: 52 minutes    Session #: Feedback    Attendees: Client attended alone     Telemedicine Visit: The patient's condition can be safely assessed and treated via synchronous audio and visual telemedicine encounter.      Reason for Telemedicine Visit: Due to distancing recommended due to COVID-19    Originating Site (Patient Location): Patient's home     Distant Site (Provider Location): Provider Remote Setting    Consent:  The patient/guardian has verbally consented to: the potential risks and benefits of telemedicine (video visit) versus in person care; bill my insurance or make self-payment for services provided; and responsibility for payment of non-covered services.     Telemedicine Visit: The patient's condition can be safely assessed and treated via synchronous audio and visual telemedicine encounter.        Mode of Communication:  Video Conference via [employer]    As the provider I attest to compliance with applicable laws and regulations related to telemedicine.      PHQ-9 / SERA-7 : 0/3    DATA  Interactive Complexity: No  Crisis: No       Progress Since Last Session (Related to Symptoms / Goals / Homework):   Symptoms: No change     Homework: Achieved / completed to satisfaction      Episode of Care Goals: Achieved / completed to satisfaction - ACTION (Actively working towards change); Intervened by reinforcing change plan / affirming steps taken     Current / Ongoing Stressors and Concerns:   Difficulty focusing on work   Wife is pregnant with mono mono twins and expected to go on bed rest in the hospital in 3 weeks.     Treatment Objective(s) Addressed in This Session:     Provided feedback on ADHD evaluation. Reviewed test results in depth  "and answered client's questions. Client diagnosed with Provisional Attention-Deficit/Hyperactivity Disorder  314.01 (F90.2) Combined presentation  300.02 (F41.1) Generalized Anxiety Disorder.    Discussed how the patient's scores on the Kj's questionnaires were not significant. Patient reported that him and his family members tend to minimize mental health symptoms; hence, he thinks that this is why his scores were not higher on the questionnaires. The client reported symptoms of forgetfulness, distractibility, and making mistake due to poor attention to detail.  Personality testing was suggestive of the client having depression, anxiety, somatic complaints, social withdrawal, and difficulty with concentration. Patient denied depression and reported that the depression questions on the Personality test are likely correlated with him feeling like he is inadequate compared to other people with completing tasks and making decisions.    Patient reported that he takes longer to \"digest\" information. He also stated that he is often \"perfectionistic\" and \"likes detail and data.\" He stated that he tends to \"hyperfocus on end results and glosses over details.\" Patient reported that he often thinks that he understands instructions and then \"forgets details.\" Patient stated that he likes the pressure of deadlines and has trouble with focusing and motivation if there is not a deadline. He reported worrying about being a \"failure\" with his new role at work and reported wishing that he would receive more feedback. Patient reported learning to make lists when he was approximately 22 years of age and thinking that he would not be as successful if he did not make lists. Patient reported often forget details, such as items on a grocery list. Patient reported that he tried to use planners and schedules in college, but that he often lacked follow through after one to two weeks. He reported that \"mind numbing tasks\" make it " "difficult to complete tasks. Discussed his tendency to not complete tasks if he loses interest in the task. Patient stated that his mother said that he always \"refused to clean\" his room when he was a child.     Patient denied symptoms of Social Anxiety Disorder. Discussed his need to learn how to socialize when sober, because he sometimes feels anxious in social situations since abstaining from alcohol.     Spoke about patient's general anxiety and feeling like it is \"torture\" to wait 45 minutes before he has to leave for an appointment. Patient reported that he tends to arrive 20-30 minutes prior to an appointment.        Reviewed ADHD symptom management handout. This provider also completed full written report of evaluation, including integration of testing data, summary, and recommendations. Please see Documentation Only dated 6/22/2020.     Intervention:   ADHD Evaluation feedback; Reviewed report (can be found in Documentation Only encounter dated 6/22/2020); Client was appreciative of the feedback and expressed understanding of the diagnoses. He stated, \"I m feeling pretty good and glad that it isn't in my head.\" Patient expressed relief and understanding about the plan for the provisional diagnosis of ADHD.       CBT: socratic questioning  EFT: emotion checking         ASSESSMENT: Current Emotional / Mental Status (status of significant symptoms):   Risk status (Self / Other harm or suicidal ideation)   Patient denies current fears or concerns for personal safety.   Patient denies current or recent suicidal ideation or behaviors.   Patientdenies current or recent homicidal ideation or behaviors.   Patient denies current or recent self injurious behavior or ideation.   Patient denies other safety concerns.   Patient reports there has been no change in risk factors since their last session.     Patientreports there has been no change in protective factors since their last session.     Recommended that patient " call 911 or go to the local ED should there be a change in any of these risk factors.     Appearance:   Appropriate    Eye Contact:   Good    Psychomotor Behavior: Normal    Attitude:   Cooperative    Orientation:   All   Speech    Rate / Production: Normal     Volume:  Normal    Mood:    Normal   Affect:    Appropriate    Thought Content:  Clear    Thought Form:  Coherent  Logical    Insight:    Good      Medication Review:   No current psychiatric medications prescribed     Medication Compliance:   NA     Changes in Health Issues:   None reported     Chemical Use Review:   Substance Use: Chemical use reviewed, no active concerns identified      Tobacco Use: No current tobacco use.      Diagnosis:  Provisional Attention-Deficit/Hyperactivity Disorder  314.01 (F90.2) Combined presentation  300.02 (F41.1) Generalized Anxiety Disorder;     Collateral Reports Completed:   Routed note to PCP    PLAN: (Patient Tasks / Therapist Tasks / Other)  Patient plans to continue to use lists to help him stay organized.     Discussed plan to diagnose patient with Provisional Attention-Deficit/Hyperactivity Disorder  314.01 (F90.2) Combined presentation due to his verbal endorsement of diagnostic criteria even though his questionnaires were not evident of ADHD. Plan to see patient again in 8 weeks to determine if his symptoms have improved with medication. Potential to complete the WAIS in the future to further rule out or confirm ADHD.     1. Schedule an appointment with your physician to discuss a medication evaluation.  2. Access resources through websites, books, and articles such as those provided  in the handout.  3. Consider working with an ADHD  or individual therapist to learn skills to  assist with symptom management, as well as ways to improve relationships,  etc that may have been impacted by your symptoms.  4. Consider attending workshops or support groups through [facility] ([email or web address]).  5. Individual  therapy is recommended for the treatment of anxiety. Therapies focusing on identifying and challenging problematic thought processes can be beneficial.  6. Schedule a follow-up appointment with me in about eight weeks to review symptoms, treatment involvement, and struggles and/or successes.  7. If symptoms do not improve with medication, then consider completing the WAIS in the future to confirm or rule out ADHD.     Mode of Communication: [facility] used.    Cleopatra Ann PsyD LP 6/22/2020         Psychological Testing   Billing/Services Summary       Testing Evaluation Services Base: 96712  (1st 60 mins) Add-on: 03791  (each addtl 60 mins)   Record Review and Clarify Referral Question  6/1/2020 9:20-9:30AM (10) 10 minutes   Clinical Decision Making/Battery Modification   6/17/2020  9:50PM-10:05PM. (15min)  6/18/2020 8:30-8:45AM consulted with Evan, PhD (15) 30 minutes   Integration/Report Generation   6/12/2020 8:30-9:30AM Eric (60 min)  6/17/2020 9:00-9:50 MMPI (50 min)  6/18/2020 8:45-9:15PM (30) Final Integrated Report  6/22/2020 8:50-9:20PM (30)  Final Integrated Report 170 minutes   Interactive Feedback Session  6/22/2020 11:32AM-12:24PM (52 min) 52 minutes   Post-Service Work   6/22/2020 9:20-9:35PM (15 min)   15 minutes   Total Time: 277 minutes (4 hours, 37 minutes)   Total Units: 1 4           Diagnosis(es): (ICD-10) Provisional Attention-Deficit/Hyperactivity Disorder  314.01 (F90.2) Combined presentation  300.02 (F41.1) Generalized Anxiety Disorder;        ______________________________________________________________________

## 2020-06-23 NOTE — PROGRESS NOTES
[facility]  ADHD Evaluation       Patient: Sarbjit Perez   YOB: 1990  MRN: [id number]  Date(s) of assessment:  Diagnostic Assessment 6/1/2020, Kj self-report and collateral measures scored and interpreted 6/12/2020 and MMPI 6/18/2020    Information about appointment:  Client attended two  sessions to aid in determining client's mental health diagnosis or diagnoses and treatment recommendations that best address client concerns. Client records including medical were reviewed. A diagnostic assessment was conducted at the initial appointment. Client completed several rating scales to assist in assessing attention-related and other mental health symptoms that may be causing impairments in functioning. Rating scales were also completed by a collateral contact.    Assessment tools:      Kj Adult ADHD Rating Scale-IV: Self and Other Reports (BAARS-IV), Kj Functional Impairment Scale: Self and Other Reports (BFIS), Kj Deficits in Executive Functioning Scale: Self and Other Reports (BDEFS), Patient Health Questionnaire-9 (PHQ-9), Generalized Anxiety Disorder-7 (SERA-7) and [place] Multiphasic Personality Inventory (MMPI)    Assessment Results:    Behavioral Observations:  The patient arrived on time for all appointments and scheduled follow-up appointments efficiently. The patient appeared motivated to complete the assessment and was polite in interactions. He was oriented by three. He appeared to be free of difficulty with attention and hyperactivity/impulsivity during sessions. The following results are likely to be an accurate reflection of Client's current functioning.       Kj Adult ADHD Rating Scale-IV: Self and Other Reports (BAARS-IV)  The BAARS-IV assesses for symptoms of ADHD that are experienced in one's daily life. This assessment measure includes self and collateral rating scales designed to provide information regarding current and childhood symptoms of  "ADHD including inattention, hyperactivity, and impulsivity. Self-report scores are reported as percentiles. Scores at the 76th-83rd percentile are considered marginal, scores at the 84th-92nd percentile are considered borderline, scores at the 93rd-95th percentile are considered mild, scores at the 96th-98th percentile are considered moderate, and those at the 99th percentile are considered severe. Collateral or \"other\" rating scales are reported as number of symptoms observed in comparison to those reported by the client. Norms and percentile scores are not available for collateral reports.      Current Symptoms Scale--Self Report:   Client completed the self-report inventory of current symptoms. The results indicate that the client's Total ADHD Score was 31 which places him in the 79th percentile for overall ADHD symptoms. In addition, the client endorsed 1/9 (86th percentile) Inattention symptoms, 2/9 (88th percentile) Hyperactivity-Impulsivity symptoms, and 2/9 (85th percentile) Sluggish Cognitive Tempo symptoms. Client indicated that the reported symptoms have resulted in impaired functioning in school and work. Overall, the results suggest the client is experiencing Marginal to Borderline ADHD symptoms.      Current Symptoms Scale--Other Report:  Client's wife completed the collateral report inventory of current symptoms. Based on the collateral contact's observation of symptoms, the client demonstrates 0/9 Inattention symptoms, 1/5 Hyperactivity symptoms, 0/4 Impulsivity symptoms, and 0/9 Sluggish Cognitive Tempo symptoms. The client's Total ADHD Score was 24. The collateral contact indicated the client demonstrates impaired functioning in work} The collateral- and self-report scores are not significantly different.      Childhood Symptoms Scale--Self-Report:  Client completed the self-report inventory of childhood symptoms. The results indicate that the client's Total ADHD Score was 22 which places him in " "the 1st-50th percentile for overall ADHD symptoms in childhood. In addition, the client endorsed 0/9 (1st-75th percentile) Inattention symptoms and  0/9 (1st-75th percentile) Hyperactivity-Impulsivity symptoms. Client indicated that the reported symptoms resulted in impaired functioning in school Overall, the results suggest the client experienced  Marginal symptoms of ADHD as a child.      Childhood Symptoms Scale--Other Report:  Client's mother completed the collateral report inventory of childhood symptoms. Based on the collateral contact's recollection of client's childhood symptoms, the client demonstrated 1/9 Inattention symptoms and 0/9 Hyperactivity-Impulsivity symptoms. The client's Total ADHD Score was 24. The collateral contact indicated the client demonstrates impaired functioning in school and homeThe collateral- and self-report scores are not significantly different.                           Kj Functional Impairment Scale: Self and Other Reports (BFIS)  The BFIS is used to assess an individuals' psychosocial impairment in major life/daily activities that may be due to a mental health disorder. This assessment measure includes self and collateral rating scales. Self-report scores are reported as percentiles. Scores at the 76th-83rd percentile are considered marginal, scores at the 84th-92nd percentile are considered borderline, scores at the 93rd-95th percentile are considered mild, scores at the 96th-98th percentile are considered moderate, and those at the 99th percentile are considered severe.Collateral or \"other\" rating scales are reported as number of symptoms observed in comparison to those reported by the client. Norms and percentile scores are not available for collateral reports.      Results indicate the client do no identify any areas of impairment (scores at or greater than 93rd percentile). The client's Mean Impairment Score was 1 (1st-50th percentile) indicating the client is " "reporting Marginal impairment in functioning across domains. Client's wife completed the collateral rating scale, which indicated similar results. The collateral contact's scores were generally higher than the client's report.     Kj Deficits in Executive Functioning Scale (BDEFS)  The BDEFS is a measure used for evaluating dimensions of adult executive functioning in daily life.This assessment measure includes self and collateral rating scales. Self-report scores are reported as percentiles. Scores at the 76th-83rd percentile are considered marginal, scores at the 84th-92nd percentile are considered borderline, scores at the 93rd-95th percentile are considered mild, scores at the 96th-98th percentile are considered moderate, and those at the 99th percentile are considered severe.Collateral or \"other\" rating scales are reported as number of symptoms observed in comparison to those reported by the client. Norms and percentile scores are not available for collateral reports.      Results indicate the client's Total Executive Functioning Score was 145  (1st-75th percentile). The ADHD-Executive Functioning Index score was 18 (51st-75th percentile). These scores suggest the client has Marginal deficits in executive functioning. These deficits may be due to ADHD. Results indicate the client identified no areas of significant deficits. Client's wife completed the collateral rating scale, which indicated similar results. The collateral contact's scores were generally lower than the client's report.     Summary of MMPI-2 Results      Client completed the [place] Multiphasic Personality Inventory-2 (MMPI-2), a self-report personality inventory, as a part of the psychological assessment for ruling out Attention Deficit disorders.  Validity scales indicate that the client responded in an open and consistent manner, resulting in a valid profile.  His responses yielded a profile that is consistent with people who report " "depression, anxiety, somatic complaints, social withdrawal, and difficulty with concentration.      Patient responded similarly to people who report depression, worries, anxiety and somatic complaints. Moreover, they tend to have fatigue, insomnia, guilt, perfectionism, and often feel unaccepted. Furthermore, people like this often avoid social situations.      People who had similar answers to the patient also reported rumination, excessive worrying, sensitivity toward criticism, perceive criticism where none is intended, and are preoccupied with self-perceived failure.     Results of testing were somewhat consistent with his report upon direct interview; however, he did not endorse depression in person and he his history of substance abuse was not evident on the MMPI.     Generalized Anxiety Disorder Questionnaire (SERA-7)  This questionnaire is designed to assess for anxiety in adults.  Based on the score, he is experiencing mild symptoms of anxiety. Client identified the following symptoms of anxiety: feeling on edge/nervous/anxious, difficulty controlling worry and being restless    Patient Health Questionnaire- 9 (PHQ-9)   This questionnaire is designed to assess for depression in adults.  Based on the score, he is experiencing no symptoms of depression. Client identified the following symptoms of depression: no symptoms.         Summary (based on clinical interview, review of records, test results):      Patient is a 30 year old, .  The pronoun use throughout this assessment reflects the patient's chosen pronoun.  Patient was referred for an assessment by mental health therapist, Madeleine Giraldo [facility].  Patient attended the session alone. The reason for seeking services at this time is: \" focus, difficulty completing tasks, distracted, and difficulty following a conversation if multiple people are speaking. \"   The problem(s) began in high school. Patient has not attempted to resolve these concerns in " "the past. Patient reported that he uses lists and starts with small \"achievable goals.\"      Client reported seeking services at this time for diagnostic assessment and recommendations for treatment.  Client's presenting concerns include: focus, difficulty completing tasks, poor attention to detail, distracted, and difficulty following a conversation if multiple people are speaking. The problem(s) began in high school. Patient has not attempted to resolve these concerns in the past. Patient reported that he uses lists and starts with small \"achievable goals.\"   Client stated that symptoms have resulted in the following functional impairments: academic performance, chronic disease management, educational activities, health maintenance and work / vocational responsibilities. Patient reported that he \"makes a lot of dumb, silly mistakes\" at work. Patient reported that he frequently makes mistakes due to poor attention to detail. Patient reported difficulty recalling information if he does not write them down. Client reported that he has not completed a previous ADHD diagnostic assessment.  Client has received a previous diagnosis of Generalized Anxiety Disorder.  Client has not been prescribed medication to address these problems. Client reported that these problem(s) began in high school and the beginning of college. Client has attempted to resolve these concerns in the past through counseling for anxiety.  Client reported that other professional(s) are involved in providing support / services. Patient sees Madeleine Giraldo [facility] for therapy and his PCP.      Patient reported they grew up in [place], [place].  They were raised by biological parents.   Parents remain . Patient reported that he has two younger brother and one older half brother through his father.   Patient reported that his childhood was \"great.\" His parents \"stressed the importance of school and sports.\"  Patient described their current relationships " "with family of origin as \"really good.\" Patient reported that he communicates with his siblings and parents at least weekly.            The patient describes their cultural background [place] .  Cultural influences and impact on patient's life structure, values, norms, and healthcare: Spiritual Beliefs: prefers science over Bahai and Health Beliefs and the endorsement of OR engagement in Culturally Specific Healing Practices: prefers Western medicine.  Patient reported that he grew up in a \"liberal\" Mormonism family; however, he no longer attends Orthodoxy and he is on the \"liberal\" side of the spectrum. Contextual influences on patient's health include: Family Factors \"anxiety runs in family and it has been largely ignored\" and Health- Seeking Factors his mother is a NP and encourages him to go to medical appointments.    These factors will be addressed in the Preliminary Treatment plan.  Patient identified their preferred language to be English. Patient reported they does not need the assistance of an  or other support involved in therapy.  Patient reported had no significant delays in developmental tasks.   Patient's highest education level was college graduate. Patient identified the following learning problems: concentration and reading.  Patient reported that he struggles with reading due to distractions, so he prefers reading news articles and short pieces.  Modifications will not be used to assist communication in therapy.   Patient reports they are  able to understand written materials.     Patient reported the following relationship history, that he has been with his wife for 14 years. He reported that he has been with his partner since he was 16 years old and in high school.  Patient's current relationship status is  for 2 years.   Patient identified their sexual orientation as heterosexual.  Patient reported having no " "children; however, his wife is pregnant with twins that are due in September. Patient's current living/housing situation involves staying in own home/apartment.  They live with his wife and they report that housing is stable. Patient identified partner, parents, siblings, friends and therapist as part of their support system.  Patient identified the quality of these relationships as stable and meaningful.     Patient is currently employed full time and reports they are able to function appropriately at work..  Patient reports their finances are obtained through employment and spouse.  Patient does not identify finances as a current stressor.  Patient reported that they have not been involved with the legal system.   Patient denies being on probation / parole / under the jurisdiction of the court.    As a child, client reported that he failed to complete assigned chores in the home environment, had problems getting ready for school in the morning, had problems with organization and keeping track of items and needed frequent reminders by parents to be motivated or to complete work. Client reported no difficulty with childhood peer relationships.  As a child, client reported having sleep disturbance, including: frequent dreams / nightmares, insomnia and sleep walking .  Client reported currently experiencing regular and consistent sleep patterns.  Client reported sleeping approximately 7-8 hours per night.  Client reported that from the ages of [age], he had \"sleep paralysis\" approximately once a month. Client reported that he has not completed a sleep study.  Client reported having a well balanced diet.  There are not significant nutritional concerns.  Client reported engaging in regular exercise.     Client's highest education level was college graduate. Client graduated high school in 2007 with a 3.7 GPA. He estimated that he  obtained mostly As and Bs in high school. The patient went to three different colleges to " "earn his bachelor's degree. He reported that his College GPA was approximately 2.9.  During the elementary, middle, and high school years, patient recalls academic strengths in the area of reading, writing and math. Client reported experiencing academic problems in geometry. Client did not identify any learning problems. Client did not receive tutoring services during the school years. Client did not receive special education services. Client reported no particular problems during the school years. Client did attend post-secondary school. Patient reported that his first year of college at [employer] he struggled with attendance and completing homework. Patient then went to a two year college and obtained mostly As. He then transferred back to [employer] and earned mostly Cs for one year. He then transferred to [employer] for two years and earned mostly As and Bs. Patient reported that his attendance improved to approximately 75% the second time he went to [employer]. 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 The patient was placed on academic probation after his first semester at [employer] and then after his second semester, [employer] stated that he needed to take a leave of absence or possibly an \"academic suspension.\"     Patient reported that he started college at [employer] and that he experienced difficulty adjusting to \"studying more\" when he had \"new found freedoms.\" He went to [employer] for one year and then he went to a small [employer] college for two years. Then he transferred back to [employer] for one semester and then he transferred to [employer] and graduated with a degree in BS in Business Administration and Finance. Patient reported that many of his classes were \"advanced classes.\" Patient reported that he struggled with math through college and then the \"switch flipped\" and he was able to understand finances and " "accounting. Patient reported that even though math was difficult, he earned As. However, he said \"my high school is kind of a joke.\" Patient reported that he works remotely and can function at his job, but thinks that \"there is room for improvement.\"       Client reported that they are currently employed. Client reported that the current job is a good fit for his skills and personality.  Client reported that he frequently made mistakes with poor attention to detail, often felt bored and problems learning new materials.  The client's work history includes: , , kitchen and , controller, and  for a  company.  The longest period of employment has been 6 years.  Client has not been terminated from a place of employment. Patient reported some difficulty in conversations with people, specifically coworkers. He reported that it takes him \"a really long time\" to figure out what other people are saying, process what they are saying, and then come up with something useful to say.\" He stated \"I want to be right and not say something wrong and have to go back on that.\"      Patient played baseball as a kid and engaged in rituals when he went up to bat. Patient reported that he likes \"routine.\" Patient reported that he does not like unexpected changes to plans.Patient stated that he \"really bugs\" him if someone does not do something that they say they will do.      Client reported a history of the following risk taking behaviors: excessive spending, gambling, impulsive decision making and substance use. Patient reported that he currently has impulsive decision making. Patient sometimes impulsively spends and cleans. Client has received a 's license.  Client has received moving violations, including: accidents due to inattention and one speeding tickets.  Patient reported that he was in a \"fender schaffer\" and received a ticket for inattentive driving. Client " "reported the following driving habits: attentive and cautious and experiences road rage.  According to client, other people are comfortable riding as a passenger when he is driving.      Patient identified the following strengths or resources that will help them succeed in treatment: commitment to health and well being, friends / good social support, family support, intelligence and sense of humor. Things that may interfere with the patient's success in treatment include: none noted.  Patient did report a family history of mental health concerns. Patient reported that he thinks that his brother and father have undiagnosed anxiety.  Patient reports family history includes Breast Cancer in his mother; Diabetes in his paternal grandfather; Diabetes Type 2  in his paternal grandfather; Hypertension in his father and paternal grandfather; Other Cancer in his maternal grandmother. Patient reported that he has high blood pressure and used to have diabetes and alcohol use disorder. Patient reported that he \"drank heavily\" from age 19 to age 25. 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 Patient reported that he stopped drinking alcohol around age 26 and that he did not go to a formal substance use and is not in AA.        Patient reported the following previous diagnoses which include(s): an Anxiety Disorder.  Patient reported symptoms began when he started college.   Patient has received mental health services in the past: therapy with ANDRIY Piña.  Psychiatric Hospitalizations: None.  Patient denies a history of civil commitment.  Currently, patient is receiving other mental health services.  These include psychotherapy with ANDRIY Piña.   Patient has had a physical exam to rule out medical causes for current symptoms.  Date of last physical exam was greater than a year " ago and client was encouraged to schedule an exam with PCP. The patient has a [place] Primary Care Provider, who is named Bill Mg..  Patient reports the following current medical concerns: high blood pressure and a potential issue with his kidneys. The patient also reported a history of diabetes.  There are not significant appetite / nutritional concerns / weight changes.   Patient does not report a history of head injury / trauma / cognitive impairment.    Patient reported that he lost approximately 100 pounds when he was 26 years old and stopped drinking alcohol.      Patient reports current meds as:   lisinopril (PRINIVIL/ZESTRIL) 10 MG tablet     Medication Adherence:  Patient reports He is not prescribed psychotropic medications at this time.     Patient Allergies:  No Known Allergies     Medical History:    Past Medical History        Past Medical History:   Diagnosis Date     Acute pancreatitis 10/20/2016     Alcohol abuse       Hx of diabetes mellitus 10/26/2016     Occurred post alcoholic pancreatitis. Resolved 1 yr later after alcohol cessation.              PHQ9:    PHQ-9 SCORE 5/8/2020 5/26/2020 6/1/2020   PHQ-9 Total Score MyChart - - 0   PHQ-9 Total Score 0 0 0   ;    GAD7:    SERA-7 SCORE 5/8/2020 5/26/2020 6/1/2020   Total Score - - 3 (minimal anxiety)   Total Score 4 3 3       Patient did not report a family history of substance use concerns; see medical history section for details.  Patient has not received chemical dependency treatment in the past.  Patient has not ever been to detox.  Patient is not currently receiving any chemical dependency treatment. Patient reported the following problems as a result of their substance use: academic.     Patient denies using alcohol.  Patient denies using tobacco.  Patient denies using marijuana.  Patient reports using caffeine 3 times per day and drinks 2 at a time. Patient started using caffeine at age 11.  Patient reports using/abusing the  following substance(s). Patient reported no other substance use.      CAGE- AID:    CAGE-AID Total Score 6/1/2020   Total Score 0       Substance Use: No symptoms From substance use currently. In his past, he abused alcohol.   Based on the negative CAGE score and clinical interview there  are not indications of drug or alcohol abuse.    Patient did not serve in the .  There are indications or report of significant loss, trauma, abuse or neglect issues related to: none noted.  Concerns for possible neglect are not present.      Safety Assessment:   Current Safety Concerns:  [place] Suicide Severity Rating Scale (Lifetime/Recent)  [place] Suicide Severity Rating (Lifetime/Recent) 11/26/2019 6/1/2020   1. Wish to be Dead (Lifetime) No No   1. Wish to be Dead (Recent) No -   2. Non-Specific Active Suicidal Thoughts (Lifetime) No -   2. Non-Specific Active Suicidal Thoughts (Recent) No -   Actual Attempt (Lifetime) No -   Actual Attempt (Past 3 Months) No -   Has subject engaged in non-suicidal self-injurious behavior? (Lifetime) No -   Has subject engaged in non-suicidal self-injurious behavior? (Past 3 Months) No -     Patient denies current homicidal ideation and behaviors.  Patient denies current self-injurious ideation and behaviors.    Patient denied risk behaviors associated with substance use.  Patient denies any high risk behaviors associated with mental health symptoms.  Patient reports the following current concerns for their personal safety: None.  Patient reports there are firearms in the house. No firearms in his home. .      History of Safety Concerns:  Patient denied a history of homicidal ideation.     Patient denied a history of personal safety concerns.    Patient denied a history of assaultive behaviors.    Patient denied a history of assaultive behaviors.     Patient denied a history of risk behaviors associated with substance use.  Patient denies any history of high risk behaviors  associated with mental health symptoms.  Patient reports the following protective factors: forward/future oriented thinking, dedication to family/friends, safe and stable environment and regular sleep        Diagnostic Criteria:   A. Excessive anxiety and worry about a number of events or activities (such as work or school performance).   B. The person finds it difficult to control the worry.  C. Select 3 or more symptoms (required for diagnosis). Only one item is required in children.   - Restlessness or feeling keyed up or on edge.    - Difficulty concentrating or mind going blank.    - Muscle tension.   D. The focus of the anxiety and worry is not confined to features of an Axis I disorder.  E. The anxiety, worry, or physical symptoms cause clinically significant distress or impairment in social, occupational, or other important areas of functioning.   F. The disturbance is not due to the direct physiological effects of a substance (e.g., a drug of abuse, a medication) or a general medical condition (e.g., hyperthyroidism) and does not occur exclusively during a Mood Disorder, a Psychotic Disorder, or a Pervasive Developmental Disorder.    - The aformentioned symptoms began 13 year(s) ago and occurs 7 days per week and is experienced as mild.     Client's symptoms are causing reduced functional status in the following areas: Follow through with Medical recommendations - patient often forgets to take his blood pressure medicine  Occupational / Vocational - patient makes mistakes at work,  academic performance     In conclusion, results of testing were significant for anxiety and inattention. Rating scales suggested that the client is currently experiencing no significant symptoms of an inattention or hyperactivity/impulsivity. Neither the patient nor his collateral reporters reported statistically significant symptoms for his current abilities or in his childhood. The client reported scores of no functional  impairment or executive functioning difficulties. This means that the client has likely made accommodations to help him function with his current abilities. The client is likely able to make decisions and execute the functions needed in his daily life activities. The client and his collateral reporters reported similar scores on the rating scales. The client reported symptoms of forgetfulness, distractibility, and making mistake due to poor attention to detail. Patient reported that him and his family members tend to minimize mental health symptoms; hence, he thinks that this is why his scores were not higher on the questionnaires. Personality testing was suggestive of the client having depression, anxiety, somatic complaints, social withdrawal, and difficulty with concentration. Patient denied depression and reported that the depression questions on the Personality test are correlated with him feeling like he is inadequate compared to other people with completing tasks and making decisions. Additionally, the client is experiencing symptoms of anxiety, which was reported on questionnaires.    DSM5 Diagnoses: (Sustained by DSM5 Criteria Listed Above)  Diagnoses: Provisional Attention-Deficit/Hyperactivity Disorder  314.01 (F90.2) Combined presentation  300.02 (F41.1) Generalized Anxiety Disorder;   Psychosocial & Contextual Factors: Working from home due to distancing during COVID-19. Wife is pregnant with twins.   WHODAS 2.0 (12 item) Raw Score: 13      Recommendations:    1. Schedule an appointment with your physician to discuss a medication evaluation.  2. Access resources through websites, books, and articles such as those provided  in the handout.  3. Consider working with an ADHD  or individual therapist to learn skills to  assist with symptom management, as well as ways to improve relationships,  etc that may have been impacted by your symptoms.  4. Consider attending workshops or support groups through  [facility] ([email or web address]).  5. Individual therapy is recommended for the treatment of anxiety. Therapies focusing on identifying and challenging problematic thought processes can be beneficial.  6. Schedule a follow-up appointment with me in about eight weeks to review symptoms, treatment involvement, and struggles and/or successes.  7. If symptoms do not improve with medication, then consider completing the WAIS in the future to confirm or rule out ADHD.     Cleopatra Ann PsyD LP  6/22/2020     Psychological Testing   Billing/Services Summary       Testing Evaluation Services Base: 31805  (1st 60 mins) Add-on: 77267  (each addtl 60 mins)   Record Review and Clarify Referral Question  6/1/2020 9:20-9:30AM (10) 10 minutes   Clinical Decision Making/Battery Modification   6/17/2020  9:50PM-10:05PM. (15min)  6/18/2020 8:30-8:45AM consulted with Evan, PhD (15) 30 minutes   Integration/Report Generation   6/12/2020 8:30-9:30AM Eric (60 min)  6/17/2020 9:00-9:50 MMPI (50 min)  6/18/2020 8:45-9:15PM (30) Final Integrated Report  6/22/2020 8:50-9:20PM (30)  Final Integrated Report 170 minutes   Interactive Feedback Session  6/22/2020 11:32AM-12:24PM (52 min) 52 minutes   Post-Service Work   6/22/2020 9:20-9:35PM (15 min)   15 minutes   Total Time: 277 minutes (4 hours, 37 minutes)   Total Units: 1 4           Diagnosis(es): (ICD-10) Provisional Attention-Deficit/Hyperactivity Disorder  314.01 (F90.2) Combined presentation  300.02 (F41.1) Generalized Anxiety Disorder;

## 2020-06-24 ENCOUNTER — TELEPHONE (OUTPATIENT)
Dept: FAMILY MEDICINE | Facility: CLINIC | Age: 30
End: 2020-06-24

## 2020-06-24 ENCOUNTER — VIRTUAL VISIT (OUTPATIENT)
Dept: FAMILY MEDICINE | Facility: CLINIC | Age: 30
End: 2020-06-24
Payer: COMMERCIAL

## 2020-06-24 DIAGNOSIS — F90.2 ATTENTION DEFICIT HYPERACTIVITY DISORDER (ADHD), COMBINED TYPE: Primary | ICD-10-CM

## 2020-06-24 DIAGNOSIS — I10 ESSENTIAL HYPERTENSION, BENIGN: ICD-10-CM

## 2020-06-24 DIAGNOSIS — R80.9 MICROALBUMINURIA: ICD-10-CM

## 2020-06-24 PROCEDURE — 99214 OFFICE O/P EST MOD 30 MIN: CPT | Mod: GT | Performed by: PHYSICIAN ASSISTANT

## 2020-06-24 RX ORDER — DEXTROAMPHETAMINE SACCHARATE, AMPHETAMINE ASPARTATE, DEXTROAMPHETAMINE SULFATE AND AMPHETAMINE SULFATE 3.75; 3.75; 3.75; 3.75 MG/1; MG/1; MG/1; MG/1
15 TABLET ORAL 2 TIMES DAILY
Qty: 60 TABLET | Refills: 0 | Status: SHIPPED | OUTPATIENT
Start: 2020-06-24 | End: 2020-09-01

## 2020-06-24 RX ORDER — LISINOPRIL 10 MG/1
10 TABLET ORAL DAILY
Qty: 90 TABLET | Refills: 1 | Status: SHIPPED | OUTPATIENT
Start: 2020-06-24 | End: 2020-09-01

## 2020-06-24 RX ORDER — DEXTROAMPHETAMINE SACCHARATE, AMPHETAMINE ASPARTATE MONOHYDRATE, DEXTROAMPHETAMINE SULFATE AND AMPHETAMINE SULFATE 3.75; 3.75; 3.75; 3.75 MG/1; MG/1; MG/1; MG/1
15 CAPSULE, EXTENDED RELEASE ORAL DAILY
Qty: 30 CAPSULE | Refills: 0 | Status: SHIPPED | OUTPATIENT
Start: 2020-06-24 | End: 2020-06-24

## 2020-06-24 ASSESSMENT — PATIENT HEALTH QUESTIONNAIRE - PHQ9
SUM OF ALL RESPONSES TO PHQ QUESTIONS 1-9: 0
SUM OF ALL RESPONSES TO PHQ QUESTIONS 1-9: 0

## 2020-06-24 NOTE — PROGRESS NOTES
"Sarbjit Perez is a 30 year old male who is being evaluated via a billable video visit.      The patient has been notified of following:     \"This video visit will be conducted via a call between you and your physician/provider. We have found that certain health care needs can be provided without the need for an in-person physical exam.  This service lets us provide the care you need with a video conversation.  If a prescription is necessary we can send it directly to your pharmacy.  If lab work is needed we can place an order for that and you can then stop by our lab to have the test done at a later time.    Video visits are billed at different rates depending on your insurance coverage.  Please reach out to your insurance provider with any questions.    If during the course of the call the physician/provider feels a video visit is not appropriate, you will not be charged for this service.\"    Patient has given verbal consent for Video visit? Yes    Will anyone else be joining your video visit? No     Use Doximity: [phone]      Subjective     Sarbjit Perez is a 30 year old male who presents today via video visit for the following health issues:    HPI  ADHDAnswers for HPI/ROS submitted by the patient on 6/24/2020   Chronic problems general questions HPI Form  PHQ9 TOTAL SCORE: 0  How many servings of fruits and vegetables do you eat daily?: 2-3  On average, how many sweetened beverages do you drink each day (Examples: soda, juice, sweet tea, etc.  Do NOT count diet or artificially sweetened beverages)?: 0  How many minutes a day do you exercise enough to make your heart beat faster?: 30 to 60  How many days a week do you exercise enough to make your heart beat faster?: 5  How many days per week do you miss taking your medication?: 0             Video Start Time: 10:43     ADHD Evaluation (Adult) Initial    Major concerns: ADHD evaluation,.    Current Employer/Job Duties:  Difficulty focusing on work.   Work " Concerns:  YES unable to focus or meet deadlines.   Home Concerns:   YES easily distracted and forgetful   Relationship Concerns:  No   Sleep: no problems  Adult ADHD Self-Reporting Symptom Checklist given to katy:  No  Was there a diagnosis of ADD made previously? Yes: provisional diagnosis through psych on 6/22. See EMR    ++++++++++++++++++++++++++++++++++++++++++++++    Family Cardiac history reviewed and is negative.  Patient Past Medical History of Cardiac Problems:  No     Hypertension Follow-up      Do you check your blood pressure regularly outside of the clinic? Yes     Are you following a low salt diet? No    Are your blood pressures ever more than 140 on the top number (systolic) OR more   than 90 on the bottom number (diastolic), for example 140/90? No    Patient Active Problem List   Diagnosis     Hx of diabetes mellitus     Alcohol dependence in remission (H)     Microalbuminuria     Essential hypertension, benign     Acute reaction to stress     Past Surgical History:   Procedure Laterality Date     HERNIORRHAPHY INGUINAL Left 3/30/2018    Procedure: HERNIORRHAPHY INGUINAL;  Left Inguinal hernia repair with Mesh ;  Surgeon: Eduard Amaral MD;  Location:  OR     [facility]         Social History     Tobacco Use     Smoking status: Never Smoker     Smokeless tobacco: Never Used   Substance Use Topics     Alcohol use: No     Alcohol/week: 0.0 standard drinks     Frequency: Never     Drinks per session: Patient refused     Binge frequency: Never     Family History   Problem Relation Age of Onset     Breast Cancer Mother      Hypertension Father      Diabetes Type 2  Paternal Grandfather      Hypertension Paternal Grandfather      Diabetes Paternal Grandfather      Other Cancer Maternal Grandmother          Current Outpatient Medications   Medication Sig Dispense Refill     amphetamine-dextroamphetamine (ADDERALL XR) 15 MG 24 hr capsule Take 1 capsule (15 mg) by mouth daily 30 capsule 0      lisinopril (ZESTRIL) 10 MG tablet Take 1 tablet (10 mg) by mouth daily 90 tablet 1     No Known Allergies  Recent Labs   Lab Test 10/07/19  0700 06/17/18  1509  03/06/18  0909 10/24/17  0921 02/02/17  0845  10/22/16  0929  10/20/16  0245   A1C 5.0  --   --  5.1 5.1 5.0  --   --   --  9.5*   LDL 97  --   --  83 97 71   < >  --   --   --    HDL 43  --   --  48 45 42   < >  --   --   --    TRIG 100  --   --  72 77 135   < >  --   --   --    ALT  --   --   --   --  42  --   --  93*  --  286*   CR 0.95 0.98   < > 0.98 1.22 0.98   < > 0.87   < > 0.82   GFRESTIMATED >90 >90   < > 90 70 >90  Non  GFR Calc     < > >90  Non  GFR Calc     < > >90  Non  GFR Calc     GFRESTBLACK >90 >90   < > >90 85 >90  African American GFR Calc     < > >90   GFR Calc     < > >90   GFR Calc     POTASSIUM 3.9 3.8   < > 3.8 3.9 4.0   < > 3.7   < > 3.7   TSH 2.58  --   --   --   --  1.88  --   --   --   --     < > = values in this interval not displayed.      BP Readings from Last 3 Encounters:   10/07/19 124/77   06/17/18 142/88   06/08/18 110/68    Wt Readings from Last 3 Encounters:   10/07/19 97.5 kg (215 lb)   06/17/18 86.6 kg (191 lb)   06/08/18 88 kg (194 lb)                    Reviewed and updated as needed this visit by Provider  Tobacco  Allergies  Meds  Problems  Med Hx  Surg Hx  Fam Hx         Review of Systems   Constitutional, HEENT, cardiovascular, pulmonary, GI, , musculoskeletal, neuro, skin, endocrine and psych systems are negative, except as otherwise noted.      Objective             Physical Exam     GENERAL: Healthy, alert and no distress  EYES: Eyes grossly normal to inspection.  No discharge or erythema, or obvious scleral/conjunctival abnormalities.  RESP: No audible wheeze, cough, or visible cyanosis.  No visible retractions or increased work of breathing.    SKIN: Visible skin clear. No significant rash, abnormal pigmentation or  "lesions.  NEURO: Cranial nerves grossly intact.  Mentation and speech appropriate for age.  PSYCH: Mentation appears normal, affect normal/bright, judgement and insight intact, normal speech and appearance well-groomed.      Diagnostic Test Results:  none         Assessment & Plan     (F90.2) Attention deficit hyperactivity disorder (ADHD), combined type  (primary encounter diagnosis)  Comment: discussed treatment options including medication and therapy. Plans on following with psych still and would like to start medication given effecting ADLs. Discussed options. Stimulant was decided as best option. Will start on adderall and follow up in 1 month for OV with bp check as well as csa and urine tox which patient agrees with.   Plan: amphetamine-dextroamphetamine (ADDERALL XR) 15         MG 24 hr capsule        -Medication use and side effects discussed with the patient. Patient is in complete understanding and agreement with plan.       (I10) Essential hypertension, benign  Comment: history of this. Did have symptoms of hypotension last year, but was told by nephrology to restart 10 mg dose due to ongoing proteinuria. Needing refill. Denies any dizziness or fatigue. Refilled. Will recheck bmp at 1 month follow up.   Plan: lisinopril (ZESTRIL) 10 MG tablet        -Medication use and side effects discussed with the patient. Patient is in complete understanding and agreement with plan.       (R80.9) Microalbuminuria  Comment: as above  Plan: lisinopril (ZESTRIL) 10 MG tablet               BMI:   Estimated body mass index is 27.6 kg/m  as calculated from the following:    Height as of 10/7/19: 1.88 m (6' 2\").    Weight as of 10/7/19: 97.5 kg (215 lb).   Weight management plan: Discussed healthy diet and exercise guidelines        Return in about 1 month (around 7/24/2020) for ADD follow up OV for care gaps. .    Bill Mg PA-C  [facility]      Video-Visit Details    Type of service:  Video " Visit    Video End Time:11:03    Originating Location (pt. Location): Home    Distant Location (provider location):  [facility]     Platform used for Video Visit: Doximity    Return in about 1 month (around 7/24/2020) for ADD follow up OV for care gaps. .       Bill Mg PA-C

## 2020-06-24 NOTE — TELEPHONE ENCOUNTER
"Fax from [facility], Adderall XR 15 mg capsule SERVICE NOT COVERED FOR PT AGE, \"MAXIMUM AGE OF 18\"    Pharmacy Benefit Information:    Provider: ENRIKE  Phone #: [phone]  ID#: [id number]  Medication/SIG: ADDERALL XR 15 MG  Pharmacy: [facility]    Routed to ENRIKE, please advise RX CHANGE OR PA, ROUTE BACK FOR PROCESSING    Esther Bowen RN, BSN  Message handled by CLINIC NURSE.        "

## 2020-06-24 NOTE — TELEPHONE ENCOUNTER
IR bid sent as alternative. Please let patient know insurance will not cover XR.     -noel chen, pac

## 2020-06-25 ENCOUNTER — TELEPHONE (OUTPATIENT)
Dept: FAMILY MEDICINE | Facility: CLINIC | Age: 30
End: 2020-06-25

## 2020-06-25 DIAGNOSIS — F90.2 ATTENTION DEFICIT HYPERACTIVITY DISORDER (ADHD), COMBINED TYPE: ICD-10-CM

## 2020-06-25 ASSESSMENT — PATIENT HEALTH QUESTIONNAIRE - PHQ9: SUM OF ALL RESPONSES TO PHQ QUESTIONS 1-9: 0

## 2020-06-25 NOTE — TELEPHONE ENCOUNTER
Pt calls, informed, will f/u with prescription and call us back to inform  Esther Boewn RN, BSN  Message handled by CLINIC NURSE.

## 2020-06-25 NOTE — TELEPHONE ENCOUNTER
Fax from [facility], Adderall 15 mg twice daily also NOT COVERED PRODUCT/SERVICE for pt age, plan limits exceeded, maximum pt age of 17, see formulary issue also for Adderall XR, discussed with ENRIKE, recommend pt confirm with plan what is covered, appears stimulants may not be covered benefit?    LMOVM for pt to call, inform pt to confirm with plan, contact us with covered options  Esther Bowen, RN, BSN  Message handled by Nurse Triage with [facility] - provider name: ENRIKE.

## 2020-06-25 NOTE — TELEPHONE ENCOUNTER
Patient returned phone call, relayed message that prescription for Adderall was changed to IR instead of XR due to insurance coverage. Patient verbalized understanding.     Seven CURTIS RN

## 2020-06-26 RX ORDER — DEXTROAMPHETAMINE SACCHARATE, AMPHETAMINE ASPARTATE MONOHYDRATE, DEXTROAMPHETAMINE SULFATE AND AMPHETAMINE SULFATE 3.75; 3.75; 3.75; 3.75 MG/1; MG/1; MG/1; MG/1
15 CAPSULE, EXTENDED RELEASE ORAL DAILY
Qty: 30 CAPSULE | Refills: 0 | COMMUNITY
Start: 2020-06-26 | End: 2020-07-27

## 2020-06-26 NOTE — TELEPHONE ENCOUNTER
See other calls, pharmacy informed product/service not covered, waiting for pt to call to inform what is covered, routed back to originating called, is this from pharmacy? Pt? Please confirm  Esther Bowen RN, BSN  Message handled by CLINIC NURSE.

## 2020-06-26 NOTE — TELEPHONE ENCOUNTER
Prior Authorization Approval    Authorization Effective Date: 6/26/2020  Authorization Expiration Date: 6/26/2023  Medication: adderall xr-APPROVED  Approved Dose/Quantity:   Reference #:     Insurance Company: [employer] [phone] Fax [phone]  Expected CoPay:       CoPay Card Available:      Foundation Assistance Needed:    Which Pharmacy is filling the prescription (Not needed for infusion/clinic administered): [id number] - [place], [place] - [address]  Pharmacy Notified: Yes  Patient Notified: No    Pharmacy will notify patient when medication is ready.

## 2020-06-26 NOTE — TELEPHONE ENCOUNTER
Staff informs pt calls, see other encounter  Esther Bowen RN, BSN  Message handled by CLINIC NURSE.

## 2020-06-26 NOTE — TELEPHONE ENCOUNTER
Routed to PA pool, pt informs needs PA, we will have to call, wants first adderall XR rx, added back to med list, please start PA    Pharmacy Benefit Information:    Provider: ENRIKE  Phone #: [phone]- CALL FOR PA  ID#: [id number]  Medication/SIG: DEXTRO-AMP XR 15 MG DAILY  Pharmacy: ROMEO Bowen RN, BSN  Message handled by CLINIC NURSE.

## 2020-06-26 NOTE — TELEPHONE ENCOUNTER
Central Prior Authorization Team   Phone: [phone]      PA Initiation    Medication: adderall-Initiated  Insurance Company: [employer] - Phone [phone] Fax [phone]  Pharmacy Filling the Rx: [id number] - [place], [place] - [address]  Filling Pharmacy Phone: [phone]  Filling Pharmacy Fax:    Start Date: 6/26/2020

## 2020-07-23 ENCOUNTER — VIRTUAL VISIT (OUTPATIENT)
Dept: BEHAVIORAL HEALTH | Facility: CLINIC | Age: 30
End: 2020-07-23
Payer: COMMERCIAL

## 2020-07-23 DIAGNOSIS — F41.1 GAD (GENERALIZED ANXIETY DISORDER): Primary | ICD-10-CM

## 2020-07-23 PROCEDURE — 90832 PSYTX W PT 30 MINUTES: CPT | Mod: 95 | Performed by: COUNSELOR

## 2020-07-23 ASSESSMENT — PATIENT HEALTH QUESTIONNAIRE - PHQ9
SUM OF ALL RESPONSES TO PHQ QUESTIONS 1-9: 2
5. POOR APPETITE OR OVEREATING: NOT AT ALL

## 2020-07-23 ASSESSMENT — ANXIETY QUESTIONNAIRES
6. BECOMING EASILY ANNOYED OR IRRITABLE: NOT AT ALL
7. FEELING AFRAID AS IF SOMETHING AWFUL MIGHT HAPPEN: NOT AT ALL
2. NOT BEING ABLE TO STOP OR CONTROL WORRYING: NOT AT ALL
3. WORRYING TOO MUCH ABOUT DIFFERENT THINGS: NOT AT ALL
5. BEING SO RESTLESS THAT IT IS HARD TO SIT STILL: NOT AT ALL
GAD7 TOTAL SCORE: 0
1. FEELING NERVOUS, ANXIOUS, OR ON EDGE: NOT AT ALL

## 2020-07-23 NOTE — LETTER
Nimesh Jennings    Sarbjit is doing very well. He stated the meds have been life changing. His wife is currently staying in the hospital and is 26 weeks. I was considering giving him a referral to [facility] in [place] if he would need it for any pregnancy related things. It has taken a turn that this is his only stressor and is something I do not work with for personal reasons as you know. Please let me know how your visit goes in August. I would appreciate if you have any tips on how to present this to him as well. Currently things are fine with the babies. Thank you!

## 2020-07-23 NOTE — PROGRESS NOTES
Progress Note    Patient Name: Sarbjit Perez  Date: 7/23/2020         Service Type: Individual      Session Start Time: 9:00am  Session End Time: 9:22am     Session Length: 22 mins    Session #: 11    Attendees: Client attended alone    Service Modality:  Video Visit:    Telemedicine Visit: The patient's condition can be safely assessed and treated via synchronous audio and visual telemedicine encounter.      Reason for Telemedicine Visit: Services only offered telehealth    Originating Site (Patient Location): Patient's home    Distant Site (Provider Location): Provider Remote Setting    Consent:  The patient/guardian has verbally consented to: the potential risks and benefits of telemedicine (video visit) versus in person care; bill my insurance or make self-payment for services provided; and responsibility for payment of non-covered services.     Patient would like the video invitation sent by: Send to e-mail at: [email or web address]     Mode of Communication:  Video Conference via [employer]    As the provider I attest to compliance with applicable laws and regulations related to telemedicine.     Treatment Plan Last Reviewed: 5/8/2020  PHQ-9 / SERA-7 : P=2 and G=0    DATA  Interactive Complexity: No  Crisis: No       Progress Since Last Session (Related to Symptoms / Goals / Homework):   Symptoms: Improving Clients assessment scores in none range. Client reported with ADHD medication he is doing very well and reports no mental health symptoms     Homework: Achieved / completed to satisfaction      Episode of Care Goals: Achieved / completed to satisfaction - MAINTENANCE (Working to maintain change, with risk of relapse); Intervened by continuing to positively reinforce healthy behavior choice      Current / Ongoing Stressors and Concerns:   Wife currently staying in hospital for high risk pregnancy of mono mono twins (26 weeks)     Treatment Objective(s) Addressed in  This Session:   comply with medication 100% of the time  Appetite issues     Intervention:   Solution Focused: Solutions to help with clients loss of appetite from medication         ASSESSMENT: Current Emotional / Mental Status (status of significant symptoms):   Risk status (Self / Other harm or suicidal ideation)   Patient denies current fears or concerns for personal safety.   Patient denies current or recent suicidal ideation or behaviors.   Patient denies current or recent homicidal ideation or behaviors.   Patient denies current or recent self injurious behavior or ideation.   Patient denies other safety concerns.   Patient reports there has been no change in risk factors since their last session.     Patient reports there has been no change in protective factors since their last session.     Recommended that patient call 911 or go to the local ED should there be a change in any of these risk factors.     Appearance:   Appropriate    Eye Contact:   Good    Psychomotor Behavior: Normal    Attitude:   Cooperative    Orientation:   All   Speech    Rate / Production: Normal     Volume:  Normal    Mood:    Normal   Affect:    Appropriate    Thought Content:  Clear    Thought Form:  Coherent  Logical    Insight:    Good      Medication Review:   Changes to psychiatric medications, see updated Medication List in EPIC.      Medication Compliance:   Yes     Changes in Health Issues:   None reported     Chemical Use Review:   Substance Use: Chemical use reviewed, no active concerns identified      Tobacco Use: No current tobacco use.      Diagnosis:  1. SERA (generalized anxiety disorder)        Collateral Reports Completed:   Not Applicable    PLAN: (Patient Tasks / Therapist Tasks / Other)  Provider assigned client to eat large breakfast to help with loss of appetite from medication       Next- reprod therapist?  Madeleine Giraldo, [facility] 7/23/2020                                                            ______________________________________________________________________     Treatment Plan     Patient's Name: Sarbjit Perez                      YOB: 1990     Date: cont 5/8/2020 by request of client due to upcoming situational stressors of birth of twins and hip replacement client wants to practice maintaining current goals            DSM5 Diagnoses: 300.02 (F41.1) Generalized Anxiety Disorder  Psychosocial / Contextual Factors: social, work, health   WHODAS: 16     Referral / Collaboration:  Referral to another professional/service is not indicated at this time.     Anticipated number of session or this episode of care: 8        MeasurableTreatment Goal(s) related to diagnosis / functional impairment(s)  Goal 1: Patient will score 3 or less on GAD7 (last one 6)     I will know I've met my goal when I am able to enjoy weekends and not worry about work. I would feel more self confident. I would like to be able to go with change without it feeling so impactful on my mood  Client would like top be able to maintain current mental health during birth of twins and hip replacement surgery      Objective #A (Patient Action)                          Patient will set limits and boundaries to help with work/life separation.  Status: cont 5/8/2020        Intervention(s)  Therapist will teach about healthy boundaries. Provider will guide client in creating ways to separate work and personal life when working from home. Provider will assign client to practice these boundaries outside of session and report back. Provider will guide client in recognizing benefits that come from boundaries .     Objective #B  Patient will use cognitive strategies identified in therapy to challenge anxious thoughts.  Status: cont 5/8/2020        Intervention(s)  Therapist will teach emotional regulation skills. Provider will utilize CBT to teach client thought labeling, use of a thought record, re-framing and shifting  perspectives.     Objective #C  Patient will identify 5 traits or charcteristics you like about yourself.  Status: cont 5/8/2020        Intervention(s)  Therapist will help the client identify distorted negative beliefs about himself and the world.     Patient has reviewed and agreed to the above plan.        Madeleine Giraldo, [facility]         5/8/2020

## 2020-07-24 ASSESSMENT — ANXIETY QUESTIONNAIRES: GAD7 TOTAL SCORE: 0

## 2020-07-27 ENCOUNTER — MYC REFILL (OUTPATIENT)
Dept: FAMILY MEDICINE | Facility: CLINIC | Age: 30
End: 2020-07-27

## 2020-07-27 DIAGNOSIS — F90.2 ATTENTION DEFICIT HYPERACTIVITY DISORDER (ADHD), COMBINED TYPE: ICD-10-CM

## 2020-07-27 RX ORDER — DEXTROAMPHETAMINE SACCHARATE, AMPHETAMINE ASPARTATE MONOHYDRATE, DEXTROAMPHETAMINE SULFATE AND AMPHETAMINE SULFATE 3.75; 3.75; 3.75; 3.75 MG/1; MG/1; MG/1; MG/1
15 CAPSULE, EXTENDED RELEASE ORAL DAILY
Qty: 30 CAPSULE | Refills: 0 | Status: SHIPPED | OUTPATIENT
Start: 2020-07-27 | End: 2021-10-28

## 2020-07-27 NOTE — TELEPHONE ENCOUNTER
Refilled. However, was to follow up after last visit in 1 month for OV to address not only add follow up but HM care gaps. Please have schedule.     -noel chen, pac

## 2020-08-17 ENCOUNTER — VIRTUAL VISIT (OUTPATIENT)
Dept: PSYCHOLOGY | Facility: CLINIC | Age: 30
End: 2020-08-17
Payer: COMMERCIAL

## 2020-08-17 DIAGNOSIS — F90.2 ATTENTION DEFICIT HYPERACTIVITY DISORDER (ADHD), COMBINED TYPE: Primary | ICD-10-CM

## 2020-08-17 PROCEDURE — 99207 ZZC NON-BILLABLE SERV PER CHARTING: CPT | Mod: 95 | Performed by: PSYCHOLOGIST

## 2020-08-17 NOTE — Clinical Note
Greetings,     I saw the patient today to follow-up on his ADHD recommendations. Patient reported that he has seen improvements in his attention and that his anxiety has decreased. The Adderall appears to be helping him quite well.     Please let me know if you have any questions.   Cleopatra Ann PsyD LP

## 2020-08-17 NOTE — PROGRESS NOTES
"                     Discharge Summary  Multiple Sessions for ADHD assessment.     Client Name: Sarbjit Perez MRN#: [id number] YOB: 1990    Discharge Date:   August 17, 2020    Service Modality: Video Visit:    Telemedicine Visit: The patient's condition can be safely assessed and treated via synchronous audio and visual telemedicine encounter.      Reason for Telemedicine Visit: due to distancing due to COVID-19    Originating Site (Patient Location): Patient's home    Distant Site (Provider Location): Provider Remote Setting    Consent:  The patient/guardian has verbally consented to: the potential risks and benefits of telemedicine (video visit) versus in person care; bill my insurance or make self-payment for services provided; and responsibility for payment of non-covered services.     Patient would like the video invitation sent by: Send to e-mail at: [email or web address]}     Mode of Communication:  Video Conference via [employer]    As the provider I attest to compliance with applicable laws and regulations related to telemedicine.    Service Type: Individual      Session Start Time: 10:01AM  Session End Time: 10:22      Session Length: 20 - 30     Session #: 21 minutes     Attendees: Client attended alone      Focus of Treatment Objective(s):  Client's presenting concerns included: Attentional Problems - focus, difficulty completing tasks, poor attention to detail, distracted, and difficulty following a conversation if multiple people are speaking  Stage of Change at time of Discharge: MAINTENANCE (Working to maintain change, with risk of relapse)     Patient started taking Adderall approximately 6-8 weeks ago.   Patient reported that he is now able to remember items on a list as is able to bring things to his wife in the hospital.     Patient denied feeling \"too focused.\" Patient reported that he has lost approximately 10 pounds in 6-8 weeks since starting. Patient said that he is taking " "medication after \"a big breakfast,\" to make sure that he is getting enough nutrition. Patient also reported that he feels \"extremely tired\" if he does not take the Adderall.     Patient said that he is functioning well at work. Patient said that he is \"easier to live with\" and is \"staying on top of tasks at home.\"     Patient reported that he has not been as involved as he wanted to be with his wife's obstetrician appointments due to COVID-19. Patient said that he is unsure if they will have help at home with the twins due to his mother and his father-in-law being healthcare providers and more exposed to COVID-19.     Patient stated that his aunt is on the same medication as well. He said that he thinks his father and brother have similar attention issues.     Patient reported that his hip pain has subsided as he has energy to move around more frequently throughout the day.      Patient reported that he now has to limit his caffeine use after 7pm due to taking Adderall.       Medication Adherence:  Yes    Chemical Use:  NA  Patient is sober. Patient reported that he has decreased his caffeine intake.     Assessment: Current Emotional / Mental Status (status of significant symptoms):    Risk status (Self / Other harm or suicidal ideation)  Client denies current fears or concerns for personal safety.  Client denies current or recent suicidal ideation or behaviors.  Client denies current or recent homicidal ideation or behaviors.  Client denies current or recent self injurious behavior or ideation.  Client denies other safety concerns.  A safety and risk management plan has not been developed at this time, however client was given the after-hours number should there be a change in any of these risk factors.    Appearance:   Appropriate   Eye Contact:   Good   Psychomotor Behavior: Normal   Attitude:   Cooperative   Orientation:   All  Speech   Rate / Production: Normal    Volume:  Normal "   Mood:    Normal  Affect:    Appropriate   Thought Content:  Clear   Thought Form:  Coherent  Logical   Insight:   Good     DSM5 Diagnoses: (Sustained by DSM5 Criteria Listed Above)  Diagnoses:ICD-10) Provisional Attention-Deficit/Hyperactivity Disorder  314.01 (F90.2) Combined presentation  Psychosocial & Contextual Factors: Working from home due to distancing during COVID-19. Wife is pregnant and on bed rest in the hospital with twins.   WHODAS 2.0 (12 item) Score: 12    Reason for Discharge:  Goals completed      Aftercare Plan:  Contact [facility] if he has mental health needs in the future      Cleopatra Ann PsyD LP  August 17, 2020

## 2020-08-31 ENCOUNTER — MYC REFILL (OUTPATIENT)
Dept: FAMILY MEDICINE | Facility: CLINIC | Age: 30
End: 2020-08-31

## 2020-08-31 DIAGNOSIS — F90.2 ATTENTION DEFICIT HYPERACTIVITY DISORDER (ADHD), COMBINED TYPE: ICD-10-CM

## 2020-08-31 RX ORDER — DEXTROAMPHETAMINE SACCHARATE, AMPHETAMINE ASPARTATE MONOHYDRATE, DEXTROAMPHETAMINE SULFATE AND AMPHETAMINE SULFATE 3.75; 3.75; 3.75; 3.75 MG/1; MG/1; MG/1; MG/1
15 CAPSULE, EXTENDED RELEASE ORAL DAILY
Qty: 30 CAPSULE | Refills: 0 | OUTPATIENT
Start: 2020-08-31

## 2020-08-31 NOTE — TELEPHONE ENCOUNTER
Routing refill request to provider for review/approval because:  Drug not on the [facility] refill protocol     Inna Jung RN   [facility] -- Triage Nurse

## 2020-09-01 ENCOUNTER — OFFICE VISIT (OUTPATIENT)
Dept: FAMILY MEDICINE | Facility: CLINIC | Age: 30
End: 2020-09-01
Payer: COMMERCIAL

## 2020-09-01 VITALS
HEIGHT: 73 IN | RESPIRATION RATE: 16 BRPM | SYSTOLIC BLOOD PRESSURE: 102 MMHG | TEMPERATURE: 98.4 F | HEART RATE: 72 BPM | WEIGHT: 197 LBS | DIASTOLIC BLOOD PRESSURE: 70 MMHG | BODY MASS INDEX: 26.11 KG/M2

## 2020-09-01 DIAGNOSIS — F90.2 ATTENTION DEFICIT HYPERACTIVITY DISORDER (ADHD), COMBINED TYPE: Primary | ICD-10-CM

## 2020-09-01 DIAGNOSIS — R80.9 MICROALBUMINURIA: ICD-10-CM

## 2020-09-01 DIAGNOSIS — I10 ESSENTIAL HYPERTENSION, BENIGN: ICD-10-CM

## 2020-09-01 DIAGNOSIS — Z86.39 HX OF DIABETES MELLITUS: ICD-10-CM

## 2020-09-01 DIAGNOSIS — Z13.220 SCREENING FOR HYPERLIPIDEMIA: ICD-10-CM

## 2020-09-01 LAB — HBA1C MFR BLD: 5.4 % (ref 0–5.6)

## 2020-09-01 PROCEDURE — 80307 DRUG TEST PRSMV CHEM ANLYZR: CPT | Mod: 90 | Performed by: PHYSICIAN ASSISTANT

## 2020-09-01 PROCEDURE — 36415 COLL VENOUS BLD VENIPUNCTURE: CPT | Performed by: PHYSICIAN ASSISTANT

## 2020-09-01 PROCEDURE — 83036 HEMOGLOBIN GLYCOSYLATED A1C: CPT | Performed by: PHYSICIAN ASSISTANT

## 2020-09-01 PROCEDURE — 80061 LIPID PANEL: CPT | Performed by: PHYSICIAN ASSISTANT

## 2020-09-01 PROCEDURE — 82043 UR ALBUMIN QUANTITATIVE: CPT | Performed by: PHYSICIAN ASSISTANT

## 2020-09-01 PROCEDURE — 80048 BASIC METABOLIC PNL TOTAL CA: CPT | Performed by: PHYSICIAN ASSISTANT

## 2020-09-01 PROCEDURE — 99000 SPECIMEN HANDLING OFFICE-LAB: CPT | Performed by: PHYSICIAN ASSISTANT

## 2020-09-01 RX ORDER — DEXTROAMPHETAMINE SACCHARATE, AMPHETAMINE ASPARTATE MONOHYDRATE, DEXTROAMPHETAMINE SULFATE AND AMPHETAMINE SULFATE 3.75; 3.75; 3.75; 3.75 MG/1; MG/1; MG/1; MG/1
15 CAPSULE, EXTENDED RELEASE ORAL DAILY
Qty: 30 CAPSULE | Refills: 0 | Status: SHIPPED | OUTPATIENT
Start: 2020-10-02 | End: 2020-11-01

## 2020-09-01 RX ORDER — DEXTROAMPHETAMINE SACCHARATE, AMPHETAMINE ASPARTATE MONOHYDRATE, DEXTROAMPHETAMINE SULFATE AND AMPHETAMINE SULFATE 3.75; 3.75; 3.75; 3.75 MG/1; MG/1; MG/1; MG/1
15 CAPSULE, EXTENDED RELEASE ORAL DAILY
Qty: 30 CAPSULE | Refills: 0 | Status: SHIPPED | OUTPATIENT
Start: 2020-11-02 | End: 2020-11-25

## 2020-09-01 RX ORDER — DEXTROAMPHETAMINE SACCHARATE, AMPHETAMINE ASPARTATE MONOHYDRATE, DEXTROAMPHETAMINE SULFATE AND AMPHETAMINE SULFATE 3.75; 3.75; 3.75; 3.75 MG/1; MG/1; MG/1; MG/1
15 CAPSULE, EXTENDED RELEASE ORAL DAILY
Qty: 30 CAPSULE | Refills: 0 | Status: CANCELLED | OUTPATIENT
Start: 2020-09-01

## 2020-09-01 RX ORDER — LISINOPRIL 10 MG/1
10 TABLET ORAL DAILY
Qty: 90 TABLET | Refills: 1 | Status: SHIPPED | OUTPATIENT
Start: 2020-09-01 | End: 2021-03-16

## 2020-09-01 RX ORDER — DEXTROAMPHETAMINE SACCHARATE, AMPHETAMINE ASPARTATE MONOHYDRATE, DEXTROAMPHETAMINE SULFATE AND AMPHETAMINE SULFATE 3.75; 3.75; 3.75; 3.75 MG/1; MG/1; MG/1; MG/1
15 CAPSULE, EXTENDED RELEASE ORAL DAILY
Qty: 30 CAPSULE | Refills: 0 | Status: SHIPPED | OUTPATIENT
Start: 2020-09-01 | End: 2020-10-01

## 2020-09-01 ASSESSMENT — MIFFLIN-ST. JEOR: SCORE: 1902.47

## 2020-09-01 NOTE — LETTER
[facility]  09/01/20    Patient: Sarbjit Perez  YOB: 1990  Medical Record Number: [id number]  CSN: [id number]                                                                              Non-opioid Controlled Substance Agreement    I understand that my care provider has prescribed a controlled substance to help manage my condition(s). I am taking this medicine to help me function or work. I know this is strong medicine, and that it can cause serious side effects. Controlled substances can be sedating, addicting and may cause a dependency on the drug. They can affect my ability to drive or think, and cause depression. They need to be taken exactly as prescribed. Combining controlled substances with certain medicines or chemicals (such as cocaine, sedatives and tranquilizers, sleeping pills, meth) can be dangerous or even fatal. Also, if I stop controlled substances suddenly, I may have severe withdrawal symptoms.  If not helpful, I may be asked to stop them.    The risks, benefits, and side effects of these medicine(s) were explained to me. I agree that:    1. I will take part in other treatments as advised by my care team. This may be psychiatry or counseling, physical therapy, behavioral therapy, group treatment or a referral to a pain clinic. I will reduce or stop my medicine when my care team tells me to do so.  2. I will take my medicines as prescribed. I will not change the dose or schedule unless my care team tells me to. There will be no refills if I  run out early.   I may be contactedwithout warning and asked to complete a urine drug test or pill count at any time.   3. I will keep all my appointments, and understand this is part of the monitoring of controlled substances. My care team may require an office visit for EVERY controlled substance refill. If I miss appointments or don t follow instructions, my care team may stop my medicine.  4. I will not ask other providers to  prescribe controlled substances, and I will not accept controlled substances from other people. If I need another prescribed controlled substance for a new reason, I will tell my care team within 1 business day.  5. I will use one pharmacy to fill all of my controlled substance prescriptions, and it is up to me to make sure that I do not run out of my medicines on weekends or holidays. If my care team is willing to refill my controlled substance prescription without a visit, I must request refills only during office hours, refills may take up to 3 days to process, and it may take up to 5 to 7 days for my medicine to be mailed and ready at my pharmacy. Prescriptions will not be mailed anywhere except my pharmacy.    6. I am responsible for my prescriptions. If the medicine/prescription is lost or stolen, it will not be replaced. I also agree not to share controlled substance medicines with anyone.              [facility]  09/01/20  Patient:  Sarbjit Perez  YOB: 1990  Medical Record Number: [id number]  [facility]: [id number]    7. I agree to not use ANY illegal or recreational drugs. This includes marijuana, cocaine, bath salts or other drugs. I agree not to use alcohol unless my care team says I may. I agree to give urine samples whenever asked. If I don t give a urine sample, the care team may stop my medicine.    8. If I enroll in the [place] Medical Marijuana program, I will tell my care team. I will also sign an agreement to share my medical records with my care team.    9. I will bring in my list of medicines (or my medicine bottles) each time I come to the clinic.   10. I will tell my care team right away if I become pregnant or have a new medical problem treated outside of my regular clinic.  11. I understand that this medicine can affect my thinking and judgment. It may be unsafe for me to drive, use machinery and do dangerous tasks. I will not do any of these things until I know how  the medicine affects me. If my dose changes, I will wait to see how it affects me. I will contact my care team if I have concerns about medicine side effects.    I understand that if I do not follow any of the conditions above, my prescriptions or treatment may be stopped.      I agree that my provider, clinic care team, and pharmacy may work with any city, state or federal law enforcement agency that investigates the misuse, sale, or other diversion of my controlled medicine. I will allow my provider to discuss my care with or share a copy of this agreement with any other treating provider, pharmacy or emergency room where I receive care. I agree to give up (waive) any right of privacy or confidentiality with respect to these consents.   I have read this agreement and have asked questions about anything I did not understand.    ____________________________________________________    ____________  ________  Patient signature                                                         Date      Time    ____________________________________________________     ____________  ________  Witness                                                          Date      Time    ____________________________________________________  Provider signature

## 2020-09-01 NOTE — PROGRESS NOTES
"Subjective     Sarbjit Perez is a 30 year old male who presents to clinic today for the following health issues:        Medication Followup of ADD    Taking Medication as prescribed: yes    Side Effects:  None    Medication Helping Symptoms:  yes     ADHD Follow-Up (Adult)  Concerns: none   Changes since last visit: Improving  Taking controlled (daily) medications as prescribed: Yes  Sleep: no problems    Medication Benefits:   Controlled symptoms: Attention span, Distractability, Finishing tasks, Impulse control, Frustration tolerance, Accepting limits and Peer relations  Uncontrolled symptoms:  None    Medication Side Effects:  Reports:  Weight loss, but intentional. Otherwise, none   Sleep Problems? no  ++++++++++++++++++++++++++++++++++++++++++++++++    Employer Concerns/Feedback: Stable  Coworker Concerns:   Stable  Home/Family Concerns: Improving      Review of Systems   Constitutional, HEENT, cardiovascular, pulmonary, GI, , musculoskeletal, neuro, skin, endocrine and psych systems are negative, except as otherwise noted.      Objective    /70 (BP Location: Right arm, Patient Position: Chair, Cuff Size: Adult Large)   Pulse 72   Temp 98.4  F (36.9  C) (Oral)   Resp 16   Ht 1.854 m (6' 1\")   Wt 89.4 kg (197 lb)   BMI 25.99 kg/m    Body mass index is 25.99 kg/m .  Physical Exam   GENERAL: healthy, alert and no distress  NECK: no adenopathy, no asymmetry, masses, or scars and thyroid normal to palpation  RESP: lungs clear to auscultation - no rales, rhonchi or wheezes  CV: regular rate and rhythm, normal S1 S2, no S3 or S4, no murmur, click or rub, no peripheral edema and peripheral pulses strong  NEURO: Normal strength and tone, mentation intact and speech normal  PSYCH: mentation appears normal, affect normal/bright        Assessment & Plan     Attention deficit hyperactivity disorder (ADHD), combined type  Stable on 15 mg dose. Manages symptoms well. Will continued on dose. csa and urine tox " udated today.  Follow up in 6 months for virtual video.    - Drug  Screen Comprehensive, Urine w/o Reported Meds (Pain Care Package)  - amphetamine-dextroamphetamine (ADDERALL XR) 15 MG 24 hr capsule; Take 1 capsule (15 mg) by mouth daily  - amphetamine-dextroamphetamine (ADDERALL XR) 15 MG 24 hr capsule; Take 1 capsule (15 mg) by mouth daily  - amphetamine-dextroamphetamine (ADDERALL XR) 15 MG 24 hr capsule; Take 1 capsule (15 mg) by mouth daily  -Medication use and side effects discussed with the patient. Patient is in complete understanding and agreement with plan.       Essential hypertension, benign  Stable.   - lisinopril (ZESTRIL) 10 MG tablet; Take 1 tablet (10 mg) by mouth daily  - Basic metabolic panel  (Ca, Cl, CO2, Creat, Gluc, K, Na, BUN)    Microalbuminuria    - lisinopril (ZESTRIL) 10 MG tablet; Take 1 tablet (10 mg) by mouth daily  - Albumin Random Urine Quantitative with Creat Ratio    Screening for hyperlipidemia    - Lipid panel reflex to direct LDL Fasting    Hx of diabetes mellitus    - Hemoglobin A1c       Return in about 6 months (around 3/1/2021) for add follow up can be virtual. , add follow up.    Bill Mg PA-C  [facility]    Answers for HPI/ROS submitted by the patient on 9/1/2020   Chronic problems general questions HPI Form  How many servings of fruits and vegetables do you eat daily?: 2-3  On average, how many sweetened beverages do you drink each day (Examples: soda, juice, sweet tea, etc.  Do NOT count diet or artificially sweetened beverages)?: 0  How many minutes a day do you exercise enough to make your heart beat faster?: 30 to 60  How many days a week do you exercise enough to make your heart beat faster?: 5  How many days per week do you miss taking your medication?: 0

## 2020-09-01 NOTE — NURSING NOTE
Future Appointments   Date Time Provider Department Center   9/1/2020 10:10 AM Bill Mg PA-C [facility] CR     Appointment Notes for this encounter:   zb reviewed. hm due-adderall follow up    Health Maintenance Due   Topic Date Due     DIABETIC FOOT EXAM  1990     URINE DRUG SCREEN  1990     PNEUMOCOCCAL IMMUNIZATION 19-64 MEDIUM RISK (1 of 1 - PPSV23) 01/17/2017     MICROALBUMIN  06/08/2019     EYE EXAM  05/28/2020     INFLUENZA VACCINE (1) 09/01/2020     Health Maintenance addressed:  NONE    Eye Exam pt had at  Eye care, will call for report    MyChart Status:  Active and Using     Micaela Hennessy/STEW  [place]---[facility]

## 2020-09-02 LAB
ANION GAP SERPL CALCULATED.3IONS-SCNC: 8 MMOL/L (ref 3–14)
BUN SERPL-MCNC: 12 MG/DL (ref 7–30)
CALCIUM SERPL-MCNC: 9.9 MG/DL (ref 8.5–10.1)
CHLORIDE SERPL-SCNC: 105 MMOL/L (ref 94–109)
CHOLEST SERPL-MCNC: 145 MG/DL
CO2 SERPL-SCNC: 24 MMOL/L (ref 20–32)
CREAT SERPL-MCNC: 0.92 MG/DL (ref 0.66–1.25)
CREAT UR-MCNC: 101 MG/DL
GFR SERPL CREATININE-BSD FRML MDRD: >90 ML/MIN/{1.73_M2}
GLUCOSE SERPL-MCNC: 86 MG/DL (ref 70–99)
HDLC SERPL-MCNC: 47 MG/DL
LDLC SERPL CALC-MCNC: 84 MG/DL
MICROALBUMIN UR-MCNC: 123 MG/L
MICROALBUMIN/CREAT UR: 121.78 MG/G CR (ref 0–17)
NONHDLC SERPL-MCNC: 98 MG/DL
POTASSIUM SERPL-SCNC: 4.4 MMOL/L (ref 3.4–5.3)
SODIUM SERPL-SCNC: 137 MMOL/L (ref 133–144)
TRIGL SERPL-MCNC: 68 MG/DL

## 2020-09-03 ENCOUNTER — TELEPHONE (OUTPATIENT)
Dept: FAMILY MEDICINE | Facility: CLINIC | Age: 30
End: 2020-09-03

## 2020-09-03 NOTE — TELEPHONE ENCOUNTER
Received fax from eye clinic and negative for diabetic retinopathy, sent to abstraction    Micaela Hennessy/[facility]---[facility]

## 2020-09-04 LAB — COMPREHEN DRUG ANALYSIS UR: NORMAL

## 2020-11-25 ENCOUNTER — MYC REFILL (OUTPATIENT)
Dept: FAMILY MEDICINE | Facility: CLINIC | Age: 30
End: 2020-11-25

## 2020-11-25 DIAGNOSIS — F90.2 ATTENTION DEFICIT HYPERACTIVITY DISORDER (ADHD), COMBINED TYPE: ICD-10-CM

## 2020-11-27 RX ORDER — DEXTROAMPHETAMINE SACCHARATE, AMPHETAMINE ASPARTATE MONOHYDRATE, DEXTROAMPHETAMINE SULFATE AND AMPHETAMINE SULFATE 3.75; 3.75; 3.75; 3.75 MG/1; MG/1; MG/1; MG/1
15 CAPSULE, EXTENDED RELEASE ORAL DAILY
Qty: 30 CAPSULE | Refills: 0 | Status: SHIPPED | OUTPATIENT
Start: 2020-11-27 | End: 2021-01-05

## 2020-11-27 NOTE — TELEPHONE ENCOUNTER
1 refill given, but patient's urine tox returned showing signs of marijuana use. As we discussed at past visits, given adderall is fda regulated anad marijuana is federally illegal, I cannot continue to prescribe this if marijuana is used. 1 month sent to allow for repeat urine tox. However, if this continues to be positive, I unfortunately cannot continue to prescribe adderall.       -noel chen, pac

## 2020-11-27 NOTE — TELEPHONE ENCOUNTER
Routing refill request to provider for review/approval because:  Drug not on the [facility] refill protocol     Inna Jung RN   [facility] -- Triage Nurse

## 2020-12-01 NOTE — TELEPHONE ENCOUNTER
Message below discussed with patient who states understanding     Lab appt scheduled 12/31/2020     Alisa Camacho, Registered Nurse, PAL (Patient [facility]ocate [facility])   [facility]   [phone]

## 2020-12-22 ENCOUNTER — DOCUMENTATION ONLY (OUTPATIENT)
Dept: LAB | Facility: CLINIC | Age: 30
End: 2020-12-22

## 2020-12-22 NOTE — PROGRESS NOTES
..Sarbjit Perez has an upcoming lab appointment:    Future Appointments   Date Time Provider Department Center   12/31/2020 10:00 AM CR LAB [facility] CR   3/2/2021  1:00 PM Bill Mg PA-C [facility]      Please review Health Maintenance and sign order: Review of Health Maintenance Protocol Orders (HMPO) to authorize patient's due Health Maintenance labs to be drawn.    Health Maintenance Due   Topic     HEPATITIS C SCREENING      ANNUAL REVIEW OF HM ORDERS      Travis Freitas

## 2020-12-27 ENCOUNTER — HEALTH MAINTENANCE LETTER (OUTPATIENT)
Age: 30
End: 2020-12-27

## 2020-12-31 DIAGNOSIS — F90.2 ATTENTION DEFICIT HYPERACTIVITY DISORDER (ADHD), COMBINED TYPE: ICD-10-CM

## 2020-12-31 PROCEDURE — 99000 SPECIMEN HANDLING OFFICE-LAB: CPT | Performed by: PHYSICIAN ASSISTANT

## 2020-12-31 PROCEDURE — 80307 DRUG TEST PRSMV CHEM ANLYZR: CPT | Mod: 90 | Performed by: PHYSICIAN ASSISTANT

## 2021-01-03 LAB — COMPREHEN DRUG ANALYSIS UR: NORMAL

## 2021-01-05 ENCOUNTER — MYC REFILL (OUTPATIENT)
Dept: FAMILY MEDICINE | Facility: CLINIC | Age: 31
End: 2021-01-05

## 2021-01-05 DIAGNOSIS — F90.2 ATTENTION DEFICIT HYPERACTIVITY DISORDER (ADHD), COMBINED TYPE: ICD-10-CM

## 2021-01-06 RX ORDER — DEXTROAMPHETAMINE SACCHARATE, AMPHETAMINE ASPARTATE MONOHYDRATE, DEXTROAMPHETAMINE SULFATE AND AMPHETAMINE SULFATE 3.75; 3.75; 3.75; 3.75 MG/1; MG/1; MG/1; MG/1
15 CAPSULE, EXTENDED RELEASE ORAL DAILY
Qty: 30 CAPSULE | Refills: 0 | Status: SHIPPED | OUTPATIENT
Start: 2021-01-06 | End: 2021-02-06

## 2021-02-06 ENCOUNTER — MYC REFILL (OUTPATIENT)
Dept: FAMILY MEDICINE | Facility: CLINIC | Age: 31
End: 2021-02-06

## 2021-02-06 DIAGNOSIS — F90.2 ATTENTION DEFICIT HYPERACTIVITY DISORDER (ADHD), COMBINED TYPE: ICD-10-CM

## 2021-02-08 RX ORDER — DEXTROAMPHETAMINE SACCHARATE, AMPHETAMINE ASPARTATE MONOHYDRATE, DEXTROAMPHETAMINE SULFATE AND AMPHETAMINE SULFATE 3.75; 3.75; 3.75; 3.75 MG/1; MG/1; MG/1; MG/1
15 CAPSULE, EXTENDED RELEASE ORAL DAILY
Qty: 30 CAPSULE | Refills: 0 | Status: SHIPPED | OUTPATIENT
Start: 2021-02-08 | End: 2021-03-10

## 2021-03-10 ENCOUNTER — MYC REFILL (OUTPATIENT)
Dept: FAMILY MEDICINE | Facility: CLINIC | Age: 31
End: 2021-03-10

## 2021-03-10 DIAGNOSIS — F90.2 ATTENTION DEFICIT HYPERACTIVITY DISORDER (ADHD), COMBINED TYPE: ICD-10-CM

## 2021-03-10 RX ORDER — DEXTROAMPHETAMINE SACCHARATE, AMPHETAMINE ASPARTATE MONOHYDRATE, DEXTROAMPHETAMINE SULFATE AND AMPHETAMINE SULFATE 3.75; 3.75; 3.75; 3.75 MG/1; MG/1; MG/1; MG/1
15 CAPSULE, EXTENDED RELEASE ORAL DAILY
Qty: 30 CAPSULE | Refills: 0 | Status: SHIPPED | OUTPATIENT
Start: 2021-03-10 | End: 2022-01-13

## 2021-03-10 NOTE — TELEPHONE ENCOUNTER
Routing refill request to provider for review/approval because:  Drug not on the [facility] refill protocol     Inna Jung RN   [facility] -- Triage Nurse

## 2021-03-15 ASSESSMENT — ANXIETY QUESTIONNAIRES
7. FEELING AFRAID AS IF SOMETHING AWFUL MIGHT HAPPEN: NOT AT ALL
2. NOT BEING ABLE TO STOP OR CONTROL WORRYING: NOT AT ALL
6. BECOMING EASILY ANNOYED OR IRRITABLE: NOT AT ALL
7. FEELING AFRAID AS IF SOMETHING AWFUL MIGHT HAPPEN: NOT AT ALL
GAD7 TOTAL SCORE: 1
GAD7 TOTAL SCORE: 1
1. FEELING NERVOUS, ANXIOUS, OR ON EDGE: NOT AT ALL
GAD7 TOTAL SCORE: 1
4. TROUBLE RELAXING: NOT AT ALL
5. BEING SO RESTLESS THAT IT IS HARD TO SIT STILL: SEVERAL DAYS
3. WORRYING TOO MUCH ABOUT DIFFERENT THINGS: NOT AT ALL

## 2021-03-15 ASSESSMENT — PATIENT HEALTH QUESTIONNAIRE - PHQ9
SUM OF ALL RESPONSES TO PHQ QUESTIONS 1-9: 3
10. IF YOU CHECKED OFF ANY PROBLEMS, HOW DIFFICULT HAVE THESE PROBLEMS MADE IT FOR YOU TO DO YOUR WORK, TAKE CARE OF THINGS AT HOME, OR GET ALONG WITH OTHER PEOPLE: NOT DIFFICULT AT ALL
SUM OF ALL RESPONSES TO PHQ QUESTIONS 1-9: 3

## 2021-03-16 ENCOUNTER — VIRTUAL VISIT (OUTPATIENT)
Dept: FAMILY MEDICINE | Facility: CLINIC | Age: 31
End: 2021-03-16
Payer: COMMERCIAL

## 2021-03-16 DIAGNOSIS — F90.2 ATTENTION DEFICIT HYPERACTIVITY DISORDER (ADHD), COMBINED TYPE: Primary | ICD-10-CM

## 2021-03-16 DIAGNOSIS — R80.9 MICROALBUMINURIA: ICD-10-CM

## 2021-03-16 DIAGNOSIS — I10 ESSENTIAL HYPERTENSION, BENIGN: ICD-10-CM

## 2021-03-16 PROCEDURE — 99214 OFFICE O/P EST MOD 30 MIN: CPT | Mod: TEL | Performed by: PHYSICIAN ASSISTANT

## 2021-03-16 RX ORDER — LISINOPRIL 5 MG/1
5 TABLET ORAL DAILY
Qty: 90 TABLET | Refills: 1 | Status: SHIPPED | OUTPATIENT
Start: 2021-03-16 | End: 2021-09-23

## 2021-03-16 RX ORDER — DEXTROAMPHETAMINE SACCHARATE, AMPHETAMINE ASPARTATE MONOHYDRATE, DEXTROAMPHETAMINE SULFATE AND AMPHETAMINE SULFATE 3.75; 3.75; 3.75; 3.75 MG/1; MG/1; MG/1; MG/1
15 CAPSULE, EXTENDED RELEASE ORAL DAILY
Qty: 30 CAPSULE | Refills: 0 | Status: SHIPPED | OUTPATIENT
Start: 2021-05-09 | End: 2021-06-08

## 2021-03-16 RX ORDER — DEXTROAMPHETAMINE SACCHARATE, AMPHETAMINE ASPARTATE MONOHYDRATE, DEXTROAMPHETAMINE SULFATE AND AMPHETAMINE SULFATE 3.75; 3.75; 3.75; 3.75 MG/1; MG/1; MG/1; MG/1
15 CAPSULE, EXTENDED RELEASE ORAL DAILY
Qty: 30 CAPSULE | Refills: 0 | Status: SHIPPED | OUTPATIENT
Start: 2021-04-09 | End: 2021-05-09

## 2021-03-16 RX ORDER — DEXTROAMPHETAMINE SACCHARATE, AMPHETAMINE ASPARTATE MONOHYDRATE, DEXTROAMPHETAMINE SULFATE AND AMPHETAMINE SULFATE 3.75; 3.75; 3.75; 3.75 MG/1; MG/1; MG/1; MG/1
15 CAPSULE, EXTENDED RELEASE ORAL DAILY
Qty: 30 CAPSULE | Refills: 0 | Status: SHIPPED | OUTPATIENT
Start: 2021-06-08 | End: 2021-07-08

## 2021-03-16 ASSESSMENT — ANXIETY QUESTIONNAIRES: GAD7 TOTAL SCORE: 1

## 2021-03-16 ASSESSMENT — PATIENT HEALTH QUESTIONNAIRE - PHQ9: SUM OF ALL RESPONSES TO PHQ QUESTIONS 1-9: 3

## 2021-03-16 NOTE — PROGRESS NOTES
Sarbjit is a 30 year old who is being evaluated via a billable video visit.      How would you like to obtain your AVS? MyChart  If the video visit is dropped, the invitation should be resent by: Text to cell phone: [phone]   Will anyone else be joining your video visit? No    Video Start Time: 10:28      Assessment & Plan     Attention deficit hyperactivity disorder (ADHD), combined type  Stable. No concerns. Maintain follow up in 6 months for inperson visit.   - amphetamine-dextroamphetamine (ADDERALL XR) 15 MG 24 hr capsule; Take 1 capsule (15 mg) by mouth daily  - amphetamine-dextroamphetamine (ADDERALL XR) 15 MG 24 hr capsule; Take 1 capsule (15 mg) by mouth daily  - amphetamine-dextroamphetamine (ADDERALL XR) 15 MG 24 hr capsule; Take 1 capsule (15 mg) by mouth daily    Microalbuminuria  Stable though having symptoms of hypotension. Also muscle aches not common, but possible side effect. Due to control of bp, will decrease dose to 5 mg. Home bp to be monitored as well as muscle aches symptoms. Follow up prn or in 6 months as above.   - lisinopril (ZESTRIL) 5 MG tablet; Take 1 tablet (5 mg) by mouth daily  -Medication use and side effects discussed with the patient. Patient is in complete understanding and agreement with plan.       Essential hypertension, benign  As above   - lisinopril (ZESTRIL) 5 MG tablet; Take 1 tablet (5 mg) by mouth daily    20 minutes spent on the date of the encounter doing chart review, patient visit and documentation        Return in about 6 months (around 9/16/2021) for Physical Exam, add follow up.    Blil Mg PA-C  [facility]   Sarbjit is a 30 year old who presents for the following health issues  HPI   Answers for HPI/ROS submitted by the patient on 3/15/2021   Chronic problems general questions HPI Form  If you checked off any problems, how difficult have these problems made it for you to do your work, take care of things at  home, or get along with other people?: Not difficult at all  PHQ9 TOTAL SCORE: 3  SERA 7 TOTAL SCORE: 1  How many servings of fruits and vegetables do you eat daily?: 0-1  On average, how many sweetened beverages do you drink each day (Examples: soda, juice, sweet tea, etc.  Do NOT count diet or artificially sweetened beverages)?: 0  How many minutes a day do you exercise enough to make your heart beat faster?: 20 to 29  How many days a week do you exercise enough to make your heart beat faster?: 4  How many days per week do you miss taking your medication?: 0    ADHD Follow Up  Better since last visit. No new issues reported  No New symptoms  No side affects from medication, taking as prescribed.    ADHD Follow-Up (Adult)  Concerns: none.   Changes since last visit: Stable  Taking controlled (daily) medications as prescribed: Yes  Sleep: trouble falling asleep if taken too late in the day    Medication Benefits:   Controlled symptoms: Attention span, Distractability, Finishing tasks, Impulse control, Frustration tolerance, Accepting limits and Peer relations  Uncontrolled symptoms:  None    Medication Side Effects:  Reports:  none  Sleep Problems? Yes Details: as above   ++++++++++++++++++++++++++++++++++++++++++++++++    Employer Concerns/Feedback: Stable  Coworker Concerns:   Stable  Home/Family Concerns: Stable      Hypertension Follow-up      Do you check your blood pressure regularly outside of the clinic? Yes     Are you following a low salt diet? Yes    Are your blood pressures ever more than 140 on the top number (systolic) OR more   than 90 on the bottom number (diastolic), for example 140/90? No     However, states notes more frequent dizziness and lightheadedness with standing. And is noticing more back soreness on days taken lisinopril than others. Renal function normal 6 months prior. No urination changes.   BP Readings from Last 3 Encounters:   09/01/20 102/70   10/07/19 124/77   06/17/18 142/88  "          Review of Systems   Constitutional, HEENT, cardiovascular, pulmonary, GI, , musculoskeletal, neuro, skin, endocrine and psych systems are negative, except as otherwise noted.      Objective    Vitals - Patient Reported  Weight (Patient Reported): 86.2 kg (190 lb)  Height (Patient Reported): 185.4 cm (6' 1\")  BMI (Based on Pt Reported Ht/Wt): 25.07      Vitals:  No vitals were obtained today due to virtual visit.    Physical Exam   GENERAL: Healthy, alert and no distress  EYES: Eyes grossly normal to inspection.  No discharge or erythema, or obvious scleral/conjunctival abnormalities.  RESP: No audible wheeze, cough, or visible cyanosis.  No visible retractions or increased work of breathing.    SKIN: Visible skin clear. No significant rash, abnormal pigmentation or lesions.  NEURO: Cranial nerves grossly intact.  Mentation and speech appropriate for age.  PSYCH: Mentation appears normal, affect normal/bright, judgement and insight intact, normal speech and appearance well-groomed.          Video-Visit Details    Type of service:  Video Visit    Video End Time:10:45 AM    Originating Location (pt. Location): Home    Distant Location (provider location):  [facility] [place]     Platform used for Video Visit: Alonzo"

## 2021-03-19 ENCOUNTER — DOCUMENTATION ONLY (OUTPATIENT)
Dept: FAMILY MEDICINE | Facility: CLINIC | Age: 31
End: 2021-03-19

## 2021-06-09 DIAGNOSIS — I10 ESSENTIAL HYPERTENSION, BENIGN: ICD-10-CM

## 2021-06-09 DIAGNOSIS — R80.9 MICROALBUMINURIA: ICD-10-CM

## 2021-06-09 RX ORDER — LISINOPRIL 10 MG/1
TABLET ORAL
Qty: 90 TABLET | Refills: 1 | OUTPATIENT
Start: 2021-06-09

## 2021-07-13 ENCOUNTER — MYC REFILL (OUTPATIENT)
Dept: FAMILY MEDICINE | Facility: CLINIC | Age: 31
End: 2021-07-13

## 2021-07-13 DIAGNOSIS — F90.2 ATTENTION DEFICIT HYPERACTIVITY DISORDER (ADHD), COMBINED TYPE: ICD-10-CM

## 2021-07-13 RX ORDER — DEXTROAMPHETAMINE SACCHARATE, AMPHETAMINE ASPARTATE MONOHYDRATE, DEXTROAMPHETAMINE SULFATE AND AMPHETAMINE SULFATE 3.75; 3.75; 3.75; 3.75 MG/1; MG/1; MG/1; MG/1
15 CAPSULE, EXTENDED RELEASE ORAL DAILY
Qty: 30 CAPSULE | Refills: 0 | Status: SHIPPED | OUTPATIENT
Start: 2021-07-13 | End: 2021-08-12

## 2021-07-13 RX ORDER — DEXTROAMPHETAMINE SACCHARATE, AMPHETAMINE ASPARTATE MONOHYDRATE, DEXTROAMPHETAMINE SULFATE AND AMPHETAMINE SULFATE 3.75; 3.75; 3.75; 3.75 MG/1; MG/1; MG/1; MG/1
15 CAPSULE, EXTENDED RELEASE ORAL DAILY
Qty: 30 CAPSULE | Refills: 0 | Status: CANCELLED | OUTPATIENT
Start: 2021-07-13

## 2021-07-13 RX ORDER — DEXTROAMPHETAMINE SACCHARATE, AMPHETAMINE ASPARTATE MONOHYDRATE, DEXTROAMPHETAMINE SULFATE AND AMPHETAMINE SULFATE 3.75; 3.75; 3.75; 3.75 MG/1; MG/1; MG/1; MG/1
15 CAPSULE, EXTENDED RELEASE ORAL DAILY
Qty: 30 CAPSULE | Refills: 0 | Status: SHIPPED | OUTPATIENT
Start: 2021-08-13 | End: 2021-09-12

## 2021-07-13 RX ORDER — DEXTROAMPHETAMINE SACCHARATE, AMPHETAMINE ASPARTATE MONOHYDRATE, DEXTROAMPHETAMINE SULFATE AND AMPHETAMINE SULFATE 3.75; 3.75; 3.75; 3.75 MG/1; MG/1; MG/1; MG/1
15 CAPSULE, EXTENDED RELEASE ORAL DAILY
Qty: 30 CAPSULE | Refills: 0 | Status: SHIPPED | OUTPATIENT
Start: 2021-09-13 | End: 2021-10-13

## 2021-07-13 NOTE — TELEPHONE ENCOUNTER
Refilled. pdmp reviewed. No concerns. 3 months post dated. Visit needed in after this. OV.     -noel chen, pac

## 2021-09-22 DIAGNOSIS — I10 ESSENTIAL HYPERTENSION, BENIGN: ICD-10-CM

## 2021-09-22 DIAGNOSIS — R80.9 MICROALBUMINURIA: ICD-10-CM

## 2021-09-23 RX ORDER — LISINOPRIL 5 MG/1
TABLET ORAL
Qty: 90 TABLET | Refills: 0 | Status: SHIPPED | OUTPATIENT
Start: 2021-09-23 | End: 2021-12-21

## 2021-09-23 NOTE — TELEPHONE ENCOUNTER
Refill extended, pt has appointment scheduled  Prescription approved per [facility] Refill Protocol.  Esther Bowen RN, BSN  Message handled by CLINIC NURSE.

## 2021-10-09 ENCOUNTER — HEALTH MAINTENANCE LETTER (OUTPATIENT)
Age: 31
End: 2021-10-09

## 2021-10-28 ENCOUNTER — MYC REFILL (OUTPATIENT)
Dept: FAMILY MEDICINE | Facility: CLINIC | Age: 31
End: 2021-10-28

## 2021-10-28 DIAGNOSIS — F90.2 ATTENTION DEFICIT HYPERACTIVITY DISORDER (ADHD), COMBINED TYPE: ICD-10-CM

## 2021-10-28 RX ORDER — DEXTROAMPHETAMINE SACCHARATE, AMPHETAMINE ASPARTATE MONOHYDRATE, DEXTROAMPHETAMINE SULFATE AND AMPHETAMINE SULFATE 3.75; 3.75; 3.75; 3.75 MG/1; MG/1; MG/1; MG/1
15 CAPSULE, EXTENDED RELEASE ORAL DAILY
Qty: 30 CAPSULE | Refills: 0 | Status: SHIPPED | OUTPATIENT
Start: 2021-10-28 | End: 2021-11-30

## 2021-10-28 NOTE — TELEPHONE ENCOUNTER
Routing refill request to provider for review/approval because:  Drug not on the [facility] refill protocol     Seven CURTIS RN

## 2021-11-24 ENCOUNTER — IMMUNIZATION (OUTPATIENT)
Dept: NURSING | Facility: CLINIC | Age: 31
End: 2021-11-24
Payer: COMMERCIAL

## 2021-11-24 PROCEDURE — 91300 PR COVID VAC PFIZER DIL RECON 30 MCG/0.3 ML IM: CPT

## 2021-11-24 PROCEDURE — 0004A PR COVID VAC PFIZER DIL RECON 30 MCG/0.3 ML IM: CPT

## 2021-11-30 ENCOUNTER — MYC REFILL (OUTPATIENT)
Dept: FAMILY MEDICINE | Facility: CLINIC | Age: 31
End: 2021-11-30
Payer: COMMERCIAL

## 2021-11-30 DIAGNOSIS — F90.2 ATTENTION DEFICIT HYPERACTIVITY DISORDER (ADHD), COMBINED TYPE: ICD-10-CM

## 2021-11-30 RX ORDER — DEXTROAMPHETAMINE SACCHARATE, AMPHETAMINE ASPARTATE MONOHYDRATE, DEXTROAMPHETAMINE SULFATE AND AMPHETAMINE SULFATE 3.75; 3.75; 3.75; 3.75 MG/1; MG/1; MG/1; MG/1
15 CAPSULE, EXTENDED RELEASE ORAL DAILY
Qty: 30 CAPSULE | Refills: 0 | Status: SHIPPED | OUTPATIENT
Start: 2021-11-30 | End: 2022-01-13

## 2021-11-30 NOTE — TELEPHONE ENCOUNTER
Routing refill request to provider for review/approval because:  Drug not on the [facility] refill protocol     Alessandra Rossi RN on 11/30/2021 at 12:46 PM

## 2021-12-20 SDOH — ECONOMIC STABILITY: INCOME INSECURITY: IN THE LAST 12 MONTHS, WAS THERE A TIME WHEN YOU WERE NOT ABLE TO PAY THE MORTGAGE OR RENT ON TIME?: NO

## 2021-12-20 SDOH — ECONOMIC STABILITY: FOOD INSECURITY: WITHIN THE PAST 12 MONTHS, YOU WORRIED THAT YOUR FOOD WOULD RUN OUT BEFORE YOU GOT MONEY TO BUY MORE.: NEVER TRUE

## 2021-12-20 SDOH — ECONOMIC STABILITY: INCOME INSECURITY: HOW HARD IS IT FOR YOU TO PAY FOR THE VERY BASICS LIKE FOOD, HOUSING, MEDICAL CARE, AND HEATING?: NOT HARD AT ALL

## 2021-12-20 SDOH — ECONOMIC STABILITY: FOOD INSECURITY: WITHIN THE PAST 12 MONTHS, THE FOOD YOU BOUGHT JUST DIDN'T LAST AND YOU DIDN'T HAVE MONEY TO GET MORE.: NEVER TRUE

## 2021-12-20 SDOH — HEALTH STABILITY: PHYSICAL HEALTH: ON AVERAGE, HOW MANY MINUTES DO YOU ENGAGE IN EXERCISE AT THIS LEVEL?: 30 MIN

## 2021-12-20 SDOH — ECONOMIC STABILITY: TRANSPORTATION INSECURITY
IN THE PAST 12 MONTHS, HAS THE LACK OF TRANSPORTATION KEPT YOU FROM MEDICAL APPOINTMENTS OR FROM GETTING MEDICATIONS?: NO

## 2021-12-20 SDOH — ECONOMIC STABILITY: TRANSPORTATION INSECURITY
IN THE PAST 12 MONTHS, HAS LACK OF TRANSPORTATION KEPT YOU FROM MEETINGS, WORK, OR FROM GETTING THINGS NEEDED FOR DAILY LIVING?: NO

## 2021-12-20 SDOH — HEALTH STABILITY: PHYSICAL HEALTH: ON AVERAGE, HOW MANY DAYS PER WEEK DO YOU ENGAGE IN MODERATE TO STRENUOUS EXERCISE (LIKE A BRISK WALK)?: 3 DAYS

## 2021-12-20 ASSESSMENT — ENCOUNTER SYMPTOMS
CONSTIPATION: 0
COUGH: 0
HEARTBURN: 0
PARESTHESIAS: 0
ARTHRALGIAS: 1
PALPITATIONS: 0
NERVOUS/ANXIOUS: 1
FEVER: 0
ABDOMINAL PAIN: 0
HEADACHES: 0
SHORTNESS OF BREATH: 0
NAUSEA: 0
CHILLS: 0
HEMATURIA: 0
JOINT SWELLING: 0
EYE PAIN: 0
HEMATOCHEZIA: 0
DYSURIA: 0
DIZZINESS: 0
SORE THROAT: 0
WEAKNESS: 1
MYALGIAS: 1
DIARRHEA: 0
FREQUENCY: 0

## 2021-12-20 ASSESSMENT — LIFESTYLE VARIABLES
HOW OFTEN DO YOU HAVE SIX OR MORE DRINKS ON ONE OCCASION: NEVER
HOW OFTEN DO YOU HAVE A DRINK CONTAINING ALCOHOL: NEVER

## 2021-12-20 ASSESSMENT — SOCIAL DETERMINANTS OF HEALTH (SDOH)
IN A TYPICAL WEEK, HOW MANY TIMES DO YOU TALK ON THE PHONE WITH FAMILY, FRIENDS, OR NEIGHBORS?: MORE THAN THREE TIMES A WEEK
HOW OFTEN DO YOU ATTEND CHURCH OR RELIGIOUS SERVICES?: NEVER
DO YOU BELONG TO ANY CLUBS OR ORGANIZATIONS SUCH AS CHURCH GROUPS UNIONS, FRATERNAL OR ATHLETIC GROUPS, OR SCHOOL GROUPS?: NO
HOW OFTEN DO YOU GET TOGETHER WITH FRIENDS OR RELATIVES?: ONCE A WEEK

## 2021-12-21 ENCOUNTER — ANCILLARY PROCEDURE (OUTPATIENT)
Dept: GENERAL RADIOLOGY | Facility: CLINIC | Age: 31
End: 2021-12-21
Attending: PHYSICIAN ASSISTANT
Payer: COMMERCIAL

## 2021-12-21 ENCOUNTER — OFFICE VISIT (OUTPATIENT)
Dept: FAMILY MEDICINE | Facility: CLINIC | Age: 31
End: 2021-12-21
Payer: COMMERCIAL

## 2021-12-21 VITALS
HEART RATE: 88 BPM | WEIGHT: 175 LBS | HEIGHT: 73 IN | SYSTOLIC BLOOD PRESSURE: 136 MMHG | DIASTOLIC BLOOD PRESSURE: 86 MMHG | TEMPERATURE: 98.2 F | OXYGEN SATURATION: 99 % | RESPIRATION RATE: 12 BRPM | BODY MASS INDEX: 23.19 KG/M2

## 2021-12-21 DIAGNOSIS — F90.2 ATTENTION DEFICIT HYPERACTIVITY DISORDER (ADHD), COMBINED TYPE: ICD-10-CM

## 2021-12-21 DIAGNOSIS — Z86.39 HX OF DIABETES MELLITUS: ICD-10-CM

## 2021-12-21 DIAGNOSIS — N18.1 CHRONIC KIDNEY DISEASE, STAGE 1: ICD-10-CM

## 2021-12-21 DIAGNOSIS — F10.21 ALCOHOL DEPENDENCE IN REMISSION (H): ICD-10-CM

## 2021-12-21 DIAGNOSIS — M25.562 CHRONIC PAIN OF LEFT KNEE: ICD-10-CM

## 2021-12-21 DIAGNOSIS — E55.9 VITAMIN D DEFICIENCY: ICD-10-CM

## 2021-12-21 DIAGNOSIS — M87.00 AVN (AVASCULAR NECROSIS OF BONE) (H): ICD-10-CM

## 2021-12-21 DIAGNOSIS — G89.29 CHRONIC PAIN OF LEFT KNEE: ICD-10-CM

## 2021-12-21 DIAGNOSIS — Z00.00 ROUTINE GENERAL MEDICAL EXAMINATION AT A HEALTH CARE FACILITY: Primary | ICD-10-CM

## 2021-12-21 DIAGNOSIS — R80.9 MICROALBUMINURIA: ICD-10-CM

## 2021-12-21 DIAGNOSIS — R42 DIZZINESS: ICD-10-CM

## 2021-12-21 LAB
ANION GAP SERPL CALCULATED.3IONS-SCNC: 6 MMOL/L (ref 3–14)
BUN SERPL-MCNC: 13 MG/DL (ref 7–30)
CALCIUM SERPL-MCNC: 9.6 MG/DL (ref 8.5–10.1)
CHLORIDE BLD-SCNC: 104 MMOL/L (ref 94–109)
CO2 SERPL-SCNC: 27 MMOL/L (ref 20–32)
CREAT SERPL-MCNC: 0.8 MG/DL (ref 0.66–1.25)
CREAT UR-MCNC: 68 MG/DL
CREAT UR-MCNC: 69 MG/DL
DEPRECATED CALCIDIOL+CALCIFEROL SERPL-MC: 15 UG/L (ref 20–75)
ERYTHROCYTE [DISTWIDTH] IN BLOOD BY AUTOMATED COUNT: 13.5 % (ref 10–15)
GFR SERPL CREATININE-BSD FRML MDRD: >90 ML/MIN/1.73M2
GLUCOSE BLD-MCNC: 94 MG/DL (ref 70–99)
HBA1C MFR BLD: 5.3 % (ref 0–5.6)
HCT VFR BLD AUTO: 41.2 % (ref 40–53)
HGB BLD-MCNC: 13.5 G/DL (ref 13.3–17.7)
MCH RBC QN AUTO: 27.7 PG (ref 26.5–33)
MCHC RBC AUTO-ENTMCNC: 32.8 G/DL (ref 31.5–36.5)
MCV RBC AUTO: 84 FL (ref 78–100)
MICROALBUMIN UR-MCNC: 157 MG/L
MICROALBUMIN/CREAT UR: 227.54 MG/G CR (ref 0–17)
PLATELET # BLD AUTO: 188 10E3/UL (ref 150–450)
POTASSIUM BLD-SCNC: 4.1 MMOL/L (ref 3.4–5.3)
RBC # BLD AUTO: 4.88 10E6/UL (ref 4.4–5.9)
SODIUM SERPL-SCNC: 137 MMOL/L (ref 133–144)
TSH SERPL DL<=0.005 MIU/L-ACNC: 1.42 MU/L (ref 0.4–4)
WBC # BLD AUTO: 4.8 10E3/UL (ref 4–11)

## 2021-12-21 PROCEDURE — 83036 HEMOGLOBIN GLYCOSYLATED A1C: CPT | Performed by: PHYSICIAN ASSISTANT

## 2021-12-21 PROCEDURE — 90686 IIV4 VACC NO PRSV 0.5 ML IM: CPT | Performed by: PHYSICIAN ASSISTANT

## 2021-12-21 PROCEDURE — 36415 COLL VENOUS BLD VENIPUNCTURE: CPT | Performed by: PHYSICIAN ASSISTANT

## 2021-12-21 PROCEDURE — 99213 OFFICE O/P EST LOW 20 MIN: CPT | Mod: 25 | Performed by: PHYSICIAN ASSISTANT

## 2021-12-21 PROCEDURE — 73565 X-RAY EXAM OF KNEES: CPT | Mod: LT | Performed by: RADIOLOGY

## 2021-12-21 PROCEDURE — 90471 IMMUNIZATION ADMIN: CPT | Performed by: PHYSICIAN ASSISTANT

## 2021-12-21 PROCEDURE — 84443 ASSAY THYROID STIM HORMONE: CPT | Performed by: PHYSICIAN ASSISTANT

## 2021-12-21 PROCEDURE — 73560 X-RAY EXAM OF KNEE 1 OR 2: CPT | Mod: 59 | Performed by: RADIOLOGY

## 2021-12-21 PROCEDURE — 99395 PREV VISIT EST AGE 18-39: CPT | Mod: 25 | Performed by: PHYSICIAN ASSISTANT

## 2021-12-21 PROCEDURE — 73560 X-RAY EXAM OF KNEE 1 OR 2: CPT | Mod: LT | Performed by: RADIOLOGY

## 2021-12-21 PROCEDURE — 80048 BASIC METABOLIC PNL TOTAL CA: CPT | Performed by: PHYSICIAN ASSISTANT

## 2021-12-21 PROCEDURE — 82043 UR ALBUMIN QUANTITATIVE: CPT | Performed by: PHYSICIAN ASSISTANT

## 2021-12-21 PROCEDURE — 80307 DRUG TEST PRSMV CHEM ANLYZR: CPT | Performed by: PHYSICIAN ASSISTANT

## 2021-12-21 PROCEDURE — 80061 LIPID PANEL: CPT | Performed by: PHYSICIAN ASSISTANT

## 2021-12-21 PROCEDURE — 85027 COMPLETE CBC AUTOMATED: CPT | Performed by: PHYSICIAN ASSISTANT

## 2021-12-21 PROCEDURE — 82306 VITAMIN D 25 HYDROXY: CPT | Performed by: PHYSICIAN ASSISTANT

## 2021-12-21 RX ORDER — DEXTROAMPHETAMINE SACCHARATE, AMPHETAMINE ASPARTATE MONOHYDRATE, DEXTROAMPHETAMINE SULFATE AND AMPHETAMINE SULFATE 3.75; 3.75; 3.75; 3.75 MG/1; MG/1; MG/1; MG/1
15 CAPSULE, EXTENDED RELEASE ORAL DAILY
Qty: 30 CAPSULE | Refills: 0 | Status: SHIPPED | OUTPATIENT
Start: 2022-02-21 | End: 2022-01-13

## 2021-12-21 RX ORDER — DEXTROAMPHETAMINE SACCHARATE, AMPHETAMINE ASPARTATE MONOHYDRATE, DEXTROAMPHETAMINE SULFATE AND AMPHETAMINE SULFATE 3.75; 3.75; 3.75; 3.75 MG/1; MG/1; MG/1; MG/1
15 CAPSULE, EXTENDED RELEASE ORAL DAILY
Qty: 30 CAPSULE | Refills: 0 | Status: SHIPPED | OUTPATIENT
Start: 2022-01-21 | End: 2022-01-13

## 2021-12-21 RX ORDER — LISINOPRIL 2.5 MG/1
2.5 TABLET ORAL DAILY
Qty: 90 TABLET | Refills: 0 | Status: SHIPPED | OUTPATIENT
Start: 2021-12-21 | End: 2022-03-17

## 2021-12-21 RX ORDER — DEXTROAMPHETAMINE SACCHARATE, AMPHETAMINE ASPARTATE MONOHYDRATE, DEXTROAMPHETAMINE SULFATE AND AMPHETAMINE SULFATE 3.75; 3.75; 3.75; 3.75 MG/1; MG/1; MG/1; MG/1
15 CAPSULE, EXTENDED RELEASE ORAL DAILY
Qty: 30 CAPSULE | Refills: 0 | Status: SHIPPED | OUTPATIENT
Start: 2021-12-21 | End: 2022-01-13

## 2021-12-21 ASSESSMENT — ENCOUNTER SYMPTOMS
DIARRHEA: 0
ARTHRALGIAS: 1
SHORTNESS OF BREATH: 0
PARESTHESIAS: 0
HEARTBURN: 0
DYSURIA: 0
MYALGIAS: 1
CONSTIPATION: 0
CHILLS: 0
FREQUENCY: 0
EYE PAIN: 0
HEMATOCHEZIA: 0
HEADACHES: 0
WEAKNESS: 1
NAUSEA: 0
PALPITATIONS: 0
SORE THROAT: 0
FEVER: 0
NERVOUS/ANXIOUS: 1
COUGH: 0
HEMATURIA: 0
ABDOMINAL PAIN: 0
JOINT SWELLING: 0
DIZZINESS: 0

## 2021-12-21 ASSESSMENT — MIFFLIN-ST. JEOR: SCORE: 1797.67

## 2021-12-21 NOTE — PROGRESS NOTES
SUBJECTIVE:   CC: Sarbjit Perez is an 31 year old male who presents for preventative health visit.       Patient has been advised of split billing requirements and indicates understanding: Yes  Healthy Habits:     Getting at least 3 servings of Calcium per day:  Yes    Bi-annual eye exam:  Yes    Dental care twice a year:  Yes    Sleep apnea or symptoms of sleep apnea:  Daytime drowsiness    Diet:  Regular (no restrictions)    Frequency of exercise:  2-3 days/week    Duration of exercise:  30-45 minutes    Taking medications regularly:  Yes    Medication side effects:  Lightheadedness    PHQ-2 Total Score: 0    Additional concerns today:  No    ADHD Follow-Up (Adult)  Concerns: none   Changes since last visit: Stable  Taking controlled (daily) medications as prescribed: Yes  Sleep: no problems      Medication Benefits:   Controlled symptoms: Attention span, Distractability, Finishing tasks, Impulse control, Frustration tolerance, Accepting limits and Peer relations  Uncontrolled symptoms:  None    Medication Side Effects:  Reports:  none  Sleep Problems? no  ++++++++++++++++++++++++++++++++++++++++++++++++    Employer Concerns/Feedback: Stable  Coworker Concerns:   Stable  Home/Family Concerns: Stable    History of AVN of bilateral hips. Followed by [facility] in the past and plan was to obtain total hip replacement in at least left. Has not seen [facility] for over 1 year. Now having mild intermittent left knee pain localized to anterior knee distal to patella. No current pain.     History of microalbuminuria on lisinopril for protection. However states continues to have intermittent dizziness with standing. ~2 times per week. Resolves with sitting. No chest pains  Or palpitations.       Today's PHQ-2 Score:   PHQ-2 ( 1999 [employer]) 12/20/2021   Q1: Little interest or pleasure in doing things 0   Q2: Feeling down, depressed or hopeless 0   PHQ-2 Score 0   PHQ-2 Total Score (12-17 Years)- Positive if 3 or more points; Administer  PHQ-A if positive -   Q1: Little interest or pleasure in doing things Not at all   Q2: Feeling down, depressed or hopeless Not at all   PHQ-2 Score 0       Abuse: Current or Past(Physical, Sexual or Emotional)- No  Do you feel safe in your environment? Yes    Have you ever done Advance Care Planning? (For example, a Health Directive, POLST, or a discussion with a medical provider or your loved ones about your wishes): Information given    Social History     Tobacco Use     Smoking status: Never Smoker     Smokeless tobacco: Never Used   Substance Use Topics     Alcohol use: No     Alcohol/week: 0.0 standard drinks       Alcohol Use 12/20/2021   Prescreen: >3 drinks/day or >7 drinks/week? Not Applicable   Prescreen: >3 drinks/day or >7 drinks/week? -     Last PSA: No results found for: PSA    Reviewed orders with patient. Reviewed health maintenance and updated orders accordingly - Yes  BP Readings from Last 3 Encounters:   12/21/21 136/86   09/01/20 102/70   10/07/19 124/77    Wt Readings from Last 3 Encounters:   12/21/21 79.4 kg (175 lb)   09/01/20 89.4 kg (197 lb)   10/07/19 97.5 kg (215 lb)                  Patient Active Problem List   Diagnosis     Hx of diabetes mellitus     Alcohol dependence in remission (H)     Microalbuminuria     Essential hypertension, benign     Acute reaction to stress     Attention deficit hyperactivity disorder (ADHD), combined type     Chronic kidney disease, stage 1     AVN (avascular necrosis of bone) (H)     Chronic pain of left knee     Past Surgical History:   Procedure Laterality Date     HERNIORRHAPHY INGUINAL Left 3/30/2018    Procedure: HERNIORRHAPHY INGUINAL;  Left Inguinal hernia repair with Mesh ;  Surgeon: Eduard Amaral MD;  Location:  OR     [facility]         Social History     Tobacco Use     Smoking status: Never Smoker     Smokeless tobacco: Never Used   Substance Use Topics     Alcohol use: No     Alcohol/week: 0.0 standard drinks     Family History    Problem Relation Age of Onset     Breast Cancer Mother      Hypertension Father      Diabetes Type 2  Paternal Grandfather      Hypertension Paternal Grandfather      Diabetes Paternal Grandfather      Other Cancer Maternal Grandmother          Current Outpatient Medications   Medication Sig Dispense Refill     amphetamine-dextroamphetamine (ADDERALL XR) 15 MG 24 hr capsule Take 1 capsule (15 mg) by mouth daily 30 capsule 0     [START ON 1/21/2022] amphetamine-dextroamphetamine (ADDERALL XR) 15 MG 24 hr capsule Take 1 capsule (15 mg) by mouth daily 30 capsule 0     [START ON 2/21/2022] amphetamine-dextroamphetamine (ADDERALL XR) 15 MG 24 hr capsule Take 1 capsule (15 mg) by mouth daily 30 capsule 0     lisinopril (ZESTRIL) 2.5 MG tablet Take 1 tablet (2.5 mg) by mouth daily 90 tablet 0     amphetamine-dextroamphetamine (ADDERALL XR) 15 MG 24 hr capsule Take 1 capsule (15 mg) by mouth daily 30 capsule 0     amphetamine-dextroamphetamine (ADDERALL XR) 15 MG 24 hr capsule Take 1 capsule (15 mg) by mouth daily 30 capsule 0     No Known Allergies  Recent Labs   Lab Test 12/21/21  0936 09/01/20  1044 10/07/19  0700 06/08/18  0930 03/06/18  0909 10/24/17  0921 10/26/16  0847 10/22/16  0929 10/21/16  0745 10/20/16  0245   A1C 5.3 5.4 5.0  --  5.1 5.1   < >  --   --  9.5*   LDL  --  84 97  --  83 97   < >  --   --   --    HDL  --  47 43  --  48 45   < >  --   --   --    TRIG  --  68 100  --  72 77   < >  --   --   --    ALT  --   --   --   --   --  42  --  93*  --  286*   CR 0.80 0.92 0.95   < > 0.98 1.22   < > 0.87   < > 0.82   GFRESTIMATED >90 >90 >90   < > 90 70   < > >90  Non  GFR Calc     < > >90  Non  GFR Calc     GFRESTBLACK  --  >90 >90   < > >90 85   < > >90   GFR Calc     < > >90   GFR Calc     POTASSIUM 4.1 4.4 3.9   < > 3.8 3.9   < > 3.7   < > 3.7   TSH 1.42  --  2.58  --   --   --    < >  --   --   --     < > = values in this interval not  "displayed.        Reviewed and updated as needed this visit by clinical staff  Tobacco  Allergies  Meds  Problems  Med Hx  Surg Hx  Fam Hx  Soc Hx         Reviewed and updated as needed this visit by Provider  Tobacco  Allergies  Meds  Problems  Med Hx  Surg Hx  Fam Hx        Past Medical History:   Diagnosis Date     Acute pancreatitis 10/20/2016     Alcohol abuse      Hx of diabetes mellitus 10/26/2016    Occurred post alcoholic pancreatitis. Resolved 1 yr later after alcohol cessation.        Past Surgical History:   Procedure Laterality Date     HERNIORRHAPHY INGUINAL Left 3/30/2018    Procedure: HERNIORRHAPHY INGUINAL;  Left Inguinal hernia repair with Mesh ;  Surgeon: Eduard Amaral MD;  Location: [facility] OR     wisdom teeth         Review of Systems   Constitutional: Negative for chills and fever.   HENT: Negative for congestion, ear pain, hearing loss and sore throat.    Eyes: Negative for pain and visual disturbance.   Respiratory: Negative for cough and shortness of breath.    Cardiovascular: Negative for chest pain, palpitations and peripheral edema.   Gastrointestinal: Negative for abdominal pain, constipation, diarrhea, heartburn, hematochezia and nausea.   Genitourinary: Negative for dysuria, frequency, genital sores, hematuria, impotence, penile discharge and urgency.   Musculoskeletal: Positive for arthralgias and myalgias. Negative for joint swelling.   Skin: Negative for rash.   Neurological: Positive for weakness. Negative for dizziness, headaches and paresthesias.   Psychiatric/Behavioral: Negative for mood changes. The patient is nervous/anxious.          OBJECTIVE:   /86 (BP Location: Right arm, Patient Position: Chair, Cuff Size: Adult Regular)   Pulse 88   Temp 98.2  F (36.8  C) (Oral)   Resp 12   Ht 1.854 m (6' 1\")   Wt 79.4 kg (175 lb)   SpO2 99%   BMI 23.09 kg/m      Physical Exam  GENERAL: healthy, alert and no distress  EYES: Eyes grossly normal to inspection, " PERRL and conjunctivae and sclerae normal  HENT: ear canals and TM's normal, nose and mouth without ulcers or lesions  NECK: no adenopathy, no asymmetry, masses, or scars and thyroid normal to palpation  RESP: lungs clear to auscultation - no rales, rhonchi or wheezes  CV: regular rate and rhythm, normal S1 S2, no S3 or S4, no murmur, click or rub, no peripheral edema and peripheral pulses strong  ABDOMEN: soft, nontender, no hepatosplenomegaly, no masses and bowel sounds normal   (male): normal male genitalia without lesions or urethral discharge, no hernia  MS: no gross musculoskeletal defects noted, no edema  SKIN: no suspicious lesions or rashes  NEURO: Normal strength and tone, mentation intact and speech normal  PSYCH: mentation appears normal, affect normal/bright  LYMPH: no cervical adenopathy    Diagnostic Test Results:  none     ASSESSMENT/PLAN:   (Z00.00) Routine general medical examination at a health care facility  (primary encounter diagnosis)  Comment: normal exam. Weight continues to improve, but for no known reason of patient. Possible side effect of stimulant. bmi is in good range. However, given changes, will obtain tsh and continue monitoring renal function. If work up negative and stable from now on, continue to monitor. However, if continues, may need to consider alternative stimulant.   Plan: Lipid panel reflex to direct LDL Fasting            (M87.00) AVN (avascular necrosis of bone) (H)  Comment: history of this. [place] follow up has stalled. Radiographs of knees obtained due to pain for assessment of avn in this location. Negative. Likely patellar strain from secondary femoral anteversion from his AVN. Discussed etiology and will have discuss with ortho for second opinion on further managemetn.   Plan: Orthopedic  Referral, XR Knee Standing        Left 2 Views, XR Knee Standing Right 2 Views,         XR Knee AP Standing Bilateral            (Z86.39) Hx of diabetes  mellitus  Comment: recheck a1c today. Dizziness considered ACE induced given bp control and possible orthostatic hypotension, but possible reactive hypoglycemia. Patient has glucometer at home. Recommending checking at times of dizziness if occurring again.   Plan: HEMOGLOBIN A1C            (R80.9) Microalbuminuria  Comment: as above. Will reduce to 2.5 mg and in the future if dizziness does not improve, attempt holiday. If still no improvement, unlikely lisinopril and will have see cardiology for second opinion.   Plan: lisinopril (ZESTRIL) 2.5 MG tablet        -Medication use and side effects discussed with the patient. Patient is in complete understanding and agreement with plan.       (F90.2) Attention deficit hyperactivity disorder (ADHD), combined type  Comment: stable. Urine tox due. 6 month follow up. Maybe virtual.   Plan: Drug Confirmation Panel Urine with Creat - lab         collect, amphetamine-dextroamphetamine         (ADDERALL XR) 15 MG 24 hr capsule,         amphetamine-dextroamphetamine (ADDERALL XR) 15         MG 24 hr capsule, amphetamine-dextroamphetamine        (ADDERALL XR) 15 MG 24 hr capsule        -Medication use and side effects discussed with the patient. Patient is in complete understanding and agreement with plan.       (R42) Dizziness  Comment: as above. Labs also pending for thyroid, anemia, electrolyte etiologies.   Plan: Basic metabolic panel  (Ca, Cl, CO2, Creat,         Gluc, K, Na, BUN), Vitamin D Deficiency, TSH         with free T4 reflex, CBC with platelets            (N18.1) Chronic kidney disease, stage 1  Comment: as above  Plan:     (F10.21) Alcohol dependence in remission (H)  Comment: continues sobriety   Plan:     (M25.562,  G89.29) Chronic pain of left knee  Comment: as above   Plan: XR Knee Standing Left 2 Views, XR Knee Standing        Right 2 Views, XR Knee AP Standing Bilateral              Patient has been advised of split billing requirements and indicates  "understanding: Yes  COUNSELING:   Reviewed preventive health counseling, as reflected in patient instructions       Regular exercise       Healthy diet/nutrition    Estimated body mass index is 23.09 kg/m  as calculated from the following:    Height as of this encounter: 1.854 m (6' 1\").    Weight as of this encounter: 79.4 kg (175 lb).         He reports that he has never smoked. He has never used smokeless tobacco.      Counseling Resources:  ATP IV Guidelines  Pooled Cohorts Equation Calculator  FRAX Risk Assessment  ICSI Preventive Guidelines  Dietary Guidelines for Americans, 2010  USDA's MyPlate  ASA Prophylaxis  Lung CA Screening    MAYELA Bishop [facility]  "

## 2021-12-21 NOTE — PROGRESS NOTES
chief complaint:annual wellness visit    History of Present Illness:   Patient presents for annual wellness visit and ADHD follow up. He has a history of essential HTN, ADHD, and is in remission with alcohol dependence.   He does not have concerns regarding his hypertension. He takes his medication most of the time and occasionally will forget a dose once per week. He occasionally feels lightheaded and takes his time to adjust with positional changes, but otherwise feels no side effects from medication.   He feels like ADHD is well controlled and able to manage work load adequately. He has noticed decrease in weight and appetite the last 2 months and is unsure if that is related to Adderal medication.   He also has a history of AVN over his left hip joint and is waiting for opening through [facility] orthopedic team to get hip replaced. He has been taking tylenol medication for pain with mild relief and feels improvement with CBD gummies. He feels like pain is migrating towards left knee and worried that could be impacted.     SUBJECTIVE:      Review Of Systems  Skin: negative for, acne, rash, itching, bruising  Eyes: negative for, visual blurring, eye pain, tearing, itching  Ears/Nose/Throat: negative for, nasal congestion, postnasal drainage, hearing loss, epistaxis, persistent sore throat  Respiratory: No shortness of breath, dyspnea on exertion, cough, or hemoptysis  Cardiovascular: negative for, palpitations, chest pain and lower extremity edema  Gastrointestinal: positive for negative and poor appetite, negative for, nausea, vomiting, abdominal pain, constipation and diarrhea  Genitourinary: negative for, frequency, urgency, hesitancy and decreased urinary stream  Musculoskeletal: negative for, back pain and arthritis; positive for joint pain and joint stiffness over left hip.  Neurologic: positive for local weakness with left hip, negative for, headaches, numbness or tingling of hands and numbness or tingling of  "feet  Psychiatric: negative for, excessive stress, anxiety and depression      Answers for HPI/ROS submitted by the patient on 12/20/2021  Frequency of exercise:: 2-3 days/week  Getting at least 3 servings of Calcium per day:: Yes  Diet:: Regular (no restrictions)  Taking medications regularly:: Yes  Medication side effects:: Lightheadedness  Bi-annual eye exam:: Yes  Dental care twice a year:: Yes  Sleep apnea or symptoms of sleep apnea:: Daytime drowsiness  abdominal pain: No  Blood in stool: No  Blood in urine: No  chest pain: No  chills: No  congestion: No  constipation: No  cough: No  diarrhea: No  dizziness: No  ear pain: No  eye pain: No  nervous/anxious: Yes  fever: No  frequency: No  genital sores: No  headaches: No  hearing loss: No  heartburn: No  arthralgias: Yes  joint swelling: No  peripheral edema: No  mood changes: No  myalgias: Yes  nausea: No  dysuria: No  palpitations: No  Skin sensation changes: No  sore throat: No  urgency: No  rash: No  shortness of breath: No  visual disturbance: No  weakness: Yes  impotence: No  penile discharge: No  Additional concerns today:: No  Duration of exercise:: 30-45 minutes  Objective      Vital signs:  Temp: 98.2  F (36.8  C) Temp src: Oral BP: 136/86 Pulse: 88   Resp: 12 SpO2: 99 %     Height: 185.4 cm (6' 1\") Weight: 79.4 kg (175 lb)  Estimated body mass index is 23.09 kg/m  as calculated from the following:    Height as of this encounter: 1.854 m (6' 1\").    Weight as of this encounter: 79.4 kg (175 lb).    Physical Exam  Constitutional:       Appearance: Normal appearance. He is normal weight.   HENT:      Nose: Nose normal.      Mouth/Throat:      Mouth: Mucous membranes are moist.      Pharynx: Oropharynx is clear.   Eyes:      Conjunctiva/sclera: Conjunctivae normal.      Pupils: Pupils are equal, round, and reactive to light.   Cardiovascular:      Rate and Rhythm: Normal rate and regular rhythm.      Pulses: Normal pulses.      Heart sounds: Normal heart " sounds.   Pulmonary:      Effort: Pulmonary effort is normal.      Breath sounds: Normal breath sounds.   Abdominal:      General: Bowel sounds are normal.      Palpations: Abdomen is soft.   Musculoskeletal:         General: Normal range of motion.      Cervical back: Normal range of motion and neck supple.      Comments: Decreased strength with left hip flexion against resistance, 4/5.   Neurological:      Mental Status: He is alert.        PLAN AND ASSESSMENT:   Patient's physical exam was normal. He had decreased strength against resistance with left sided hip and knee flexion. No abnormalities on right side lower extremities. IT is possible patient is experiencing patellar femoral pain syndrome secondary from AVN. Xray of knee ordered given history and chronic pain over his left knee. Patient recommended to partake in physical therapy which patient is open to to help manage pain until surgical procedure is possible and was referred to orthopedics.   Regarding his blood pressure, it appears to be well controlled despite missing doses every now and then. We decided to decrease his Lisinopril dose to 2.5mg to hopefully resolve his dizziness symptoms. If lab results return normal and patient still experiences symptoms, we can hold medication and take a drug holiday and refer to cardiology. If patient's blood pressure remains within goal with decreased dose, it is possible to still take a drug holiday in the future to determine if medication is still needed for his hypertension.   His ADHD appears to be well control and no changes to medication is needed at this time. Overall Sarbjit is implementing healthy changes and his weight is within normal range and not concerned at this time with weight loss. Patient encouraged to continue implementing healthy eating habits and recommend to follow up w/ pcp in 1 year for annual visit.

## 2021-12-22 LAB
CHOLEST SERPL-MCNC: 141 MG/DL
FASTING STATUS PATIENT QL REPORTED: YES
HDLC SERPL-MCNC: 54 MG/DL
LDLC SERPL CALC-MCNC: 78 MG/DL
NONHDLC SERPL-MCNC: 87 MG/DL
TRIGL SERPL-MCNC: 44 MG/DL

## 2021-12-23 LAB
AMPHET UR CFM-MCNC: 2300 NG/ML
AMPHET/CREAT UR: 3382 NG/MG {CREAT}
TSH SERPL DL<=0.005 MIU/L-ACNC: 1.51 MU/L (ref 0.4–4)

## 2022-01-06 ENCOUNTER — MYC MEDICAL ADVICE (OUTPATIENT)
Dept: FAMILY MEDICINE | Facility: CLINIC | Age: 32
End: 2022-01-06
Payer: COMMERCIAL

## 2022-01-06 DIAGNOSIS — F90.2 ATTENTION DEFICIT HYPERACTIVITY DISORDER (ADHD), COMBINED TYPE: Primary | ICD-10-CM

## 2022-01-06 NOTE — TELEPHONE ENCOUNTER
Routing to provider to notify of message below, please advise.     Mary Vick, VELASQUEZN, RN  [facility]

## 2022-01-07 NOTE — TELEPHONE ENCOUNTER
See [employer] messages, need pharmacy to send information to start PA process, did not find any pharmacy faxes, sent [employer] response  Esther Bowen, RN, BSN  [facility]

## 2022-01-11 ENCOUNTER — OFFICE VISIT (OUTPATIENT)
Dept: ORTHOPEDICS | Facility: CLINIC | Age: 32
End: 2022-01-11
Attending: PHYSICIAN ASSISTANT
Payer: COMMERCIAL

## 2022-01-11 ENCOUNTER — ANCILLARY PROCEDURE (OUTPATIENT)
Dept: GENERAL RADIOLOGY | Facility: CLINIC | Age: 32
End: 2022-01-11
Attending: ORTHOPAEDIC SURGERY
Payer: COMMERCIAL

## 2022-01-11 VITALS — DIASTOLIC BLOOD PRESSURE: 80 MMHG | WEIGHT: 175 LBS | SYSTOLIC BLOOD PRESSURE: 122 MMHG | BODY MASS INDEX: 23.09 KG/M2

## 2022-01-11 DIAGNOSIS — M25.552 BILATERAL HIP PAIN: Primary | ICD-10-CM

## 2022-01-11 DIAGNOSIS — M25.551 BILATERAL HIP PAIN: Primary | ICD-10-CM

## 2022-01-11 DIAGNOSIS — M19.90 INFLAMMATORY ARTHRITIS: ICD-10-CM

## 2022-01-11 DIAGNOSIS — M25.552 BILATERAL HIP PAIN: ICD-10-CM

## 2022-01-11 DIAGNOSIS — M87.00 AVN (AVASCULAR NECROSIS OF BONE) (H): ICD-10-CM

## 2022-01-11 DIAGNOSIS — M25.551 BILATERAL HIP PAIN: ICD-10-CM

## 2022-01-11 PROCEDURE — 73522 X-RAY EXAM HIPS BI 3-4 VIEWS: CPT | Performed by: RADIOLOGY

## 2022-01-11 PROCEDURE — 99203 OFFICE O/P NEW LOW 30 MIN: CPT | Performed by: ORTHOPAEDIC SURGERY

## 2022-01-11 ASSESSMENT — PATIENT HEALTH QUESTIONNAIRE - PHQ9: SUM OF ALL RESPONSES TO PHQ QUESTIONS 1-9: 5

## 2022-01-11 ASSESSMENT — HOOS S4: HOW SEVERE IS YOUR HIP JOINT STIFFNESS AFTER FIRST WAKENING IN THE MORNING?: MODERATE

## 2022-01-11 ASSESSMENT — ACTIVITIES OF DAILY LIVING (ADL)
ADL_SUM: 15
ADL_MEAN: .88
ADL_SUBSCALE_SCORE: 78
ADL_COUNT: 17

## 2022-01-11 NOTE — LETTER
1/11/2022         RE: Sarbjit Perez  [address]  [place] [address]        Dear Colleague,    Thank you for referring your patient, Sarbjit Perez, to the [facility] [place]. Please see a copy of my visit note below.    S: c/o of left hip pain and bilateral hip AVN. He states his left hip collapsed about 3 years ago and has had pain and arthritis since. He states his pain comes and goes and for the most part is achy and his femur will throb and feels heavy. Pain is a 6/10 at its worst. He denies anything that makes it better. He has tried PT.      Patient has a history of a few years of hip problems.  He had had issues with alcohol abuse a number of years ago and developed pancreatitis.  Since then he has stopped drinking he has lost quite a bit of weight and he has reduced his hemoglobin A1c remarkably.  He had visited the [facility] and had discussion about total hip arthroplasty for the left hip and core decompression for the right hip.  He has not had any interventions at this time.  Patient still has left hip groin and buttock pain.  At times she has intermittent right hip discomfort.  Otherwise doing well.  No recent history of chills or fever.         Patient Active Problem List   Diagnosis     Hx of diabetes mellitus     Alcohol dependence in remission (H)     Microalbuminuria     Essential hypertension, benign     Acute reaction to stress     Attention deficit hyperactivity disorder (ADHD), combined type     Chronic kidney disease, stage 1     AVN (avascular necrosis of bone) (H)     Chronic pain of left knee     Vitamin D deficiency            Past Medical History:   Diagnosis Date     Acute pancreatitis 10/20/2016     Alcohol abuse      Hx of diabetes mellitus 10/26/2016    Occurred post alcoholic pancreatitis. Resolved 1 yr later after alcohol cessation.       Hypertension 10.24.16            Past Surgical History:   Procedure Laterality Date      HERNIORRHAPHY INGUINAL Left 3/30/2018    Procedure: HERNIORRHAPHY INGUINAL;  Left Inguinal hernia repair with Mesh ;  Surgeon: Eduard Amaral MD;  Location: [facility] OR     wisdom teeth              Social History     Tobacco Use     Smoking status: Never Smoker     Smokeless tobacco: Never Used   Substance Use Topics     Alcohol use: No     Alcohol/week: 0.0 standard drinks            Family History   Problem Relation Age of Onset     Breast Cancer Mother      Hypertension Father      Diabetes Type 2  Paternal Grandfather      Hypertension Paternal Grandfather      Diabetes Paternal Grandfather      Other Cancer Maternal Grandmother              No Known Allergies         Current Outpatient Medications   Medication Sig Dispense Refill     amphetamine-dextroamphetamine (ADDERALL XR) 15 MG 24 hr capsule Take 1 capsule (15 mg) by mouth daily 30 capsule 0     [START ON 1/21/2022] amphetamine-dextroamphetamine (ADDERALL XR) 15 MG 24 hr capsule Take 1 capsule (15 mg) by mouth daily 30 capsule 0     [START ON 2/21/2022] amphetamine-dextroamphetamine (ADDERALL XR) 15 MG 24 hr capsule Take 1 capsule (15 mg) by mouth daily 30 capsule 0     amphetamine-dextroamphetamine (ADDERALL XR) 15 MG 24 hr capsule Take 1 capsule (15 mg) by mouth daily 30 capsule 0     amphetamine-dextroamphetamine (ADDERALL XR) 15 MG 24 hr capsule Take 1 capsule (15 mg) by mouth daily 30 capsule 0     lisinopril (ZESTRIL) 2.5 MG tablet Take 1 tablet (2.5 mg) by mouth daily 90 tablet 0     vitamin D3 (CHOLECALCIFEROL) 1.25 MG (90753 UT) capsule Take 1 capsule (50,000 Units) by mouth every 7 days 8 capsule 0          Review Of Systems  Skin: negative  Eyes: negative  Ears/Nose/Throat: negative  Respiratory: No shortness of breath, dyspnea on exertion, cough, or hemoptysis    O: Physical examination: Supple right hip without crepitus and no limits to internal rotation, external rotation, flexion, extension.  Circulation motor and sensory intact to both lower  extremities and no tension signs.  Left lower extremity patient is limited in internal rotation.  He also has some limitation to abduction.  Adequate flexion adequate extension.  No crepitus with motion.  Also neither of these maneuvers to right or left hip exacerbate his symptoms.    Laboratory: Vitamin D 25-hydroxy 15.  Urine albumin markedly elevated.  No recent inflammatory markers nor are there any arthritic profile studies.  Last uric acid was 2018.  Most recent hemoglobin A1c 5.3.    Images:IMPRESSION: Large area of chronic avascular necrosis in the left  femoral head. There has been mild collapse of the superior medial  articular surface with subchondral sclerosis in the femoral head. Mild  narrowing and hypertrophic change in the left hip joint are new.  Stable small area of chronic avascular necrosis in the right femoral  head. Normal alignment of the right hip joint. The SI joints are  unremarkable.         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  Stage I AVN right hip.  History of EtOH abuse as well as elevated hemoglobin A1c and pancreatitis but this has since resolved as he has changed his lifestyle.  He is also lost quite a bit of weight.  He is active and would like to continue with activity.  He had had discussion about total hip arthroplasty left lower extremity and core decompression right hip sometime ago but never followed through with intervention.    Plan: Patient is to have laboratories to include inflammatory markers and arthritic profile.  We will also have MRI scan of the pelvis to include both hips.  Would like to go ahead and stage the hips again at this point.  We will see him back after his studies.  He can return to activities as tolerated.  We discussed interventions such as total hip arthroplasty and core decompression.  Of also discussed utilization of aspirin with the history of avascular necrosis as this may help to prevent progression.  We have  also discussed his hypovitaminosis D and he is already taking 50,000 IU once weekly for 3 months but I will also suggest that he add 5000 IU of D3 daily.  We will see him back after studies.  He will notify us if there are any exacerbation of symptoms.           In addition to the above assessment and plan each active problem on Sarbjit's problem list was evaluated today. This included the questioning of Sarbjit for any medication problems. We will continue the current treatment plan for these active problems except as noted.        Again, thank you for allowing me to participate in the care of your patient.        Sincerely,        DILMA ADAMS MD

## 2022-01-11 NOTE — PATIENT INSTRUCTIONS
Thank you for choosing [facility]    Dr. Delaney Locations:    [facility] [address], [address]  [place], [place] [address] [place] [id number]   Appointments: [phone] Appointments: [phone]   Fax: [phone] Fax: [phone]     Dr. Delaney contact: [email or web address] - Please email with any questions.     Follow up: aft MRI and labs are completed to review findings.  Please call [phone] to schedule your follow up appointment.     An order for Labs has been placed. You can go to your [facility] location that has a lab. Please call your [facility] to schedule a lab appointment. [facility] has a walk-in hospital lab for your convenience.      An order for an MRI has been placed You may call directly to schedule at [phone] at your convenience.

## 2022-01-11 NOTE — TELEPHONE ENCOUNTER
See [employer] messages, ZB sent response for pt to confirm with prescription plan what is covered, pt has viewed [employer] message, will wait for pt to respond  Esther Bowen RN, BSN  [facility]

## 2022-01-13 ENCOUNTER — TELEPHONE (OUTPATIENT)
Dept: FAMILY MEDICINE | Facility: CLINIC | Age: 32
End: 2022-01-13
Payer: COMMERCIAL

## 2022-01-13 RX ORDER — DEXTROAMPHETAMINE SACCHARATE, AMPHETAMINE ASPARTATE, DEXTROAMPHETAMINE SULFATE AND AMPHETAMINE SULFATE 3.75; 3.75; 3.75; 3.75 MG/1; MG/1; MG/1; MG/1
15 TABLET ORAL 2 TIMES DAILY
Qty: 60 TABLET | Refills: 0 | Status: SHIPPED | OUTPATIENT
Start: 2022-01-13 | End: 2022-02-23

## 2022-01-13 NOTE — TELEPHONE ENCOUNTER
"See Mychart message, no fax found, called [facility], has [employer] now, informs covers Brand name but Brand name will need PA moving forward, \"should cover with a PA\", will fax now    Esther Bowen RN, BSN  [facility]    "

## 2022-01-13 NOTE — TELEPHONE ENCOUNTER
Discussed with [employer], will cancel PA, sent alternate, encounter closed  Esther Bowen RN, BSN  [facility]

## 2022-01-13 NOTE — TELEPHONE ENCOUNTER
[facility] received, started new encounter for PA and routed to PA pool, FYI to ENRIKE    Pharmacy Benefit Information:    PLEASE START PA FOR SUMIT RACHANAERALL    Provider: ENRIKE  Phone #: [phone]  ID#: [id number]  BIN: [id number]  PCN: [id number]  GRP: [id number]  Medication/SIG: ADDERALL XR 15 MG CAPSULE  Pharmacy: ROMEO Bowen RN, BSN  [facility]

## 2022-01-13 NOTE — TELEPHONE ENCOUNTER
Pharmacy Benefit Information:    PLEASE START PA FOR SUMIT ADDERALL PER [facility]    Provider: NERIKE  Phone #: [phone]  ID#: [id number]  BIN: [id number]  PCN: [id number]  GRP: [id number]  Medication/SIG: ADDERALL XR 15 MG CAPSULE  Pharmacy: [facility]    Esther Bowen RN, BSN  [facility]        \

## 2022-01-13 NOTE — TELEPHONE ENCOUNTER
See [employer] note, canceled PA, sent Mychart message updating pt, will monitor pt response, recommend Mychart message confirming receipt   Esther Bowen, RN, BSN  [facility]

## 2022-01-13 NOTE — TELEPHONE ENCOUNTER
PA will likely be denied. He has not tried/failed any other options. Will likely need to attempt IR dose. Sent this to pharmacy 15 mg bid. Would recommend he schedule follow up virtually with me after 1 month of this. If not managed as well as his XR, at this time, we will have reason to consider PA.     -noel chen, pac

## 2022-01-17 NOTE — TELEPHONE ENCOUNTER
Left detailed, message on VM informing rx change per insurance, f[facility], check Mychart messages, call us if ?'s  Esther Bowen, RN, BSN  [facility]

## 2022-01-26 ENCOUNTER — HOSPITAL ENCOUNTER (OUTPATIENT)
Dept: MRI IMAGING | Facility: CLINIC | Age: 32
Discharge: HOME | End: 2022-01-26
Attending: ORTHOPAEDIC SURGERY | Admitting: ORTHOPAEDIC SURGERY
Payer: COMMERCIAL

## 2022-01-26 DIAGNOSIS — M25.552 BILATERAL HIP PAIN: ICD-10-CM

## 2022-01-26 DIAGNOSIS — M25.551 BILATERAL HIP PAIN: ICD-10-CM

## 2022-01-26 DIAGNOSIS — M87.00 AVN (AVASCULAR NECROSIS OF BONE) (H): ICD-10-CM

## 2022-01-26 PROCEDURE — A9585 GADOBUTROL INJECTION: HCPCS | Performed by: ORTHOPAEDIC SURGERY

## 2022-01-26 PROCEDURE — 73723 MRI JOINT LWR EXTR W/O&W/DYE: CPT | Mod: 50

## 2022-01-26 PROCEDURE — 255N000002 HC RX 255 OP 636: Performed by: ORTHOPAEDIC SURGERY

## 2022-01-26 PROCEDURE — 73723 MRI JOINT LWR EXTR W/O&W/DYE: CPT | Mod: 26 | Performed by: RADIOLOGY

## 2022-01-26 RX ORDER — GADOBUTROL 604.72 MG/ML
8 INJECTION INTRAVENOUS ONCE
Status: COMPLETED | OUTPATIENT
Start: 2022-01-26 | End: 2022-01-26

## 2022-01-26 RX ADMIN — GADOBUTROL 8 ML: 604.72 INJECTION INTRAVENOUS at 08:57

## 2022-02-16 ENCOUNTER — OFFICE VISIT (OUTPATIENT)
Dept: FAMILY MEDICINE | Facility: CLINIC | Age: 32
End: 2022-02-16
Payer: COMMERCIAL

## 2022-02-16 VITALS
BODY MASS INDEX: 23.7 KG/M2 | SYSTOLIC BLOOD PRESSURE: 126 MMHG | OXYGEN SATURATION: 97 % | HEART RATE: 89 BPM | HEIGHT: 73 IN | DIASTOLIC BLOOD PRESSURE: 82 MMHG | TEMPERATURE: 98.3 F | WEIGHT: 178.8 LBS

## 2022-02-16 DIAGNOSIS — Z11.59 NEED FOR HEPATITIS C SCREENING TEST: Primary | ICD-10-CM

## 2022-02-16 PROCEDURE — 99213 OFFICE O/P EST LOW 20 MIN: CPT | Performed by: FAMILY MEDICINE

## 2022-02-16 NOTE — PROGRESS NOTES
"      Tyson Schaffer is a 31 year old who presents for the following health issues bulge in right inguinal ring reducible hernia,mildly tender     HPI     Pt has hernia x 3 weeks would like referral for surgery. Pt felt a pop when putting on shoes. He's had prior hernia surgery with Dr Amaral .          Review of Systems   Constitutional, HEENT, cardiovascular, pulmonary, GI, , musculoskeletal, neuro, skin, endocrine and psych systems are negative, except as otherwise noted.      Objective    /82   Pulse 89   Temp 98.3  F (36.8  C) (Oral)   Ht 1.854 m (6' 1\")   Wt 81.1 kg (178 lb 12.8 oz)   SpO2 97%   BMI 23.59 kg/m    Body mass index is 23.59 kg/m .  Physical Exam   GENERAL: healthy, alert and no distress  EYES: Eyes grossly normal to inspection, PERRL and conjunctivae and sclerae normal  HENT: ear canals and TM's normal, nose and mouth without ulcers or lesions  NECK: no adenopathy, no asymmetry, masses, or scars and thyroid normal to palpation  RESP: lungs clear to auscultation - no rales, rhonchi or wheezes  CV: regular rate and rhythm, normal S1 S2, no S3 or S4, no murmur, click or rub, no peripheral edema and peripheral pulses strong  ABDOMEN: soft, nontender, no hepatosplenomegaly, no masses and bowel sounds normal  MS: no gross musculoskeletal defects noted, no edema  SKIN: no suspicious lesions or rashes  NEURO: Normal strength and tone, mentation intact and speech normal  PSYCH: mentation appears normal, affect normal/bright    (Z11.59) Need for hepatitis C screening test  (primary encounter diagnosis)  Comment:   Plan: Adult General Surg Referral        Dr. Amaral                "

## 2022-02-23 ENCOUNTER — MYC REFILL (OUTPATIENT)
Dept: FAMILY MEDICINE | Facility: CLINIC | Age: 32
End: 2022-02-23
Payer: COMMERCIAL

## 2022-02-23 DIAGNOSIS — F90.2 ATTENTION DEFICIT HYPERACTIVITY DISORDER (ADHD), COMBINED TYPE: ICD-10-CM

## 2022-02-23 RX ORDER — DEXTROAMPHETAMINE SACCHARATE, AMPHETAMINE ASPARTATE, DEXTROAMPHETAMINE SULFATE AND AMPHETAMINE SULFATE 3.75; 3.75; 3.75; 3.75 MG/1; MG/1; MG/1; MG/1
15 TABLET ORAL 2 TIMES DAILY
Qty: 60 TABLET | Refills: 0 | Status: SHIPPED | OUTPATIENT
Start: 2022-02-23 | End: 2022-03-28

## 2022-02-23 NOTE — TELEPHONE ENCOUNTER
Routing refill request to provider for review/approval because:  Drug not on the [facility] refill protocol   Trish Graham RN, BSN, PHN  [facility]

## 2022-02-27 DIAGNOSIS — E55.9 VITAMIN D DEFICIENCY: ICD-10-CM

## 2022-02-28 ENCOUNTER — TELEPHONE (OUTPATIENT)
Dept: SURGERY | Facility: CLINIC | Age: 32
End: 2022-02-28

## 2022-02-28 ENCOUNTER — OFFICE VISIT (OUTPATIENT)
Dept: SURGERY | Facility: CLINIC | Age: 32
End: 2022-02-28
Payer: COMMERCIAL

## 2022-02-28 VITALS
RESPIRATION RATE: 16 BRPM | HEIGHT: 73 IN | OXYGEN SATURATION: 97 % | HEART RATE: 72 BPM | WEIGHT: 178 LBS | SYSTOLIC BLOOD PRESSURE: 124 MMHG | BODY MASS INDEX: 23.59 KG/M2 | DIASTOLIC BLOOD PRESSURE: 80 MMHG

## 2022-02-28 DIAGNOSIS — K40.90 RIGHT INGUINAL HERNIA: Primary | ICD-10-CM

## 2022-02-28 DIAGNOSIS — Z11.59 ENCOUNTER FOR SCREENING FOR OTHER VIRAL DISEASES: Primary | ICD-10-CM

## 2022-02-28 PROCEDURE — 99204 OFFICE O/P NEW MOD 45 MIN: CPT | Performed by: SURGERY

## 2022-02-28 NOTE — PROGRESS NOTES
HPI:  Sarbjit is a 31 year old male who presents for evaluation of right groin bulge.  Symptoms began  4 weeks  ago.  This is described as aching and pressure.  The pain occurs with prolonged standing and lifting.  Associated symptoms include none. The patient has noticed a bulge. The patient has not had a previous herniorrhaphy in this location, had a LIH repaired with [facility] in 2018. Employment does not require heavy lifting. He also has bilateral hip avascular necrosis but has never been on steroids.    Constipation: No  Colonoscopy: No   Dysuria: No  Cough: No  Diabetes: borderline    Past Medical History:   has a past medical history of Acute pancreatitis (10/20/2016), Alcohol abuse, diabetes mellitus (10/26/2016), and Hypertension (10.24.16).    Past Surgical History:  Past Surgical History:   Procedure Laterality Date     HERNIORRHAPHY INGUINAL Left 3/30/2018    Procedure: HERNIORRHAPHY INGUINAL;  Left Inguinal hernia repair with Mesh ;  Surgeon: Eduard Amaral MD;  Location: [facility] OR     wisdom teeth        Additional abdominal operations: none    Social History:  Social History     Socioeconomic History     Marital status:      Spouse name: Kalie     Number of children: 2     Years of education: Not on file     Highest education level: Bachelor's degree (e.g., BA, AB, BS)   Occupational History     Occupation: controller/finance     Comment: [employer]   Tobacco Use     Smoking status: Never Smoker     Smokeless tobacco: Never Used   Substance and Sexual Activity     Alcohol use: No     Alcohol/week: 0.0 standard drinks     Drug use: No     Sexual activity: Yes     Partners: Female     Birth control/protection: Pill   Other Topics Concern     Parent/sibling w/ CABG, MI or angioplasty before 65F 55M? No   Social History Narrative     Not on file     Social Determinants of Health     Financial Resource Strain: Low Risk      Difficulty of Paying Living Expenses: Not hard at all   Food  Insecurity: No Food Insecurity     Worried About Running Out of Food in the Last Year: Never true     Ran Out of Food in the Last Year: Never true   Transportation Needs: No Transportation Needs     Lack of Transportation (Medical): No     Lack of Transportation (Non-Medical): No   Physical Activity: Insufficiently Active     Days of Exercise per Week: 3 days     Minutes of Exercise per Session: 30 min   Stress: Stress Concern Present     Feeling of Stress : To some extent   Social Connections: Moderately Isolated     Frequency of Communication with Friends and Family: More than three times a week     Frequency of Social Gatherings with Friends and Family: Once a week     Attends Roman [employer] Services: Never     Active Member of Clubs or Organizations: No     Attends Club or Organization Meetings: Not on file     Marital Status:    Intimate Partner Violence: Not on file   Housing Stability: Low Risk      Unable to Pay for Housing in the Last Year: No     Number of Places Lived in the Last Year: 1     Unstable Housing in the Last Year: No        Family History:  Family History   Problem Relation Age of Onset     Breast Cancer Mother      Hypertension Father      Diabetes Type 2  Paternal Grandfather      Hypertension Paternal Grandfather      Diabetes Paternal Grandfather      Other Cancer Maternal Grandmother      Hernias: No    ROS:  The 10 point review of systems is negative other than noted in the HPI and above.    PE:    General- Well-developed, well-nourished, patient able to get up on table without difficulty.  HEENT- Normocephalic and atraumatic. Pupils equal and round.  Mucous membranes moist.  Sclera are nonicteric.  Neck- No lymphadenopathy or masses   Respirations- are regular and non labored  Abdomen is abdomen is soft without significant tenderness, masses, organomegaly or guarding  Hernia- Left inguinal hernia is not present with valsalva              Right inguinal hernia is present with  valsalva              The hernia is reducible              Testicles are normal              Varix- bilateral present    Assesment: Right inguinal hernia, reducible    Plan:    We have discussed observation, reduction techniques and importance, incarceration and strangulation signs, symptoms and importance as well as need to seek emergency treatment.    We have discussed surgery in detail, including risk, benefits, complications, mesh, infection, nerve and cord damage and their sequelae including chronic pain and testicular loss, lifting and activity limits after surgery. We also discussed laparoscopic surgery including incision placement, transabdominal surgery, intra-abdominal injury and adhesion formation as well as less pain and quicker recovery. He did very well with his previous anterior approach and wishes to repeat this on the right. He has been given literature to review. We will schedule surgery at patient's convenience.    45 minutes spent on the date of encounter and chart review, review will of test results, patient visit, physical exam, education, counseling, development of care plan and documentation.    Eduard Amaral MD    Please route or send letter to:  Primary Care Provider (PCP) and Include Progress Note

## 2022-02-28 NOTE — LETTER
[facility]    [address], [address]  [place], [place] [address]  Phone [phone]  Fax [phone] [address], [address]  [place] [address]  Phone [phone]  Fax [phone]    [email or web address]"         Sarbjit Perez      You have been scheduled for a COVID-19 testing appointment at [facility].    3-14-22 at 11:00 AM     The clinic is located at:  [address] [place], [place] [address]    Please wear a mask or face covering to the testing site.      Following your testing, you will be required to self-isolate until your surgery.  If you need a note for your employer due to this, please let us know.

## 2022-02-28 NOTE — TELEPHONE ENCOUNTER
Type of surgery: OPEN RIGHT INGUINAL HERNIA REPAIR WITH MESH  Location of surgery: [facility]  Date and time of surgery: 3-18-22, 7:30 AM   Surgeon: DR. CATALINA Torres  Pre-Op Appt Date: PATIENT TO SCHEDULE   Post-Op Appt Date: PATIENT TO SCHEDULE    Packet sent out: GIVEN TO PATIENT   Pre-cert/Authorization completed:  Not Applicable  Date: 2-28-22      OPEN RIGHT INGUINAL HERNIA REPAIR WITH MESH    GENERAL   PT INST TO HAVE H&P WITH DR. VILLAGOMEZ  90 MINS REQ  PA ASSIST CELINA EARLY

## 2022-02-28 NOTE — LETTER
2022    RE: Sarbjit Perez, : 1990      HPI:  Sarbjit is a 31 year old male who presents for evaluation of right groin bulge.  Symptoms began  4 weeks  ago.  This is described as aching and pressure.  The pain occurs with prolonged standing and lifting.  Associated symptoms include none. The patient has noticed a bulge. The patient has not had a previous herniorrhaphy in this location, had a LIH repaired with [facility] in 2018. Employment does not require heavy lifting. He also has bilateral hip avascular necrosis but has never been on steroids.     Constipation: No  Colonoscopy: No   Dysuria: No  Cough: No  Diabetes: borderline     Past Medical History:  Has a past medical history of Acute pancreatitis (10/20/2016), Alcohol abuse, diabetes mellitus (10/26/2016), and Hypertension (10.24.16).     Additional abdominal operations: none     Hernias: No     ROS:  The 10 point review of systems is negative other than noted in the HPI and above.     PE:    General- Well-developed, well-nourished, patient able to get up on table without difficulty.  HEENT- Normocephalic and atraumatic. Pupils equal and round.  Mucous membranes moist.  Sclera are nonicteric.  Neck- No lymphadenopathy or masses   Respirations- are regular and non labored  Abdomen is abdomen is soft without significant tenderness, masses, organomegaly or guarding  Hernia- Left inguinal hernia is not present with valsalva              Right inguinal hernia is present with valsalva              The hernia is reducible              Testicles are normal              Varix- bilateral present     Assesment: Right inguinal hernia, reducible     Plan:    We have discussed observation, reduction techniques and importance, incarceration and strangulation signs, symptoms and importance as well as need to seek emergency treatment.    We have discussed surgery in detail, including risk, benefits, complications, mesh, infection, nerve and cord damage and their  sequelae including chronic pain and testicular loss, lifting and activity limits after surgery. We also discussed laparoscopic surgery including incision placement, transabdominal surgery, intra-abdominal injury and adhesion formation as well as less pain and quicker recovery. He did very well with his previous anterior approach and wishes to repeat this on the right. He has been given literature to review. We will schedule surgery at patient's convenience.       Eduard Amaral MD

## 2022-03-01 RX ORDER — CHOLECALCIFEROL (VITAMIN D3) 1250 MCG
CAPSULE ORAL
Qty: 4 CAPSULE | Refills: 1 | OUTPATIENT
Start: 2022-03-01

## 2022-03-01 NOTE — TELEPHONE ENCOUNTER
Routing refill request to provider for review/approval because:  Fail: high dose of Vitamin D    Seven CURTIS RN

## 2022-03-01 NOTE — TELEPHONE ENCOUNTER
No meant for long term. Was to follow up 2 months after last test to recheck value. This was future ordered in dec. Needing lab only.     -noel chen, pac

## 2022-03-03 ENCOUNTER — LAB (OUTPATIENT)
Dept: LAB | Facility: CLINIC | Age: 32
End: 2022-03-03
Payer: COMMERCIAL

## 2022-03-03 DIAGNOSIS — M25.551 BILATERAL HIP PAIN: ICD-10-CM

## 2022-03-03 DIAGNOSIS — E55.9 VITAMIN D DEFICIENCY: ICD-10-CM

## 2022-03-03 DIAGNOSIS — M19.90 INFLAMMATORY ARTHRITIS: ICD-10-CM

## 2022-03-03 DIAGNOSIS — M25.552 BILATERAL HIP PAIN: ICD-10-CM

## 2022-03-03 DIAGNOSIS — Z11.59 NEED FOR HEPATITIS C SCREENING TEST: ICD-10-CM

## 2022-03-03 DIAGNOSIS — M87.00 AVN (AVASCULAR NECROSIS OF BONE) (H): ICD-10-CM

## 2022-03-03 LAB
CRP SERPL-MCNC: <2.9 MG/L (ref 0–8)
D DIMER PPP FEU-MCNC: <0.27 UG/ML FEU (ref 0–0.5)
ERYTHROCYTE [SEDIMENTATION RATE] IN BLOOD BY WESTERGREN METHOD: 7 MM/HR (ref 0–15)

## 2022-03-03 PROCEDURE — 86038 ANTINUCLEAR ANTIBODIES: CPT | Performed by: ORTHOPAEDIC SURGERY

## 2022-03-03 PROCEDURE — 86140 C-REACTIVE PROTEIN: CPT | Performed by: ORTHOPAEDIC SURGERY

## 2022-03-03 PROCEDURE — 84550 ASSAY OF BLOOD/URIC ACID: CPT | Performed by: ORTHOPAEDIC SURGERY

## 2022-03-03 PROCEDURE — 85379 FIBRIN DEGRADATION QUANT: CPT | Performed by: ORTHOPAEDIC SURGERY

## 2022-03-03 PROCEDURE — 86431 RHEUMATOID FACTOR QUANT: CPT

## 2022-03-03 PROCEDURE — 82306 VITAMIN D 25 HYDROXY: CPT

## 2022-03-03 PROCEDURE — 36415 COLL VENOUS BLD VENIPUNCTURE: CPT | Performed by: ORTHOPAEDIC SURGERY

## 2022-03-03 PROCEDURE — 86200 CCP ANTIBODY: CPT

## 2022-03-03 PROCEDURE — 85652 RBC SED RATE AUTOMATED: CPT

## 2022-03-03 PROCEDURE — 86803 HEPATITIS C AB TEST: CPT

## 2022-03-03 PROCEDURE — 86225 DNA ANTIBODY NATIVE: CPT | Performed by: ORTHOPAEDIC SURGERY

## 2022-03-04 LAB
ANA SER QL IF: NEGATIVE
DEPRECATED CALCIDIOL+CALCIFEROL SERPL-MC: 86 UG/L (ref 20–75)
DSDNA AB SER-ACNC: 0.8 IU/ML
HCV AB SERPL QL IA: NONREACTIVE
RHEUMATOID FACT SER NEPH-ACNC: <6 IU/ML
URATE SERPL-MCNC: 4 MG/DL (ref 3.5–7.2)

## 2022-03-07 LAB — CCP AB SER IA-ACNC: 1.6 U/ML

## 2022-03-14 ENCOUNTER — LAB (OUTPATIENT)
Dept: LAB | Facility: CLINIC | Age: 32
End: 2022-03-14
Payer: COMMERCIAL

## 2022-03-14 DIAGNOSIS — Z11.59 ENCOUNTER FOR SCREENING FOR OTHER VIRAL DISEASES: ICD-10-CM

## 2022-03-14 PROCEDURE — U0005 INFEC AGEN DETEC AMPLI PROBE: HCPCS

## 2022-03-14 PROCEDURE — U0003 INFECTIOUS AGENT DETECTION BY NUCLEIC ACID (DNA OR RNA); SEVERE ACUTE RESPIRATORY SYNDROME CORONAVIRUS 2 (SARS-COV-2) (CORONAVIRUS DISEASE [COVID-19]), AMPLIFIED PROBE TECHNIQUE, MAKING USE OF HIGH THROUGHPUT TECHNOLOGIES AS DESCRIBED BY CMS-2020-01-R: HCPCS

## 2022-03-14 RX ORDER — ASPIRIN 81 MG/1
81 TABLET ORAL DAILY
COMMUNITY
End: 2023-11-28

## 2022-03-15 ENCOUNTER — OFFICE VISIT (OUTPATIENT)
Dept: FAMILY MEDICINE | Facility: CLINIC | Age: 32
End: 2022-03-15
Payer: COMMERCIAL

## 2022-03-15 VITALS
HEIGHT: 73 IN | OXYGEN SATURATION: 100 % | DIASTOLIC BLOOD PRESSURE: 86 MMHG | BODY MASS INDEX: 23.46 KG/M2 | SYSTOLIC BLOOD PRESSURE: 145 MMHG | HEART RATE: 81 BPM | WEIGHT: 177 LBS | TEMPERATURE: 98 F

## 2022-03-15 DIAGNOSIS — Z01.818 PREOP GENERAL PHYSICAL EXAM: Primary | ICD-10-CM

## 2022-03-15 PROBLEM — E11.9 TYPE 2 DIABETES MELLITUS WITHOUT COMPLICATION (H): Status: ACTIVE | Noted: 2018-06-08

## 2022-03-15 PROBLEM — M87.059 AVASCULAR NECROSIS OF FEMORAL HEAD (H): Status: ACTIVE | Noted: 2018-06-13

## 2022-03-15 LAB
BASOPHILS # BLD AUTO: 0 10E3/UL (ref 0–0.2)
BASOPHILS NFR BLD AUTO: 0 %
EOSINOPHIL # BLD AUTO: 0.1 10E3/UL (ref 0–0.7)
EOSINOPHIL NFR BLD AUTO: 1 %
ERYTHROCYTE [DISTWIDTH] IN BLOOD BY AUTOMATED COUNT: 13.7 % (ref 10–15)
HCT VFR BLD AUTO: 40.3 % (ref 40–53)
HGB BLD-MCNC: 13.1 G/DL (ref 13.3–17.7)
LYMPHOCYTES # BLD AUTO: 1.5 10E3/UL (ref 0.8–5.3)
LYMPHOCYTES NFR BLD AUTO: 34 %
MCH RBC QN AUTO: 28.6 PG (ref 26.5–33)
MCHC RBC AUTO-ENTMCNC: 32.5 G/DL (ref 31.5–36.5)
MCV RBC AUTO: 88 FL (ref 78–100)
MONOCYTES # BLD AUTO: 0.3 10E3/UL (ref 0–1.3)
MONOCYTES NFR BLD AUTO: 7 %
NEUTROPHILS # BLD AUTO: 2.6 10E3/UL (ref 1.6–8.3)
NEUTROPHILS NFR BLD AUTO: 57 %
PLATELET # BLD AUTO: 199 10E3/UL (ref 150–450)
RBC # BLD AUTO: 4.58 10E6/UL (ref 4.4–5.9)
SARS-COV-2 RNA RESP QL NAA+PROBE: NEGATIVE
WBC # BLD AUTO: 4.5 10E3/UL (ref 4–11)

## 2022-03-15 PROCEDURE — 85025 COMPLETE CBC W/AUTO DIFF WBC: CPT | Performed by: FAMILY MEDICINE

## 2022-03-15 PROCEDURE — 36415 COLL VENOUS BLD VENIPUNCTURE: CPT | Performed by: FAMILY MEDICINE

## 2022-03-15 PROCEDURE — 99214 OFFICE O/P EST MOD 30 MIN: CPT | Performed by: FAMILY MEDICINE

## 2022-03-15 PROCEDURE — 80048 BASIC METABOLIC PNL TOTAL CA: CPT | Performed by: FAMILY MEDICINE

## 2022-03-15 ASSESSMENT — ANXIETY QUESTIONNAIRES
5. BEING SO RESTLESS THAT IT IS HARD TO SIT STILL: NOT AT ALL
7. FEELING AFRAID AS IF SOMETHING AWFUL MIGHT HAPPEN: NOT AT ALL
6. BECOMING EASILY ANNOYED OR IRRITABLE: NOT AT ALL
GAD7 TOTAL SCORE: 0
2. NOT BEING ABLE TO STOP OR CONTROL WORRYING: NOT AT ALL
GAD7 TOTAL SCORE: 0
7. FEELING AFRAID AS IF SOMETHING AWFUL MIGHT HAPPEN: NOT AT ALL
3. WORRYING TOO MUCH ABOUT DIFFERENT THINGS: NOT AT ALL
GAD7 TOTAL SCORE: 0
4. TROUBLE RELAXING: NOT AT ALL
1. FEELING NERVOUS, ANXIOUS, OR ON EDGE: NOT AT ALL

## 2022-03-15 ASSESSMENT — PATIENT HEALTH QUESTIONNAIRE - PHQ9
SUM OF ALL RESPONSES TO PHQ QUESTIONS 1-9: 5
10. IF YOU CHECKED OFF ANY PROBLEMS, HOW DIFFICULT HAVE THESE PROBLEMS MADE IT FOR YOU TO DO YOUR WORK, TAKE CARE OF THINGS AT HOME, OR GET ALONG WITH OTHER PEOPLE: NOT DIFFICULT AT ALL
SUM OF ALL RESPONSES TO PHQ QUESTIONS 1-9: 5

## 2022-03-15 NOTE — PROGRESS NOTES
[facility]  [id number] [place] [id number]  Phone: [phone]  Primary Provider: Bill Mg  Pre-op Performing Provider: DANIELLE JIMENEZ      PREOPERATIVE EVALUATION:  Today's date: 3/15/2022    Sarbjit Perez is a 31 year old male who presents for a preoperative evaluation.    Surgical Information:  Surgery/Procedure: HERNIORRHAPHY, INGUINAL, OPEN, with mesh, Right  Surgery Location: [facility]  Surgeon: Eduard Amaral MD  Surgery Date: 3/18/2022  Time of Surgery: 7:30 AM  Where patient plans to recover: At home with family  Fax number for surgical facility: Note does not need to be faxed, will be available electronically in Epic.  Answers for HPI/ROS submitted by the patient on 3/15/2022  If you checked off any problems, how difficult have these problems made it for you to do your work, take care of things at home, or get along with other people?: Not difficult at all  PHQ9 TOTAL SCORE: 5  SERA 7 TOTAL SCORE: 0      Type of Anesthesia Anticipated: General    Assessment & Plan     The proposed surgical procedure is considered LOW risk.    Preop general physical exam  For right inguinal hernia  Repair        REVIEW OF SYSTEMS    Generally has been  feeling well until this episode. No problems with vision, hearing, dental or neck pain.Has no airborne or ingestion allergy  No chest pain, palpitations, dyspnea, change in bowel habits, blood  in stool or dyspepsia.  No rashes, changing moles, weakness, lassitude or back problems.  No chronic issues . No dysuria  Patient not  a smoker. No problems with significant headaches.  Other  Operations: left inguinal hernai              RECOMMENDATION:  APPROVAL GIVEN to proceed with proposed procedure, without further diagnostic evaluation.      schedul    Subjective     HPI related to upcoming procedure: right inguinal  Hernia     Preop Questions 3/15/2022   1. Have you ever had a heart  attack or stroke? No   2. Have you ever had surgery on your heart or blood vessels, such as a stent placement, a coronary artery bypass, or surgery on an artery in your head, neck, heart, or legs? No   3. Do you have chest pain with activity? No   4. Do you have a history of  heart failure? No   5. Do you currently have a cold, bronchitis or symptoms of other infection? No   6. Do you have a cough, shortness of breath, or wheezing? No   7. Do you or anyone in your family have previous history of blood clots? No   8. Do you or does anyone in your family have a serious bleeding problem such as prolonged bleeding following surgeries or cuts? No   9. Have you ever had problems with anemia or been told to take iron pills? No   10. Have you had any abnormal blood loss such as black, tarry or bloody stools? No   11. Have you ever had a blood transfusion? No   12. Are you willing to have a blood transfusion if it is medically needed before, during, or after your surgery? Yes   13. Have you or any of your relatives ever had problems with anesthesia? No   14. Do you have sleep apnea, excessive snoring or daytime drowsiness? No   15. Do you have any artifical heart valves or other implanted medical devices like a pacemaker, defibrillator, or continuous glucose monitor? No   16. Do you have artificial joints? No   17. Are you allergic to latex? No       Health Care Directive:  Patient does not have a Health Care Directive or Living Will:     Preoperative Review of :   reviewed - controlled substances reflected in medication list.adderall  adder        Review of Systems     REVIEW OF SYSTEMS    Generally has been  feeling well until this episode. No problems with vision, hearing, dental or neck pain.Has mild airborne or ingestion allergy  No chest pain, palpitations, dyspnea, change in bowel habits, blood  in stool or dyspepsia.  No rashes, changing moles, weakness, lassitude or back problems.  No chronic issues . No  dysuria  Patient not  a smoker. No problems with significant headaches  His hernia is painfless most of the time   .    Patient Active Problem List    Diagnosis Date Noted     Chronic kidney disease, stage 1 12/21/2021     Priority: Medium     AVN (avascular necrosis of bone) (H) 12/21/2021     Priority: Medium     Chronic pain of left knee 12/21/2021     Priority: Medium     Vitamin D deficiency 12/21/2021     Priority: Medium     Attention deficit hyperactivity disorder (ADHD), combined type 06/24/2020     Priority: Medium     Patient is followed by [facility] for ongoing prescription of stimulants.  All refills should be approved by this provider, or covering partner.    Medication(s): adderall 15 mg xr.   Maximum quantity per month: 30  Clinic visit frequency required: Q 6  months     Controlled substance agreement on file: Yes       Date(s): 9/2020  Neuropsych evaluation for ADD completed:  YES, reviewed and on file    Last [facility] website verification:  done on 3/16/21  [email or web address]           Acute reaction to stress 10/07/2019     Priority: Medium     Avascular necrosis of femoral head (H) 06/13/2018     Priority: Medium     Type 2 diabetes mellitus without complication (H) 06/08/2018     Priority: Medium     Microalbuminuria 10/26/2017     Priority: Medium     Essential hypertension, benign 10/26/2017     Priority: Medium     Hx of diabetes mellitus 10/26/2016     Priority: Medium     Occurred post alcoholic pancreatitis. Resolved 1 yr later after alcohol cessation.         Alcohol dependence in remission (H) 10/26/2016     Priority: Medium      Past Medical History:   Diagnosis Date     Acute pancreatitis 10/20/2016     Alcohol abuse      Hx of diabetes mellitus 10/26/2016    Occurred post alcoholic pancreatitis. Resolved 1 yr later after alcohol cessation.       Hypertension 10.24.16     Other chronic pain     Avascular necrosis in both hips     Pancreatic disease      "pancreatitis - 2016     Type 2 diabetes mellitus without complication (H) 6/8/2018     Past Surgical History:   Procedure Laterality Date     HERNIORRHAPHY INGUINAL Left 3/30/2018    Procedure: HERNIORRHAPHY INGUINAL;  Left Inguinal hernia repair with Mesh ;  Surgeon: Eduard Amaral MD;  Location: [facility] OR     wisdom teeth       Current Outpatient Medications   Medication Sig Dispense Refill     amphetamine-dextroamphetamine (ADDERALL) 15 MG tablet Take 1 tablet (15 mg) by mouth 2 times daily 60 tablet 0     aspirin 81 MG EC tablet Take 81 mg by mouth daily       Cholecalciferol (VITAMIN D-3) 125 MCG (5000 UT) TABS Take 1 tablet by mouth daily       lisinopril (ZESTRIL) 2.5 MG tablet Take 1 tablet (2.5 mg) by mouth daily 90 tablet 0     Multiple Vitamin (MULTI VITAMIN MENS PO) Take 1 tablet by mouth         No Known Allergies     Social History     Tobacco Use     Smoking status: Never Smoker     Smokeless tobacco: Never Used   Substance Use Topics     Alcohol use: No     Alcohol/week: 0.0 standard drinks     Family History   Problem Relation Age of Onset     Breast Cancer Mother      Hypertension Father      Diabetes Type 2  Paternal Grandfather      Hypertension Paternal Grandfather      Diabetes Paternal Grandfather      Other Cancer Maternal Grandmother      History   Drug Use No         Objective     BP (!) 145/86 (BP Location: Right arm, Patient Position: Chair, Cuff Size: Adult Large)   Pulse 81   Temp 98  F (36.7  C) (Oral)   Ht 1.854 m (6' 1\")   Wt 80.3 kg (177 lb)   SpO2 100%   BMI 23.35 kg/m      Physical Exam    GENERAL APPEARANCE: healthy, alert and no distress     EYES: EOMI,  PERRL     HENT: ear canals and TM's normal and nose and mouth without ulcers or lesions     NECK: no adenopathy, no asymmetry, masses, or scars and thyroid normal to palpation     RESP: lungs clear to auscultation - no rales, rhonchi or wheezes     CV: regular rates and rhythm, normal S1 S2, no S3 or S4 and no murmur, click " or rub     ABDOMEN:  soft, nontender, no HSM or masses and bowel sounds normal     MS: extremities normal- no gross deformities noted, no evidence of inflammation in joints, FROM in all extremities.     SKIN: no suspicious lesions or rashes     NEURO: Normal strength and tone, sensory exam grossly normal, mentation intact and speech normal     PSYCH: mentation appears normal. and affect normal/bright     LYMPHATICS: No cervical adenopathy    Recent Labs   Lab Test 12/21/21  0936 09/01/20  1044   HGB 13.5  --      --     137   POTASSIUM 4.1 4.4   CR 0.80 0.92   A1C 5.3 5.4        Diagnostics:  Labs pending at this time.  Results will be reviewed when available.   No EKG required for low risk surgery (cataract, skin procedure, breast biopsy, etc).    Revised Cardiac Risk Index (RCRI):  The patient has the following serious cardiovascular risks for perioperative complications:   - No serious cardiac risks = 0 points     RCRI Interpretation: 1 point: Class II (low risk - 0.9% complication rate)    Fit for surgery without further testing        Signed Electronically by: Rolando Alcantara MD  Copy of this evaluation report is provided to requesting physician.

## 2022-03-15 NOTE — PATIENT INSTRUCTIONS
Preparing for Your Surgery  Getting started  A nurse will call you to review your health history and instructions. They will give you an arrival time based on your scheduled surgery time. Please be ready to share:    Your doctor's clinic name and phone number    Your medical, surgical and anesthesia history    A list of allergies and sensitivities    A list of medicines, including herbal treatments and over-the-counter drugs    Whether the patient has a legal guardian (ask how to send us the papers in advance)  Please tell us if you're pregnant--or if there's any chance you might be pregnant. Some surgeries may injure a fetus (unborn baby), so they require a pregnancy test. Surgeries that are safe for a fetus don't always need a test, and you can choose whether to have one.   If you have a child who's having surgery, please ask for a copy of Preparing for Your Child's Surgery.    Preparing for surgery    Within 30 days of surgery: Have a pre-op exam (sometimes called an H&P, or History and Physical). This can be done at a clinic or pre-operative center.  ? If you're having a , you may not need this exam. Talk to your care team.    At your pre-op exam, talk to your care team about all medicines you take. If you need to stop any medicines before surgery, ask when to start taking them again.  ? We do this for your safety. Many medicines can make you bleed too much during surgery. Some change how well surgery (anesthesia) drugs work.    Call your insurance company to let them know you're having surgery. (If you don't have insurance, call [phone].)    Call your clinic if there's any change in your health. This includes signs of a cold or flu (sore throat, runny nose, cough, rash, fever). It also includes a scrape or scratch near the surgery site.    If you have questions on the day of surgery, call your hospital or surgery center.  COVID testing  You may need to be tested for COVID-19 before having  surgery. If so, your surgical team will give you instructions for scheduling this test, separate from your preoperative history and physical.  Eating and drinking guidelines  For your safety: Unless your surgeon tells you otherwise, follow the guidelines below.    Eat and drink as usual until 8 hours before surgery. After that, no food or milk.    Drink clear liquids until 2 hours before surgery. These are liquids you can see through, like water, Gatorade and Propel Water. You may also have black coffee and tea (no cream or milk).    Nothing by mouth within 2 hours of surgery. This includes gum, candy and breath mints.    If you drink alcohol: Stop drinking it the night before surgery.    If your care team tells you to take medicine on the morning of surgery, it's okay to take it with a sip of water.  Preventing infection    Shower or bathe the night before and morning of your surgery. Follow the instructions your clinic gave you. (If no instructions, use regular soap.)    Don't shave or clip hair near your surgery site. We'll remove the hair if needed.    Don't smoke or vape the morning of surgery. You may chew nicotine gum up to 2 hours before surgery. A nicotine patch is okay.  ? Note: Some surgeries require you to completely quit smoking and nicotine. Check with your surgeon.    Your care team will make every effort to keep you safe from infection. We will:  ? Clean our hands often with soap and water (or an alcohol-based hand rub).  ? Clean the skin at your surgery site with a special soap that kills germs.  ? Give you a special gown to keep you warm. (Cold raises the risk of infection.)  ? Wear special hair covers, masks, gowns and gloves during surgery.  ? Give antibiotic medicine, if prescribed. Not all surgeries need antibiotics.  What to bring on the day of surgery    Photo ID and insurance card    Copy of your health care directive, if you have one    Glasses and hearing aides (bring cases)  ? You can't  wear contacts during surgery    Inhaler and eye drops, if you use them (tell us about these when you arrive)    CPAP machine or breathing device, if you use them    A few personal items, if spending the night    If you have . . .  ? A pacemaker, ICD (cardiac defibrillator) or other implant: Bring the ID card.  ? An implanted stimulator: Bring the remote control.  ? A legal guardian: Bring a copy of the certified (court-stamped) guardianship papers.  Please remove any jewelry, including body piercings. Leave jewelry and other valuables at home.  If you're going home the day of surgery    You must have a responsible adult drive you home. They should stay with you overnight as well.    If you don't have someone to stay with you, and you aren't safe to go home alone, we may keep you overnight. Insurance often won't pay for this.  After surgery  If it's hard to control your pain or you need more pain medicine, please call your surgeon's office.  Questions?   If you have any questions for your care team, list them here: _________________________________________________________________________________________________________________________________________________________________________ ____________________________________ ____________________________________ ____________________________________  For informational purposes only. Not to replace the advice of your health care provider. Copyright   2003, 2019 [facility]. All rights reserved. Clinically reviewed by Nakita Pittman MD. [employer] [id number] - REV 07/21.

## 2022-03-16 DIAGNOSIS — R80.9 MICROALBUMINURIA: ICD-10-CM

## 2022-03-16 LAB
ANION GAP SERPL CALCULATED.3IONS-SCNC: 10 MMOL/L (ref 3–14)
BUN SERPL-MCNC: 12 MG/DL (ref 7–30)
CALCIUM SERPL-MCNC: 9.6 MG/DL (ref 8.5–10.1)
CHLORIDE BLD-SCNC: 106 MMOL/L (ref 94–109)
CO2 SERPL-SCNC: 23 MMOL/L (ref 20–32)
CREAT SERPL-MCNC: 0.92 MG/DL (ref 0.66–1.25)
GFR SERPL CREATININE-BSD FRML MDRD: >90 ML/MIN/1.73M2
GLUCOSE BLD-MCNC: 96 MG/DL (ref 70–99)
POTASSIUM BLD-SCNC: 4.5 MMOL/L (ref 3.4–5.3)
SODIUM SERPL-SCNC: 139 MMOL/L (ref 133–144)

## 2022-03-16 ASSESSMENT — PATIENT HEALTH QUESTIONNAIRE - PHQ9: SUM OF ALL RESPONSES TO PHQ QUESTIONS 1-9: 5

## 2022-03-16 ASSESSMENT — ANXIETY QUESTIONNAIRES: GAD7 TOTAL SCORE: 0

## 2022-03-17 RX ORDER — LISINOPRIL 2.5 MG/1
TABLET ORAL
Qty: 90 TABLET | Refills: 0 | Status: SHIPPED | OUTPATIENT
Start: 2022-03-17 | End: 2022-06-23

## 2022-03-17 NOTE — TELEPHONE ENCOUNTER
Elevated at recent preop. Please call after his surgery and update over the phone.   -noel chen, pac

## 2022-03-17 NOTE — CONFIDENTIAL NOTE
Routing refill request to provider for review/approval because:  BP (!) 145/86BP (!) 145/86

## 2022-03-18 ENCOUNTER — ANESTHESIA (OUTPATIENT)
Dept: SURGERY | Facility: CLINIC | Age: 32
End: 2022-03-18
Payer: COMMERCIAL

## 2022-03-18 ENCOUNTER — HOSPITAL ENCOUNTER (OUTPATIENT)
Facility: CLINIC | Age: 32
Discharge: HOME | End: 2022-03-18
Attending: SURGERY | Admitting: SURGERY
Payer: COMMERCIAL

## 2022-03-18 ENCOUNTER — APPOINTMENT (OUTPATIENT)
Dept: SURGERY | Facility: PHYSICIAN GROUP | Age: 32
End: 2022-03-18
Payer: COMMERCIAL

## 2022-03-18 ENCOUNTER — ANESTHESIA EVENT (OUTPATIENT)
Dept: SURGERY | Facility: CLINIC | Age: 32
End: 2022-03-18
Payer: COMMERCIAL

## 2022-03-18 VITALS
HEART RATE: 65 BPM | DIASTOLIC BLOOD PRESSURE: 87 MMHG | TEMPERATURE: 97.2 F | HEIGHT: 73 IN | OXYGEN SATURATION: 96 % | BODY MASS INDEX: 23.46 KG/M2 | SYSTOLIC BLOOD PRESSURE: 132 MMHG | WEIGHT: 177 LBS | RESPIRATION RATE: 16 BRPM

## 2022-03-18 DIAGNOSIS — K40.90 RIGHT INGUINAL HERNIA: ICD-10-CM

## 2022-03-18 LAB
GLUCOSE BLDC GLUCOMTR-MCNC: 101 MG/DL (ref 70–99)
GLUCOSE BLDC GLUCOMTR-MCNC: 89 MG/DL (ref 70–99)

## 2022-03-18 PROCEDURE — 250N000009 HC RX 250: Performed by: SURGERY

## 2022-03-18 PROCEDURE — 258N000003 HC RX IP 258 OP 636: Performed by: NURSE ANESTHETIST, CERTIFIED REGISTERED

## 2022-03-18 PROCEDURE — C1781 MESH (IMPLANTABLE): HCPCS | Performed by: SURGERY

## 2022-03-18 PROCEDURE — 250N000009 HC RX 250: Performed by: ANESTHESIOLOGY

## 2022-03-18 PROCEDURE — 360N000075 HC SURGERY LEVEL 2, PER MIN: Performed by: SURGERY

## 2022-03-18 PROCEDURE — 710N000009 HC RECOVERY PHASE 1, LEVEL 1, PER MIN: Performed by: SURGERY

## 2022-03-18 PROCEDURE — 999N000141 HC STATISTIC PRE-PROCEDURE NURSING ASSESSMENT: Performed by: SURGERY

## 2022-03-18 PROCEDURE — 49505 PRP I/HERN INIT REDUC >5 YR: CPT | Mod: AS | Performed by: PHYSICIAN ASSISTANT

## 2022-03-18 PROCEDURE — 370N000017 HC ANESTHESIA TECHNICAL FEE, PER MIN: Performed by: SURGERY

## 2022-03-18 PROCEDURE — 250N000011 HC RX IP 250 OP 636: Performed by: SURGERY

## 2022-03-18 PROCEDURE — 82962 GLUCOSE BLOOD TEST: CPT

## 2022-03-18 PROCEDURE — 49505 PRP I/HERN INIT REDUC >5 YR: CPT | Mod: RT | Performed by: SURGERY

## 2022-03-18 PROCEDURE — 710N000012 HC RECOVERY PHASE 2, PER MINUTE: Performed by: SURGERY

## 2022-03-18 PROCEDURE — 250N000013 HC RX MED GY IP 250 OP 250 PS 637: Performed by: SURGERY

## 2022-03-18 PROCEDURE — 272N000001 HC OR GENERAL SUPPLY STERILE: Performed by: SURGERY

## 2022-03-18 PROCEDURE — 250N000011 HC RX IP 250 OP 636: Performed by: NURSE ANESTHETIST, CERTIFIED REGISTERED

## 2022-03-18 DEVICE — MESH ULTRAPRO HERNIA 2.4X4.7" LARGE UHSL: Type: IMPLANTABLE DEVICE | Site: GROIN | Status: FUNCTIONAL

## 2022-03-18 RX ORDER — GLYCINE 1.5 G/100ML
SOLUTION IRRIGATION PRN
Status: DISCONTINUED | OUTPATIENT
Start: 2022-03-18 | End: 2022-03-18 | Stop reason: HOSPADM

## 2022-03-18 RX ORDER — BUPIVACAINE HYDROCHLORIDE AND EPINEPHRINE 2.5; 5 MG/ML; UG/ML
INJECTION, SOLUTION EPIDURAL; INFILTRATION; INTRACAUDAL; PERINEURAL PRN
Status: DISCONTINUED | OUTPATIENT
Start: 2022-03-18 | End: 2022-03-18 | Stop reason: HOSPADM

## 2022-03-18 RX ORDER — OXYCODONE HYDROCHLORIDE 5 MG/1
5 TABLET ORAL
Status: COMPLETED | OUTPATIENT
Start: 2022-03-18 | End: 2022-03-18

## 2022-03-18 RX ORDER — FENTANYL CITRATE 50 UG/ML
INJECTION, SOLUTION INTRAMUSCULAR; INTRAVENOUS PRN
Status: DISCONTINUED | OUTPATIENT
Start: 2022-03-18 | End: 2022-03-18

## 2022-03-18 RX ORDER — FENTANYL CITRATE 50 UG/ML
25 INJECTION, SOLUTION INTRAMUSCULAR; INTRAVENOUS
Status: DISCONTINUED | OUTPATIENT
Start: 2022-03-18 | End: 2022-03-18 | Stop reason: HOSPADM

## 2022-03-18 RX ORDER — LIDOCAINE 40 MG/G
CREAM TOPICAL
Status: DISCONTINUED | OUTPATIENT
Start: 2022-03-18 | End: 2022-03-18 | Stop reason: HOSPADM

## 2022-03-18 RX ORDER — SODIUM CHLORIDE, SODIUM LACTATE, POTASSIUM CHLORIDE, CALCIUM CHLORIDE 600; 310; 30; 20 MG/100ML; MG/100ML; MG/100ML; MG/100ML
INJECTION, SOLUTION INTRAVENOUS CONTINUOUS
Status: DISCONTINUED | OUTPATIENT
Start: 2022-03-18 | End: 2022-03-18 | Stop reason: HOSPADM

## 2022-03-18 RX ORDER — KETOROLAC TROMETHAMINE 15 MG/ML
15 INJECTION, SOLUTION INTRAMUSCULAR; INTRAVENOUS
Status: DISCONTINUED | OUTPATIENT
Start: 2022-03-18 | End: 2022-03-18 | Stop reason: HOSPADM

## 2022-03-18 RX ORDER — ONDANSETRON 2 MG/ML
4 INJECTION INTRAMUSCULAR; INTRAVENOUS EVERY 30 MIN PRN
Status: DISCONTINUED | OUTPATIENT
Start: 2022-03-18 | End: 2022-03-18 | Stop reason: HOSPADM

## 2022-03-18 RX ORDER — ONDANSETRON 4 MG/1
4 TABLET, ORALLY DISINTEGRATING ORAL EVERY 30 MIN PRN
Status: DISCONTINUED | OUTPATIENT
Start: 2022-03-18 | End: 2022-03-18 | Stop reason: HOSPADM

## 2022-03-18 RX ORDER — ONDANSETRON 2 MG/ML
INJECTION INTRAMUSCULAR; INTRAVENOUS PRN
Status: DISCONTINUED | OUTPATIENT
Start: 2022-03-18 | End: 2022-03-18

## 2022-03-18 RX ORDER — CEFAZOLIN SODIUM/WATER 2 G/20 ML
2 SYRINGE (ML) INTRAVENOUS SEE ADMIN INSTRUCTIONS
Status: DISCONTINUED | OUTPATIENT
Start: 2022-03-18 | End: 2022-03-18 | Stop reason: HOSPADM

## 2022-03-18 RX ORDER — OXYCODONE HYDROCHLORIDE 5 MG/1
5 TABLET ORAL EVERY 4 HOURS PRN
Status: DISCONTINUED | OUTPATIENT
Start: 2022-03-18 | End: 2022-03-18 | Stop reason: HOSPADM

## 2022-03-18 RX ORDER — PROPOFOL 10 MG/ML
INJECTION, EMULSION INTRAVENOUS PRN
Status: DISCONTINUED | OUTPATIENT
Start: 2022-03-18 | End: 2022-03-18

## 2022-03-18 RX ORDER — CEFAZOLIN SODIUM/WATER 2 G/20 ML
2 SYRINGE (ML) INTRAVENOUS
Status: COMPLETED | OUTPATIENT
Start: 2022-03-18 | End: 2022-03-18

## 2022-03-18 RX ORDER — OXYCODONE HYDROCHLORIDE 5 MG/1
5-10 TABLET ORAL EVERY 4 HOURS PRN
Qty: 10 TABLET | Refills: 0 | Status: SHIPPED | OUTPATIENT
Start: 2022-03-18 | End: 2022-08-08

## 2022-03-18 RX ORDER — DEXAMETHASONE SODIUM PHOSPHATE 4 MG/ML
INJECTION, SOLUTION INTRA-ARTICULAR; INTRALESIONAL; INTRAMUSCULAR; INTRAVENOUS; SOFT TISSUE PRN
Status: DISCONTINUED | OUTPATIENT
Start: 2022-03-18 | End: 2022-03-18

## 2022-03-18 RX ORDER — HYDROMORPHONE HCL IN WATER/PF 6 MG/30 ML
0.4 PATIENT CONTROLLED ANALGESIA SYRINGE INTRAVENOUS EVERY 5 MIN PRN
Status: DISCONTINUED | OUTPATIENT
Start: 2022-03-18 | End: 2022-03-18 | Stop reason: HOSPADM

## 2022-03-18 RX ORDER — LABETALOL HYDROCHLORIDE 5 MG/ML
10 INJECTION, SOLUTION INTRAVENOUS EVERY 10 MIN PRN
Status: DISCONTINUED | OUTPATIENT
Start: 2022-03-18 | End: 2022-03-18 | Stop reason: HOSPADM

## 2022-03-18 RX ORDER — FENTANYL CITRATE 50 UG/ML
25 INJECTION, SOLUTION INTRAMUSCULAR; INTRAVENOUS EVERY 5 MIN PRN
Status: DISCONTINUED | OUTPATIENT
Start: 2022-03-18 | End: 2022-03-18 | Stop reason: HOSPADM

## 2022-03-18 RX ORDER — SODIUM CHLORIDE, SODIUM LACTATE, POTASSIUM CHLORIDE, CALCIUM CHLORIDE 600; 310; 30; 20 MG/100ML; MG/100ML; MG/100ML; MG/100ML
INJECTION, SOLUTION INTRAVENOUS CONTINUOUS PRN
Status: DISCONTINUED | OUTPATIENT
Start: 2022-03-18 | End: 2022-03-18

## 2022-03-18 RX ORDER — MEPERIDINE HYDROCHLORIDE 25 MG/ML
25 INJECTION INTRAMUSCULAR; INTRAVENOUS; SUBCUTANEOUS
Status: DISCONTINUED | OUTPATIENT
Start: 2022-03-18 | End: 2022-03-18 | Stop reason: HOSPADM

## 2022-03-18 RX ADMIN — ONDANSETRON HYDROCHLORIDE 4 MG: 2 INJECTION, SOLUTION INTRAVENOUS at 07:29

## 2022-03-18 RX ADMIN — PROPOFOL 200 MG: 10 INJECTION, EMULSION INTRAVENOUS at 07:30

## 2022-03-18 RX ADMIN — OXYCODONE HYDROCHLORIDE 5 MG: 5 TABLET ORAL at 09:03

## 2022-03-18 RX ADMIN — FENTANYL CITRATE 100 MCG: 50 INJECTION, SOLUTION INTRAMUSCULAR; INTRAVENOUS at 07:30

## 2022-03-18 RX ADMIN — LIDOCAINE HYDROCHLORIDE 50 MG: 10 INJECTION, SOLUTION EPIDURAL; INFILTRATION; INTRACAUDAL; PERINEURAL at 07:30

## 2022-03-18 RX ADMIN — MIDAZOLAM 2 MG: 1 INJECTION INTRAMUSCULAR; INTRAVENOUS at 07:24

## 2022-03-18 RX ADMIN — DEXAMETHASONE SODIUM PHOSPHATE 4 MG: 4 INJECTION, SOLUTION INTRA-ARTICULAR; INTRALESIONAL; INTRAMUSCULAR; INTRAVENOUS; SOFT TISSUE at 07:31

## 2022-03-18 RX ADMIN — SODIUM CHLORIDE, POTASSIUM CHLORIDE, SODIUM LACTATE AND CALCIUM CHLORIDE: 600; 310; 30; 20 INJECTION, SOLUTION INTRAVENOUS at 07:24

## 2022-03-18 RX ADMIN — Medication 2 G: at 07:24

## 2022-03-18 NOTE — ANESTHESIA PROCEDURE NOTES
Airway       Patient location during procedure: OR  Staff -        CRNA: Lelo Andre APRN CRNA       Performed By: CRNA  Consent for Airway        Urgency: elective  Indications and Patient Condition       Indications for airway management: desiree-procedural       Induction type:intravenous       Mask difficulty assessment: 0 - not attempted    Final Airway Details       Final airway type: supraglottic airway    Supraglottic Airway Details        Type: LMA       Brand: I-Gel       LMA size: 5    Post intubation assessment        Placement verified by: capnometry, equal breath sounds and chest rise        Number of attempts at approach: 1       Number of other approaches attempted: 0       Ease of procedure: easy       Dentition: Intact

## 2022-03-18 NOTE — ANESTHESIA POSTPROCEDURE EVALUATION
Patient: Sarbjit Mccainlund    Procedure: Procedure(s):  OPEN RIGHT INGUINAL HERNIORRHAPHY WITH MESH       Anesthesia Type:  General    Note:     Postop Pain Control: Uneventful            Sign Out: Well controlled pain   PONV: No   Neuro/Psych: Uneventful            Sign Out: Acceptable/Baseline neuro status   Airway/Respiratory: Uneventful            Sign Out: Acceptable/Baseline resp. status   CV/Hemodynamics: Uneventful            Sign Out: Acceptable CV status   Other NRE: NONE   DID A NON-ROUTINE EVENT OCCUR? No           Last vitals:  Vitals Value Taken Time   /78 03/18/22 0907   Temp 97.4  F (36.3  C) 03/18/22 0907   Pulse 81 03/18/22 0910   Resp 8 03/18/22 0910   SpO2 100 % 03/18/22 0910   Vitals shown include unvalidated device data.    Electronically Signed By: Leonel Méndez MD  March 18, 2022  1:57 PM

## 2022-03-18 NOTE — OP NOTE
General Surgery Operative Note      Pre-operative diagnosis:  Right inguinal hernia   Post-operative diagnosis:  Right inguinal hernia, indirect    Procedure: Indirect right inguinal hernia repair with Ultrapro hernia system (large size)   Surgeon: Eduard Amaral MD   Assistant(s): MAYELA English was medically necessary for their expertise in prepping, retracting, exposure, and suctioning.   Anesthesia: General    Estimated blood loss:  Complications: 5 cc  None   Specimens: * No specimens in log *     DESCRIPTION OF PROCEDURE:  The patient was placed on the table in supine position.  General endotracheal anesthesia was induced and the abdomen was prepped and draped in sterile fashion.  A pause was performed, the site had been marked with the patient in pre-induction.  An inguinal incision was made on the right side and was carried down through the subcutaneous tissue.  The external oblique aponeurosis was cleared of subcutaneous tissue.  The external oblique aponeurosis was incised parallel to its fibers through the external ring.  Flaps were developed superomedially and inferolaterally.  The cord was encircled with a penrose drain at the level of the pubic tubercle. This allowed for good exposure of the inguinal canal.  This revealed an intact floor of the inguinal canal.  We then performed a thorough evaluation of the spermatic cord. Cremaster fibers were split and the nerve was preserved.  There was a moderate indirect hernia defect noted.  The sac and an adjacent mass of herniated fat were mobilized from normal cord structures and reduced.  The pre-peritoneal space was developed to accept the inner layer of the Ultrapro hernia system. The operative field and the Ultrapro mesh were irrigated with irricept and rinsed. The inner layer of the mesh was carefully deployed in the preperitoneal space. The outer layer was deployed. A slit had been cut for egress of the cord and nerve. This was coapted  adjacent to the cord, thus re-enforcing the internal ring. The medial portion of the outer layer was sutured to the inguinal ligament (wrapping the ligament with the mesh), overlapped and sutured adjacent to the pubic tubercle and then sutured to the anterior rectus sheath as far medially as possible. The other portion of the outer layer was tucked up under the external oblique aponeurosis.  The cord was evaluated with a sterile doppler and excellent arterial and venous signals were noted. There was no venous engorgement of the cord.   Local anesthetic was placed for post op pain relief. .  We inspected for hemostasis and irrigated with sterile saline.  We closed the external oblique aponeurosis with a 2-0 PDS suture in running fashion.  The Anjana's fascia was closed with 2-0 PDS sutures.  The skin was closed with a running 4-0 Vicryl subcuticular suture and dressings were applied.  The patient tolerated the procedure well.  Sponge, needle and instrument counts were correct at the end of the case. The testicle position was verified after the procedure.    Eduard Amaral MD

## 2022-03-18 NOTE — ANESTHESIA PREPROCEDURE EVALUATION
Anesthesia Pre-Procedure Evaluation    Patient: Sarbjit Perez   MRN: [id number] : 1990        Procedure : Procedure(s):  HERNIORRHAPHY, INGUINAL, OPEN, with mesh          Past Medical History:   Diagnosis Date     Acute pancreatitis 10/20/2016     Alcohol abuse      Hx of diabetes mellitus 10/26/2016    Occurred post alcoholic pancreatitis. Resolved 1 yr later after alcohol cessation.       Hypertension 10.24.16     Other chronic pain     Avascular necrosis in both hips     Pancreatic disease     pancreatitis - 2016     Type 2 diabetes mellitus without complication (H) 2018      Past Surgical History:   Procedure Laterality Date     HERNIORRHAPHY INGUINAL Left 3/30/2018    Procedure: HERNIORRHAPHY INGUINAL;  Left Inguinal hernia repair with Mesh ;  Surgeon: Eduard Amaral MD;  Location: [facility] OR     wisdom teeth        No Known Allergies   Social History     Tobacco Use     Smoking status: Never Smoker     Smokeless tobacco: Never Used   Substance Use Topics     Alcohol use: No     Alcohol/week: 0.0 standard drinks      Wt Readings from Last 1 Encounters:   22 80.3 kg (177 lb)        Anesthesia Evaluation   Pt has had prior anesthetic. Type: General.    No history of anesthetic complications       ROS/MED HX  ENT/Pulmonary:  - neg pulmonary ROS     Neurologic: Comment: ADHD      Cardiovascular:     (+) hypertension-----    METS/Exercise Tolerance:     Hematologic:  - neg hematologic  ROS     Musculoskeletal:       GI/Hepatic:  - neg GI/hepatic ROS     Renal/Genitourinary:     (+) renal disease, type: CRI,     Endo:     (+) type II DM,     Psychiatric/Substance Use:     (+) alcohol abuse     Infectious Disease:  - neg infectious disease ROS     Malignancy:  - neg malignancy ROS     Other:  - neg other ROS    (+) , H/O Chronic Pain,        Physical Exam    Airway        Mallampati: I   TM distance: > 3 FB   Neck ROM: full   Mouth opening: > 3 cm    Respiratory Devices and Support         Dental  no  notable dental history         Cardiovascular   cardiovascular exam normal          Pulmonary   pulmonary exam normal                OUTSIDE LABS:  CBC:   Lab Results   Component Value Date    WBC 4.5 03/15/2022    WBC 4.8 12/21/2021    HGB 13.1 (L) 03/15/2022    HGB 13.5 12/21/2021    HCT 40.3 03/15/2022    HCT 41.2 12/21/2021     03/15/2022     12/21/2021     BMP:   Lab Results   Component Value Date     03/15/2022     12/21/2021    POTASSIUM 4.5 03/15/2022    POTASSIUM 4.1 12/21/2021    CHLORIDE 106 03/15/2022    CHLORIDE 104 12/21/2021    CO2 23 03/15/2022    CO2 27 12/21/2021    BUN 12 03/15/2022    BUN 13 12/21/2021    CR 0.92 03/15/2022    CR 0.80 12/21/2021    GLC 89 03/18/2022    GLC 96 03/15/2022     COAGS: No results found for: PTT, INR, FIBR  POC:   Lab Results   Component Value Date    BGM 93 03/30/2018     HEPATIC:   Lab Results   Component Value Date    ALBUMIN 4.2 06/08/2018    PROTTOTAL 6.8 10/22/2016    ALT 42 10/24/2017    AST 43 10/22/2016    ALKPHOS 54 10/22/2016    BILITOTAL 0.6 10/22/2016     OTHER:   Lab Results   Component Value Date    LACT 0.9 10/20/2016    A1C 5.3 12/21/2021    GARRETT 9.6 03/15/2022    PHOS 3.2 06/08/2018    MAG 2.3 10/21/2016    LIPASE 2,469 (H) 10/23/2016    TSH 1.42 12/21/2021    TSH 1.51 12/21/2021    CRP <2.9 03/03/2022    SED 7 03/03/2022       Anesthesia Plan    ASA Status:  2   NPO Status:  NPO Appropriate    Anesthesia Type: General.     - Airway: LMA   Induction: Intravenous, Propofol.   Maintenance: Balanced.        Consents    Anesthesia Plan(s) and associated risks, benefits, and realistic alternatives discussed. Questions answered and patient/representative(s) expressed understanding.    - Discussed:     - Discussed with:  Patient         Postoperative Care    Pain management: IV analgesics, Oral pain medications, Multi-modal analgesia.   PONV prophylaxis: Ondansetron (or other 5HT-3), Dexamethasone or Solumedrol     Comments:                 Leonel Méndez MD

## 2022-03-18 NOTE — DISCHARGE INSTRUCTIONS
HOME CARE FOLLOWING INGUINAL/FEMORAL HERNIA REPAIR  EVER Spaulding, MYRA Salinas, CHERYL Aguilar, KENYON Valero    DIET:  Start with liquids and gradually resume your regular diet as tolerated.  Drink plenty of fluids.  While taking pain medications, consider use of a stool softener, increase your fiber in your diet, or add a fiber supplement (like Metamucil, Citrucel) to help prevent constipation - a possible side effect of pain medications.    NAUSEA:  If nauseated from the anesthetic/pain meds; rest in bed, get up cautiously with assistance, and drink clear liquids (juice, tea, broth).    ACTIVITY:  Light Activity -- you may immediately be up and about as tolerated.  Walking is encouraged, increase as tolerated.  Driving/Light Work-- when comfortable and off narcotic pain medications.  Strenuous Work/Activity -- limit lifting to 20 pounds for 3 weeks.  Active Sports (running, biking, etc.) -- cautiously resume after 4 weeks.    INCISIONAL CARE:    If you have a dressing in place, keep clean and dry for 48 hours; you may replace the gauze if it becomes soiled.    After 48 hours you may remove the dressing and shower.  Do not submerse incision in water for 1 week.    Sutures will absorb and need not be removed.    If present, leave the steri-strips (white paper tapes) in place for 14 days after surgery.    Do not apply lotions, creams, or ointments to incisions.    Expect a variable amount of swelling/black and blue discoloration that may involve the penis/scrotum or labia.    Some numbness around the incision is common.    A lump/ridge under the incision is normal and will gradually resolve.    DISCOMFORT:  Local anesthetic placed at surgery should provide relief for 4-8 hours.  Begin taking pain pills before discomfort is severe.  Take the pain medication with some food, when possible, to minimize side effects.  Intermittent use of ice packs to the hernia repair site may help during the first  1-3 weeks after surgery.  Expect gradual improvement.    Over-the-counter anti-inflammatory medications (i.e. Ibuprofen/Advil/Motrin or Naprosyn/Aleve) may be used per package instructions in addition to or while tapering off the narcotic pain medications to decrease swelling and sensitivity at the repair site.  DO NOT TAKE these Anti-inflammatory medications if your primary physician has advised against doing so, or if you have acid reflux, ulcer, or bleeding disorder, or take blood-thinner medications.  Call your primary physician or the surgery office if you have medication questions.    FOLLOW-UP AFTER SURGERY:  -Our office will contact you approximately 2-3 weeks after surgery to check on your progress and answer any questions you may have.  If you are doing well, you will not need to return for an office appointment.  If any concerns are identified over the phone, we will help you make an appointment to see a provider.    -If you have not received a phone call, have any questions or concerns, or would like to be seen, please call us at [phone].  We are located at: [address], [address]; [place] [id number]    -CONTACT US IF THE FOLLOWING DEVELOPS:   1. A fever that is above 101     2. Increased redness, warmth, drainage, bleeding, or swelling.   3. Pain that is not relieved by rest/ice and your prescription.   4.  Increasing pain after 48 hours.   5. Drainage that is thick, cloudy, yellow, green or white.   6. Any other questions or concerns.      FREQUENTLY ASKED QUESTIONS:    Q:  How should my incision look?    A:  Normally your incision will appear slightly swollen with light redness directly along the incision itself as it heals.  It may feel like a bump or ridge as the healing/scarring happens, and over time (3-4 months) this bump or ridge feeling should slowly go away.  In general, clear or pink watery drainage can be normal at first as your incision heals, but should decrease over  time.    Q:  How do I know if my incision is infected?  A:  Look at your incision for signs of infection, like redness around the incision spreading to surrounding skin, or drainage of cloudy or foul-smelling drainage.  If you feel warm, check your temperature to see if you are running a fever.    **If any of these things occur, please notify the nurse at our office.  We may need you to come into the office for an incision check.      Q:  How do I take care of my incision?  A:  If you have a dressing in place - Starting the day after surgery, replace the dressing 1-2 times a day until there is no further drainage from the incision.  At that time, a dressing is no longer needed.  Try to minimize tape on the skin if irritation is occurring at the tape sites.  If you have significant irritation from tape on the skin, please call the office to discuss other method of dressing your incision.    Small pieces of tape called  steri-strips  may be present directly overlying your incision; these may be removed 10 days after surgery unless otherwise specified by your surgeon.  If these tapes start to loosen at the ends, you may trim them back until they fall off or are removed.      Q:  There is a piece of tape or a sticky  lead  still on my skin.  Can I remove this?  A:  Sometimes the sticky  leads  used for monitoring during surgery or for evaluation in the emergency department are not all removed while you are in the hospital.  These sometimes have a tab or metal dot on them.  You can easily remove these on your own, like taking off a band-aid.  If there is a gel substance under the  lead , simply wipe/clean it off with a washcloth or paper towel.      Q:  What can I do to minimize constipation (very hard stools, or lack of stools)?  A:  Stay well hydrated.  Increase your dietary fiber intake or take a fiber supplement -with plenty of water.  Walk around frequently.  You may consider an over-the-counter stool-softener.   Your Pharmacist can assist you with choosing one that is stocked at your pharmacy.  Constipation is also one of the most common side effects of pain medication.  If you are using pain medication, be pro-active and try to PREVENT problems with constipation by taking the steps above BEFORE constipation becomes a problem.    Q:  What do I do if I need more pain medications?  A:  Call the office to receive refills.  Be aware that certain pain meds cannot be called into a pharmacy and actually require a paper prescription.  A change may be made in your pain med as you progress thru your recovery period or if you have side effects to certain meds.    --Pain meds are NOT refilled after 5pm on weekdays, and NOT AT ALL on the weekends, so please look ahead to prevent problems.    Q:  Why am I having a hard time sleeping now that I am at home?  A:  Many medications you receive while you are in the hospital can impact your sleep for a number of days after your surgery/hospitalization.  Decreased level of activity and naps during the day may also make sleeping at night difficult.  Try to minimize day-time naps, and get up frequently during the day to walk around your home during your recovery time.  Sleep aides may be of some help, but are not recommended for long-term use.      Q:  I am having some back discomfort.  What should I do?  A:  This may be related to certain positioning that was required for your surgery, extended periods of time in bed, or other changes in your overall activity level.  You may try ice, heat, acetaminophen, or ibuprofen to treat this temporarily.  Note that many pain medications have acetaminophen in them and would state this on the prescription bottle.  Be sure not to exceed the maximum of 4000mg per day of acetaminophen.     **If the pain you are having does not resolve, is severe, or is a flare of back pain you have had on other occasions prior to surgery, please contact your primary physician  for further recommendations or for an appointment to be examined at their office.    Q:  Why am I having headaches?  A:  Headaches can be caused by many things:  caffeine withdrawal, use of pain meds, dehydration, high blood pressure, lack of sleep, over-activity/exhaustion, flare-up of usual migraine headaches.  If you feel this is related to muscle tension (a band-like feeling around the head, or a pressure at the low-back of the head) you may try ice or heat to this area.  You may need to drink more fluids (try electrolyte drink like Gatorade), rest, or take your usual migraine medications.   **If your headaches do not resolve, worsen, are accompanied by other symptoms, or if your blood pressure is high, please call your primary physician for recommendation and/or examination.    Q:  I am unable to urinate.  What do I do?  A:  A small percentage of people can have difficulty urinating initially after surgery.  This includes being able to urinate only a very small amount at a time and feeling discomfort or pressure in the very low abdomen.  This is called  urinary retention , and is actually an urgent situation.  Proceed to your nearest Emergency department for evaluation (not an Urgent Care Center).  Sometimes the bladder does not work correctly after certain medications you receive during surgery, or related to certain procedures.  You may need to have a catheter placed until your bladder recovers.  When planning to go to an Emergency department, it may help to call the ER to let them know you are coming in for this problem after a surgery.  This may help you get in quicker to be evaluated.  **If you have symptoms of a urinary tract infection, please contact your primary physician for the proper evaluation and treatment.        If you have other questions, please call the office Monday thru Friday between 8am and 4:30pm to discuss with the nurse or physician assistant.  #[phone]    There is a surgeon ON  CALL on weekday evenings and over the weekend in case of urgent need only, and may be contacted at the same number.    If you are having an emergency, call 911 or proceed to your nearest emergency department.    GENERAL ANESTHESIA OR SEDATION ADULT DISCHARGE INSTRUCTIONS   SPECIAL PRECAUTIONS FOR 24 HOURS AFTER SURGERY    IT IS NOT UNUSUAL TO FEEL LIGHT-HEADED OR FAINT, UP TO 24 HOURS AFTER SURGERY OR WHILE TAKING PAIN MEDICATION.  IF YOU HAVE THESE SYMPTOMS; SIT FOR A FEW MINUTES BEFORE STANDING AND HAVE SOMEONE ASSIST YOU WHEN YOU GET UP TO WALK OR USE THE BATHROOM.    YOU SHOULD REST AND RELAX FOR THE NEXT 24 HOURS AND YOU MUST MAKE ARRANGEMENTS TO HAVE SOMEONE STAY WITH YOU FOR AT LEAST 24 HOURS AFTER YOUR DISCHARGE.  AVOID HAZARDOUS AND STRENUOUS ACTIVITIES.  DO NOT MAKE IMPORTANT DECISIONS FOR 24 HOURS.    DO NOT DRIVE ANY VEHICLE OR OPERATE MECHANICAL EQUIPMENT FOR 24 HOURS FOLLOWING THE END OF YOUR SURGERY.  EVEN THOUGH YOU MAY FEEL NORMAL, YOUR REACTIONS MAY BE AFFECTED BY THE MEDICATION YOU HAVE RECEIVED.    DO NOT DRINK ALCOHOLIC BEVERAGES FOR 24 HOURS FOLLOWING YOUR SURGERY.    DRINK CLEAR LIQUIDS (APPLE JUICE, GINGER ALE, 7-UP, BROTH, ETC.).  PROGRESS TO YOUR REGULAR DIET AS YOU FEEL ABLE.    YOU MAY HAVE A DRY MOUTH, A SORE THROAT, MUSCLES ACHES OR TROUBLE SLEEPING.  THESE SHOULD GO AWAY AFTER 24 HOURS.    CALL YOUR DOCTOR FOR ANY OF THE FOLLOWING:  SIGNS OF INFECTION (FEVER, GROWING TENDERNESS AT THE SURGERY SITE, A LARGE AMOUNT OF DRAINAGE OR BLEEDING, SEVERE PAIN, FOUL-SMELLING DRAINAGE, REDNESS OR SWELLING.    IT HAS BEEN OVER 8 TO 10 HOURS SINCE SURGERY AND YOU ARE STILL NOT ABLE TO URINATE (PASS WATER).       Maximum acetaminophen (Tylenol) dose from all sources should not exceed 4 grams (4000 mg) per day.    Can start ibuprofen over the counter at any time as directed per bottle.

## 2022-03-28 ENCOUNTER — MYC REFILL (OUTPATIENT)
Dept: FAMILY MEDICINE | Facility: CLINIC | Age: 32
End: 2022-03-28
Payer: COMMERCIAL

## 2022-03-28 DIAGNOSIS — F90.2 ATTENTION DEFICIT HYPERACTIVITY DISORDER (ADHD), COMBINED TYPE: ICD-10-CM

## 2022-03-28 RX ORDER — DEXTROAMPHETAMINE SACCHARATE, AMPHETAMINE ASPARTATE, DEXTROAMPHETAMINE SULFATE AND AMPHETAMINE SULFATE 3.75; 3.75; 3.75; 3.75 MG/1; MG/1; MG/1; MG/1
15 TABLET ORAL 2 TIMES DAILY
Qty: 60 TABLET | Refills: 0 | Status: SHIPPED | OUTPATIENT
Start: 2022-03-28 | End: 2022-05-10

## 2022-03-28 NOTE — TELEPHONE ENCOUNTER
Routing refill request to provider for review/approval because:  Drug not on the [facility] refill protocol     Alessandra Rossi RN on 3/28/2022 at 11:29 AM

## 2022-04-01 NOTE — TELEPHONE ENCOUNTER
Called pt  -Most recent blood pressure is 120/79.  -Updated Chart.     Trish Graham, RN, BSN, PHN  [facility]

## 2022-04-06 ENCOUNTER — TELEPHONE (OUTPATIENT)
Dept: SURGERY | Facility: CLINIC | Age: 32
End: 2022-04-06
Payer: COMMERCIAL

## 2022-04-06 DIAGNOSIS — Z98.890 POSTOPERATIVE STATE: Primary | ICD-10-CM

## 2022-04-06 PROCEDURE — 99024 POSTOP FOLLOW-UP VISIT: CPT | Performed by: PHYSICIAN ASSISTANT

## 2022-04-06 NOTE — TELEPHONE ENCOUNTER
"[facility]   Postoperative Follow-up Phone Call  -Call to patient to review recent procedure and recovery    Attempted to call patient for post op check.  No answer.  Message was left for patient to call back if they had any questions of concerns.     Marilyn Loomis PA-C     [facility] Message:  Eder Sarbjit,     I left a message on your ph# to call if you have questions/concerns after your hernia surgery.    You were found to have a defect at your hernia site which was repaired with a Ethicon Ultrapro Hernia System mesh.     You may now remove skin glue or tapes from incision sites.    You may slowly increase activity as tolerated; generally this is a slow process as follows: 30lb limit at 3 weeks, 40lb limit at 4 weeks, 50lb limit at 5 weeks, etc, until resuming all \"normal\" rigors/activities by 8-10 weeks after surgery.  Attempt to return to any type of workout/strenuous activity should be held until 1 month after surgery; start slowly and increase as able.     Please contact me if other questions or work note needed.    Thank you!  Marilyn Loomis PA-C   "

## 2022-05-10 ENCOUNTER — MYC REFILL (OUTPATIENT)
Dept: FAMILY MEDICINE | Facility: CLINIC | Age: 32
End: 2022-05-10
Payer: COMMERCIAL

## 2022-05-10 DIAGNOSIS — F90.2 ATTENTION DEFICIT HYPERACTIVITY DISORDER (ADHD), COMBINED TYPE: ICD-10-CM

## 2022-05-10 RX ORDER — DEXTROAMPHETAMINE SACCHARATE, AMPHETAMINE ASPARTATE, DEXTROAMPHETAMINE SULFATE AND AMPHETAMINE SULFATE 3.75; 3.75; 3.75; 3.75 MG/1; MG/1; MG/1; MG/1
15 TABLET ORAL 2 TIMES DAILY
Qty: 60 TABLET | Refills: 0 | Status: SHIPPED | OUTPATIENT
Start: 2022-05-10 | End: 2022-06-15

## 2022-05-10 NOTE — TELEPHONE ENCOUNTER
Routing refill request to provider for review/approval because:  Drug not on the [facility] refill protocol     Alessandra Rossi RN on 5/10/2022 at 2:18 PM

## 2022-06-15 ENCOUNTER — MYC REFILL (OUTPATIENT)
Dept: FAMILY MEDICINE | Facility: CLINIC | Age: 32
End: 2022-06-15
Payer: COMMERCIAL

## 2022-06-15 ENCOUNTER — MYC MEDICAL ADVICE (OUTPATIENT)
Dept: FAMILY MEDICINE | Facility: CLINIC | Age: 32
End: 2022-06-15
Payer: COMMERCIAL

## 2022-06-15 DIAGNOSIS — F90.2 ATTENTION DEFICIT HYPERACTIVITY DISORDER (ADHD), COMBINED TYPE: ICD-10-CM

## 2022-06-15 RX ORDER — DEXTROAMPHETAMINE SACCHARATE, AMPHETAMINE ASPARTATE, DEXTROAMPHETAMINE SULFATE AND AMPHETAMINE SULFATE 3.75; 3.75; 3.75; 3.75 MG/1; MG/1; MG/1; MG/1
15 TABLET ORAL 2 TIMES DAILY
Qty: 60 TABLET | Refills: 0 | Status: SHIPPED | OUTPATIENT
Start: 2022-06-15 | End: 2022-08-08

## 2022-06-16 NOTE — TELEPHONE ENCOUNTER
Appears patient would like to possibly adjust dose. He also has recently sent a message to me about concerns for prostate where I recommended visit. Recommending not only visit for prostate concerns, but also to discuss adhd management. Refilled original dose per request to allow for this.     -noel chen pac

## 2022-06-21 DIAGNOSIS — R80.9 MICROALBUMINURIA: ICD-10-CM

## 2022-06-23 RX ORDER — LISINOPRIL 2.5 MG/1
TABLET ORAL
Qty: 90 TABLET | Refills: 0 | Status: SHIPPED | OUTPATIENT
Start: 2022-06-23 | End: 2022-10-03

## 2022-06-23 NOTE — TELEPHONE ENCOUNTER
Prescription approved per [facility] Refill Protocol.  Seven CURTIS RN

## 2022-08-08 ENCOUNTER — MYC REFILL (OUTPATIENT)
Dept: FAMILY MEDICINE | Facility: CLINIC | Age: 32
End: 2022-08-08

## 2022-08-08 DIAGNOSIS — F90.2 ATTENTION DEFICIT HYPERACTIVITY DISORDER (ADHD), COMBINED TYPE: ICD-10-CM

## 2022-08-08 RX ORDER — DEXTROAMPHETAMINE SACCHARATE, AMPHETAMINE ASPARTATE, DEXTROAMPHETAMINE SULFATE AND AMPHETAMINE SULFATE 3.75; 3.75; 3.75; 3.75 MG/1; MG/1; MG/1; MG/1
15 TABLET ORAL 2 TIMES DAILY
Qty: 60 TABLET | Refills: 0 | Status: SHIPPED | OUTPATIENT
Start: 2022-08-08 | End: 2022-09-16

## 2022-08-09 ENCOUNTER — OFFICE VISIT (OUTPATIENT)
Dept: ORTHOPEDICS | Facility: CLINIC | Age: 32
End: 2022-08-09
Payer: COMMERCIAL

## 2022-08-09 VITALS — SYSTOLIC BLOOD PRESSURE: 138 MMHG | DIASTOLIC BLOOD PRESSURE: 76 MMHG

## 2022-08-09 DIAGNOSIS — M87.059 AVASCULAR NECROSIS OF BONE OF HIP, UNSPECIFIED LATERALITY (H): Primary | ICD-10-CM

## 2022-08-09 PROCEDURE — 99213 OFFICE O/P EST LOW 20 MIN: CPT | Performed by: ORTHOPAEDIC SURGERY

## 2022-08-09 NOTE — LETTER
8/9/2022         RE: Sarbjit Perez  [address]  [place] [address]        Dear Colleague,    Thank you for referring your patient, Sarbjit Perez, to the [facility] [place]. Please see a copy of my visit note below.    S: Patient is a 32 year old male seen today in follow up for bilateral hip AVN.    They were previously evaluated on 1/11/22.  Since this visit they report sensation of locking in the left hip. He reports sensation is sporadic, and causes sharp pain that dissipates within minutes.    Patient is here today to discuss recent lab findings and MRI results.    Now with more consistent pain and disability L hip and not much symptom R hip.  Would like to discuss options.           Patient Active Problem List   Diagnosis     Hx of diabetes mellitus     Alcohol dependence in remission (H)     Microalbuminuria     Essential hypertension, benign     Acute reaction to stress     Attention deficit hyperactivity disorder (ADHD), combined type     Chronic kidney disease, stage 1     AVN (avascular necrosis of bone) (H)     Chronic pain of left knee     Vitamin D deficiency     Avascular necrosis of femoral head (H)     Type 2 diabetes mellitus without complication (H)            Past Medical History:   Diagnosis Date     Acute pancreatitis 10/20/2016     Alcohol abuse      Hx of diabetes mellitus 10/26/2016    Occurred post alcoholic pancreatitis. Resolved 1 yr later after alcohol cessation.       Hypertension 10.24.16     Other chronic pain     Avascular necrosis in both hips     Pancreatic disease     pancreatitis - 2016     Type 2 diabetes mellitus without complication (H) 6/8/2018            Past Surgical History:   Procedure Laterality Date     HERNIORRHAPHY INGUINAL Left 3/30/2018    Procedure: HERNIORRHAPHY INGUINAL;  Left Inguinal hernia repair with Mesh ;  Surgeon: Eduard Amaral MD;  Location: [facility] OR     HERNIORRHAPHY INGUINAL Right 3/18/2022    Procedure: OPEN  RIGHT INGUINAL HERNIORRHAPHY WITH MESH;  Surgeon: Eduard Amaral MD;  Location: [facility] OR     wisdom teeth              Social History     Tobacco Use     Smoking status: Never Smoker     Smokeless tobacco: Never Used   Substance Use Topics     Alcohol use: No     Alcohol/week: 0.0 standard drinks            Family History   Problem Relation Age of Onset     Breast Cancer Mother      Hypertension Father      Diabetes Type 2  Paternal Grandfather      Hypertension Paternal Grandfather      Diabetes Paternal Grandfather      Other Cancer Maternal Grandmother              No Known Allergies         Current Outpatient Medications   Medication Sig Dispense Refill     amphetamine-dextroamphetamine (ADDERALL) 15 MG tablet Take 1 tablet (15 mg) by mouth 2 times daily 60 tablet 0     aspirin 81 MG EC tablet Take 81 mg by mouth daily       Cholecalciferol (VITAMIN D-3) 125 MCG (5000 UT) TABS Take 1 tablet by mouth daily       lisinopril (ZESTRIL) 2.5 MG tablet TAKE 1 TABLET BY MOUTH EVERY DAY 90 tablet 0     Multiple Vitamin (MULTI VITAMIN MENS PO) Take 1 tablet by mouth            Review Of Systems  Skin: negative  Eyes: negative  Ears/Nose/Throat: negative  Respiratory: No shortness of breath, dyspnea on exertion, cough, or hemoptysis    O: Physical Exam:  Supple  R hip to rotation and no limits to motion/no crepitus.  L hip some limited IR/ER/Abduction and does reproduce some symptoms.  CMS intact to both lower extremities.    Labs: unremarkable    Images:  IMPRESSION: Large area of chronic avascular necrosis in the left  femoral head. There has been mild collapse of the superior medial  articular surface with subchondral sclerosis in the femoral head. Mild  narrowing and hypertrophic change in the left hip joint are new.  Stable small area of chronic avascular necrosis in the right femoral  head. Normal alignment of the right hip joint. The SI joints are  Unremarkable.       Findings:     Osseous structures  Osseous  structures:      Redemonstration of chronic osteonecrosis of the left femoral head with  partial collapse of the femoral head. There is associated edema like  signal extending through the femoral neck into the proximal diaphysis.     Advanced degenerative changes of the left hip with marginal  osteophytosis along the acetabulum and of the femoral head neck  junction. Subchondral cystlike changes along the superior lateral  acetabulum. Areas of full-thickness cartilage loss in the superior  aspect of the femoral head and acetabulum with subchondral bony  irregularity.     There is an area of avascular necrosis in the right femoral head with  mild surrounding rim edema. No significant collapse of the femoral  head.     Degenerative changes of the right hip with marginal osteophytosis. No  full-thickness cartilage loss.     Partially visualized sacroiliac joints are congruent. No periarticular  edema or cystlike change.     Partially visualized lumbar spine, 5 lumbar-type vertebrae are assumed  for the purposes of this dictation. Degenerative changes including  disc space narrowing and desiccation at L5-S1.     Articular cartilage and labrum  Assessment limited on this non-arthrographic study due to relative  lack of joint distension.     Articular cartilage: Full-thickness cartilage loss of the right hip as  detailed above.     Labrum: Suboptimal visualization on this non-arthrographic exam.  Degenerative tearing of the left and late labrum suspected in the  setting of arthritis.     Ligament teres and transverse ligament of acetabulum: Intact.     Joint or bursal effusion     Joint effusion: Moderate volume joint effusion on the left with  synovial thickening and enhancement. Physiologic joint fluid on the  right.     Bursal effusion: Minimal nonspecific edema over the greater  trochanter. No substantial iliopsoas or trochanteric bursal effusion.     Muscles and tendons  Muscles and tendons: Proximal hamstrings,  rectus femoris, sartorius,  and iliopsoas tendons intact. The hip abductors are intact. The  visualized adductor muscles are unremarkable.      Nerves:  The visualized course of the sciatic nerve is unremarkable.     Other Findings:  Moderate rectal stool burden. Incompletely distended bladder is  unremarkable. Prostate is enlarged measuring up to 5.5 cm in diameter.                                                                      Impression:  1. Left hip:   a. Avascular necrosis of the femoral head with partial collapse and  associated edema like signal extending through the femoral neck into  the proximal femoral diaphysis.  b. Advanced degenerative changes of the hip with full-thickness  femoral and acetabular cartilage loss.  c. Small effusion with synovitis.     2. Right hip:  a. Avascular necrosis of the femoral head without significant  collapse.  b. Mild to moderate degenerative changes.         A: Bilateral hip AVN:  Stage 3 L and Stage 1 R     P: At some point plan core decompression R hip, is taking ASA now.  Discussed procedure, risks, benefits, possible complications.  Then move on to L JAMES anterolateral approach.  Discussed same.  He will let us know how he'd like to proceed.           In addition to the above assessment and plan each active problem on Sarbjit's problem list was evaluated today. This included the questioning of Sarbjit for any medication problems. We will continue the current treatment plan for these active problems except as noted.        Again, thank you for allowing me to participate in the care of your patient.        Sincerely,        DILMA ADAMS MD

## 2022-08-09 NOTE — PATIENT INSTRUCTIONS
Thank you for choosing [facility]    Dr. Delaney Locations:    [facility] [address], [address]  [address] [address]   Appointments: [phone] Appointments: [phone]   Fax: [phone] Fax: [phone]       Follow up: as needed, when you would like to proceed to schedule surgery.     Please call [phone] to schedule your follow up appointment.       Today we discuss right hip decompression, followed by left total hip arthroplasty.       For any questions please contact my office, [phone].

## 2022-08-09 NOTE — PROGRESS NOTES
S: Patient is a 32 year old male seen today in follow up for bilateral hip AVN.    They were previously evaluated on 1/11/22.  Since this visit they report sensation of locking in the left hip. He reports sensation is sporadic, and causes sharp pain that dissipates within minutes.    Patient is here today to discuss recent lab findings and MRI results.    Now with more consistent pain and disability L hip and not much symptom R hip.  Would like to discuss options.           Patient Active Problem List   Diagnosis     Hx of diabetes mellitus     Alcohol dependence in remission (H)     Microalbuminuria     Essential hypertension, benign     Acute reaction to stress     Attention deficit hyperactivity disorder (ADHD), combined type     Chronic kidney disease, stage 1     AVN (avascular necrosis of bone) (H)     Chronic pain of left knee     Vitamin D deficiency     Avascular necrosis of femoral head (H)     Type 2 diabetes mellitus without complication (H)            Past Medical History:   Diagnosis Date     Acute pancreatitis 10/20/2016     Alcohol abuse      Hx of diabetes mellitus 10/26/2016    Occurred post alcoholic pancreatitis. Resolved 1 yr later after alcohol cessation.       Hypertension 10.24.16     Other chronic pain     Avascular necrosis in both hips     Pancreatic disease     pancreatitis - 2016     Type 2 diabetes mellitus without complication (H) 6/8/2018            Past Surgical History:   Procedure Laterality Date     HERNIORRHAPHY INGUINAL Left 3/30/2018    Procedure: HERNIORRHAPHY INGUINAL;  Left Inguinal hernia repair with Mesh ;  Surgeon: Eduard Amaral MD;  Location: [facility] OR     HERNIORRHAPHY INGUINAL Right 3/18/2022    Procedure: OPEN RIGHT INGUINAL HERNIORRHAPHY WITH MESH;  Surgeon: Eduard Amaral MD;  Location: [facility] OR     wisdom teeth              Social History     Tobacco Use     Smoking status: Never Smoker     Smokeless tobacco: Never Used   Substance Use Topics     Alcohol use: No      Alcohol/week: 0.0 standard drinks            Family History   Problem Relation Age of Onset     Breast Cancer Mother      Hypertension Father      Diabetes Type 2  Paternal Grandfather      Hypertension Paternal Grandfather      Diabetes Paternal Grandfather      Other Cancer Maternal Grandmother              No Known Allergies         Current Outpatient Medications   Medication Sig Dispense Refill     amphetamine-dextroamphetamine (ADDERALL) 15 MG tablet Take 1 tablet (15 mg) by mouth 2 times daily 60 tablet 0     aspirin 81 MG EC tablet Take 81 mg by mouth daily       Cholecalciferol (VITAMIN D-3) 125 MCG (5000 UT) TABS Take 1 tablet by mouth daily       lisinopril (ZESTRIL) 2.5 MG tablet TAKE 1 TABLET BY MOUTH EVERY DAY 90 tablet 0     Multiple Vitamin (MULTI VITAMIN MENS PO) Take 1 tablet by mouth            Review Of Systems  Skin: negative  Eyes: negative  Ears/Nose/Throat: negative  Respiratory: No shortness of breath, dyspnea on exertion, cough, or hemoptysis    O: Physical Exam:  Supple  R hip to rotation and no limits to motion/no crepitus.  L hip some limited IR/ER/Abduction and does reproduce some symptoms.  CMS intact to both lower extremities.    Labs: unremarkable    Images:  IMPRESSION: Large area of chronic avascular necrosis in the left  femoral head. There has been mild collapse of the superior medial  articular surface with subchondral sclerosis in the femoral head. Mild  narrowing and hypertrophic change in the left hip joint are new.  Stable small area of chronic avascular necrosis in the right femoral  head. Normal alignment of the right hip joint. The SI joints are  Unremarkable.       Findings:     Osseous structures  Osseous structures:      Redemonstration of chronic osteonecrosis of the left femoral head with  partial collapse of the femoral head. There is associated edema like  signal extending through the femoral neck into the proximal diaphysis.     Advanced degenerative changes of the  left hip with marginal  osteophytosis along the acetabulum and of the femoral head neck  junction. Subchondral cystlike changes along the superior lateral  acetabulum. Areas of full-thickness cartilage loss in the superior  aspect of the femoral head and acetabulum with subchondral bony  irregularity.     There is an area of avascular necrosis in the right femoral head with  mild surrounding rim edema. No significant collapse of the femoral  head.     Degenerative changes of the right hip with marginal osteophytosis. No  full-thickness cartilage loss.     Partially visualized sacroiliac joints are congruent. No periarticular  edema or cystlike change.     Partially visualized lumbar spine, 5 lumbar-type vertebrae are assumed  for the purposes of this dictation. Degenerative changes including  disc space narrowing and desiccation at L5-S1.     Articular cartilage and labrum  Assessment limited on this non-arthrographic study due to relative  lack of joint distension.     Articular cartilage: Full-thickness cartilage loss of the right hip as  detailed above.     Labrum: Suboptimal visualization on this non-arthrographic exam.  Degenerative tearing of the left and late labrum suspected in the  setting of arthritis.     Ligament teres and transverse ligament of acetabulum: Intact.     Joint or bursal effusion     Joint effusion: Moderate volume joint effusion on the left with  synovial thickening and enhancement. Physiologic joint fluid on the  right.     Bursal effusion: Minimal nonspecific edema over the greater  trochanter. No substantial iliopsoas or trochanteric bursal effusion.     Muscles and tendons  Muscles and tendons: Proximal hamstrings, rectus femoris, sartorius,  and iliopsoas tendons intact. The hip abductors are intact. The  visualized adductor muscles are unremarkable.      Nerves:  The visualized course of the sciatic nerve is unremarkable.     Other Findings:  Moderate rectal stool burden.  Incompletely distended bladder is  unremarkable. Prostate is enlarged measuring up to 5.5 cm in diameter.                                                                      Impression:  1. Left hip:   a. Avascular necrosis of the femoral head with partial collapse and  associated edema like signal extending through the femoral neck into  the proximal femoral diaphysis.  b. Advanced degenerative changes of the hip with full-thickness  femoral and acetabular cartilage loss.  c. Small effusion with synovitis.     2. Right hip:  a. Avascular necrosis of the femoral head without significant  collapse.  b. Mild to moderate degenerative changes.         A: Bilateral hip AVN:  Stage 3 L and Stage 1 R     P: At some point plan core decompression R hip, is taking ASA now.  Discussed procedure, risks, benefits, possible complications.  Then move on to L JAMES anterolateral approach.  Discussed same.  He will let us know how he'd like to proceed.           In addition to the above assessment and plan each active problem on Sarbjit's problem list was evaluated today. This included the questioning of Sarbjit for any medication problems. We will continue the current treatment plan for these active problems except as noted.

## 2022-09-11 ENCOUNTER — HEALTH MAINTENANCE LETTER (OUTPATIENT)
Age: 32
End: 2022-09-11

## 2022-09-16 ENCOUNTER — MYC MEDICAL ADVICE (OUTPATIENT)
Dept: ORTHOPEDICS | Facility: CLINIC | Age: 32
End: 2022-09-16

## 2022-09-16 ENCOUNTER — MYC REFILL (OUTPATIENT)
Dept: FAMILY MEDICINE | Facility: CLINIC | Age: 32
End: 2022-09-16

## 2022-09-16 DIAGNOSIS — F90.2 ATTENTION DEFICIT HYPERACTIVITY DISORDER (ADHD), COMBINED TYPE: ICD-10-CM

## 2022-09-16 RX ORDER — DEXTROAMPHETAMINE SACCHARATE, AMPHETAMINE ASPARTATE, DEXTROAMPHETAMINE SULFATE AND AMPHETAMINE SULFATE 3.75; 3.75; 3.75; 3.75 MG/1; MG/1; MG/1; MG/1
15 TABLET ORAL 2 TIMES DAILY
Qty: 60 TABLET | Refills: 0 | Status: SHIPPED | OUTPATIENT
Start: 2022-09-16 | End: 2022-10-24

## 2022-09-16 NOTE — TELEPHONE ENCOUNTER
Patient last seen 8/9/22. He would like to move forward with both procedures.     Dr. Delaney- please place surgery orders.     Demetrio- please contact patient to schedule.     Rosa M Kat MSA, ATC  Certified Athletic Trainer

## 2022-09-16 NOTE — TELEPHONE ENCOUNTER
Routing refill request to provider for review/approval because:  Drug not on the [facility] refill protocol     Trish Graham RN

## 2022-09-21 NOTE — TELEPHONE ENCOUNTER
Dr Delaney-    Please place surgery case request.  I still don't have orders.       Demetrio White, Surgery Scheduler

## 2022-09-30 ENCOUNTER — TELEPHONE (OUTPATIENT)
Dept: ORTHOPEDICS | Facility: CLINIC | Age: 32
End: 2022-09-30

## 2022-09-30 NOTE — TELEPHONE ENCOUNTER
Attempt to reach patient to schedule surgery for right femur head core decompression surgery.  Left message for patient to call the surgery scheduling line at [phone].       Per Dr. Delaney, patient to complete core decompression surgery first than JAMES about 6 weeks later.    Demetrio White, Surgery Scheduler

## 2022-10-01 DIAGNOSIS — R80.9 MICROALBUMINURIA: ICD-10-CM

## 2022-10-03 RX ORDER — LISINOPRIL 2.5 MG/1
TABLET ORAL
Qty: 90 TABLET | Refills: 0 | Status: SHIPPED | OUTPATIENT
Start: 2022-10-03 | End: 2022-11-22

## 2022-10-03 NOTE — TELEPHONE ENCOUNTER
Patient has upcoming appointment 11/22/22. Prescription approved per [facility] Refill Protocol.  Seven CURTIS RN

## 2022-10-04 ENCOUNTER — TELEPHONE (OUTPATIENT)
Dept: ORTHOPEDICS | Facility: CLINIC | Age: 32
End: 2022-10-04

## 2022-10-04 DIAGNOSIS — M87.052 AVASCULAR NECROSIS OF LEFT FEMORAL HEAD (H): ICD-10-CM

## 2022-10-04 DIAGNOSIS — Z11.59 ENCOUNTER FOR SCREENING FOR OTHER VIRAL DISEASES: Primary | ICD-10-CM

## 2022-10-04 NOTE — TELEPHONE ENCOUNTER
Scheduled surgery.     ATC: please confirm if PT is needed for the core decompression.    Type of surgery: right femur head core decompression  Location of surgery: [facility]  Date and time of surgery: 11/10/22  Surgeon: Rhett  Pre-Op Appt Date: patient will schedule  Post-Op Appt Date: 11/22/22   Packet sent out: Yes  Pre-cert/Authorization completed:  No  Date: 10/4/22      Demetrio White, Surgery Scheduler

## 2022-10-04 NOTE — TELEPHONE ENCOUNTER
Scheduled surgery    ATC: please place COVID and PT orders.     Type of surgery: left JAMES  Location of surgery: [facility]  Date and time of surgery: 12/22/22  Surgeon: Rhett  Pre-Op Appt Date: patient to ibrahima  Post-Op Appt Date: 1/3/22   Packet sent out: Yes  Pre-cert/Authorization completed:  No  Date: 10/4/22      Demetrio White, Surgery Scheduler

## 2022-10-05 NOTE — TELEPHONE ENCOUNTER
Per Dr. Delaney :    Not needed for core decompression, just less weight bearing operative leg x 6 weeks

## 2022-10-24 ENCOUNTER — MYC REFILL (OUTPATIENT)
Dept: FAMILY MEDICINE | Facility: CLINIC | Age: 32
End: 2022-10-24

## 2022-10-24 DIAGNOSIS — F90.2 ATTENTION DEFICIT HYPERACTIVITY DISORDER (ADHD), COMBINED TYPE: ICD-10-CM

## 2022-10-25 RX ORDER — DEXTROAMPHETAMINE SACCHARATE, AMPHETAMINE ASPARTATE, DEXTROAMPHETAMINE SULFATE AND AMPHETAMINE SULFATE 3.75; 3.75; 3.75; 3.75 MG/1; MG/1; MG/1; MG/1
15 TABLET ORAL 2 TIMES DAILY
Qty: 60 TABLET | Refills: 0 | Status: SHIPPED | OUTPATIENT
Start: 2022-10-25 | End: 2022-11-22

## 2022-11-08 ENCOUNTER — OFFICE VISIT (OUTPATIENT)
Dept: INTERNAL MEDICINE | Facility: CLINIC | Age: 32
End: 2022-11-08
Payer: COMMERCIAL

## 2022-11-08 VITALS
DIASTOLIC BLOOD PRESSURE: 68 MMHG | OXYGEN SATURATION: 100 % | HEART RATE: 77 BPM | BODY MASS INDEX: 23.72 KG/M2 | SYSTOLIC BLOOD PRESSURE: 120 MMHG | HEIGHT: 73 IN | TEMPERATURE: 98.4 F | WEIGHT: 179 LBS

## 2022-11-08 DIAGNOSIS — M87.051 AVASCULAR NECROSIS OF RIGHT FEMORAL HEAD (H): ICD-10-CM

## 2022-11-08 DIAGNOSIS — Z01.818 ENCOUNTER FOR PREOPERATIVE ASSESSMENT: Primary | ICD-10-CM

## 2022-11-08 PROCEDURE — 99214 OFFICE O/P EST MOD 30 MIN: CPT | Performed by: INTERNAL MEDICINE

## 2022-11-08 NOTE — PROGRESS NOTES
[address] [address]  Phone: [phone]  Primary Provider: Bill Mg  Pre-op Performing Provider: COREY LAZO    PREOPERATIVE EVALUATION:  Today's date: 11/8/2022    Sarbjit Perez is a 32 year old male who presents for a preoperative evaluation.    Surgical Information:  Surgery/Procedure: Core Decompression, femur, head  Surgery Location: [facility]   Surgeon:  Dr. Delaney  Surgery Date: 11/10/2022   Time of Surgery: 7:30am  Where patient plans to recover: At home with family  Fax number for surgical facility: Note does not need to be faxed, will be available electronically in Epic.    Type of Anesthesia Anticipated: Choice    Assessment & Plan   The proposed surgical procedure is considered INTERMEDIATE risk.    Encounter for preoperative assessment  Avascular necrosis of right femoral head (H)  Approval given to proceed with proposed procedure without further diagnostic evaluation. Most recent labs reviewed. Medications were reviewed in detail today. He reports his surgeon has asked him to continue the aspirin preoperatively. I will defer to his surgical team's request on that. On the morning of surgery, take all normal medications with a small sip of water. Resume all medications post-operatively at the same doses unless otherwise directed by surgical care team.    RECOMMENDATION:  APPROVAL GIVEN to proceed with proposed procedure, without further diagnostic evaluation.    Subjective   HPI related to upcoming procedure: Sarbjit presents for a pre-op exam. This is the first time I have met Sarbjit. He has no other complaints. He reports no personal history of cardiac disease. He is able to walk up a flight of stairs without stopping, getting short of breath, or developing chest pain.    Preop Questions 11/2/2022   1. Have you ever had a heart attack or stroke? No   2. Have you ever had surgery on your heart or blood vessels, such  as a stent placement, a coronary artery bypass, or surgery on an artery in your head, neck, heart, or legs? No   3. Do you have chest pain with activity? No   4. Do you have a history of  heart failure? No   5. Do you currently have a cold, bronchitis or symptoms of other infection? No   6. Do you have a cough, shortness of breath, or wheezing? No   7. Do you or anyone in your family have previous history of blood clots? No   8. Do you or does anyone in your family have a serious bleeding problem such as prolonged bleeding following surgeries or cuts? No   9. Have you ever had problems with anemia or been told to take iron pills? No   10. Have you had any abnormal blood loss such as black, tarry or bloody stools? No   11. Have you ever had a blood transfusion? No   12. Are you willing to have a blood transfusion if it is medically needed before, during, or after your surgery? Yes   13. Have you or any of your relatives ever had problems with anesthesia? No   14. Do you have sleep apnea, excessive snoring or daytime drowsiness? No   15. Do you have any artifical heart valves or other implanted medical devices like a pacemaker, defibrillator, or continuous glucose monitor? No   16. Do you have artificial joints? No   17. Are you allergic to latex? No     Health Care Directive:  Patient does not have a Health Care Directive or Living Will    Preoperative Review of :   reviewed - controlled substances reflected in medication list.    Review of Systems  8pt ROS reviewed and all other systems negative unless otherwise stated above.    Patient Active Problem List    Diagnosis Date Noted     Chronic kidney disease, stage 1 12/21/2021     Priority: Medium     AVN (avascular necrosis of bone) (H) 12/21/2021     Priority: Medium     Chronic pain of left knee 12/21/2021     Priority: Medium     Vitamin D deficiency 12/21/2021     Priority: Medium     Attention deficit hyperactivity disorder (ADHD), combined type  06/24/2020     Priority: Medium     Patient is followed by [facility] for ongoing prescription of stimulants.  All refills should be approved by this provider, or covering partner.    Medication(s): adderall 15 mg xr.   Maximum quantity per month: 30  Clinic visit frequency required: Q 6  months     Controlled substance agreement on file: Yes       Date(s): 9/2020  Neuropsych evaluation for ADD completed:  YES, reviewed and on file    Last [facility] website verification:  done on 8/8/22  [email or web address]           Acute reaction to stress 10/07/2019     Priority: Medium     Avascular necrosis of femoral head (H) 06/13/2018     Priority: Medium     Type 2 diabetes mellitus without complication (H) 06/08/2018     Priority: Medium     Microalbuminuria 10/26/2017     Priority: Medium     Essential hypertension, benign 10/26/2017     Priority: Medium     Hx of diabetes mellitus 10/26/2016     Priority: Medium     Occurred post alcoholic pancreatitis. Resolved 1 yr later after alcohol cessation.         Alcohol dependence in remission (H) 10/26/2016     Priority: Medium      Past Medical History:   Diagnosis Date     Acute pancreatitis 10/20/2016     Alcohol abuse      Hx of diabetes mellitus 10/26/2016    Occurred post alcoholic pancreatitis. Resolved 1 yr later after alcohol cessation.       Hypertension 10.24.16     Other chronic pain     Avascular necrosis in both hips     Pancreatic disease     pancreatitis - 2016     Type 2 diabetes mellitus without complication (H) 6/8/2018     Past Surgical History:   Procedure Laterality Date     HERNIORRHAPHY INGUINAL Left 3/30/2018    Procedure: HERNIORRHAPHY INGUINAL;  Left Inguinal hernia repair with Mesh ;  Surgeon: Eduard Amaral MD;  Location: [facility] OR     HERNIORRHAPHY INGUINAL Right 3/18/2022    Procedure: OPEN RIGHT INGUINAL HERNIORRHAPHY WITH MESH;  Surgeon: Eduard Amaral MD;  Location: [facility] OR     wisdom teeth       Current Outpatient  "Medications   Medication Sig Dispense Refill     amphetamine-dextroamphetamine (ADDERALL) 15 MG tablet Take 1 tablet (15 mg) by mouth 2 times daily 60 tablet 0     aspirin 81 MG EC tablet Take 81 mg by mouth daily       Cholecalciferol (VITAMIN D-3) 125 MCG (5000 UT) TABS Take 1 tablet by mouth daily       lisinopril (ZESTRIL) 2.5 MG tablet TAKE 1 TABLET BY MOUTH EVERY DAY 90 tablet 0     Multiple Vitamin (MULTI VITAMIN MENS PO) Take 1 tablet by mouth       No Known Allergies     Social History     Tobacco Use     Smoking status: Never     Smokeless tobacco: Never   Substance Use Topics     Alcohol use: No     Alcohol/week: 0.0 standard drinks     History   Drug Use No         Objective   /68   Pulse 77   Temp 98.4  F (36.9  C) (Tympanic)   Ht 1.854 m (6' 1\")   Wt 81.2 kg (179 lb)   SpO2 100%   BMI 23.62 kg/m      Physical Exam  GENERAL: alert and in no distress.  EYES: conjunctivae/corneas clear. EOMs grossly intact  HENT: Facies symmetric.  RESP: CTAB, no w/r/r.  CV: RRR, no m/r/g.  GI: NT, ND, without rebound tenderness or guarding  MSK: No edema. Moves all four extremities freely.  SKIN: No significant ulcers, lesions, or rashes on the visualized portions of the skin  NEURO: CN II-XII grossly intact.    Recent Labs   Lab Test 03/15/22  1126 12/21/21  0936   HGB 13.1* 13.5    188    137   POTASSIUM 4.5 4.1   CR 0.92 0.80   A1C  --  5.3      Diagnostics:  No labs were ordered during this visit.   No EKG required, no history of coronary heart disease, significant arrhythmia, peripheral arterial disease or other structural heart disease.    Revised Cardiac Risk Index (RCRI):  The patient has the following serious cardiovascular risks for perioperative complications:   - No serious cardiac risks = 0 points     RCRI Interpretation: 0 points: Class I (very low risk - 0.4% complication rate)    Signed Electronically by: Robi Wallace MD  Copy of this evaluation report is provided to requesting " physician.

## 2022-11-09 ENCOUNTER — ANESTHESIA EVENT (OUTPATIENT)
Dept: SURGERY | Facility: CLINIC | Age: 32
End: 2022-11-09
Payer: COMMERCIAL

## 2022-11-10 ENCOUNTER — APPOINTMENT (OUTPATIENT)
Dept: GENERAL RADIOLOGY | Facility: CLINIC | Age: 32
End: 2022-11-10
Attending: ORTHOPAEDIC SURGERY
Payer: COMMERCIAL

## 2022-11-10 ENCOUNTER — HOSPITAL ENCOUNTER (OUTPATIENT)
Facility: CLINIC | Age: 32
Discharge: HOME | End: 2022-11-10
Attending: ORTHOPAEDIC SURGERY | Admitting: ORTHOPAEDIC SURGERY
Payer: COMMERCIAL

## 2022-11-10 ENCOUNTER — ANESTHESIA (OUTPATIENT)
Dept: SURGERY | Facility: CLINIC | Age: 32
End: 2022-11-10
Payer: COMMERCIAL

## 2022-11-10 VITALS
OXYGEN SATURATION: 99 % | HEIGHT: 73 IN | HEART RATE: 71 BPM | WEIGHT: 175.27 LBS | TEMPERATURE: 98 F | RESPIRATION RATE: 16 BRPM | SYSTOLIC BLOOD PRESSURE: 127 MMHG | DIASTOLIC BLOOD PRESSURE: 88 MMHG | BODY MASS INDEX: 23.23 KG/M2

## 2022-11-10 DIAGNOSIS — M87.059 AVASCULAR NECROSIS OF BONE OF HIP, UNSPECIFIED LATERALITY (H): Primary | ICD-10-CM

## 2022-11-10 LAB
GLUCOSE BLDC GLUCOMTR-MCNC: 81 MG/DL (ref 70–99)
GLUCOSE BLDC GLUCOMTR-MCNC: 92 MG/DL (ref 70–99)

## 2022-11-10 PROCEDURE — 710N000009 HC RECOVERY PHASE 1, LEVEL 1, PER MIN: Performed by: ORTHOPAEDIC SURGERY

## 2022-11-10 PROCEDURE — 250N000011 HC RX IP 250 OP 636: Performed by: ORTHOPAEDIC SURGERY

## 2022-11-10 PROCEDURE — 999N000141 HC STATISTIC PRE-PROCEDURE NURSING ASSESSMENT: Performed by: ORTHOPAEDIC SURGERY

## 2022-11-10 PROCEDURE — 250N000025 HC SEVOFLURANE, PER MIN: Performed by: ORTHOPAEDIC SURGERY

## 2022-11-10 PROCEDURE — 88307 TISSUE EXAM BY PATHOLOGIST: CPT | Mod: TC | Performed by: ORTHOPAEDIC SURGERY

## 2022-11-10 PROCEDURE — 250N000011 HC RX IP 250 OP 636: Performed by: ANESTHESIOLOGY

## 2022-11-10 PROCEDURE — 258N000003 HC RX IP 258 OP 636: Performed by: ANESTHESIOLOGY

## 2022-11-10 PROCEDURE — C1769 GUIDE WIRE: HCPCS | Performed by: ORTHOPAEDIC SURGERY

## 2022-11-10 PROCEDURE — 272N000001 HC OR GENERAL SUPPLY STERILE: Performed by: ORTHOPAEDIC SURGERY

## 2022-11-10 PROCEDURE — 999N000180 XR SURGERY CARM FLUORO LESS THAN 5 MIN: Mod: TC

## 2022-11-10 PROCEDURE — 710N000012 HC RECOVERY PHASE 2, PER MINUTE: Performed by: ORTHOPAEDIC SURGERY

## 2022-11-10 PROCEDURE — 27299 UNLISTED PX PELVIS/HIP JOINT: CPT | Mod: RT | Performed by: ORTHOPAEDIC SURGERY

## 2022-11-10 PROCEDURE — 360N000084 HC SURGERY LEVEL 4 W/ FLUORO, PER MIN: Performed by: ORTHOPAEDIC SURGERY

## 2022-11-10 PROCEDURE — 250N000013 HC RX MED GY IP 250 OP 250 PS 637

## 2022-11-10 PROCEDURE — 370N000017 HC ANESTHESIA TECHNICAL FEE, PER MIN: Performed by: ORTHOPAEDIC SURGERY

## 2022-11-10 PROCEDURE — 82962 GLUCOSE BLOOD TEST: CPT

## 2022-11-10 RX ORDER — BUPIVACAINE HYDROCHLORIDE 2.5 MG/ML
INJECTION, SOLUTION INFILTRATION; PERINEURAL PRN
Status: DISCONTINUED | OUTPATIENT
Start: 2022-11-10 | End: 2022-11-10 | Stop reason: HOSPADM

## 2022-11-10 RX ORDER — ONDANSETRON 2 MG/ML
INJECTION INTRAMUSCULAR; INTRAVENOUS PRN
Status: DISCONTINUED | OUTPATIENT
Start: 2022-11-10 | End: 2022-11-10

## 2022-11-10 RX ORDER — HYDROCODONE BITARTRATE AND ACETAMINOPHEN 5; 325 MG/1; MG/1
1 TABLET ORAL
Status: COMPLETED | OUTPATIENT
Start: 2022-11-10 | End: 2022-11-10

## 2022-11-10 RX ORDER — FENTANYL CITRATE 50 UG/ML
25-50 INJECTION, SOLUTION INTRAMUSCULAR; INTRAVENOUS
Status: DISCONTINUED | OUTPATIENT
Start: 2022-11-10 | End: 2022-11-10

## 2022-11-10 RX ORDER — SODIUM CHLORIDE, SODIUM LACTATE, POTASSIUM CHLORIDE, CALCIUM CHLORIDE 600; 310; 30; 20 MG/100ML; MG/100ML; MG/100ML; MG/100ML
INJECTION, SOLUTION INTRAVENOUS CONTINUOUS
Status: DISCONTINUED | OUTPATIENT
Start: 2022-11-10 | End: 2022-11-10 | Stop reason: HOSPADM

## 2022-11-10 RX ORDER — DEXAMETHASONE SODIUM PHOSPHATE 4 MG/ML
INJECTION, SOLUTION INTRA-ARTICULAR; INTRALESIONAL; INTRAMUSCULAR; INTRAVENOUS; SOFT TISSUE PRN
Status: DISCONTINUED | OUTPATIENT
Start: 2022-11-10 | End: 2022-11-10

## 2022-11-10 RX ORDER — NALOXONE HYDROCHLORIDE 0.4 MG/ML
0.2 INJECTION, SOLUTION INTRAMUSCULAR; INTRAVENOUS; SUBCUTANEOUS
Status: DISCONTINUED | OUTPATIENT
Start: 2022-11-10 | End: 2022-11-10 | Stop reason: HOSPADM

## 2022-11-10 RX ORDER — AMOXICILLIN 250 MG
1-2 CAPSULE ORAL 2 TIMES DAILY
Qty: 30 TABLET | Refills: 0 | Status: SHIPPED | OUTPATIENT
Start: 2022-11-10 | End: 2022-11-22

## 2022-11-10 RX ORDER — OXYCODONE HYDROCHLORIDE 5 MG/1
5 TABLET ORAL EVERY 4 HOURS PRN
Status: DISCONTINUED | OUTPATIENT
Start: 2022-11-10 | End: 2022-11-10 | Stop reason: HOSPADM

## 2022-11-10 RX ORDER — HYDROMORPHONE HYDROCHLORIDE 1 MG/ML
0.2 INJECTION, SOLUTION INTRAMUSCULAR; INTRAVENOUS; SUBCUTANEOUS EVERY 5 MIN PRN
Status: DISCONTINUED | OUTPATIENT
Start: 2022-11-10 | End: 2022-11-10 | Stop reason: HOSPADM

## 2022-11-10 RX ORDER — ONDANSETRON 2 MG/ML
4 INJECTION INTRAMUSCULAR; INTRAVENOUS EVERY 30 MIN PRN
Status: DISCONTINUED | OUTPATIENT
Start: 2022-11-10 | End: 2022-11-10 | Stop reason: HOSPADM

## 2022-11-10 RX ORDER — CEFAZOLIN SODIUM/WATER 2 G/20 ML
2 SYRINGE (ML) INTRAVENOUS
Status: COMPLETED | OUTPATIENT
Start: 2022-11-10 | End: 2022-11-10

## 2022-11-10 RX ORDER — NALOXONE HYDROCHLORIDE 0.4 MG/ML
0.4 INJECTION, SOLUTION INTRAMUSCULAR; INTRAVENOUS; SUBCUTANEOUS
Status: DISCONTINUED | OUTPATIENT
Start: 2022-11-10 | End: 2022-11-10 | Stop reason: HOSPADM

## 2022-11-10 RX ORDER — HYDROCODONE BITARTRATE AND ACETAMINOPHEN 5; 325 MG/1; MG/1
1-2 TABLET ORAL EVERY 4 HOURS PRN
Qty: 15 TABLET | Refills: 0 | Status: SHIPPED | OUTPATIENT
Start: 2022-11-10 | End: 2022-11-22

## 2022-11-10 RX ORDER — PROPOFOL 10 MG/ML
INJECTION, EMULSION INTRAVENOUS PRN
Status: DISCONTINUED | OUTPATIENT
Start: 2022-11-10 | End: 2022-11-10

## 2022-11-10 RX ORDER — FENTANYL CITRATE 50 UG/ML
INJECTION, SOLUTION INTRAMUSCULAR; INTRAVENOUS PRN
Status: DISCONTINUED | OUTPATIENT
Start: 2022-11-10 | End: 2022-11-10

## 2022-11-10 RX ORDER — FENTANYL CITRATE 50 UG/ML
25 INJECTION, SOLUTION INTRAMUSCULAR; INTRAVENOUS EVERY 5 MIN PRN
Status: DISCONTINUED | OUTPATIENT
Start: 2022-11-10 | End: 2022-11-10 | Stop reason: HOSPADM

## 2022-11-10 RX ORDER — SODIUM CHLORIDE, SODIUM LACTATE, POTASSIUM CHLORIDE, CALCIUM CHLORIDE 600; 310; 30; 20 MG/100ML; MG/100ML; MG/100ML; MG/100ML
INJECTION, SOLUTION INTRAVENOUS CONTINUOUS PRN
Status: DISCONTINUED | OUTPATIENT
Start: 2022-11-10 | End: 2022-11-10

## 2022-11-10 RX ORDER — ONDANSETRON 4 MG/1
4 TABLET, ORALLY DISINTEGRATING ORAL EVERY 30 MIN PRN
Status: DISCONTINUED | OUTPATIENT
Start: 2022-11-10 | End: 2022-11-10 | Stop reason: HOSPADM

## 2022-11-10 RX ORDER — ONDANSETRON 4 MG/1
4 TABLET, ORALLY DISINTEGRATING ORAL EVERY 8 HOURS PRN
Qty: 4 TABLET | Refills: 0 | Status: SHIPPED | OUTPATIENT
Start: 2022-11-10 | End: 2022-11-22

## 2022-11-10 RX ORDER — FLUMAZENIL 0.1 MG/ML
0.2 INJECTION, SOLUTION INTRAVENOUS
Status: DISCONTINUED | OUTPATIENT
Start: 2022-11-10 | End: 2022-11-10 | Stop reason: HOSPADM

## 2022-11-10 RX ORDER — MEPERIDINE HYDROCHLORIDE 25 MG/ML
12.5 INJECTION INTRAMUSCULAR; INTRAVENOUS; SUBCUTANEOUS
Status: DISCONTINUED | OUTPATIENT
Start: 2022-11-10 | End: 2022-11-10 | Stop reason: HOSPADM

## 2022-11-10 RX ORDER — CEFAZOLIN SODIUM/WATER 2 G/20 ML
2 SYRINGE (ML) INTRAVENOUS SEE ADMIN INSTRUCTIONS
Status: DISCONTINUED | OUTPATIENT
Start: 2022-11-10 | End: 2022-11-10 | Stop reason: HOSPADM

## 2022-11-10 RX ORDER — FENTANYL CITRATE 50 UG/ML
25 INJECTION, SOLUTION INTRAMUSCULAR; INTRAVENOUS
Status: DISCONTINUED | OUTPATIENT
Start: 2022-11-10 | End: 2022-11-10 | Stop reason: HOSPADM

## 2022-11-10 RX ADMIN — HYDROCODONE BITARTRATE AND ACETAMINOPHEN 1 TABLET: 5; 325 TABLET ORAL at 09:40

## 2022-11-10 RX ADMIN — Medication 2 G: at 07:55

## 2022-11-10 RX ADMIN — SODIUM CHLORIDE, POTASSIUM CHLORIDE, SODIUM LACTATE AND CALCIUM CHLORIDE: 600; 310; 30; 20 INJECTION, SOLUTION INTRAVENOUS at 07:38

## 2022-11-10 RX ADMIN — DEXAMETHASONE SODIUM PHOSPHATE 4 MG: 4 INJECTION, SOLUTION INTRA-ARTICULAR; INTRALESIONAL; INTRAMUSCULAR; INTRAVENOUS; SOFT TISSUE at 07:55

## 2022-11-10 RX ADMIN — PROPOFOL 100 MG: 10 INJECTION, EMULSION INTRAVENOUS at 07:50

## 2022-11-10 RX ADMIN — HYDROMORPHONE HYDROCHLORIDE 0.3 MG: 1 INJECTION, SOLUTION INTRAMUSCULAR; INTRAVENOUS; SUBCUTANEOUS at 08:22

## 2022-11-10 RX ADMIN — PROPOFOL 200 MG: 10 INJECTION, EMULSION INTRAVENOUS at 07:47

## 2022-11-10 RX ADMIN — HYDROMORPHONE HYDROCHLORIDE 0.2 MG: 1 INJECTION, SOLUTION INTRAMUSCULAR; INTRAVENOUS; SUBCUTANEOUS at 09:32

## 2022-11-10 RX ADMIN — ONDANSETRON 4 MG: 2 INJECTION INTRAMUSCULAR; INTRAVENOUS at 07:55

## 2022-11-10 RX ADMIN — MIDAZOLAM 2 MG: 1 INJECTION INTRAMUSCULAR; INTRAVENOUS at 07:38

## 2022-11-10 RX ADMIN — FENTANYL CITRATE 25 MCG: 50 INJECTION, SOLUTION INTRAMUSCULAR; INTRAVENOUS at 08:22

## 2022-11-10 RX ADMIN — FENTANYL CITRATE 25 MCG: 50 INJECTION, SOLUTION INTRAMUSCULAR; INTRAVENOUS at 07:47

## 2022-11-10 ASSESSMENT — ACTIVITIES OF DAILY LIVING (ADL)
ADLS_ACUITY_SCORE: 35
ADLS_ACUITY_SCORE: 35

## 2022-11-10 NOTE — DISCHARGE INSTRUCTIONS
Same-Day Surgery   Adult Discharge Orders & Instructions     For 24 hours after surgery:  Get plenty of rest.  A responsible adult must stay with you for at least 24 hours after you leave the hospital.   Pain medication can slow your reflexes. Do not drive or use heavy equipment.  If you have weakness or tingling, don't drive or use heavy equipment until this feeling goes away.  Mixing alcohol and pain medication can cause dizziness and slow your breathing. It can even be fatal. Do not drink alcohol while taking pain medication.  Avoid strenuous or risky activities.  Ask for help when climbing stairs.   You may feel lightheaded.  If so, sit for a few minutes before standing.  Have someone help you get up.   If you have nausea (feel sick to your stomach), drink only clear liquids such as apple juice, ginger ale, broth or 7-Up.  Rest may also help.  Be sure to drink enough fluids.  Move to a regular diet as you feel able. Take pain medications with a small amount of solid food, such as toast or crackers, to avoid nausea.   A slight fever is normal. Call the doctor if your fever is over 100 F (37.7 C) (taken under the tongue) or lasts longer than 24 hours.  You may have a dry mouth, muscle aches, trouble sleeping or a sore throat.  These symptoms should go away after 24 hours.  Do not make important or legal decisions.   Pain Management:      1. Take pain medication (if prescribed) for pain as directed by your physician.        2. WARNING: If the pain medication you have been prescribed contains Tylenol  (acetaminophen), DO NOT take additional doses of Tylenol (acetaminophen).     Call your doctor for any of the followin.  Signs of infection (fever, growing tenderness at the surgery site, severe pain, a large amount of drainage or bleeding, foul-smelling drainage, redness, swelling).    2.  It has been over 8 to 10 hours since surgery and you are still not able to urinate (pee).    3.  Headache for over 24  hours.    4.  Numbness, tingling or weakness the day after surgery (if you had spinal anesthesia).  To contact a doctor, call [facility] at [phone], option 4 or:  '   [phone] and ask for the Resident On Call for:          Orthopedics (answered 24 hours a day)  '   Emergency Department:  [place] [facility]: [phone]  [place] [facility]: [phone]               Rev. 10/2014

## 2022-11-10 NOTE — ANESTHESIA PREPROCEDURE EVALUATION
Anesthesia Pre-Procedure Evaluation    Patient: Sarbjit Perez   MRN: [id number] : 1990        Procedure : Procedure(s):  CORE DECOMPRESSION, FEMUR, HEAD          Past Medical History:   Diagnosis Date     Acute pancreatitis 10/20/2016     ADHD (attention deficit hyperactivity disorder)      Alcohol abuse      Hx of diabetes mellitus 10/26/2016    Occurred post alcoholic pancreatitis. Resolved 1 yr later after alcohol cessation.       Hypertension 10/24/2016     Other chronic pain     Avascular necrosis in both hips     Pancreatic disease     pancreatitis - 2016     Type 2 diabetes mellitus without complication (H) 2018      Past Surgical History:   Procedure Laterality Date     HERNIORRHAPHY INGUINAL Left 3/30/2018    Procedure: HERNIORRHAPHY INGUINAL;  Left Inguinal hernia repair with Mesh ;  Surgeon: Eduard Amaral MD;  Location: [facility] OR     HERNIORRHAPHY INGUINAL Right 3/18/2022    Procedure: OPEN RIGHT INGUINAL HERNIORRHAPHY WITH MESH;  Surgeon: Eduard Amaral MD;  Location: [facility] OR     wisdom teeth        No Known Allergies   Social History     Tobacco Use     Smoking status: Never     Smokeless tobacco: Never   Substance Use Topics     Alcohol use: Not Currently     Comment: sober x 6 yrs.      Wt Readings from Last 1 Encounters:   11/10/22 79.5 kg (175 lb 4.3 oz)        Anesthesia Evaluation            ROS/MED HX  ENT/Pulmonary:       Neurologic:       Cardiovascular:       METS/Exercise Tolerance:     Hematologic:       Musculoskeletal:       GI/Hepatic:       Renal/Genitourinary:       Endo:     (+) type II DM,     Psychiatric/Substance Use:       Infectious Disease:       Malignancy:       Other:            Physical Exam    Airway        Mallampati: II       Respiratory Devices and Support         Dental           Cardiovascular          Rhythm and rate: regular     Pulmonary           breath sounds clear to auscultation           OUTSIDE LABS:  CBC:   Lab Results   Component Value Date     WBC 4.5 03/15/2022    WBC 4.8 12/21/2021    HGB 13.1 (L) 03/15/2022    HGB 13.5 12/21/2021    HCT 40.3 03/15/2022    HCT 41.2 12/21/2021     03/15/2022     12/21/2021     BMP:   Lab Results   Component Value Date     03/15/2022     12/21/2021    POTASSIUM 4.5 03/15/2022    POTASSIUM 4.1 12/21/2021    CHLORIDE 106 03/15/2022    CHLORIDE 104 12/21/2021    CO2 23 03/15/2022    CO2 27 12/21/2021    BUN 12 03/15/2022    BUN 13 12/21/2021    CR 0.92 03/15/2022    CR 0.80 12/21/2021    GLC 81 11/10/2022    GLC 92 11/10/2022     COAGS: No results found for: PTT, INR, FIBR  POC:   Lab Results   Component Value Date    BGM 93 03/30/2018     HEPATIC:   Lab Results   Component Value Date    ALBUMIN 4.2 06/08/2018    PROTTOTAL 6.8 10/22/2016    ALT 42 10/24/2017    AST 43 10/22/2016    ALKPHOS 54 10/22/2016    BILITOTAL 0.6 10/22/2016     OTHER:   Lab Results   Component Value Date    LACT 0.9 10/20/2016    A1C 5.3 12/21/2021    GARRETT 9.6 03/15/2022    PHOS 3.2 06/08/2018    MAG 2.3 10/21/2016    LIPASE 2,469 (H) 10/23/2016    TSH 1.42 12/21/2021    TSH 1.51 12/21/2021    CRP <2.9 03/03/2022    SED 7 03/03/2022       Anesthesia Plan    ASA Status:  2   NPO Status:  NPO Appropriate    Anesthesia Type: General.     - Airway: LMA   Induction: Intravenous.   Maintenance: Balanced.        Consents    Anesthesia Plan(s) and associated risks, benefits, and realistic alternatives discussed. Questions answered and patient/representative(s) expressed understanding.    - Discussed:     - Discussed with:  Patient         Postoperative Care    Pain management: Oral pain medications, IV analgesics, Multi-modal analgesia.   PONV prophylaxis: Ondansetron (or other 5HT-3), Dexamethasone or Solumedrol     Comments:                Leonel Sharma MD

## 2022-11-10 NOTE — ANESTHESIA PROCEDURE NOTES
Airway       Patient location during procedure: OR  Staff -        Anesthesiologist:  Leonel Sharma MD       CRNA: Mallory Hudson APRN CRNA       Performed By: CRNA  Consent for Airway        Urgency: elective  Indications and Patient Condition       Indications for airway management: desiree-procedural       Induction type:intravenous       Mask difficulty assessment: 1 - vent by mask    Final Airway Details       Final airway type: supraglottic airway    Supraglottic Airway Details        Type: LMA       Brand: Ambu AuraGain       LMA size: 5    Post intubation assessment        Placement verified by: capnometry, equal breath sounds and chest rise        Number of attempts at approach: 1       Number of other approaches attempted: 0       Secured with: pink tape       Ease of procedure: easy       Dentition: Intact and Unchanged

## 2022-11-10 NOTE — OR NURSING
Pt. And pt. Spouse state having crutches at home and previous use of crutches.  Pt. Did not want to take any crutches home from here.

## 2022-11-10 NOTE — ANESTHESIA CARE TRANSFER NOTE
Patient: Sarbjit GAMBLE Naslund    Procedure: Procedure(s):  CORE DECOMPRESSION, FEMUR, HEAD       Diagnosis: Avascular necrosis of bone of hip, unspecified laterality (H) [M87.059]  Diagnosis Additional Information: No value filed.    Anesthesia Type:   No value filed.     Note:  Anesthesia Care Transfer Notewriter  Vitals:  Vitals Value Taken Time   /49 11/10/22 0851   Temp 36  C (96.8  F) 11/10/22 0851   Pulse 54 11/10/22 0857   Resp 12 11/10/22 0857   SpO2 100 % 11/10/22 0857   Vitals shown include unvalidated device data.    Electronically Signed By: IGGY Costello CRNA  November 10, 2022  8:58 AM

## 2022-11-10 NOTE — ANESTHESIA POSTPROCEDURE EVALUATION
Patient: Sarbjit GAMBLE Naslund    Procedure: Procedure(s):  CORE DECOMPRESSION, FEMUR, HEAD       Anesthesia Type:  No value filed.    Note:  Disposition: Outpatient   Postop Pain Control: Uneventful            Sign Out: Well controlled pain   PONV: No   Neuro/Psych: Uneventful            Sign Out: Acceptable/Baseline neuro status   Airway/Respiratory: Uneventful            Sign Out: Acceptable/Baseline resp. status   CV/Hemodynamics: Uneventful            Sign Out: Acceptable CV status; No obvious hypovolemia; No obvious fluid overload   Other NRE: NONE   DID A NON-ROUTINE EVENT OCCUR? No           Last vitals:  Vitals Value Taken Time   /80 11/10/22 0945   Temp 36.4  C (97.5  F) 11/10/22 0945   Pulse 62 11/10/22 0946   Resp 8 11/10/22 0946   SpO2 100 % 11/10/22 0951   Vitals shown include unvalidated device data.    Electronically Signed By: Leonel Sharma MD  November 10, 2022  10:15 AM

## 2022-11-10 NOTE — BRIEF OP NOTE
[facility]    Brief Operative Note    Pre-operative diagnosis: Avascular necrosis of bone of hip, unspecified laterality (H) [M87.059]  Post-operative diagnosis Same as pre-operative diagnosis    Procedure: Procedure(s):  CORE DECOMPRESSION, FEMUR, HEAD  Surgeon: Surgeon(s) and Role:     * Brady Delaney MD - Primary  Anesthesia: Choice   Estimated Blood Loss: 17mL    Drains: None  Specimens:   ID Type Source Tests Collected by Time Destination   1 : right femoral head and neck reamings Tissue Hip, Right SURGICAL PATHOLOGY EXAM Brady Delaney MD 11/10/2022  8:26 AM      Findings:   None.  Complications: None.  Implants:  None.    Plan:  Pain: Scheduled Tylenol, PO hydrocodone PRN  Activity: Touch down weight bearing x 6 weeks  Wound care: Dressing change at two week follow up   Abx: Periop ancef   Diet: Clear liquids and ADAT  DVT ppx: ASA 162mg x 4 weeks  Radiographs: complete, will get repeat films at 6 week follow up   Disposition: Discharge to home once stable in PACU    Andreia Daniels MD  Orthopaedic Surgery, PGY1

## 2022-11-10 NOTE — OP NOTE
Preop DX:  Right hip avascular necrosis  Postop DX:  Same  Procedure:  Core Decompression R hip AVN     Attending:  Rhett  Resident:  Andreia Daniels MD G1     Indications for procedure:  R hip pain and evidence avascular necrosis. Hx ETOH abuse.  L hip with Stage 3 changes and plan for L JAMES soon.    Findings:Findings:     Osseous structures  Osseous structures:      Redemonstration of chronic osteonecrosis of the left femoral head with  partial collapse of the femoral head. There is associated edema like  signal extending through the femoral neck into the proximal diaphysis.     Advanced degenerative changes of the left hip with marginal  osteophytosis along the acetabulum and of the femoral head neck  junction. Subchondral cystlike changes along the superior lateral  acetabulum. Areas of full-thickness cartilage loss in the superior  aspect of the femoral head and acetabulum with subchondral bony  irregularity.     There is an area of avascular necrosis in the right femoral head with  mild surrounding rim edema. No significant collapse of the femoral  head.     Degenerative changes of the right hip with marginal osteophytosis. No  full-thickness cartilage loss.     Partially visualized sacroiliac joints are congruent. No periarticular  edema or cystlike change.     Partially visualized lumbar spine, 5 lumbar-type vertebrae are assumed  for the purposes of this dictation. Degenerative changes including  disc space narrowing and desiccation at L5-S1.     Articular cartilage and labrum  Assessment limited on this non-arthrographic study due to relative  lack of joint distension.     Articular cartilage: Full-thickness cartilage loss of the right hip as  detailed above.     Labrum: Suboptimal visualization on this non-arthrographic exam.  Degenerative tearing of the left and late labrum suspected in the  setting of arthritis.     Ligament teres and transverse ligament of acetabulum: Intact.     Joint or bursal  effusion     Joint effusion: Moderate volume joint effusion on the left with  synovial thickening and enhancement. Physiologic joint fluid on the  right.     Bursal effusion: Minimal nonspecific edema over the greater  trochanter. No substantial iliopsoas or trochanteric bursal effusion.     Muscles and tendons  Muscles and tendons: Proximal hamstrings, rectus femoris, sartorius,  and iliopsoas tendons intact. The hip abductors are intact. The  visualized adductor muscles are unremarkable.      Nerves:  The visualized course of the sciatic nerve is unremarkable.     Other Findings:  Moderate rectal stool burden. Incompletely distended bladder is  unremarkable. Prostate is enlarged measuring up to 5.5 cm in diameter.                                                                      Impression:  1. Left hip:   a. Avascular necrosis of the femoral head with partial collapse and  associated edema like signal extending through the femoral neck into  the proximal femoral diaphysis.  b. Advanced degenerative changes of the hip with full-thickness  femoral and acetabular cartilage loss.  c. Small effusion with synovitis.     2. Right hip:  a. Avascular necrosis of the femoral head without significant  collapse.  b. Mild to moderate degenerative changes.        Findings at procedure:  Adequate bone except for sclerotic AVN lesion.  Reamings sent to pathology for permanent section.  No complications identified.  I spoke with patient's wife postop.     Procedure:  Following general anesthesia patient placed on fracture table.  Placed gently in traction.  Under shanna guidance marked the drill trajectory.  Patient then had sterile prep and drape.  Percutaneous threaded guide wire placed into center of lesion somewhat cephalad and anterior.  7.3 synthes reamer then used over guidewire.  We had incised the skin about the pin sharply and used hemostat to spread soft tissue.  We removed the drill/reamer a few time to clear the  bit/reamer.  We collected all of the reamings.  We passed the reamer carefully through the sclerotic AVN lesion and then to the subchondral surface which was not compromised with the reamer.  We removed the guide pin and reamer and sent reamings to pathology.  We irrigated the wound and closed with staples.  We dressed with xeroform then aquacel over this.  Patient taken to recovery without incident.  Hip supple postop. We did inject 1/4 plain marcaine as well.

## 2022-11-15 LAB
PATH REPORT.COMMENTS IMP SPEC: NORMAL
PATH REPORT.COMMENTS IMP SPEC: NORMAL
PATH REPORT.FINAL DX SPEC: NORMAL
PATH REPORT.GROSS SPEC: NORMAL
PATH REPORT.MICROSCOPIC SPEC OTHER STN: NORMAL
PATH REPORT.RELEVANT HX SPEC: NORMAL
PHOTO IMAGE: NORMAL

## 2022-11-15 PROCEDURE — 88307 TISSUE EXAM BY PATHOLOGIST: CPT | Mod: 26 | Performed by: PATHOLOGY

## 2022-11-22 ENCOUNTER — OFFICE VISIT (OUTPATIENT)
Dept: ORTHOPEDICS | Facility: CLINIC | Age: 32
End: 2022-11-22
Payer: COMMERCIAL

## 2022-11-22 ENCOUNTER — OFFICE VISIT (OUTPATIENT)
Dept: FAMILY MEDICINE | Facility: CLINIC | Age: 32
End: 2022-11-22
Payer: COMMERCIAL

## 2022-11-22 VITALS
DIASTOLIC BLOOD PRESSURE: 80 MMHG | SYSTOLIC BLOOD PRESSURE: 121 MMHG | HEIGHT: 73 IN | BODY MASS INDEX: 22.8 KG/M2 | TEMPERATURE: 97.7 F | WEIGHT: 172 LBS | HEART RATE: 86 BPM | RESPIRATION RATE: 12 BRPM | OXYGEN SATURATION: 97 %

## 2022-11-22 DIAGNOSIS — F90.2 ATTENTION DEFICIT HYPERACTIVITY DISORDER (ADHD), COMBINED TYPE: ICD-10-CM

## 2022-11-22 DIAGNOSIS — Z79.899 ENCOUNTER FOR LONG-TERM (CURRENT) USE OF MEDICATIONS: ICD-10-CM

## 2022-11-22 DIAGNOSIS — R39.11 URINARY HESITANCY: ICD-10-CM

## 2022-11-22 DIAGNOSIS — Z13.220 SCREENING FOR HYPERLIPIDEMIA: ICD-10-CM

## 2022-11-22 DIAGNOSIS — Z00.00 ROUTINE GENERAL MEDICAL EXAMINATION AT A HEALTH CARE FACILITY: Primary | ICD-10-CM

## 2022-11-22 DIAGNOSIS — M87.00 AVASCULAR NECROSIS (H): Primary | ICD-10-CM

## 2022-11-22 DIAGNOSIS — D64.9 ANEMIA, UNSPECIFIED TYPE: ICD-10-CM

## 2022-11-22 DIAGNOSIS — N18.1 CHRONIC KIDNEY DISEASE, STAGE 1: ICD-10-CM

## 2022-11-22 DIAGNOSIS — Z86.39 HX OF DIABETES MELLITUS: ICD-10-CM

## 2022-11-22 DIAGNOSIS — R80.9 MICROALBUMINURIA: ICD-10-CM

## 2022-11-22 LAB
CREAT UR-MCNC: 318 MG/DL
CREAT UR-MCNC: 318 MG/DL
ERYTHROCYTE [DISTWIDTH] IN BLOOD BY AUTOMATED COUNT: 13.2 % (ref 10–15)
HBA1C MFR BLD: 5.5 % (ref 0–5.6)
HCT VFR BLD AUTO: 41.2 % (ref 40–53)
HGB BLD-MCNC: 13.6 G/DL (ref 13.3–17.7)
MCH RBC QN AUTO: 28.8 PG (ref 26.5–33)
MCHC RBC AUTO-ENTMCNC: 33 G/DL (ref 31.5–36.5)
MCV RBC AUTO: 87 FL (ref 78–100)
MICROALBUMIN UR-MCNC: 369 MG/L
MICROALBUMIN/CREAT UR: 116.04 MG/G CR (ref 0–17)
PLATELET # BLD AUTO: 226 10E3/UL (ref 150–450)
RBC # BLD AUTO: 4.73 10E6/UL (ref 4.4–5.9)
WBC # BLD AUTO: 4.9 10E3/UL (ref 4–11)

## 2022-11-22 PROCEDURE — 36415 COLL VENOUS BLD VENIPUNCTURE: CPT | Performed by: PHYSICIAN ASSISTANT

## 2022-11-22 PROCEDURE — 99395 PREV VISIT EST AGE 18-39: CPT | Mod: 25 | Performed by: PHYSICIAN ASSISTANT

## 2022-11-22 PROCEDURE — 83036 HEMOGLOBIN GLYCOSYLATED A1C: CPT | Performed by: PHYSICIAN ASSISTANT

## 2022-11-22 PROCEDURE — 82043 UR ALBUMIN QUANTITATIVE: CPT | Performed by: PHYSICIAN ASSISTANT

## 2022-11-22 PROCEDURE — 85027 COMPLETE CBC AUTOMATED: CPT | Performed by: PHYSICIAN ASSISTANT

## 2022-11-22 PROCEDURE — 90686 IIV4 VACC NO PRSV 0.5 ML IM: CPT | Performed by: PHYSICIAN ASSISTANT

## 2022-11-22 PROCEDURE — 99024 POSTOP FOLLOW-UP VISIT: CPT | Performed by: ORTHOPAEDIC SURGERY

## 2022-11-22 PROCEDURE — 80048 BASIC METABOLIC PNL TOTAL CA: CPT | Performed by: PHYSICIAN ASSISTANT

## 2022-11-22 PROCEDURE — 99213 OFFICE O/P EST LOW 20 MIN: CPT | Mod: 25 | Performed by: PHYSICIAN ASSISTANT

## 2022-11-22 PROCEDURE — 84153 ASSAY OF PSA TOTAL: CPT | Performed by: PHYSICIAN ASSISTANT

## 2022-11-22 PROCEDURE — 90471 IMMUNIZATION ADMIN: CPT | Performed by: PHYSICIAN ASSISTANT

## 2022-11-22 PROCEDURE — 80061 LIPID PANEL: CPT | Performed by: PHYSICIAN ASSISTANT

## 2022-11-22 PROCEDURE — 80307 DRUG TEST PRSMV CHEM ANLYZR: CPT | Performed by: PHYSICIAN ASSISTANT

## 2022-11-22 RX ORDER — DEXTROAMPHETAMINE SACCHARATE, AMPHETAMINE ASPARTATE, DEXTROAMPHETAMINE SULFATE AND AMPHETAMINE SULFATE 3.75; 3.75; 3.75; 3.75 MG/1; MG/1; MG/1; MG/1
15 TABLET ORAL 2 TIMES DAILY
Qty: 60 TABLET | Refills: 0 | Status: CANCELLED | OUTPATIENT
Start: 2022-11-22

## 2022-11-22 RX ORDER — DEXTROAMPHETAMINE SACCHARATE, AMPHETAMINE ASPARTATE MONOHYDRATE, DEXTROAMPHETAMINE SULFATE AND AMPHETAMINE SULFATE 5; 5; 5; 5 MG/1; MG/1; MG/1; MG/1
20 CAPSULE, EXTENDED RELEASE ORAL DAILY
Qty: 30 CAPSULE | Refills: 0 | Status: SHIPPED | OUTPATIENT
Start: 2022-11-22 | End: 2022-12-30

## 2022-11-22 NOTE — LETTER
11/22/2022         RE: Sarbjit Perez  [id number]  [place] [id number]        Dear Colleague,    Thank you for referring your patient, Sarbjit Perez, to the [facility] [place]. Please see a copy of my visit note below.    S:  Patient is seen s/p right femoral head core decompression, DOS 11/10/22.  Patient also has upcoming left TKA procedure scheduled for 12/22/22.          Patient Active Problem List   Diagnosis     Hx of diabetes mellitus     Alcohol dependence in remission (H)     Microalbuminuria     Essential hypertension, benign     Acute reaction to stress     Attention deficit hyperactivity disorder (ADHD), combined type     Chronic kidney disease, stage 1     AVN (avascular necrosis of bone) (H)     Chronic pain of left knee     Vitamin D deficiency     Avascular necrosis of femoral head (H)     Type 2 diabetes mellitus without complication (H)            Past Medical History:   Diagnosis Date     Acute pancreatitis 10/20/2016     ADHD (attention deficit hyperactivity disorder)      Alcohol abuse      Hx of diabetes mellitus 10/26/2016    Occurred post alcoholic pancreatitis. Resolved 1 yr later after alcohol cessation.       Hypertension 10/24/2016     Other chronic pain     Avascular necrosis in both hips     Pancreatic disease     pancreatitis - 2016            Past Surgical History:   Procedure Laterality Date     DECOMPRESSION HIP CORE Right 11/10/2022    Procedure: CORE DECOMPRESSION, FEMUR, HEAD;  Surgeon: Brady Delaney MD;  [facility]     HERNIORRHAPHY INGUINAL Left 3/30/2018    Procedure: HERNIORRHAPHY INGUINAL;  Left Inguinal hernia repair with Mesh ;  Surgeon: Eduard Amaral MD;  Location: [facility] OR     HERNIORRHAPHY INGUINAL Right 3/18/2022    Procedure: OPEN RIGHT INGUINAL HERNIORRHAPHY WITH MESH;  Surgeon: Eduard Amaral MD;  Location: [facility] OR     wisdom teeth              Social History     Tobacco Use     Smoking status: Never     Smokeless  tobacco: Never   Substance Use Topics     Alcohol use: Not Currently     Comment: sober x 6 yrs.            Family History   Problem Relation Age of Onset     Breast Cancer Mother      Hypertension Father      Diabetes Type 2  Paternal Grandfather      Hypertension Paternal Grandfather      Diabetes Paternal Grandfather      Other Cancer Maternal Grandmother              No Known Allergies         Current Outpatient Medications   Medication Sig Dispense Refill     amphetamine-dextroamphetamine (ADDERALL XR) 20 MG 24 hr capsule Take 1 capsule (20 mg) by mouth daily 30 capsule 0     aspirin 81 MG EC tablet Take 81 mg by mouth daily       Cholecalciferol (VITAMIN D-3) 125 MCG (5000 UT) TABS Take 1 tablet by mouth daily       Multiple Vitamin (MULTI VITAMIN MENS PO) Take 1 tablet by mouth daily            Review Of Systems  Skin: negative  Eyes: negative  Ears/Nose/Throat: negative  Respiratory: No shortness of breath, dyspnea on exertion, cough, or hemoptysis    O: Physical Exam:  R hip wound healing well.  CMS intact to both lower extremities.  Decreased IR/Abduction L hip with exacerbation groin pain.  Supple R hip to motion.    Lab:  Fibrous tissue, no evidence necrotic bone in sample, crush artifact    Images: intra op images reviewed with patient           A:  Stable R hip after core decompression, L hip with AVN and head collapse    P:  Less weight bearing RLE for next month.  Staples out.  Discussed wound care.  Plan for L JAMES end of December.  Discussed risks/benefits/procedure/possible complications, postop rehab.  He'd like to proceed. No contra indications at this point.See back at time of surgery.           In addition to the above assessment and plan each active problem on Sarbjit's problem list was evaluated today. This included the questioning of Sarbjit for any medication problems. We will continue the current treatment plan for these active problems except as noted.        Again, thank you for allowing me to  participate in the care of your patient.        Sincerely,        DILMA ADAMS MD

## 2022-11-22 NOTE — PROGRESS NOTES
SUBJECTIVE:   CC: Sarbjit is an 32 year old who presents for preventative health visit.   Patient has been advised of split billing requirements and indicates understanding: Yes  Healthy Habits:     Getting at least 3 servings of Calcium per day:  Yes    Bi-annual eye exam:  Yes    Dental care twice a year:  Yes    Sleep apnea or symptoms of sleep apnea:  None    Diet:  Regular (no restrictions)    Frequency of exercise:  2-3 days/week    Duration of exercise:  30-45 minutes    Taking medications regularly:  2    Barriers to taking medications:  Remembering to take    Medication side effects:  Other    PHQ-2 Total Score: 2  A.D.H.D    History of Present Illness       Reason for visit:  Annual Physical    He eats 2-3 servings of fruits and vegetables daily.He consumes 0 sweetened beverage(s) daily.He exercises with enough effort to increase his heart rate 10 to 19 minutes per day.  He exercises with enough effort to increase his heart rate 3 or less days per week. He is missing 2 dose(s) of medications per week.  He is not taking prescribed medications regularly due to remembering to take.     ADHD Follow-Up (Adult)  Concerns: worsening symptoms   Changes since last visit: Worse  Taking controlled (daily) medications as prescribed: Yes  Sleep: no problems    Medication Benefits:   Controlled symptoms: Attention span, Distractability, Finishing tasks, Impulse control, Frustration tolerance and Accepting limits  Uncontrolled symptoms:  Peer relations and zoning out concerns.     Medication Side Effects:  Reports:  none  Sleep Problems? Yes Details: if taken too late in day  ++++++++++++++++++++++++++++++++++++++++++++++++    Employer Concerns/Feedback: Stable  Coworker Concerns:   Stable  Home/Family Concerns: Stable        Today's PHQ-2 Score:   PHQ-2 ( 1999 [employer]) 11/21/2022   Q1: Little interest or pleasure in doing things 1   Q2: Feeling down, depressed or hopeless 1   PHQ-2 Score 2   PHQ-2 Total Score (12-17 Years)-  Positive if 3 or more points; Administer PHQ-A if positive -   Q1: Little interest or pleasure in doing things Several days   Q2: Feeling down, depressed or hopeless Several days   PHQ-2 Score 2       Have you ever done Advance Care Planning? (For example, a Health Directive, POLST, or a discussion with a medical provider or your loved ones about your wishes): No, advance care planning information given to patient to review.  Patient declined advance care planning discussion at this time.    Social History     Tobacco Use     Smoking status: Never     Smokeless tobacco: Never   Substance Use Topics     Alcohol use: Not Currently     Comment: sober x 6 yrs.     If you drink alcohol do you typically have >3 drinks per day or >7 drinks per week? No    No flowsheet data found.    Last PSA: No results found for: PSA    Reviewed orders with patient. Reviewed health maintenance and updated orders accordingly - Yes  BP Readings from Last 3 Encounters:   11/22/22 121/80   11/10/22 127/88   11/08/22 120/68    Wt Readings from Last 3 Encounters:   11/22/22 78 kg (172 lb)   11/10/22 79.5 kg (175 lb 4.3 oz)   11/08/22 81.2 kg (179 lb)                  Patient Active Problem List   Diagnosis     Hx of diabetes mellitus     Alcohol dependence in remission (H)     Microalbuminuria     Essential hypertension, benign     Acute reaction to stress     Attention deficit hyperactivity disorder (ADHD), combined type     Chronic kidney disease, stage 1     AVN (avascular necrosis of bone) (H)     Chronic pain of left knee     Vitamin D deficiency     Avascular necrosis of femoral head (H)     Type 2 diabetes mellitus without complication (H)     Past Surgical History:   Procedure Laterality Date     DECOMPRESSION HIP CORE Right 11/10/2022    Procedure: CORE DECOMPRESSION, FEMUR, HEAD;  Surgeon: Brady Delaney MD;  [facility]     HERNIORRHAPHY INGUINAL Left 3/30/2018    Procedure: HERNIORRHAPHY INGUINAL;  Left Inguinal hernia repair  with Mesh ;  Surgeon: Eduard Amaral MD;  [facility]: [facility] OR     HERNIORRHAPHY INGUINAL Right 3/18/2022    Procedure: OPEN RIGHT INGUINAL HERNIORRHAPHY WITH MESH;  Surgeon: Eduard Amaral MD;  Location: [facility] OR     wisdom teeth         Social History     Tobacco Use     Smoking status: Never     Smokeless tobacco: Never   Substance Use Topics     Alcohol use: Not Currently     Comment: sober x 6 yrs.     Family History   Problem Relation Age of Onset     Breast Cancer Mother      Hypertension Father      Diabetes Type 2  Paternal Grandfather      Hypertension Paternal Grandfather      Diabetes Paternal Grandfather      Other Cancer Maternal Grandmother          Current Outpatient Medications   Medication Sig Dispense Refill     amphetamine-dextroamphetamine (ADDERALL XR) 20 MG 24 hr capsule Take 1 capsule (20 mg) by mouth daily 30 capsule 0     aspirin 81 MG EC tablet Take 81 mg by mouth daily       Cholecalciferol (VITAMIN D-3) 125 MCG (5000 UT) TABS Take 1 tablet by mouth daily       Multiple Vitamin (MULTI VITAMIN MENS PO) Take 1 tablet by mouth daily       No Known Allergies  Recent Labs   Lab Test 11/22/22  0949 03/15/22  1126 12/21/21  0936 09/01/20  1044 10/07/19  0700 03/06/18  0909 10/24/17  0921 10/26/16  0847 10/22/16  0929 10/21/16  0745 10/20/16  0245   A1C 5.5  --  5.3 5.4 5.0   < > 5.1   < >  --   --  9.5*   LDL  --   --  78 84 97   < > 97   < >  --   --   --    HDL  --   --  54 47 43   < > 45   < >  --   --   --    TRIG  --   --  44 68 100   < > 77   < >  --   --   --    ALT  --   --   --   --   --   --  42  --  93*  --  286*   CR  --  0.92 0.80 0.92 0.95   < > 1.22   < > 0.87   < > 0.82   GFRESTIMATED  --  >90 >90 >90 >90   < > 70   < > >90  Non  GFR Calc     < > >90  Non  GFR Calc     GFRESTBLACK  --   --   --  >90 >90   < > 85   < > >90   GFR Calc     < > >90   GFR Calc     POTASSIUM  --  4.5 4.1 4.4 3.9   < > 3.9   < > 3.7   < > 3.7    TSH  --   --  1.51  1.42  --  2.58  --   --    < >  --   --   --     < > = values in this interval not displayed.        Reviewed and updated as needed this visit by clinical staff   Tobacco  Allergies  Meds  Problems  Med Hx  Surg Hx  Fam Hx          Reviewed and updated as needed this visit by Provider   Tobacco  Allergies  Meds  Problems  Med Hx  Surg Hx  Fam Hx         Past Medical History:   Diagnosis Date     Acute pancreatitis 10/20/2016     ADHD (attention deficit hyperactivity disorder)      Alcohol abuse      Hx of diabetes mellitus 10/26/2016    Occurred post alcoholic pancreatitis. Resolved 1 yr later after alcohol cessation.       Hypertension 10/24/2016     Other chronic pain     Avascular necrosis in both hips     Pancreatic disease     pancreatitis - 2016      Past Surgical History:   Procedure Laterality Date     DECOMPRESSION HIP CORE Right 11/10/2022    Procedure: CORE DECOMPRESSION, FEMUR, HEAD;  Surgeon: Brady Delaney MD;  [facility]     HERNIORRHAPHY INGUINAL Left 3/30/2018    Procedure: HERNIORRHAPHY INGUINAL;  Left Inguinal hernia repair with Mesh ;  Surgeon: Eduard Amaral MD;  [facility]     HERNIORRHAPHY INGUINAL Right 3/18/2022    Procedure: OPEN RIGHT INGUINAL HERNIORRHAPHY WITH MESH;  Surgeon: Eduard Amaral MD;  Location: [facility] OR     wisdom teeth         Months of post voidal incontienence as well as urinary frequency. Present for months. No known cause. Intermittent symptoms and at times notices sperm in urine. No pain with urination.     Review of Systems  CONSTITUTIONAL: NEGATIVE for fever, chills, change in weight  INTEGUMENTARY/SKIN: NEGATIVE for worrisome rashes, moles or lesions  EYES: NEGATIVE for vision changes or irritation  ENT: NEGATIVE for ear, mouth and throat problems  RESP: NEGATIVE for significant cough or SOB  CV: NEGATIVE for chest pain, palpitations or peripheral edema  GI: NEGATIVE for nausea, abdominal pain, heartburn, or change in  "bowel habits   male: negative for dysuria, hematuria, decreased urinary stream, erectile dysfunction, urethral discharge  MUSCULOSKELETAL: NEGATIVE for significant arthralgias or myalgia  NEURO: NEGATIVE for weakness, dizziness or paresthesias  PSYCHIATRIC: NEGATIVE for changes in mood or affect    OBJECTIVE:   /80 (BP Location: Right arm, Patient Position: Chair, Cuff Size: Adult Regular)   Pulse 86   Temp 97.7  F (36.5  C) (Oral)   Resp 12   Ht 1.854 m (6' 1\")   Wt 78 kg (172 lb)   SpO2 97%   BMI 22.69 kg/m      Physical Exam  GENERAL: healthy, alert and no distress  EYES: Eyes grossly normal to inspection, PERRL and conjunctivae and sclerae normal  HENT: ear canals and TM's normal, nose and mouth without ulcers or lesions  NECK: no adenopathy, no asymmetry, masses, or scars and thyroid normal to palpation  RESP: lungs clear to auscultation - no rales, rhonchi or wheezes  CV: regular rate and rhythm, normal S1 S2, no S3 or S4, no murmur, click or rub, no peripheral edema and peripheral pulses strong  ABDOMEN: soft, nontender, no hepatosplenomegaly, no masses and bowel sounds normal   (male): normal male genitalia without lesions or urethral discharge, no hernia  MS: no gross musculoskeletal defects noted, no edema  SKIN: no suspicious lesions or rashes  NEURO: Normal strength and tone, mentation intact and speech normal  PSYCH: mentation appears normal, affect normal/bright  LYMPH: no cervical adenopathy    Diagnostic Test Results:  none     ASSESSMENT/PLAN:   (Z00.00) Routine general medical examination at a health care facility  (primary encounter diagnosis)  Comment: stable wellness. Fasting labs pending.   Plan:     (F90.2) Attention deficit hyperactivity disorder (ADHD), combined type  Comment: worsening symptoms. Will change to 20 mg due to this. Patient states has new insurance and would prefer xr dose. Will prescribe. Urine tox and csa updated. Recheck in 1 month virtually due to dose " change.   Plan: [id number] - Urine Drug Confirmation Panel         (Comprehensive), amphetamine-dextroamphetamine         (ADDERALL XR) 20 MG 24 hr capsule        Medication use and side effects discussed with the patient. Patient is in complete understanding and agreement with plan.       (Z13.220) Screening for hyperlipidemia  Comment: as above   Plan: Lipid panel reflex to direct LDL Fasting            (N18.1) Chronic kidney disease, stage 1  Comment: microalbuminuria. Cannot tolerate ace due to dizziness. Has seen improved albumin levels. Will recheck today. If continued improved, continue to monitor. If worsening or failure to continue to improve, consider low dose arb as alternative to trial.   Plan:     (Z79.899) Encounter for long-term (current) use of medications  Comment:   Plan:     (Z86.39) Hx of diabetes mellitus  Comment: past history. Alcohol induced. Continued sobriety and weight management. Annual a1c to be continued to be checked.   Plan: Hemoglobin A1c, Basic metabolic panel  (Ca, Cl,        CO2, Creat, Gluc, K, Na, BUN)            (R80.9) Microalbuminuria  Comment: as above   Plan: Albumin Random Urine Quantitative with Creat         Ratio            (D64.9) Anemia, unspecified type  Comment: noted at recent  visit. Will recheck.   Plan: CBC with platelets            (R39.11) Urinary hesitancy  Comment: patient reports this for months. Worsening and at time notices semen present in urine. Will check psa and consider urology evaluation in future.   Plan: PSA, tumor marker              Patient has been advised of split billing requirements and indicates understanding: Yes      COUNSELING:   Reviewed preventive health counseling, as reflected in patient instructions       Regular exercise       Healthy diet/nutrition        He reports that he has never smoked. He has never used smokeless tobacco.        Bill Mg PA-C  [facility]

## 2022-11-22 NOTE — PROGRESS NOTES
S:  Patient is seen s/p right femoral head core decompression, DOS 11/10/22.  Patient also has upcoming left TKA procedure scheduled for 12/22/22.          Patient Active Problem List   Diagnosis     Hx of diabetes mellitus     Alcohol dependence in remission (H)     Microalbuminuria     Essential hypertension, benign     Acute reaction to stress     Attention deficit hyperactivity disorder (ADHD), combined type     Chronic kidney disease, stage 1     AVN (avascular necrosis of bone) (H)     Chronic pain of left knee     Vitamin D deficiency     Avascular necrosis of femoral head (H)     Type 2 diabetes mellitus without complication (H)            Past Medical History:   Diagnosis Date     Acute pancreatitis 10/20/2016     ADHD (attention deficit hyperactivity disorder)      Alcohol abuse      Hx of diabetes mellitus 10/26/2016    Occurred post alcoholic pancreatitis. Resolved 1 yr later after alcohol cessation.       Hypertension 10/24/2016     Other chronic pain     Avascular necrosis in both hips     Pancreatic disease     pancreatitis - 2016            Past Surgical History:   Procedure Laterality Date     DECOMPRESSION HIP CORE Right 11/10/2022    Procedure: CORE DECOMPRESSION, FEMUR, HEAD;  Surgeon: Brady Delaney MD;  Location: [facility]     HERNIORRHAPHY INGUINAL Left 3/30/2018    Procedure: HERNIORRHAPHY INGUINAL;  Left Inguinal hernia repair with Mesh ;  Surgeon: Eduard Amaral MD;  Location: [facility] OR     HERNIORRHAPHY INGUINAL Right 3/18/2022    Procedure: OPEN RIGHT INGUINAL HERNIORRHAPHY WITH MESH;  Surgeon: Eduard Amaral MD;  Location: [facility] OR     wisdom teeth              Social History     Tobacco Use     Smoking status: Never     Smokeless tobacco: Never   Substance Use Topics     Alcohol use: Not Currently     Comment: sober x 6 yrs.            Family History   Problem Relation Age of Onset     Breast Cancer Mother      Hypertension Father      Diabetes Type 2  Paternal Grandfather      Hypertension  Paternal Grandfather      Diabetes Paternal Grandfather      Other Cancer Maternal Grandmother              No Known Allergies         Current Outpatient Medications   Medication Sig Dispense Refill     amphetamine-dextroamphetamine (ADDERALL XR) 20 MG 24 hr capsule Take 1 capsule (20 mg) by mouth daily 30 capsule 0     aspirin 81 MG EC tablet Take 81 mg by mouth daily       Cholecalciferol (VITAMIN D-3) 125 MCG (5000 UT) TABS Take 1 tablet by mouth daily       Multiple Vitamin (MULTI VITAMIN MENS PO) Take 1 tablet by mouth daily            Review Of Systems  Skin: negative  Eyes: negative  Ears/Nose/Throat: negative  Respiratory: No shortness of breath, dyspnea on exertion, cough, or hemoptysis    O: Physical Exam:  R hip wound healing well.  CMS intact to both lower extremities.  Decreased IR/Abduction L hip with exacerbation groin pain.  Supple R hip to motion.    Lab:  Fibrous tissue, no evidence necrotic bone in sample, crush artifact    Images: intra op images reviewed with patient           A:  Stable R hip after core decompression, L hip with AVN and head collapse    P:  Less weight bearing RLE for next month.  Staples out.  Discussed wound care.  Plan for L JAMES end of December.  Discussed risks/benefits/procedure/possible complications, postop rehab.  He'd like to proceed. No contra indications at this point.See back at time of surgery.           In addition to the above assessment and plan each active problem on Sarbjit's problem list was evaluated today. This included the questioning of Sarbjit for any medication problems. We will continue the current treatment plan for these active problems except as noted.

## 2022-11-22 NOTE — LETTER
[facility]  11/22/22  Patient: Sarbjit Perez  YOB: 1990  Medical Record Number: [id number]                                                                                  Non-Opioid Controlled Substance Agreement    This is an agreement between you and your provider regarding safe and appropriate use of controlled substances prescribed by your care team. Controlled substances are?medicines that can cause physical and mental dependence (abuse).     There are strict laws about having and using these medicines. We here at [facility] are  committed to working with you in your efforts to get better. To support you in this work, we'll help you schedule regular office appointments for medicine refills. If we must cancel or change your appointment for any reason, we'll make sure you have enough medicine to last until your next appointment.     As a Provider, I will:     Listen carefully to your concerns while treating you with respect.     Recommend a treatment plan that I believe is in your best interest and may involve therapies other than medicine.      Talk with you often about the possible benefits and the risk of harm of any medicine that we prescribe for you.    Assess the safety of this medicine and check how well it works.      Provide a plan on how to taper (discontinue or go off) using this medicine if the decision is made to stop its use.      ::  As a Patient, I understand controlled substances:       Are prescribed by my care provider to help me function or work and manage my condition(s).?    Are strong medicines and can cause serious side effects.       Need to be taken exactly as prescribed.?Combining controlled substances with certain medicines or chemicals (such as illegal drugs, alcohol, sedatives, sleeping pills, and benzodiazepines) can be dangerous or even fatal.? If I stop taking my medicines suddenly, I may have severe withdrawal symptoms.     The  risks, benefits, and side effects of these medicine(s) were explained to me. I agree that:    1. I will take part in other treatments as advised by my care team. This may be psychiatry or counseling, physical therapy, behavioral therapy, group treatment or a referral to specialist.    2. I will keep all my appointments and understand this is part of the monitoring of controlled substances.?My care team may require an office visit for EVERY controlled substance refill. If I miss appointments or don t follow instructions, my care team may stop my medicine    3. I will take my medicines as prescribed. I will not change the dose or schedule unless my care team tells me to. There will be no refills if I run out early.      4. I may be asked to come to the clinic and complete a urine drug test or complete a pill count. If I don t give a urine sample or participate in a pill count, the care team may stop my medicine.    5. I will only receive controlled substance prescriptions from this clinic. If I am treated by another provider, I will tell them that I am taking controlled substances and that I have a treatment agreement with this provider. I will inform my [facility] care team within one business day if I am given a prescription for any controlled substance by another healthcare provider. My [facility] care team can contact other providers and pharmacists about my use of any medicines.    6. It is up to me to make sure that I don't run out of my medicines on weekends or holidays.?If my care team is willing to refill my prescription without a visit, I must request refills only during office hours. Refills may take up to 3 business days to process. I will use one pharmacy to fill all my controlled substance prescriptions. I will notify the clinic about any changes to my insurance or medicine availability.    7. I am responsible for my prescriptions. If the medicine/prescription is lost, stolen or destroyed,  it will not be replaced.?I also agree not to share controlled substance medicines with anyone.     8. I am aware I should not use any illegal or recreational drugs. I agree not to drink alcohol unless my care team says I can.     9. If I enroll in the [place] Medical Cannabis program, I will tell my care team before my next refill.    10. I will tell my care team right away if I become pregnant, have a new medical problem treated outside of my regular clinic, or have a change in my medicines.     11. I understand that this medicine can affect my thinking, judgment and reaction time.? Alcohol and drugs affect the brain and body, which can affect the safety of my driving. Being under the influence of alcohol or drugs can affect my decision-making, behaviors, personal safety and the safety of others. Driving while impaired (DWI) can occur if a person is driving, operating or in physical control of a car, motorcycle, boat, snowmobile, ATV, motorbike, off-road vehicle or any other motor vehicle ([place] Statute 169A.20). I understand the risk if I choose to drive or operate any vehicle or machinery.    I understand that if I do not follow any of the conditions above, my prescriptions or treatment may be stopped or changed.   I agree that my provider, clinic care team and pharmacy may work with any city, state or federal law enforcement agency that investigates the misuse, sale or other diversion of my controlled medicine. I will allow my provider to discuss my care with, or share a copy of, this agreement with any other treating provider, pharmacy or emergency room where I receive care.     I have read this agreement and have asked questions about anything I did not understand.    ________________________________________________________  Patient Signature - Sarbjit Perez     ___________________                   Date     ________________________________________________________  Provider Signature - Bill Mg,  PA-C       ___________________                   Date     ________________________________________________________  Witness Signature (required if provider not present while patient signing)          ___________________                   Date

## 2022-11-23 LAB
ANION GAP SERPL CALCULATED.3IONS-SCNC: 16 MMOL/L (ref 7–15)
BUN SERPL-MCNC: 15.7 MG/DL (ref 6–20)
CALCIUM SERPL-MCNC: 10 MG/DL (ref 8.6–10)
CHLORIDE SERPL-SCNC: 101 MMOL/L (ref 98–107)
CHOLEST SERPL-MCNC: 156 MG/DL
CREAT SERPL-MCNC: 0.97 MG/DL (ref 0.67–1.17)
DEPRECATED HCO3 PLAS-SCNC: 21 MMOL/L (ref 22–29)
GFR SERPL CREATININE-BSD FRML MDRD: >90 ML/MIN/1.73M2
GLUCOSE SERPL-MCNC: 92 MG/DL (ref 70–99)
HDLC SERPL-MCNC: 51 MG/DL
LDLC SERPL CALC-MCNC: 94 MG/DL
NONHDLC SERPL-MCNC: 105 MG/DL
POTASSIUM SERPL-SCNC: 4.9 MMOL/L (ref 3.4–5.3)
PSA SERPL-MCNC: 0.64 NG/ML
SODIUM SERPL-SCNC: 138 MMOL/L (ref 136–145)
TRIGL SERPL-MCNC: 54 MG/DL

## 2022-11-25 LAB
AMPHET UR CFM-MCNC: 8520 NG/ML
AMPHET/CREAT UR: 2679 NG/MG {CREAT}

## 2022-12-19 NOTE — PROGRESS NOTES
Ortho Navigator Note      Pre-op Date 12/29/22     Medical Clearance  Dr. Delaney to complete Pre-op physical per note      Labs WNL from yealy checkup 11/22/22     COVID Test Date Not indicated      Skin  Intact      Activity: Ambulates independently      Equipment Need Patient will likely need a walker for discharge. Defer to PT/OT for recs.       Meds to Hold Held all supplements 14 days prior to surgery  Instructed to continue ASA per Dr. Delaney.       NPO Instructions  Defer to PAN RN     Pre-op Joint Education Complete? Completed online    Discharge Plan Patient has plan to discharge home on morning of POD 1.   Kalie (wife ) will arrive at hospital at 0800 to participate in therapy and discharge education. They will then transport patient home    /Transportation Kalie (wife).  is physically able to care for patient.      Barriers to Discharge No barriers to discharge.      Additional Info/   Special Needs : Patient had no unanswered questions or concerns.            12/19/22 1001   Discharge Planning   Patient/Family Anticipates Transition to home with family   Concerns to be Addressed no discharge needs identified   Living Arrangements   People in Home significant other   Is your private residence a single family home or apartment? Single family home   Number of Stairs, Within Home, Primary greater than 10 stairs   Stair Railings, Within Home, Primary railings safe and in good condition   Once home, are you able to live on one level? Yes   Which level? Upper Level   Bathroom Shower/Tub Walk-in shower   Equipment Currently Used at Home none   Support System   Do you have someone available to stay with you one or two nights once you are home? Yes   Medical Clearance   Date of Physical   (Dr. Delaney to complete Preop physical on DOS per note)   It is recommended that you call and check with any specialty providers before surgery to see if you need surgical clearance.  Do you see any specialty providers  outside of your primary care provider? No   Blood   Known bleeding disorder or coagulopathy? No   Does the patient have any Scientologist/cultural preferences related to blood products? No   Education   Has the patient scheduled or completed pre-op total joint education, either in class or online, in the last 12 months? Yes   Patient attended total joint pre-op class/received pre-op teaching  online   Relationship/Environment   Name(s) of People in Home Kalie

## 2022-12-22 ENCOUNTER — HOSPITAL ENCOUNTER (OUTPATIENT)
Facility: CLINIC | Age: 32
Discharge: HOME | End: 2022-12-23
Attending: ORTHOPAEDIC SURGERY | Admitting: ORTHOPAEDIC SURGERY
Payer: COMMERCIAL

## 2022-12-22 ENCOUNTER — ANESTHESIA (OUTPATIENT)
Dept: SURGERY | Facility: CLINIC | Age: 32
End: 2022-12-22
Payer: COMMERCIAL

## 2022-12-22 ENCOUNTER — DOCUMENTATION ONLY (OUTPATIENT)
Dept: SURGERY | Facility: CLINIC | Age: 32
End: 2022-12-22

## 2022-12-22 ENCOUNTER — ANESTHESIA EVENT (OUTPATIENT)
Dept: SURGERY | Facility: CLINIC | Age: 32
End: 2022-12-22
Payer: COMMERCIAL

## 2022-12-22 ENCOUNTER — APPOINTMENT (OUTPATIENT)
Dept: PHYSICAL THERAPY | Facility: CLINIC | Age: 32
End: 2022-12-22
Attending: ORTHOPAEDIC SURGERY
Payer: COMMERCIAL

## 2022-12-22 ENCOUNTER — APPOINTMENT (OUTPATIENT)
Dept: GENERAL RADIOLOGY | Facility: CLINIC | Age: 32
End: 2022-12-22
Attending: ORTHOPAEDIC SURGERY
Payer: COMMERCIAL

## 2022-12-22 DIAGNOSIS — M87.059 AVASCULAR NECROSIS OF BONE OF HIP, UNSPECIFIED LATERALITY (H): Primary | ICD-10-CM

## 2022-12-22 LAB
ABO/RH(D): NORMAL
ANTIBODY SCREEN: NEGATIVE
GLUCOSE BLDC GLUCOMTR-MCNC: 120 MG/DL (ref 70–99)
GLUCOSE BLDC GLUCOMTR-MCNC: 89 MG/DL (ref 70–99)
SPECIMEN EXPIRATION DATE: NORMAL

## 2022-12-22 PROCEDURE — 97161 PT EVAL LOW COMPLEX 20 MIN: CPT | Mod: GP

## 2022-12-22 PROCEDURE — 258N000001 HC RX 258: Performed by: ORTHOPAEDIC SURGERY

## 2022-12-22 PROCEDURE — 250N000009 HC RX 250: Performed by: ANESTHESIOLOGY

## 2022-12-22 PROCEDURE — 999N000180 XR SURGERY CARM FLUORO LESS THAN 5 MIN: Mod: TC

## 2022-12-22 PROCEDURE — 710N000009 HC RECOVERY PHASE 1, LEVEL 1, PER MIN: Performed by: ORTHOPAEDIC SURGERY

## 2022-12-22 PROCEDURE — 250N000011 HC RX IP 250 OP 636: Performed by: ORTHOPAEDIC SURGERY

## 2022-12-22 PROCEDURE — 360N000077 HC SURGERY LEVEL 4, PER MIN: Performed by: ORTHOPAEDIC SURGERY

## 2022-12-22 PROCEDURE — 27130 TOTAL HIP ARTHROPLASTY: CPT | Mod: LT | Performed by: ORTHOPAEDIC SURGERY

## 2022-12-22 PROCEDURE — 250N000011 HC RX IP 250 OP 636: Performed by: ANESTHESIOLOGY

## 2022-12-22 PROCEDURE — C1713 ANCHOR/SCREW BN/BN,TIS/BN: HCPCS | Performed by: ORTHOPAEDIC SURGERY

## 2022-12-22 PROCEDURE — 258N000003 HC RX IP 258 OP 636: Performed by: ANESTHESIOLOGY

## 2022-12-22 PROCEDURE — 82962 GLUCOSE BLOOD TEST: CPT | Mod: 91

## 2022-12-22 PROCEDURE — 250N000009 HC RX 250: Performed by: ORTHOPAEDIC SURGERY

## 2022-12-22 PROCEDURE — 258N000003 HC RX IP 258 OP 636

## 2022-12-22 PROCEDURE — 86901 BLOOD TYPING SEROLOGIC RH(D): CPT | Performed by: ORTHOPAEDIC SURGERY

## 2022-12-22 PROCEDURE — 97530 THERAPEUTIC ACTIVITIES: CPT | Mod: GP

## 2022-12-22 PROCEDURE — 370N000017 HC ANESTHESIA TECHNICAL FEE, PER MIN: Performed by: ORTHOPAEDIC SURGERY

## 2022-12-22 PROCEDURE — 250N000013 HC RX MED GY IP 250 OP 250 PS 637

## 2022-12-22 PROCEDURE — 250N000013 HC RX MED GY IP 250 OP 250 PS 637: Performed by: PHYSICIAN ASSISTANT

## 2022-12-22 PROCEDURE — 97116 GAIT TRAINING THERAPY: CPT | Mod: GP

## 2022-12-22 PROCEDURE — 999N000065 XR PELVIS AND HIP PORTABLE LEFT 1 VIEW

## 2022-12-22 PROCEDURE — 88305 TISSUE EXAM BY PATHOLOGIST: CPT | Mod: TC | Performed by: ORTHOPAEDIC SURGERY

## 2022-12-22 PROCEDURE — 250N000025 HC SEVOFLURANE, PER MIN: Performed by: ORTHOPAEDIC SURGERY

## 2022-12-22 PROCEDURE — 250N000011 HC RX IP 250 OP 636: Performed by: NURSE ANESTHETIST, CERTIFIED REGISTERED

## 2022-12-22 PROCEDURE — 272N000001 HC OR GENERAL SUPPLY STERILE: Performed by: ORTHOPAEDIC SURGERY

## 2022-12-22 PROCEDURE — C1776 JOINT DEVICE (IMPLANTABLE): HCPCS | Performed by: ORTHOPAEDIC SURGERY

## 2022-12-22 PROCEDURE — 88311 DECALCIFY TISSUE: CPT | Mod: TC | Performed by: ORTHOPAEDIC SURGERY

## 2022-12-22 PROCEDURE — 999N000141 HC STATISTIC PRE-PROCEDURE NURSING ASSESSMENT: Performed by: ORTHOPAEDIC SURGERY

## 2022-12-22 PROCEDURE — 86850 RBC ANTIBODY SCREEN: CPT | Performed by: ORTHOPAEDIC SURGERY

## 2022-12-22 DEVICE — PINNACLE HIP SOLUTIONS ALTRX POLYETHYLENE ACETABULAR LINER NEUTRAL 36MM ID 56MM OD
Type: IMPLANTABLE DEVICE | Site: HIP | Status: FUNCTIONAL
Brand: PINNACLE ALTRX

## 2022-12-22 DEVICE — BIOLOX DELTA CERAMIC FEMORAL HEAD +5.0 36MM DIA 12/14 TAPER
Type: IMPLANTABLE DEVICE | Site: HIP | Status: FUNCTIONAL
Brand: BIOLOX DELTA

## 2022-12-22 DEVICE — EMPHASYS 12/14 FEMORAL STEM COLLARED STANDARD OFFSET HA COATED  3 SO
Type: IMPLANTABLE DEVICE | Site: HIP | Status: FUNCTIONAL
Brand: EMPHASYS

## 2022-12-22 DEVICE — APEX HOLE ELIMINATOR - PS
Type: IMPLANTABLE DEVICE | Site: HIP | Status: FUNCTIONAL
Brand: APEX

## 2022-12-22 RX ORDER — ASPIRIN 81 MG/1
81 TABLET ORAL 2 TIMES DAILY
Status: DISCONTINUED | OUTPATIENT
Start: 2022-12-23 | End: 2022-12-23 | Stop reason: HOSPADM

## 2022-12-22 RX ORDER — SODIUM CHLORIDE, SODIUM LACTATE, POTASSIUM CHLORIDE, CALCIUM CHLORIDE 600; 310; 30; 20 MG/100ML; MG/100ML; MG/100ML; MG/100ML
INJECTION, SOLUTION INTRAVENOUS CONTINUOUS PRN
Status: DISCONTINUED | OUTPATIENT
Start: 2022-12-22 | End: 2022-12-22

## 2022-12-22 RX ORDER — BUPIVACAINE HYDROCHLORIDE 2.5 MG/ML
INJECTION, SOLUTION EPIDURAL; INFILTRATION; INTRACAUDAL
Status: COMPLETED | OUTPATIENT
Start: 2022-12-22 | End: 2022-12-22

## 2022-12-22 RX ORDER — CEFAZOLIN SODIUM/WATER 2 G/20 ML
2 SYRINGE (ML) INTRAVENOUS EVERY 8 HOURS
Status: DISCONTINUED | OUTPATIENT
Start: 2022-12-22 | End: 2022-12-22 | Stop reason: CLARIF

## 2022-12-22 RX ORDER — CEFAZOLIN SODIUM/WATER 2 G/20 ML
2 SYRINGE (ML) INTRAVENOUS
Status: COMPLETED | OUTPATIENT
Start: 2022-12-22 | End: 2022-12-22

## 2022-12-22 RX ORDER — HYDROMORPHONE HYDROCHLORIDE 1 MG/ML
0.4 INJECTION, SOLUTION INTRAMUSCULAR; INTRAVENOUS; SUBCUTANEOUS EVERY 5 MIN PRN
Status: DISCONTINUED | OUTPATIENT
Start: 2022-12-22 | End: 2022-12-22 | Stop reason: HOSPADM

## 2022-12-22 RX ORDER — MAGNESIUM HYDROXIDE 1200 MG/15ML
LIQUID ORAL PRN
Status: DISCONTINUED | OUTPATIENT
Start: 2022-12-22 | End: 2022-12-22 | Stop reason: HOSPADM

## 2022-12-22 RX ORDER — NALOXONE HYDROCHLORIDE 0.4 MG/ML
0.2 INJECTION, SOLUTION INTRAMUSCULAR; INTRAVENOUS; SUBCUTANEOUS
Status: DISCONTINUED | OUTPATIENT
Start: 2022-12-22 | End: 2022-12-23 | Stop reason: HOSPADM

## 2022-12-22 RX ORDER — ACETAMINOPHEN 325 MG/1
975 TABLET ORAL EVERY 8 HOURS
Status: DISCONTINUED | OUTPATIENT
Start: 2022-12-22 | End: 2022-12-23 | Stop reason: HOSPADM

## 2022-12-22 RX ORDER — HYDROXYZINE HYDROCHLORIDE 10 MG/1
10 TABLET, FILM COATED ORAL EVERY 8 HOURS PRN
Qty: 40 TABLET | Refills: 0 | Status: SHIPPED | OUTPATIENT
Start: 2022-12-22 | End: 2023-01-20

## 2022-12-22 RX ORDER — BUPIVACAINE HYDROCHLORIDE 2.5 MG/ML
INJECTION, SOLUTION INFILTRATION; PERINEURAL PRN
Status: DISCONTINUED | OUTPATIENT
Start: 2022-12-22 | End: 2022-12-22 | Stop reason: HOSPADM

## 2022-12-22 RX ORDER — PROPOFOL 10 MG/ML
INJECTION, EMULSION INTRAVENOUS PRN
Status: DISCONTINUED | OUTPATIENT
Start: 2022-12-22 | End: 2022-12-22

## 2022-12-22 RX ORDER — SODIUM CHLORIDE, SODIUM LACTATE, POTASSIUM CHLORIDE, CALCIUM CHLORIDE 600; 310; 30; 20 MG/100ML; MG/100ML; MG/100ML; MG/100ML
INJECTION, SOLUTION INTRAVENOUS CONTINUOUS
Status: DISCONTINUED | OUTPATIENT
Start: 2022-12-22 | End: 2022-12-23 | Stop reason: HOSPADM

## 2022-12-22 RX ORDER — AMOXICILLIN 250 MG
1 CAPSULE ORAL 2 TIMES DAILY
Status: DISCONTINUED | OUTPATIENT
Start: 2022-12-22 | End: 2022-12-23 | Stop reason: HOSPADM

## 2022-12-22 RX ORDER — AMOXICILLIN 250 MG
1 CAPSULE ORAL DAILY
Qty: 30 TABLET | Refills: 0 | Status: SHIPPED | OUTPATIENT
Start: 2022-12-22 | End: 2023-01-20

## 2022-12-22 RX ORDER — CELECOXIB 200 MG/1
400 CAPSULE ORAL
Status: COMPLETED | OUTPATIENT
Start: 2022-12-22 | End: 2022-12-22

## 2022-12-22 RX ORDER — OXYCODONE HYDROCHLORIDE 5 MG/1
10 TABLET ORAL EVERY 4 HOURS PRN
Status: DISCONTINUED | OUTPATIENT
Start: 2022-12-22 | End: 2022-12-23 | Stop reason: HOSPADM

## 2022-12-22 RX ORDER — CEFAZOLIN SODIUM/WATER 2 G/20 ML
2 SYRINGE (ML) INTRAVENOUS SEE ADMIN INSTRUCTIONS
Status: DISCONTINUED | OUTPATIENT
Start: 2022-12-22 | End: 2022-12-22 | Stop reason: HOSPADM

## 2022-12-22 RX ORDER — ACETAMINOPHEN 325 MG/1
650 TABLET ORAL EVERY 4 HOURS PRN
Status: DISCONTINUED | OUTPATIENT
Start: 2022-12-25 | End: 2022-12-23 | Stop reason: HOSPADM

## 2022-12-22 RX ORDER — DEXMEDETOMIDINE HYDROCHLORIDE 4 UG/ML
INJECTION, SOLUTION INTRAVENOUS PRN
Status: DISCONTINUED | OUTPATIENT
Start: 2022-12-22 | End: 2022-12-22

## 2022-12-22 RX ORDER — NALOXONE HYDROCHLORIDE 0.4 MG/ML
0.4 INJECTION, SOLUTION INTRAMUSCULAR; INTRAVENOUS; SUBCUTANEOUS
Status: DISCONTINUED | OUTPATIENT
Start: 2022-12-22 | End: 2022-12-23 | Stop reason: HOSPADM

## 2022-12-22 RX ORDER — ONDANSETRON 2 MG/ML
4 INJECTION INTRAMUSCULAR; INTRAVENOUS EVERY 30 MIN PRN
Status: DISCONTINUED | OUTPATIENT
Start: 2022-12-22 | End: 2022-12-22 | Stop reason: HOSPADM

## 2022-12-22 RX ORDER — POLYETHYLENE GLYCOL 3350 17 G/17G
1 POWDER, FOR SOLUTION ORAL DAILY
Qty: 7 PACKET | Refills: 0 | Status: SHIPPED | OUTPATIENT
Start: 2022-12-22 | End: 2023-01-20

## 2022-12-22 RX ORDER — CEFAZOLIN SODIUM 2 G/100ML
2 INJECTION, SOLUTION INTRAVENOUS EVERY 8 HOURS
Status: COMPLETED | OUTPATIENT
Start: 2022-12-22 | End: 2022-12-23

## 2022-12-22 RX ORDER — LIDOCAINE 40 MG/G
CREAM TOPICAL
Status: DISCONTINUED | OUTPATIENT
Start: 2022-12-22 | End: 2022-12-23 | Stop reason: HOSPADM

## 2022-12-22 RX ORDER — OXYCODONE HYDROCHLORIDE 5 MG/1
5 TABLET ORAL EVERY 4 HOURS PRN
Qty: 26 TABLET | Refills: 0 | Status: SHIPPED | OUTPATIENT
Start: 2022-12-22 | End: 2023-01-20

## 2022-12-22 RX ORDER — LIDOCAINE HYDROCHLORIDE 20 MG/ML
INJECTION, SOLUTION INFILTRATION; PERINEURAL PRN
Status: DISCONTINUED | OUTPATIENT
Start: 2022-12-22 | End: 2022-12-22

## 2022-12-22 RX ORDER — ONDANSETRON 4 MG/1
4 TABLET, ORALLY DISINTEGRATING ORAL EVERY 6 HOURS PRN
Status: DISCONTINUED | OUTPATIENT
Start: 2022-12-22 | End: 2022-12-23 | Stop reason: HOSPADM

## 2022-12-22 RX ORDER — DEXMEDETOMIDINE HYDROCHLORIDE 4 UG/ML
INJECTION, SOLUTION INTRAVENOUS
Status: COMPLETED | OUTPATIENT
Start: 2022-12-22 | End: 2022-12-22

## 2022-12-22 RX ORDER — DEXAMETHASONE SODIUM PHOSPHATE 10 MG/ML
INJECTION, SOLUTION INTRAMUSCULAR; INTRAVENOUS
Status: COMPLETED | OUTPATIENT
Start: 2022-12-22 | End: 2022-12-22

## 2022-12-22 RX ORDER — ONDANSETRON 4 MG/1
4 TABLET, ORALLY DISINTEGRATING ORAL EVERY 30 MIN PRN
Status: DISCONTINUED | OUTPATIENT
Start: 2022-12-22 | End: 2022-12-22 | Stop reason: HOSPADM

## 2022-12-22 RX ORDER — ONDANSETRON 2 MG/ML
INJECTION INTRAMUSCULAR; INTRAVENOUS PRN
Status: DISCONTINUED | OUTPATIENT
Start: 2022-12-22 | End: 2022-12-22

## 2022-12-22 RX ORDER — FENTANYL CITRATE 50 UG/ML
INJECTION, SOLUTION INTRAMUSCULAR; INTRAVENOUS PRN
Status: DISCONTINUED | OUTPATIENT
Start: 2022-12-22 | End: 2022-12-22

## 2022-12-22 RX ORDER — PREGABALIN 75 MG/1
75 CAPSULE ORAL
Status: DISCONTINUED | OUTPATIENT
Start: 2022-12-22 | End: 2022-12-22 | Stop reason: HOSPADM

## 2022-12-22 RX ORDER — HYDROMORPHONE HYDROCHLORIDE 1 MG/ML
0.2 INJECTION, SOLUTION INTRAMUSCULAR; INTRAVENOUS; SUBCUTANEOUS
Status: DISCONTINUED | OUTPATIENT
Start: 2022-12-22 | End: 2022-12-23 | Stop reason: HOSPADM

## 2022-12-22 RX ORDER — ONDANSETRON 2 MG/ML
4 INJECTION INTRAMUSCULAR; INTRAVENOUS EVERY 6 HOURS PRN
Status: DISCONTINUED | OUTPATIENT
Start: 2022-12-22 | End: 2022-12-23 | Stop reason: HOSPADM

## 2022-12-22 RX ORDER — FENTANYL CITRATE 50 UG/ML
25 INJECTION, SOLUTION INTRAMUSCULAR; INTRAVENOUS EVERY 5 MIN PRN
Status: DISCONTINUED | OUTPATIENT
Start: 2022-12-22 | End: 2022-12-22 | Stop reason: HOSPADM

## 2022-12-22 RX ORDER — ACETAMINOPHEN 325 MG/1
975 TABLET ORAL
Status: COMPLETED | OUTPATIENT
Start: 2022-12-22 | End: 2022-12-22

## 2022-12-22 RX ORDER — POLYETHYLENE GLYCOL 3350 17 G/17G
17 POWDER, FOR SOLUTION ORAL DAILY
Status: DISCONTINUED | OUTPATIENT
Start: 2022-12-23 | End: 2022-12-23 | Stop reason: HOSPADM

## 2022-12-22 RX ORDER — MEPERIDINE HYDROCHLORIDE 25 MG/ML
12.5 INJECTION INTRAMUSCULAR; INTRAVENOUS; SUBCUTANEOUS
Status: DISCONTINUED | OUTPATIENT
Start: 2022-12-22 | End: 2022-12-22 | Stop reason: HOSPADM

## 2022-12-22 RX ORDER — DEXAMETHASONE SODIUM PHOSPHATE 4 MG/ML
INJECTION, SOLUTION INTRA-ARTICULAR; INTRALESIONAL; INTRAMUSCULAR; INTRAVENOUS; SOFT TISSUE PRN
Status: DISCONTINUED | OUTPATIENT
Start: 2022-12-22 | End: 2022-12-22

## 2022-12-22 RX ORDER — FENTANYL CITRATE 50 UG/ML
25 INJECTION, SOLUTION INTRAMUSCULAR; INTRAVENOUS
Status: DISCONTINUED | OUTPATIENT
Start: 2022-12-22 | End: 2022-12-22 | Stop reason: HOSPADM

## 2022-12-22 RX ORDER — HYDROXYZINE HYDROCHLORIDE 25 MG/1
25 TABLET, FILM COATED ORAL EVERY 6 HOURS PRN
Status: DISCONTINUED | OUTPATIENT
Start: 2022-12-22 | End: 2022-12-23 | Stop reason: HOSPADM

## 2022-12-22 RX ORDER — HYDROMORPHONE HYDROCHLORIDE 1 MG/ML
0.4 INJECTION, SOLUTION INTRAMUSCULAR; INTRAVENOUS; SUBCUTANEOUS
Status: DISCONTINUED | OUTPATIENT
Start: 2022-12-22 | End: 2022-12-23 | Stop reason: HOSPADM

## 2022-12-22 RX ORDER — FENTANYL CITRATE 50 UG/ML
50 INJECTION, SOLUTION INTRAMUSCULAR; INTRAVENOUS EVERY 5 MIN PRN
Status: DISCONTINUED | OUTPATIENT
Start: 2022-12-22 | End: 2022-12-22 | Stop reason: HOSPADM

## 2022-12-22 RX ORDER — OXYCODONE HYDROCHLORIDE 5 MG/1
5 TABLET ORAL EVERY 4 HOURS PRN
Status: DISCONTINUED | OUTPATIENT
Start: 2022-12-22 | End: 2022-12-23 | Stop reason: HOSPADM

## 2022-12-22 RX ORDER — PROCHLORPERAZINE MALEATE 10 MG
10 TABLET ORAL EVERY 6 HOURS PRN
Status: DISCONTINUED | OUTPATIENT
Start: 2022-12-22 | End: 2022-12-23 | Stop reason: HOSPADM

## 2022-12-22 RX ORDER — METHOCARBAMOL 500 MG/1
500 TABLET, FILM COATED ORAL 4 TIMES DAILY PRN
Qty: 30 TABLET | Refills: 0 | Status: SHIPPED | OUTPATIENT
Start: 2022-12-22 | End: 2023-01-20

## 2022-12-22 RX ORDER — BISACODYL 10 MG
10 SUPPOSITORY, RECTAL RECTAL DAILY PRN
Status: DISCONTINUED | OUTPATIENT
Start: 2022-12-22 | End: 2022-12-23 | Stop reason: HOSPADM

## 2022-12-22 RX ORDER — ASPIRIN 81 MG/1
162 TABLET ORAL 2 TIMES DAILY
Status: DISCONTINUED | OUTPATIENT
Start: 2022-12-23 | End: 2022-12-22

## 2022-12-22 RX ORDER — METHOCARBAMOL 750 MG/1
750 TABLET, FILM COATED ORAL EVERY 6 HOURS PRN
Status: DISCONTINUED | OUTPATIENT
Start: 2022-12-22 | End: 2022-12-23 | Stop reason: HOSPADM

## 2022-12-22 RX ORDER — SODIUM CHLORIDE, SODIUM LACTATE, POTASSIUM CHLORIDE, CALCIUM CHLORIDE 600; 310; 30; 20 MG/100ML; MG/100ML; MG/100ML; MG/100ML
INJECTION, SOLUTION INTRAVENOUS CONTINUOUS
Status: DISCONTINUED | OUTPATIENT
Start: 2022-12-22 | End: 2022-12-22 | Stop reason: HOSPADM

## 2022-12-22 RX ORDER — KETAMINE HYDROCHLORIDE 10 MG/ML
INJECTION INTRAMUSCULAR; INTRAVENOUS PRN
Status: DISCONTINUED | OUTPATIENT
Start: 2022-12-22 | End: 2022-12-22

## 2022-12-22 RX ORDER — HYDROMORPHONE HYDROCHLORIDE 1 MG/ML
0.2 INJECTION, SOLUTION INTRAMUSCULAR; INTRAVENOUS; SUBCUTANEOUS EVERY 5 MIN PRN
Status: DISCONTINUED | OUTPATIENT
Start: 2022-12-22 | End: 2022-12-22 | Stop reason: HOSPADM

## 2022-12-22 RX ADMIN — Medication 10 MG: at 10:25

## 2022-12-22 RX ADMIN — FENTANYL CITRATE 25 MCG: 50 INJECTION, SOLUTION INTRAMUSCULAR; INTRAVENOUS at 07:40

## 2022-12-22 RX ADMIN — HYDROMORPHONE HYDROCHLORIDE 0.2 MG: 1 INJECTION, SOLUTION INTRAMUSCULAR; INTRAVENOUS; SUBCUTANEOUS at 09:17

## 2022-12-22 RX ADMIN — METHOCARBAMOL 750 MG: 750 TABLET ORAL at 16:47

## 2022-12-22 RX ADMIN — ONDANSETRON 4 MG: 2 INJECTION INTRAMUSCULAR; INTRAVENOUS at 08:04

## 2022-12-22 RX ADMIN — Medication 2 G: at 08:10

## 2022-12-22 RX ADMIN — PROPOFOL 150 MG: 10 INJECTION, EMULSION INTRAVENOUS at 07:52

## 2022-12-22 RX ADMIN — HYDROMORPHONE HYDROCHLORIDE 0.5 MG: 1 INJECTION, SOLUTION INTRAMUSCULAR; INTRAVENOUS; SUBCUTANEOUS at 11:47

## 2022-12-22 RX ADMIN — SODIUM CHLORIDE, POTASSIUM CHLORIDE, SODIUM LACTATE AND CALCIUM CHLORIDE: 600; 310; 30; 20 INJECTION, SOLUTION INTRAVENOUS at 07:40

## 2022-12-22 RX ADMIN — HYDROMORPHONE HYDROCHLORIDE 0.2 MG: 1 INJECTION, SOLUTION INTRAMUSCULAR; INTRAVENOUS; SUBCUTANEOUS at 13:45

## 2022-12-22 RX ADMIN — HYDROMORPHONE HYDROCHLORIDE 0.3 MG: 1 INJECTION, SOLUTION INTRAMUSCULAR; INTRAVENOUS; SUBCUTANEOUS at 09:08

## 2022-12-22 RX ADMIN — PHENYLEPHRINE HYDROCHLORIDE 100 MCG: 10 INJECTION INTRAVENOUS at 08:24

## 2022-12-22 RX ADMIN — DEXMEDETOMIDINE 20 MCG: 100 INJECTION, SOLUTION, CONCENTRATE INTRAVENOUS at 07:56

## 2022-12-22 RX ADMIN — Medication 30 MG: at 07:51

## 2022-12-22 RX ADMIN — Medication 10 MG: at 09:17

## 2022-12-22 RX ADMIN — DEXAMETHASONE SODIUM PHOSPHATE 2 MG: 10 INJECTION, SOLUTION INTRAMUSCULAR; INTRAVENOUS at 07:56

## 2022-12-22 RX ADMIN — LIDOCAINE HYDROCHLORIDE 100 MG: 20 INJECTION, SOLUTION INFILTRATION; PERINEURAL at 07:51

## 2022-12-22 RX ADMIN — SODIUM CHLORIDE, POTASSIUM CHLORIDE, SODIUM LACTATE AND CALCIUM CHLORIDE: 600; 310; 30; 20 INJECTION, SOLUTION INTRAVENOUS at 15:33

## 2022-12-22 RX ADMIN — FENTANYL CITRATE 75 MCG: 50 INJECTION, SOLUTION INTRAMUSCULAR; INTRAVENOUS at 08:36

## 2022-12-22 RX ADMIN — SODIUM CHLORIDE, POTASSIUM CHLORIDE, SODIUM LACTATE AND CALCIUM CHLORIDE: 600; 310; 30; 20 INJECTION, SOLUTION INTRAVENOUS at 08:30

## 2022-12-22 RX ADMIN — DEXMEDETOMIDINE HYDROCHLORIDE 4 MCG: 4 INJECTION, SOLUTION INTRAVENOUS at 11:26

## 2022-12-22 RX ADMIN — CEFAZOLIN SODIUM 2 G: 2 INJECTION, SOLUTION INTRAVENOUS at 18:56

## 2022-12-22 RX ADMIN — OXYCODONE HYDROCHLORIDE 5 MG: 5 TABLET ORAL at 13:48

## 2022-12-22 RX ADMIN — ACETAMINOPHEN 975 MG: 325 TABLET, FILM COATED ORAL at 22:27

## 2022-12-22 RX ADMIN — HYDROXYZINE HYDROCHLORIDE 25 MG: 25 TABLET, FILM COATED ORAL at 16:47

## 2022-12-22 RX ADMIN — BUPIVACAINE HYDROCHLORIDE 20 ML: 2.5 INJECTION, SOLUTION EPIDURAL; INFILTRATION; INTRACAUDAL at 07:56

## 2022-12-22 RX ADMIN — ACETAMINOPHEN 975 MG: 325 TABLET, FILM COATED ORAL at 15:30

## 2022-12-22 RX ADMIN — Medication 30 MG: at 09:24

## 2022-12-22 RX ADMIN — DEXMEDETOMIDINE HYDROCHLORIDE 4 MCG: 4 INJECTION, SOLUTION INTRAVENOUS at 11:11

## 2022-12-22 RX ADMIN — Medication 50 MG: at 07:53

## 2022-12-22 RX ADMIN — CELECOXIB 400 MG: 200 CAPSULE ORAL at 06:16

## 2022-12-22 RX ADMIN — HYDROMORPHONE HYDROCHLORIDE 0.2 MG: 1 INJECTION, SOLUTION INTRAMUSCULAR; INTRAVENOUS; SUBCUTANEOUS at 13:22

## 2022-12-22 RX ADMIN — DEXAMETHASONE SODIUM PHOSPHATE 4 MG: 4 INJECTION, SOLUTION INTRA-ARTICULAR; INTRALESIONAL; INTRAMUSCULAR; INTRAVENOUS; SOFT TISSUE at 08:04

## 2022-12-22 RX ADMIN — FENTANYL CITRATE 50 MCG: 50 INJECTION INTRAMUSCULAR; INTRAVENOUS at 12:35

## 2022-12-22 RX ADMIN — PHENYLEPHRINE HYDROCHLORIDE 150 MCG: 10 INJECTION INTRAVENOUS at 08:31

## 2022-12-22 RX ADMIN — ONDANSETRON 4 MG: 2 INJECTION INTRAMUSCULAR; INTRAVENOUS at 11:09

## 2022-12-22 RX ADMIN — PREGABALIN 75 MG: 75 CAPSULE ORAL at 06:19

## 2022-12-22 RX ADMIN — HYDROMORPHONE HYDROCHLORIDE 0.2 MG: 1 INJECTION, SOLUTION INTRAMUSCULAR; INTRAVENOUS; SUBCUTANEOUS at 13:34

## 2022-12-22 RX ADMIN — DEXMEDETOMIDINE HYDROCHLORIDE 8 MCG: 4 INJECTION, SOLUTION INTRAVENOUS at 10:58

## 2022-12-22 RX ADMIN — Medication 2 G: at 10:44

## 2022-12-22 RX ADMIN — MIDAZOLAM 2 MG: 1 INJECTION INTRAMUSCULAR; INTRAVENOUS at 07:40

## 2022-12-22 RX ADMIN — OXYCODONE HYDROCHLORIDE 5 MG: 5 TABLET ORAL at 18:14

## 2022-12-22 RX ADMIN — ACETAMINOPHEN 975 MG: 325 TABLET, FILM COATED ORAL at 06:16

## 2022-12-22 RX ADMIN — SUGAMMADEX 200 MG: 100 INJECTION, SOLUTION INTRAVENOUS at 11:06

## 2022-12-22 RX ADMIN — OXYCODONE HYDROCHLORIDE 5 MG: 5 TABLET ORAL at 22:27

## 2022-12-22 RX ADMIN — PHENYLEPHRINE HYDROCHLORIDE 100 MCG: 10 INJECTION INTRAVENOUS at 08:18

## 2022-12-22 ASSESSMENT — ACTIVITIES OF DAILY LIVING (ADL)
ADLS_ACUITY_SCORE: 27
ADLS_ACUITY_SCORE: 29
ADLS_ACUITY_SCORE: 27
ADLS_ACUITY_SCORE: 29
ADLS_ACUITY_SCORE: 27
ADLS_ACUITY_SCORE: 29
ADLS_ACUITY_SCORE: 27

## 2022-12-22 NOTE — ANESTHESIA POSTPROCEDURE EVALUATION
Patient: Sarbjit Perez    Procedure: Procedure(s):  ARTHROPLASTY, LEFT HIP, TOTAL       Anesthesia Type:  General    Note:  Disposition: Outpatient   Postop Pain Control: Uneventful            Sign Out: Well controlled pain   PONV: No   Neuro/Psych: Uneventful            Sign Out: Acceptable/Baseline neuro status   Airway/Respiratory: Uneventful            Sign Out: Acceptable/Baseline resp. status   CV/Hemodynamics: Uneventful            Sign Out: Acceptable CV status; No obvious hypovolemia; No obvious fluid overload   Other NRE:    DID A NON-ROUTINE EVENT OCCUR?            Last vitals:  Vitals Value Taken Time   /98 12/22/22 1430   Temp 36.7  C (98.1  F) 12/22/22 1348   Pulse 67 12/22/22 1437   Resp 8 12/22/22 1438   SpO2 100 % 12/22/22 1438   Vitals shown include unvalidated device data.    Electronically Signed By: Ritu Prescott MD  December 22, 2022  4:16 PM

## 2022-12-22 NOTE — PLAN OF CARE
[facility]  OUTPATIENT PHYSICAL THERAPY EVALUATION  PLAN OF TREATMENT FOR OUTPATIENT REHABILITATION  (COMPLETE FOR INITIAL CLAIMS ONLY)  Patient's Last Name, First Name, M.I.  YOB: 1990  AnaSarbjit  S                        Provider's Name  [facility] Medical Record No.  [id number]                             Onset Date:  12/22/22   Start of Care Date:      Type:     _X_PT   ___OT   ___SLP Medical Diagnosis:                 PT Diagnosis:  impaired functional mobility Visits from SOC:  1     See note for plan of treatment, functional goals and certification details    I CERTIFY THE NEED FOR THESE SERVICES FURNISHED UNDER        THIS PLAN OF TREATMENT AND WHILE UNDER MY CARE     (Physician co-signature of this document indicates review and certification of the therapy plan).

## 2022-12-22 NOTE — PROVIDER NOTIFICATION
Patient can walk and do all activities of daily living but is limited in length of time he is able to do certain activities due to pain in his hip.  He cannot do more vigorous exercise like running and riding bike and he would like to be able to do those activities again.

## 2022-12-22 NOTE — PROGRESS NOTES
12/22/22 1523   Appointment Info   Signing Clinician's Name / Credentials (PT) NATHALIA Gar   Student Supervision On-site supervision provided;Therapy services provided with the co-signing licensed therapist guiding and directing the services, and providing the skilled judgement and assessment throughout the session;Direct supervision provided       Present no   Language English   Living Environment   People in Home spouse;child(manolo), dependent  (2 kids, 2 dogs)   Current Living Arrangements house   Home Accessibility stairs to enter home;stairs within home   Number of Stairs, Main Entrance 2   Stair Railings, Main Entrance none   Number of Stairs, Within Home, Primary six   Stair Railings, Within Home, Primary railing on left side (ascending)   Transportation Anticipated family or friend will provide  (Jaswinder 4)   Living Environment Comments works from home; walk-in shower   Self-Care   Usual Activity Tolerance good   Current Activity Tolerance moderate   Regular Exercise No   Equipment Currently Used at Home cane, quad;crutches;cane, straight   Fall history within last six months no   General Information   Onset of Illness/Injury or Date of Surgery 12/22/22   Referring Physician Brady Delaney MD   Patient/Family Therapy Goals Statement (PT) go home   Pertinent History of Current Problem (include personal factors and/or comorbidities that impact the POC) L JAMES   Existing Precautions/Restrictions fall;no pivoting or twisting;weight bearing;no hip ER;no active hip ABD;no hip hyperextension   Weight-Bearing Status - LLE weight-bearing as tolerated   General Observations PT supine in bed upon arrival, wife in room. Agreeable to PT and pain manageable.   Cognition   Affect/Mental Status (Cognition) WNL   Orientation Status (Cognition) oriented x 4   Follows Commands (Cognition) WNL   Pain Assessment   Patient Currently in Pain Yes, see Vital Sign flowsheet   Integumentary/Edema    Integumentary/Edema Comments NOrmal post-op swelling   Posture    Posture Protracted shoulders   Range of Motion (ROM)   Range of Motion ROM is WFL   Strength (Manual Muscle Testing)   Strength Comments Not formally assessed, unable to L SLR but good quad contraction with tactile cueing   Bed Mobility   Comment, (Bed Mobility) SBAx1 with self assist with leg    Transfers   Comment, (Transfers) SBA/Supervisionx1 with FWW   Gait/Stairs (Locomotion)   [place] Level (Gait) verbal cues;supervision   Assistive Device (Gait) walker, front-wheeled   Distance in Feet 80ft   Distance in Feet (Gait) 80ft   Pattern (Gait) 2-point;step-to   Deviations/Abnormal Patterns (Gait) antalgic;base of support, narrow;gait speed decreased;joya decreased;stride length decreased;weight shifting decreased   Maintains Weight-bearing Status (Gait) able to maintain   Balance   Balance Comments Supervision standing balance with FWW   Sensory Examination   Sensory Perception WNL   Clinical Impression   Criteria for Skilled Therapeutic Intervention Yes, treatment indicated   PT Diagnosis (PT) impaired functional mobility   Influenced by the following impairments pain, swelling, impaired strength and ROM 2/2 surgical procedure   Functional limitations due to impairments bed mobility, gait, transfers, stairs   Clinical Presentation (PT Evaluation Complexity) Stable/Uncomplicated   Clinical Presentation Rationale per clinical judgement   Clinical Decision Making (Complexity) low complexity   Planned Therapy Interventions (PT) balance training;bed mobility training;gait training;home exercise program;neuromuscular re-education;ROM (range of motion);stair training;strengthening;stretching;home program guidelines   Anticipated Equipment Needs at Discharge (PT) walker, standard;other (see comments)  (FWW and leg )   Risk & Benefits of therapy have been explained evaluation/treatment results reviewed;care plan/treatment goals  reviewed;risks/benefits reviewed;current/potential barriers reviewed;participants voiced agreement with care plan;participants included;patient   Clinical Impression Comments Pt would benefit from skilled PT services for functional mobility training with AD within hip precautions and wb status, as well as adminstration of HEP per post-op JAMES protocol.   PT Total Evaluation Time   PT Eval, Low Complexity Minutes (06786) 10   Plan of Care Review   Plan of Care Reviewed With patient;spouse   Physical Therapy Goals   PT Frequency 2x/day   PT Predicted Duration/Target Date for Goal Attainment 12/23/22   PT Goals Bed Mobility;Transfers;Gait;Stairs;PT Goal 1   PT: Bed Mobility Supervision/stand-by assist;Supine to/from sit;Rolling;Bridging;Within precautions   PT: Transfers Supervision/stand-by assist;Assistive device;Bed to/from chair;Sit to/from stand;Within precautions   PT: Gait Supervision/stand-by assist;150 feet;Within precautions;Standard walker   PT: Stairs Supervision/stand-by assist;Assistive device;Within precautions;8 stairs;Rail on right   PT: Goal 1 PT will be IND with HEP in order to progress strength and ROM per post-op JAMES protocol.   Interventions   Interventions Quick Adds Gait Training;Therapeutic Activity;Therapeutic Procedure   Therapeutic Procedure/Exercise   Ther. Procedure: strength, endurance, ROM, flexibillity Minutes (08754) 10   Treatment Detail/Skilled Intervention PT: Administered HEP per post-op JAMES protocol. Educated pt on frequency, intensity, and duration. Pt performed the following exercises for strength, ROM, and circulation x6 reps: ankle pumps, quad sets, glute sets, self-assist SLR, heel slides, and hamstring sets. Verbal and tactile cues for exercise techniquer and muscle setting.   Therapeutic Activity   Therapeutic Activities: dynamic activities to improve functional performance Minutes (30529) 15   Treatment Detail/Skilled Intervention PT: Pt supine in bed with wife in room upon  arrival, agreeable to PT. Edcuated patient on antieriolateral hip precautions and wb status. Discussed AD options for d/c per pt request. Increased time needed for line management and room set up for mobility. Pt performed supine>sit bed mobility with leg  SBAx1, pt adhered to hip precautions well. Pt performed STSx2 SBA/Superivsion with no AD, good static standing balance. Pt performed STS toilet transfer, verbal cues for kicking L LE out in front and reaching back for grab bar on L and slowly lowering down to toilet; CGAx1 with FWW due to low height of toilet. Pt left seated on toilet with wife in room and call string within reach.   Gait Training   Gait Training Minutes (69418) 12   Treatment Detail/Skilled Intervention PT: Pt amb 80ft with FWW SBA/Supervision, Ax1 for IV management. PT demonstrates decreased gait speed, but good upright posture with mild UE wb on walker. Narrow RODY, step-to pattern with shortened R step length, but good weight shifting ability. Verbal cues for heel-to-toe gait pattern and stepping throughout when able to. Pt able to adapt gait pattern accordingly, but still to painful to maintain.   [place] Level (Gait Training) stand-by assist   Physical Assistance Level (Gait Training) supervision;verbal cues   Weight Bearing (Gait Training) weight-bearing as tolerated   Assistive Device (Gait Training) standard walker   Pattern Analysis (Gait Training) 2-point gait   Gait Analysis Deviations decreased joya;decreased step length;decreased weight-shifting ability;increased time in double stance   Impairments (Gait Analysis/Training) pain;strength decreased   PT Discharge Planning   PT Plan stairs with SEC, SLR with leg , progress AMB, car transfer   PT Discharge Recommendation (DC Rec) home with assist;home with outpatient physical therapy   PT Rationale for DC Rec Primary d/c recommendation home with assist from wife as patient demonstrates good mobility techniques and  safety awareness. Recommend OP PT for progression of strength, ROM,  and functional mobility for return to PLOF and improve QOL. Equipment needs: FWW and leg .   PT Brief overview of current status SBA/Supervision All mobility, FWW   Total Session Time   Timed Code Treatment Minutes 37   Total Session Time (sum of timed and untimed services) 47

## 2022-12-22 NOTE — ANESTHESIA CARE TRANSFER NOTE
Patient: Sarbjit GAMBLE Naslund    Procedure: Procedure(s):  ARTHROPLASTY, LEFT HIP, TOTAL       Diagnosis: Avascular necrosis of bone of hip, unspecified laterality (H) [M87.059]  Diagnosis Additional Information: No value filed.    Anesthesia Type:   General     Note:    Oropharynx: oropharynx clear of all foreign objects and spontaneously breathing  Level of Consciousness: awake and drowsy  Oxygen Supplementation: face mask  Level of Supplemental Oxygen (L/min / FiO2): 6  Independent Airway: airway patency satisfactory and stable  Dentition: dentition unchanged  Vital Signs Stable: post-procedure vital signs reviewed and stable  Report to RN Given: handoff report given  Patient transferred to: PACU  Comments: Patient transferred to PACU on oxygen. Patent airway and PIV. All VSS. Patient responsive to verbal cues. Report to PACU RN.    Handoff Report: Identifed the Patient, Identified the Reponsible Provider, Reviewed the pertinent medical history, Discussed the surgical course, Reviewed Intra-OP anesthesia mangement and issues during anesthesia, Set expectations for post-procedure period and Allowed opportunity for questions and acknowledgement of understanding      Vitals:  Vitals Value Taken Time   /95 12/22/22 1200   Temp 36.8  C (98.2  F) 12/22/22 1150   Pulse 77 12/22/22 1209   Resp 12 12/22/22 1209   SpO2 99 % 12/22/22 1209   Vitals shown include unvalidated device data.    Electronically Signed By: IGGY Costello CRNA  December 22, 2022  12:10 PM

## 2022-12-22 NOTE — PHARMACY-ADMISSION MEDICATION HISTORY
Admission Medication History Completed by Pharmacy    See [facility] Admission Navigator for allergy information, preferred outpatient pharmacy, prior to admission medications and immunization status.     Medication History Sources:     Patient     Last doses recorded by pre-op RN     Pharmacy dispense fill data     Changes made to PTA medication list (reason):  None    Additional Information:    With the exception of Adderall, last doses of medications one week ago; recent dispense fill for lisinopril - pt no longer taking (did not tolerate, was for microalbuminuria not BP, PCP aware)    Prior to Admission medications    Medication Sig Last Dose Taking? Auth Provider Long Term End Date   amphetamine-dextroamphetamine (ADDERALL XR) 20 MG 24 hr capsule Take 1 capsule (20 mg) by mouth daily 12/21/2022 at 0800 Yes Bill Mg PA-C     aspirin 81 MG EC tablet Take 81 mg by mouth daily 12/15/2022 Yes Reported, Patient     Cholecalciferol (VITAMIN D-3) 125 MCG (5000 UT) TABS Take 1 tablet by mouth daily 12/15/2022 Yes Reported, Patient     Multiple Vitamin (MULTI VITAMIN MENS PO) Take 1 tablet by mouth daily 12/15/2022 Yes Reported, Patient         Date completed: 12/22/22    Medication history completed by: RONAL QUEEN [facility]

## 2022-12-22 NOTE — ANESTHESIA PROCEDURE NOTES
Airway       Patient location during procedure: OR       Procedure Start/Stop Times: 12/22/2022 7:55 AM  Staff -        Anesthesiologist:  Ritu Rangel MD       CRNA: Mallory Hudson APRN CRNA       Performed By: CRNAIndications and Patient Condition       Indications for airway management: desiree-procedural       Induction type:intravenous       Mask difficulty assessment: 1 - vent by mask    Final Airway Details       Final airway type: endotracheal airway       Successful airway: ETT - single and Oral  Endotracheal Airway Details        ETT size (mm): 7.5       Successful intubation technique: direct laryngoscopy       DL Blade Type: MAC 3       Grade View of Cords: 1       Adjucts: stylet       Position: Right       Measured from: gums/teeth       Secured at (cm): 22       Bite block used: Soft    Post intubation assessment        Placement verified by: capnometry, equal breath sounds and chest rise        Number of attempts at approach: 1       Number of other approaches attempted: 0       Secured with: pink tape       Ease of procedure: easy       Dentition: Intact and Unchanged    Medication(s) Administered   Medication Administration Time: 12/22/2022 7:55 AM

## 2022-12-22 NOTE — PLAN OF CARE
"VS: BP (!) 147/91 (BP Location: Left arm)   Pulse 61   Temp 98.1  F (36.7  C) (Axillary)   Resp (!) 8   Ht 1.854 m (6' 1\")   Wt 80 kg (176 lb 5.9 oz)   SpO2 95%   BMI 23.27 kg/m      O2: Sating >90% on RA. Lung sounds clear. Denies chest pain and SOB.   Output: Voids spontaneously and adequately. Output was 700 mL.   Last BM: 12/21/22 per pt report. Bowel sounds active x4. Passing flatus.    Activity: Up with SBA assist and FWW. Steady gait.    Skin: Intact except for L hip incision.   Pain: Pain was managed with PRN Oxycodone, PRN Robaxin, PRN atarax, and ice packs.    CMS: A&Ox4. Denies N/T.    Dressing: Dressing to L hip C/D/I. PIV dressing to R and L forearm C/D/I.   Diet: Regular. Appetite was good. Denies N/V.    LDA: PIV to L forearm is S/L and R forearm is infusing 75 mL/hr. Hemo vac patent, output was 45.   Equipment: IV pole, FWW, gait belt, and personal belongings.    Plan: Continue to monitor. Call light within reach and utilized appropriately.    Additional Info: Pt arrived on unit around 1500.       Patient vital signs are at baseline: Yes  Patient able to ambulate as they were prior to admission or with assist devices provided by therapies during their stay:  Yes  Patient MUST void prior to discharge:  Yes  Patient able to tolerate oral intake:  Yes  Pain has adequate pain control using Oral analgesics:  Yes  Does patient have an identified :  Yes  Has goal D/C date and time been discussed with patient:  Yes    "

## 2022-12-22 NOTE — PROGRESS NOTES
Prior Authorization **INITIATED**    Medication: Oxycodone 5mg tabs  Insurance Company: [employer] - Phone [phone] Fax [phone]  Pharmacy Filling the Rx: [facility] - [place], [place] - [address]  Filling Pharmacy Phone: [phone]  Filling Pharmacy Fax: [phone]  Start Date: 12/22/2022  Reference #: CoverMyMeds Key: [id number] - PA Case ID: [id number]  Comments:  Plan limits to 7 tablets of opioids per 90 days. PA required for quantities beyond these limits.      Julianne Olea CPhT  [facility] Liaison  Pronouns: She/Her/Hers    [facility]  [address]  [address] [address]   [email or web address]   Phone: [phone]  Pager: [phone]  Fax: [phone]

## 2022-12-22 NOTE — BRIEF OP NOTE
[facility]    Brief Operative Note    Pre-operative diagnosis: Avascular necrosis of bone of hip, unspecified laterality (H) [M87.059]  Post-operative diagnosis Same as pre-operative diagnosis    Procedure: Procedure(s):  ARTHROPLASTY, LEFT HIP, TOTAL  Surgeon: Surgeon(s) and Role:     * Brady Delaney MD - Primary     * Sima Baker PA-LINA - Assisting     * Rachele Raines MD - Resident - Assisting     * Mukund Gregory MD - Fellow - Assisting  Anesthesia: General   Estimated Blood Loss: 250 mL from 12/22/2022  7:44 AM to 12/22/2022 11:48 AM      Drains: Frank-Soria  Specimens:   ID Type Source Tests Collected by Time Destination   1 : femur head Bone Resection Femur, Left SURGICAL PATHOLOGY EXAM Brady Delaney MD 12/22/2022  8:58 AM      Findings:   None.  Complications: None.  Implants:   Implant Name Type Inv. Item Serial No.  Lot No. LRB No. Used Action   PINNACLE GRIPTION ACETABULAR SHELL SECTOR 56MM OD    Depuy [id number] Left 1 Implanted   EMPHASYS FEMORAL STEM    Depuy [id number] Left 1 Implanted   LINER ACETABULAR ALTRX NEUTRAL [id number] - [id number] Total Joint Component/Insert LINER ACETABULAR ALTRX NEUTRAL [id number]  [employer]- [id number] Left 1 Implanted   IMP APEX HOLE ELIMINATOR HIP DEPUY DURALOC 1246- - [id number] Metallic Hardware/[place]ons IMP APEX HOLE ELIMINATOR HIP DEPUY DURALOC 1246-  [employer]- [id number] Left 1 Implanted   IMP HEAD FEMORAL DEPUY CERAMIC 36MM +5MM [id number] - [id number] Total Joint Component/Insert IMP HEAD FEMORAL DEPUY CERAMIC 36MM +5MM [id number]  [employer]- [id number] Left 1 Implanted       Anterolateral approach of hip for JAMES. Superficial closure with monocryl. Dressings include xeroform, gauze, and tegaderm. 1 drain present on anterior left thigh distal to the surgical incision. dressed with gauze and tegaderm. Abduction pillow placed at the end of the case.      Rachele Raines  DO  Orthopaedic Surgery PGY-1

## 2022-12-22 NOTE — PROGRESS NOTES
PACU to Inpatient Nursing Handoff    Patient Sarbjit Perez is a 32 year old male who speaks English.   Procedure Procedure(s):  ARTHROPLASTY, LEFT HIP, TOTAL   Surgeon(s) Primary: Brady Delaney MD  Assisting: Sima Baker PA-C  Resident - Assisting: Rachele Raines MD  Fellow - Assisting: Mukund Gregory MD     No Known Allergies    Isolation  No active isolations     Past Medical History   has a past medical history of Acute pancreatitis (10/20/2016), ADHD (attention deficit hyperactivity disorder), Alcohol abuse, diabetes mellitus (10/26/2016), Hypertension (10/24/2016), Other chronic pain, and Pancreatic disease.    Anesthesia General   Dermatome Level     Preop Meds acetaminophen (Tylenol) - time given: 0616  celecoxib (Celebrex) - time given: 0616  Lyrica 75mg - time given: 0616   Nerve block PENG block .  Location:left. Med:bupivacaine. Time given: 0800   Intraop Meds midazolam 1mg/mL (mg)  Total dose:  2 mg                      fentaNYL (SUBLIMAZE) injection (mcg)  Total dose:  100 mcg  Date/Time Rate/Dose/Volume Action Route Admin User Audit    12/22/22 0740 25 mcg Given Intravenous Mallory Hudson APRN CRNA     0836 75 mcg Given Intravenous Mallory Hudson APRN CRNA       lidocaine 2% (mg)  Total dose:  100 mg  Date/Time Rate/Dose/Volume Action Route Admin User Audit    12/22/22 0751 100 mg Given Intravenous Ritu Rangel MD edited      propofol (DIPRIVAN) injection 10 mg/mL vial (mg)  Total dose:  150 mg  Date/Time Rate/Dose/Volume Action Route Admin User Audit    12/22/22 0752 150 mg Given Intravenous Ritu Rangel MD edited      rocuronium 10mg/mL (mg)  Total dose:  90 mg  Date/Time Rate/Dose/Volume Action Route Admin User Audit    12/22/22 0753 50 mg Given Intravenous Ritu Rangel MD edited    0924 30 mg Given Intravenous Mallory Hudson APRN CRNA     Comment: surgeon request     1025 10 mg Given Intravenous Mallory Hudson APRN CRNA       dexamethasone  4mg/mL (mg)  Total dose:  4 mg  Date/Time Rate/Dose/Volume Action Route Admin User Audit    12/22/22 0804 4 mg Given Intravenous Mallory Hudson APRN CRNA       ondansetron 2mg/mL (mg)  Total dose:  8 mg  Date/Time Rate/Dose/Volume Action Route Admin User Audit    12/22/22 0804 4 mg Given Intravenous Mallory Hudson APRN CRNA     1109 4 mg Given Intravenous Mallory Hudson APRN CRNA       HYDROmorphone 1 mg/ml (mg)  Total dose:  1 mg  Date/Time Rate/Dose/Volume Action Route Admin User Audit    12/22/22 0908 0.3 mg Given Intravenous Swati Puentes APRN CRNA     0917 0.2 mg Given Intravenous Mallory Hudson APRN CRNA     1147 0.5 mg Given Intravenous Mallory Hudson APRN CRNA       phenylephrine (ROMEO-SYNEPHRINE) injection (mcg)  Total dose:  350 mcg  Date/Time Rate/Dose/Volume Action Route Admin User Audit    12/22/22 0818 100 mcg New Bag Intravenous Mallory Hudson APRN CRNA     0824 100 mcg Bolus Intravenous Mallory Hudson APRN CRNA     0831 150 mcg Bolus Intravenous Mallory Hudson APRN CRNA       sugammadex 200 mg/2ml (mg)  Total dose:  200 mg  Date/Time Rate/Dose/Volume Action Route Admin User Audit    12/22/22 1106 200 mg Given Intravenous Mallory Hudson APRN CRNA       ceFAZolin Sodium (ANCEF) injection 2 g (g)  Total dose:  4 g  Date/Time Rate/Dose/Volume Action Route Admin User Audit    12/22/22 0810 2 g (over 3 min) Given Intravenous Mallory Hudson APRN CRNA edited    1044 2 g (over 3 min) Given Intravenous Mallory Hudson APRN CRNA     Comment: redose now per surgeon       ketamine 10 mg/mL (mg)  Total dose:  50 mg  Date/Time Rate/Dose/Volume Action Route Admin User Audit    12/22/22 0751 30 mg Given Intravenous Ritu Rangel MD edited    0917 10 mg Given Intravenous Lennartson, Mallory K, APRN CRNA     1025 10 mg Given Intravenous Mallory Hudson APRN CRNA       Bupivacaine 0.25% PF (Infiltration) (mL)  Total  volume:  20 mL  Date/Time Rate/Dose/Volume Action Route Admin User Audit    12/22/22 0756 20 mL Given Infiltration Mynor Bey MD       Dexmedetomidine 4 mcg/mL (Perineural) (mcg)  Total dose:  20 mcg  Date/Time Rate/Dose/Volume Action Route Admin User Audit    12/22/22 0756 20 mcg Given Perineural Mynor Bey MD       dexmedetomidine (PRECEDEX) in NS syringe (4 mcg/mL) (mcg)  Total dose:  16 mcg  Date/Time Rate/Dose/Volume Action Route Admin User Audit    12/22/22 1058 8 mcg Given Intravenous LennartsonDylanMallory K, APRN CRNA     1111 4 mcg Given Intravenous LennartsonMallory, APRN CRNA     1126 4 mcg Given Intravenous LennarflorinonMallory, APRN CRNA       Dexamethasone 10 mg/mL PF (Perineural) (mg)  Total dose:  2 mg  Date/Time Rate/Dose/Volume Action Route Admin User Audit    12/22/22 0756 2 mg Given Perineural Mynor Bey MD       LR (mL)  Total volume:  1,300 mL  Date/Time Rate/Dose/Volume Action Route Admin User Audit    12/22/22 0740  New Bag Intravenous LennartsonMallory, APRN CRNA     0751 500 mL Anesthesia Volume Adjustment Intravenous LennartsonDylanMallory K, APRN CRNA edited    0830 500 mL New Bag Intravenous Lennartson, Mallory K, APRN CRNA edited    1109 300 mL Anesthesia Volume Adjustment Intravenous RegisnartsonDylanMallory LATIA, APRN CRNA          Local Meds Yes   Antibiotics cefazolin (Ancef) - last given at 1044     Pain Patient Currently in Pain: yes   PACU meds  fentanyl (Sublimaze): 50 mcg (total dose) last given at 1235   hydromorphone (Dilaudid): .6 mg (total dose) last given at 1345   oxycodone (Roxicodone): 5 mg (total dose) last given at 1348    PCA / epidural No   Capnography     Telemetry ECG Rhythm: Sinus rhythm   Inpatient Telemetry Monitor Ordered? No        Labs Glucose Lab Results   Component Value Date     12/22/2022    GLC 96 03/15/2022    GLC 86 09/01/2020       Hgb Lab Results   Component Value Date    HGB 13.6 11/22/2022    HGB 12.9  06/17/2018       INR No results found for: INR   PACU Imaging Completed     Wound/Incision Incision/Surgical Site 12/22/22 Left Hip (Active)   Incision Assessment Swelling 12/22/22 1226   Closure KALIA 12/22/22 1226   Dressing Intervention Clean, dry, intact 12/22/22 1226   Number of days: 0      CMS        Equipment ice pack   Other LDA  Closed drain system to left hip incision     IV Access Peripheral IV Right Lower forearm (Active)   Site Assessment WDL 12/22/22 1230   Line Status Infusing 12/22/22 1230   Phlebitis Scale 0-->no symptoms 12/22/22 1230   Infiltration Scale 0 12/22/22 1230   Number of days:        Peripheral IV 12/22/22 Left Lower forearm (Active)   Site Assessment [facility] 12/22/22 1230   Line Status Saline locked 12/22/22 1230   Phlebitis Scale 0-->no symptoms 12/22/22 1230   Infiltration Scale 0 12/22/22 1230   Number of days: 0      Blood Products Not applicable  mL   Intake/Output Date 12/22/22 0700 - 12/23/22 0659   Shift 0370-3614 4447-7931 6491-2455 24 Hour Total   INTAKE   I.V. 1300   1300   Shift Total(mL/kg) 1300(16.25)   1300(16.25)   OUTPUT   Blood 250   250   Shift Total(mL/kg) 250(3.13)   250(3.13)   Weight (kg) 80 80 80 80      Drains / Lewis Closed/Suction Drain Left Hip Accordion 10 Zimbabwean (Active)   Site Description WDL 12/22/22 1230   Dressing Status Normal: Clean, Dry & Intact 12/22/22 1230   Status To bulb suction 12/22/22 1230   Number of days: 0      Time of void PreOp Void Prior to Procedure: 0545 (12/22/22 0609)    PostOp      Diapered? No   Bladder Scan  344ml @ 1340   PO   250ml crackers and water     Vitals    B/P: (!) 147/93  T: 98.2  F (36.8  C)    Temp src: Axillary  P:  Pulse: 56 (12/22/22 1245)          R: (!) 6  O2:  SpO2: 98 %         Oxygen Delivery: 2 LPM (12/22/22 1245)         Family/support present significant other   Patient belongings     Patient transported on cart and air mat   DC meds/scripts (obs/outpt) Not applicable   Inpatient Pain Meds Released? Yes        Special needs/considerations None   Tasks needing completion None       Raymundo Phelps, LULÚ  ASCOM [id number]

## 2022-12-22 NOTE — H&P
L hip arthropathy presents for L JAMES.    Patient has had no changes since H and P 11/8/2022.  He had R hip core decompression without issues.  No recent complaints and seems to be healing well.  We have discussed L JAMES.  We have been over procedure, discussed risks/benefits/possible complications /postop rehabilitation.  There are no contra indications to procedure and he'd like to proceed.  We will proceed with operative intervention.    Brady Delaney MD

## 2022-12-22 NOTE — ANESTHESIA PROCEDURE NOTES
Other (PENG block) Procedure Note    Pre-Procedure   Staff -        Anesthesiologist:  Mynor Bey MD       Performed By: anesthesiologist       Location: OR       Pre-Anesthestic Checklist: patient identified, IV checked, site marked, risks and benefits discussed, informed consent, monitors and equipment checked, pre-op evaluation, at physician/surgeon's request and post-op pain management  Timeout:       Correct Patient: Yes        Correct Procedure: Yes        Correct Site: Yes        Correct Position: Yes        Correct Laterality: Yes        Site Marked: Yes  Procedure Documentation  Procedure: Other (PENG block)       Laterality: left       Patient Position: supine       Patient Prep/Sterile Barriers: sterile gloves, mask       Skin prep: Chloraprep       Needle Type: short bevel       Needle Gauge: 21.        Needle Length (millimeters): 100        Ultrasound guided       1. Ultrasound was used to identify targeted nerve, plexus, vascular marker, or fascial plane and place a needle adjacent to it in real-time.       2. Ultrasound was used to visualize the spread of anesthetic in close proximity to the above referenced structure.       3. A permanent image is entered into the patient's record.       4. The visualized anatomic structures appeared normal.       5. There were no apparent abnormal pathologic findings.    Assessment/Narrative         The placement was negative for: blood aspirated, painful injection and site bleeding       Paresthesias: No.       Bolus given via needle..        Secured via.        Insertion/Infusion Method: Single Shot       Complications: none       Injection made incrementally with aspirations every 5 mL.    Medication(s) Administered   Bupivacaine 0.25% PF (Infiltration) - Infiltration   20 mL - 12/22/2022 7:56:00 AM  Dexamethasone 10 mg/mL PF (Perineural) - Perineural   2 mg - 12/22/2022 7:56:00 AM  Dexmedetomidine 4 mcg/mL (Perineural) - Perineural   20 mcg -  "12/22/2022 7:56:00 AM    FOR [facility] ONLY:   Pain Team Contact information: please page the Pain Team Via [facility]. Search \"Pain\". During daytime hours, please page the attending first. At night please page the resident first.    "

## 2022-12-22 NOTE — ANESTHESIA PREPROCEDURE EVALUATION
Anesthesia Pre-Procedure Evaluation    Patient: Sarbjit Perez   MRN: [id number] : 1990        Procedure : Procedure(s):  ARTHROPLASTY, LEFT HIP, TOTAL          Past Medical History:   Diagnosis Date     Acute pancreatitis 10/20/2016     ADHD (attention deficit hyperactivity disorder)      Alcohol abuse      Hx of diabetes mellitus 10/26/2016    Occurred post alcoholic pancreatitis. Resolved 1 yr later after alcohol cessation.       Hypertension 10/24/2016     Other chronic pain     Avascular necrosis in both hips     Pancreatic disease     pancreatitis -       Past Surgical History:   Procedure Laterality Date     DECOMPRESSION HIP CORE Right 11/10/2022    Procedure: CORE DECOMPRESSION, FEMUR, HEAD;  Surgeon: Brady Delaney MD;  Location: [facility] OR     HERNIORRHAPHY INGUINAL Left 3/30/2018    Procedure: HERNIORRHAPHY INGUINAL;  Left Inguinal hernia repair with Mesh ;  Surgeon: Eduard Amaral MD;  Location: [facility] OR     HERNIORRHAPHY INGUINAL Right 3/18/2022    Procedure: OPEN RIGHT INGUINAL HERNIORRHAPHY WITH MESH;  Surgeon: Eduard Amaral MD;  Location: [facility] OR     wisdom teeth        No Known Allergies   Social History     Tobacco Use     Smoking status: Never     Smokeless tobacco: Never   Substance Use Topics     Alcohol use: Not Currently     Comment: sober x 6 yrs.      Wt Readings from Last 1 Encounters:   22 80 kg (176 lb 5.9 oz)        Anesthesia Evaluation   Pt has had prior anesthetic.         ROS/MED HX  ENT/Pulmonary:       Neurologic:       Cardiovascular:     (+) hypertension-----    METS/Exercise Tolerance:     Hematologic:       Musculoskeletal:       GI/Hepatic:       Renal/Genitourinary:     (+) renal disease,     Endo:     (+) type II DM,     Psychiatric/Substance Use:       Infectious Disease:       Malignancy:       Other:      (+) , H/O Chronic Pain,        Physical Exam    Airway        Mallampati: II   TM distance: > 3 FB   Neck ROM: full   Mouth opening: > 3 cm    Respiratory  Devices and Support         Dental  no notable dental history         Cardiovascular   cardiovascular exam normal          Pulmonary   pulmonary exam normal                OUTSIDE LABS:  CBC:   Lab Results   Component Value Date    WBC 4.9 11/22/2022    WBC 4.5 03/15/2022    HGB 13.6 11/22/2022    HGB 13.1 (L) 03/15/2022    HCT 41.2 11/22/2022    HCT 40.3 03/15/2022     11/22/2022     03/15/2022     BMP:   Lab Results   Component Value Date     11/22/2022     03/15/2022    POTASSIUM 4.9 11/22/2022    POTASSIUM 4.5 03/15/2022    CHLORIDE 101 11/22/2022    CHLORIDE 106 03/15/2022    CO2 21 (L) 11/22/2022    CO2 23 03/15/2022    BUN 15.7 11/22/2022    BUN 12 03/15/2022    CR 0.97 11/22/2022    CR 0.92 03/15/2022    GLC 89 12/22/2022    GLC 92 11/22/2022     COAGS: No results found for: PTT, INR, FIBR  POC:   Lab Results   Component Value Date    BGM 93 03/30/2018     HEPATIC:   Lab Results   Component Value Date    ALBUMIN 4.2 06/08/2018    PROTTOTAL 6.8 10/22/2016    ALT 42 10/24/2017    AST 43 10/22/2016    ALKPHOS 54 10/22/2016    BILITOTAL 0.6 10/22/2016     OTHER:   Lab Results   Component Value Date    LACT 0.9 10/20/2016    A1C 5.5 11/22/2022    GARRETT 10.0 11/22/2022    PHOS 3.2 06/08/2018    MAG 2.3 10/21/2016    LIPASE 2,469 (H) 10/23/2016    TSH 1.42 12/21/2021    TSH 1.51 12/21/2021    CRP <2.9 03/03/2022    SED 7 03/03/2022       Anesthesia Plan    ASA Status:  2   NPO Status:  NPO Appropriate    Anesthesia Type: General.     - Airway: ETT      Maintenance: Balanced.   Techniques and Equipment:     - Lines/Monitors: 2nd IV     Consents    Anesthesia Plan(s) and associated risks, benefits, and realistic alternatives discussed. Questions answered and patient/representative(s) expressed understanding.    - Discussed: Risks, Benefits and Alternatives for the PROCEDURE were discussed     - Discussed with:          Postoperative Care    Pain management: Multi-modal analgesia.   PONV  prophylaxis: Ondansetron (or other 5HT-3)     Comments:    Other Comments: GETTA with std monitors, 2 PIVs and ketamine with post op diludid and precedex            Ritu Prescott MD

## 2022-12-23 ENCOUNTER — APPOINTMENT (OUTPATIENT)
Dept: OCCUPATIONAL THERAPY | Facility: CLINIC | Age: 32
End: 2022-12-23
Attending: ORTHOPAEDIC SURGERY
Payer: COMMERCIAL

## 2022-12-23 ENCOUNTER — APPOINTMENT (OUTPATIENT)
Dept: PHYSICAL THERAPY | Facility: CLINIC | Age: 32
End: 2022-12-23
Attending: ORTHOPAEDIC SURGERY
Payer: COMMERCIAL

## 2022-12-23 VITALS
SYSTOLIC BLOOD PRESSURE: 148 MMHG | HEART RATE: 62 BPM | TEMPERATURE: 97.1 F | HEIGHT: 73 IN | RESPIRATION RATE: 16 BRPM | WEIGHT: 176.37 LBS | BODY MASS INDEX: 23.37 KG/M2 | DIASTOLIC BLOOD PRESSURE: 98 MMHG | OXYGEN SATURATION: 97 %

## 2022-12-23 LAB
HGB BLD-MCNC: 10.3 G/DL (ref 13.3–17.7)
HOLD SPECIMEN: NORMAL

## 2022-12-23 PROCEDURE — 97110 THERAPEUTIC EXERCISES: CPT | Mod: GP | Performed by: REHABILITATION PRACTITIONER

## 2022-12-23 PROCEDURE — 97535 SELF CARE MNGMENT TRAINING: CPT | Mod: GO

## 2022-12-23 PROCEDURE — 97165 OT EVAL LOW COMPLEX 30 MIN: CPT | Mod: GO

## 2022-12-23 PROCEDURE — 250N000013 HC RX MED GY IP 250 OP 250 PS 637: Performed by: ORTHOPAEDIC SURGERY

## 2022-12-23 PROCEDURE — 97116 GAIT TRAINING THERAPY: CPT | Mod: GP | Performed by: REHABILITATION PRACTITIONER

## 2022-12-23 PROCEDURE — 250N000011 HC RX IP 250 OP 636: Performed by: ORTHOPAEDIC SURGERY

## 2022-12-23 PROCEDURE — 36415 COLL VENOUS BLD VENIPUNCTURE: CPT

## 2022-12-23 PROCEDURE — 250N000013 HC RX MED GY IP 250 OP 250 PS 637

## 2022-12-23 PROCEDURE — 85018 HEMOGLOBIN: CPT

## 2022-12-23 PROCEDURE — 97530 THERAPEUTIC ACTIVITIES: CPT | Mod: GP | Performed by: REHABILITATION PRACTITIONER

## 2022-12-23 RX ORDER — ACETAMINOPHEN 325 MG/1
650 TABLET ORAL EVERY 4 HOURS PRN
Qty: 60 TABLET | Refills: 0 | Status: SHIPPED | OUTPATIENT
Start: 2022-12-25 | End: 2023-01-20

## 2022-12-23 RX ADMIN — ACETAMINOPHEN 975 MG: 325 TABLET, FILM COATED ORAL at 09:26

## 2022-12-23 RX ADMIN — HYDROXYZINE HYDROCHLORIDE 25 MG: 25 TABLET, FILM COATED ORAL at 09:26

## 2022-12-23 RX ADMIN — ASPIRIN 81 MG: 81 TABLET ORAL at 09:27

## 2022-12-23 RX ADMIN — OXYCODONE HYDROCHLORIDE 5 MG: 5 TABLET ORAL at 09:37

## 2022-12-23 RX ADMIN — CEFAZOLIN SODIUM 2 G: 2 INJECTION, SOLUTION INTRAVENOUS at 02:36

## 2022-12-23 RX ADMIN — HYDROXYZINE HYDROCHLORIDE 25 MG: 25 TABLET, FILM COATED ORAL at 02:37

## 2022-12-23 RX ADMIN — OXYCODONE HYDROCHLORIDE 5 MG: 5 TABLET ORAL at 05:47

## 2022-12-23 RX ADMIN — POLYETHYLENE GLYCOL 3350 17 G: 17 POWDER, FOR SOLUTION ORAL at 09:27

## 2022-12-23 RX ADMIN — METHOCARBAMOL 750 MG: 750 TABLET ORAL at 02:37

## 2022-12-23 RX ADMIN — SENNOSIDES AND DOCUSATE SODIUM 1 TABLET: 50; 8.6 TABLET ORAL at 09:27

## 2022-12-23 ASSESSMENT — ACTIVITIES OF DAILY LIVING (ADL)
ADLS_ACUITY_SCORE: 28

## 2022-12-23 NOTE — PLAN OF CARE
Patient A/Ox4. VSS. Denies CP, SOB, dizziness/LH. LSCTA. +fl/BS. Voiding well in bathroom. CMS intact. Dressing to hip area CDI. Tolerating regular diet without NV. Activity level is good, pt SBA in room. IVs SL bilaterally. Pain rated as comfortably managed throughout shift, taking PRN atarax, oxycodone, and robaxin periodically. Hopeful to discharge home today with wife. Patient has demonstrated ability to call appropriately. Patient is resting with call light within reach. Will continue to monitor.

## 2022-12-23 NOTE — PLAN OF CARE
"VS: BP (!) 148/98 (BP Location: Left arm)   Pulse 62   Temp 97.1  F (36.2  C) (Oral)   Resp 16   Ht 1.854 m (6' 1\")   Wt 80 kg (176 lb 5.9 oz)   SpO2 97%   BMI 23.27 kg/m     O2: O2> 95% ORA, denies feeling SOB, lightheadedness   Lungs clear, equal bilat   Output: Voids spontaneously in bathroom, denies any problems in voiding   Last BM: 12/22, denies any abdominal discomfort   On nadira miralax and senna    Activity: Ax1-SBA w/ walker and GB, steady gait    Up for meals? Pt is able to sit up for meals   Skin: L hip incision    Pain: Pain in L hip, managed w/ scheduled tylenol and prn oxy, atarax   CMS: AOx4, denies numbness and tingling    Dressing: Dressing to L hip, CDI   Diet: Regular, denies nausea/vomiting    LDA: L PIV removed by author  R PIV removed by author   Hemo vac removed by Ortho   Plan: Plan to discharge home today w/ OP physical therapy    Additional Info:         DISCHARGE SUMMARY    Pt discharging to: Home  Transportation: Family - wife  AVS given and discussed: Yes  Stoplight Tool given and discussed: Yes   Medications given: Yes  Belongings returned: Yes  Comments: Pt safely discharged unit at 1225          "

## 2022-12-23 NOTE — PLAN OF CARE
[facility]      OUTPATIENT OCCUPATIONAL THERAPY  EVALUATION  PLAN OF TREATMENT FOR OUTPATIENT REHABILITATION  (COMPLETE FOR INITIAL CLAIMS ONLY)  Patient's Last Name, First Name, M.I.  YOB: 1990  Sarbjit Perez                          Provider's Name  [facility] Medical Record No.  [id number]                               Onset Date:  12/22/22   Start of Care Date:        Type:     ___PT   _X_OT   ___SLP Medical Diagnosis:                           OT Diagnosis:  Decreased I/ADL IND   Visits from SOC:  1   _________________________________________________________________________________  Plan of Treatment/Functional Goals    Planned Interventions: ADL retraining, IADL retraining, bed mobility training   Goals: See Occupational Therapy Goals on Care Plan in [facility] [employer] electronic health record.    Therapy Frequency: One time eval and treatment  Predicted Duration of Therapy Intervention: 12/23/22  _________________________________________________________________________________    I CERTIFY THE NEED FOR THESE SERVICES FURNISHED UNDER        THIS PLAN OF TREATMENT AND WHILE UNDER MY CARE .             Physician Signature               Date    X_____________________________________________________                   ,      Referring Physician: Rachele Raines MD            Initial Assessment        See Occupational Therapy evaluation dated   in Epic electronic health record.                   no fever/no chills

## 2022-12-23 NOTE — DISCHARGE SUMMARY
ORTHOPAEDIC SURGERY DISCHARGE SUMMARY     Date of Admission: 12/22/2022  Date of Discharge: 12/23/2022 12:26 PM  Disposition: Home  Staff Physician: Brady Delaney MD  Primary Care Provider: Bill Mg    DISCHARGE DIAGNOSIS:  Avascular necrosis of bone of hip, unspecified laterality (H) [M87.059]    PROCEDURES: Procedure(s):  ARTHROPLASTY, LEFT HIP, TOTAL  on 12/22/2022    BRIEF HISTORY:  This is a 32 year old patient who has been followed in clinic. Please refer to that documentation for full details. Briefly, the patient has a history of avascular necrosis of the right femur. The patient has failed conservative treatment of symptoms and desires more definitive intervention. Therefore, after reviewing non-operative and operative options including the risks and benefits associated with each, the patient elected to proceed with the above stated procedure.     HOSPITAL COURSE:    The patient was admitted following the above listed procedures for pain control and rehabilitation. Sarbjit Perez did well post-operatively. The patient received routine nursing cares and at the time of discharge was medically stable. Vital signs were stable throughout admission. The patient is tolerating a regular diet and is voiding spontaneously. All PT/OT goals have been met for safe mobility. Pain is now controlled on oral medications which will be available on discharge. Stool softeners have been used while taking pain medications to help prevent constipation. Sarbjit Perez is deemed medically safe to discharge.     Antibiotics:  Ancef given periop and 24 hours postop.  DVT prophylaxis:  Mechanical  PT Progress:  Has met PT/OT goals for safe mobility.  Pain Meds:  Weaned off all IV pain meds by discharge.  Inpatient Events: No significant events or complications.    PHYSICAL EXAM:    Gen: No acute distress, resting comfortably in bed. On 2L NC   Resp: Non-labored breathing  Cardio: Regular rate via peripheral  pulse  MSK:  LLE:   - Dressings c/d/I  - Fires Quad, TA, GSC, EHL, FHL  - SILT femoral/tibial/sural/saphenous/DP/SP nerves  - PT/DP pulses 2+, foot wwp     Drain output: 0/0/45.    FOLLOWUP:    Follow up with Dr. Delaney in clinic at 1 week postoperatively.    Future Appointments   Date Time Provider Department Center   12/23/2022  9:15 AM Jake Pratt, PTA [facility] [place]   12/23/2022  9:30 AM Rebekah Borrego, OT [facility] [place]   12/23/2022  1:15 PM UR PT WAITLIST [facility] [place]   12/26/2022  3:30 PM Rush Wright, PT [facility] [place]   12/27/2022 10:20 AM Brayd Delaney MD [facility]   1/2/2023 10:00 AM Rush Wright, PT [facility] [place]   1/9/2023 10:00 AM Rush Wright, PT [facility] [place]   1/16/2023  1:30 PM Bill Mg PA-C [facility]       Orthopaedic Surgery appointments are at the [facility] ([address] [place] [address]). Call [phone] to schedule a follow-up appointment at this location with your provider.     PLANNED DISCHARGE ORDERS:     DVT Prophylaxis: Mechanical     Activity: WBAT      Wound Care: see Below      Current Discharge Medication List        START taking these medications    Details   !! aspirin (ASA) 81 MG EC tablet Take 2 tablets (162 mg) by mouth 2 times daily  Qty: 120 tablet, Refills: 0    Associated Diagnoses: Avascular necrosis of bone of hip, unspecified laterality (H)      hydrOXYzine (ATARAX) 10 MG tablet Take 1 tablet (10 mg) by mouth every 8 hours as needed for itching  Qty: 40 tablet, Refills: 0    Associated Diagnoses: Avascular necrosis of bone of hip, unspecified laterality (H)      methocarbamol (ROBAXIN) 500 MG tablet Take 1 tablet (500 mg) by mouth 4 times daily as needed for muscle spasms  Qty: 30 tablet, Refills: 0    Associated Diagnoses: Avascular necrosis of bone of hip, unspecified laterality (H)      oxyCODONE (ROXICODONE) 5 MG tablet Take 1 tablet (5 mg) by mouth every 4 hours as needed for  pain  Qty: 26 tablet, Refills: 0    Associated Diagnoses: Avascular necrosis of bone of hip, unspecified laterality (H)      polyethylene glycol (MIRALAX) 17 g packet Take 17 g by mouth daily  Qty: 7 packet, Refills: 0    Associated Diagnoses: Avascular necrosis of bone of hip, unspecified laterality (H)      senna-docusate (SENOKOT-S/PERICOLACE) 8.6-50 MG tablet Take 1 tablet by mouth daily  Qty: 30 tablet, Refills: 0    Associated Diagnoses: Avascular necrosis of bone of hip, unspecified laterality (H)       !! - Potential duplicate medications found. Please discuss with provider.        CONTINUE these medications which have NOT CHANGED    Details   amphetamine-dextroamphetamine (ADDERALL XR) 20 MG 24 hr capsule Take 1 capsule (20 mg) by mouth daily  Qty: 30 capsule, Refills: 0    Associated Diagnoses: Attention deficit hyperactivity disorder (ADHD), combined type      !! aspirin 81 MG EC tablet Take 81 mg by mouth daily      Cholecalciferol (VITAMIN D-3) 125 MCG (5000 UT) TABS Take 1 tablet by mouth daily      Multiple Vitamin (MULTI VITAMIN MENS PO) Take 1 tablet by mouth daily       !! - Potential duplicate medications found. Please discuss with provider.            Discharge Procedure Orders   Crutches DME   Order Comments: DME Documentation: Describe the reason for need to support medical necessity: Impaired gait status post hip surgery. I, the undersigned, certify that the above prescribed supplies are medically necessary for this patient and is both reasonable and necessary in reference to accepted standards of medical practice in the treatment of this patient's condition and is not prescribed as a convenience.     Order Specific Question Answer Comments   DME Provider: [place]    Crutch Type: Standard    Crutches Add On: NA    Length of Need: Lifetime      Cane DME   Order Comments: DME Documentation: Describe the reason for need to support medical necessity: Impaired gait status post hip surgery.  I, the undersigned, certify that the above prescribed supplies are medically necessary for this patient and is both reasonable and necessary in reference to accepted standards of medical practice in the treatment of this patient's condition and is not prescribed as a convenience.     Order Specific Question Answer Comments   DME Provider: [facility]    Cane Type: Single Tip    Reminder: Patient can typically get 1 every 5 years      Walker DME   Order Comments: : DME Documentation: Describe the reason for need to support medical necessity: Impaired gait status post hip surgery. I, the undersigned, certify that the above prescribed supplies are medically necessary for this patient and is both reasonable and necessary in reference to accepted standards of medical practice in the treatment of this patient's condition and is not prescribed as a convenience.     Order Specific Question Answer Comments   DME Provider: [facility]    Walker Type: Standard (2 Wheel)    Accessories: N/A          Rachele Raines, DO  Orthopaedic Surgery PGY-1

## 2022-12-23 NOTE — PROGRESS NOTES
Orthopaedic Surgery Progress Note 12/23/2022    Subjective  No acute events overnight.  Pain well controlled, rated 2/10 in severity. Denies new numbness, tingling, or weakness.  Tolerating diet without nausea or vomiting.  Voiding spontaneously.  +flatus, -BM.   Denies chest pain, SOB.  Worked with PT yesterday, able to ambulate. Abduction pillow in place.    Objective  Temp: 97.1  F (36.2  C) Temp src: Oral BP: 133/83 Pulse: 61   Resp: 14 SpO2: 95 % O2 Device: None (Room air) Oxygen Delivery: 2 LPM    Exam:  Gen: No acute distress, resting comfortably in bed. On 2L NC   Resp: Non-labored breathing  Cardio: Regular rate via peripheral pulse  MSK:  LLE:   - Dressings c/d/I  - Fires Quad, TA, GSC, EHL, FHL  - SILT femoral/tibial/sural/saphenous/DP/SP nerves  - PT/DP pulses 2+, foot wwp    Drain output: 0/0/45.    Assessment: Sarbjit Perez is a 32 year old male s/p left total hip arthroplasty on 12/22/22 with Dr. Delaney. Doing well.    Plan:  Ortho Primary  Activity: Up with assist.  Weight bearing status: WBAT  Antibiotics: Ancef x24 hours perioperatively.  Diet: Progress diet as tolerated.  DVT prophylaxis: ASA 81mg BID and mechanical while in the hospital  Bracing/Splinting: Abduction brace placed post surgically  Elevation: Elevate LLE on pillows as much as possible.  Wound Care: No surgical dressing change necessary while in patient. Please page ortho team if dressings saturate.   Drains: Document output per shift, will be discontinued today   Pain management: transition from IV to orals as tolerated.   X-rays: XR Pelvis and left hip completed in PACU  Physical Therapy: LLE ROM, ADL's.  Occupational Therapy: ADL's.  Labs: No workup needed at this time  Cultures: Pending, follow culture results closely.  Consults: PT, OT, appreciate assistance in caring for this patient.  Follow-up: Clinic with Dr. Delaney in 1 week (on 12/27/22).    Disposition: Discharge to home today.    Rachele Raines DO  Orthopaedic Surgery PGY-1        Future Appointments   Date Time Provider Department Center   12/23/2022  9:30 AM Rebekah Borrego, OT [facility] [place]   12/23/2022 10:15 AM Guerda Mayo, PT [facility] [place]   12/23/2022  1:15 PM UR PT WAITLIST [facility] [place]   12/26/2022  3:30 PM Rush Wright, PT [facility] [place]   12/27/2022 10:20 AM Brady Delaney [place] [facility]   1/2/2023 10:00 AM Rush Wright, PT [facility] [place]   1/9/2023 10:00 AM Rush Wright, PT [facility] [place]   1/16/2023  1:30 PM Bill Mg PA-C [facility]

## 2022-12-23 NOTE — OP NOTE
Preop:  L Hip AVN  Postop DX:  Same with acetabular OA  Procedure: L JAMES  Depuy pinnacle sector with gription 56, apex hole eliminator, neutral altrx liner, 36 +5 ceramic head, 3 collared standard Emphasys stem.  Leg length equal postop.  Hip stable in all 4 quadrants      Attending:  [facility]  Assist:  Mukund Gregory MD Arthroplasty Fellow              Rachele Raines MD G1     Indications for procedure:  Pt with L hip pain over the last few years and diagnosis of AVN with head collapse.  Difficulty with ambulation.   Femoral head with AVN and acetabulum with degenerative changes and progressive exacerbation of disability over time.     Findings at procedure:  Femoral head with appearance consistent with AVN and acetabulum with degenerative osteoarthropathy. Some synovitis not sent to pathology.  Bone adequate.  Screws not required. Depuy pinnacle sector with gription 56, apex hole eliminator, neutral altrx liner, 36 +5 Ceramic head, 3 collared standard Emphasys stem.  Leg length equal postop.  Hip stable through range of motion all four quadrants with final implants.  Postop images show adequate orientation of components with acetabular index of acetabular component acceptable.No complications identified.  I spoke with family postop.     Procedure:  Following verbal and written consent, patient taken to OR 11.  Placed supine with bump under LL hip after general anesthesia also had single stick hip block.  Sterile prep and drape L lower extremity.  Curvilinear incision over anterior greater trochanter about 12 cm.  Dissection carried down sharply to interval between tensor fascia eliseo and gluteus medius after incision through IT band/gluteus drake fascia.  We t'd the gluteus medius minimally, mostly about the insertion site of femoral component.  Capsule was identified and a trapezoidal flap with a medial base was developed.  Simpulse lavage irrigation was used throughout the case.  Electrocautery for hemostasis.  The  capsule was somewhat matted down to the femoral head/neck and was somewhat thick/boggy.  We elevated in a sub periosteal fashion and subcapsular.  Dissection was carried to superiorlateral acetabular margin cephalad and lesser trochanter caudad.  Capsule was retracted medially.  Remnant of head was sectioned as ligamentum was intact and we had some difficulty dislocating from acetabulum.  Femoral neck transected with recip saw long and then sections of Head removed and saved if needed and eventually sent to pathology for permanent section. Some synovitis then excised from about the acetabulum along with labrum removed.  We used currette to remove debris and bone tissue down to medial wall. There were osteophytes which we removed with large curette.  We reamed from 47 sequentially to 55 by odds.  Bone adequate and medial inner cortex preserved.  56 pinnacle sector with gription placed carefully under fluoro.  This appeared to be well seated. We placed neutral trial liner.  WE then removed and replaced zelpis with leg placed in figure of 4 position to approach proximal femur.  We contoured the neck cut and prepared for broaching.  We started with the chili pepper, then 1 broach and sequentially increased.  We placed the 3  emphasys broach with good fit and placed the trial 36 +5 head.    There appeared to be good fixation of cup.  Leg length appeared to be equal under fluoro compared to contralateral extremity and hip stable so we finally used a 36 +5 head.  Hip was stable in all four quadrants with motion.  All trial components were removed. We found an abundant amount of acetabular osteophyte all about the seated cup so we removed osteophytes with rongeur.  We then placed final components using apex hole eliminator, 56 neutral poly altrx liner with good fit, we then placed 3 Emphsys collared stem with standard neck.  36 +5 ceramic head carefully impacted on stem and hip reduced.  Leg length equal.  Stable through  entire range of motion.  We then placed sterile betadine solution for 3 minutes.  This was then irrigated and capsule closed with #2 fiber wire.  Over medium hemovac drain we closed gluteus medius tensor fascia eliseo interval with #1 vicryl in a running fashion.  IT band/gluteal fascia interval closed with running #1 vicryl followed by #1 stratofix for water tight seal.  Deep subcutaneous fat layer, intermediate, superficial closed sequentially with #1 vicryl, 0 vicryl, 2-0 vicryl in interrupted fashion.  Epithelium closed with running suibcuticular 3-0 monocryl.  Wound sealed with exofin.  Xeroform followed by alginate and tegaderm placed.  Drain taped in place.  Marcaine also injected in and about wound.  Patient placed in abduction pillow.  Leg length equal.  Postop images showed anatomic orientation of components, acetabular index acceptable.  I spoke with family postop.       I was present entire procedure. No complications identified.  Postop images showed anatomic orientation of components, equal leg length.       Mukund Gregory MD Arthroplasty Fellow and Rachele Greco MD G1 assisted with retraction and extremity position throughout the case.

## 2022-12-23 NOTE — PLAN OF CARE
Discharge Planner PT   Patient plan for discharge: home   Current status: PT pt demo supine TE following writen JAMES program. pt ed to cont to demo IND x 3 per day.PT pt demo supien to sit going to R getting out and R going IND. pt able to demo R out at home. ed pt wife how to assist if needed. pt able to verb hip precaution but Verb cues to follow. pt demo STS x 4 from bed and chair. pt back in bed at end of session. pt ed on tech for in and out of car for home. pt stating understanding of tech.Pt pt demo short amb wtih WW up to 90'x 2 with CGA. pt limited by pain weakness and pain. pt working on step through pattern still needing heavy use of WW for all stainding and amb. pt declined try of stairs due to pain, pt and wife ed on tech, pt stating should be able to demo the only two to get into home.  Barriers to return to prior living situation: pain weakness and fatigue.   Recommendations for discharge: per PT eval home with assist. OPPT to follow.   Rationale for recommendations: pt has progressed well good support at home.   Pt declined to demo stairs but ed on tech.   At time of discharge pt met. 4/5 goals  Pt received WW at time of discharge        Entered by: Jake Pratt, PTA 12/23/2022 4:06 PM

## 2022-12-26 ENCOUNTER — THERAPY VISIT (OUTPATIENT)
Dept: PHYSICAL THERAPY | Facility: CLINIC | Age: 32
End: 2022-12-26
Attending: ORTHOPAEDIC SURGERY
Payer: COMMERCIAL

## 2022-12-26 DIAGNOSIS — M87.059 AVASCULAR NECROSIS OF BONE OF HIP, UNSPECIFIED LATERALITY (H): ICD-10-CM

## 2022-12-26 DIAGNOSIS — Z47.1 AFTERCARE FOLLOWING LEFT HIP JOINT REPLACEMENT SURGERY: ICD-10-CM

## 2022-12-26 DIAGNOSIS — M25.552 HIP PAIN, LEFT: ICD-10-CM

## 2022-12-26 DIAGNOSIS — Z96.642 AFTERCARE FOLLOWING LEFT HIP JOINT REPLACEMENT SURGERY: ICD-10-CM

## 2022-12-26 DIAGNOSIS — M87.052 AVASCULAR NECROSIS OF LEFT FEMORAL HEAD (H): ICD-10-CM

## 2022-12-26 PROCEDURE — 97110 THERAPEUTIC EXERCISES: CPT | Mod: GP | Performed by: PHYSICAL THERAPIST

## 2022-12-26 PROCEDURE — 97161 PT EVAL LOW COMPLEX 20 MIN: CPT | Mod: GP | Performed by: PHYSICAL THERAPIST

## 2022-12-26 ASSESSMENT — ACTIVITIES OF DAILY LIVING (ADL)
GOING_DOWN_1_FLIGHT_OF_STAIRS: MODERATE DIFFICULTY
RECREATIONAL_ACTIVITIES: UNABLE TO DO
GETTING_INTO_AND_OUT_OF_AN_AVERAGE_CAR: MODERATE DIFFICULTY
STANDING_FOR_15_MINUTES: SLIGHT DIFFICULTY
GETTING_INTO_AND_OUT_OF_A_BATHTUB: UNABLE TO DO
HOW_WOULD_YOU_RATE_YOUR_CURRENT_LEVEL_OF_FUNCTION_DURING_YOUR_USUAL_ACTIVITIES_OF_DAILY_LIVING_FROM_0_TO_100_WITH_100_BEING_YOUR_LEVEL_OF_FUNCTION_PRIOR_TO_YOUR_HIP_PROBLEM_AND_0_BEING_THE_INABILITY_TO_PERFORM_ANY_OF_YOUR_USUAL_DAILY_ACTIVITIES?: 30
HOS_ADL_ITEM_SCORE_TOTAL: 23
HEAVY_WORK: UNABLE TO DO
GOING_UP_1_FLIGHT_OF_STAIRS: MODERATE DIFFICULTY
HOS_ADL_HIGHEST_POTENTIAL_SCORE: 68
WALKING_APPROXIMATELY_10_MINUTES: MODERATE DIFFICULTY
LIGHT_TO_MODERATE_WORK: MODERATE DIFFICULTY
STEPPING_UP_AND_DOWN_CURBS: MODERATE DIFFICULTY
WALKING_15_MINUTES_OR_GREATER: MODERATE DIFFICULTY
HOS_ADL_SCORE(%): 33.82
WALKING_INITIALLY: MODERATE DIFFICULTY
ROLLING_OVER_IN_BED: UNABLE TO DO
PUTTING_ON_SOCKS_AND_SHOES: EXTREME DIFFICULTY
HOS_ADL_COUNT: 17
WALKING_UP_STEEP_HILLS: MODERATE DIFFICULTY
TWISTING/PIVOTING_ON_INVOLVED_LEG: UNABLE TO DO
DEEP_SQUATTING: UNABLE TO DO
WALKING_DOWN_STEEP_HILLS: MODERATE DIFFICULTY
SITTING_FOR_15_MINUTES: SLIGHT DIFFICULTY

## 2022-12-26 NOTE — PROGRESS NOTES
S:Patient is a 32 year old,  male seen today in follow up for s/p left JAMES.    They were previously evaluated on 12/22,  they are 5 days out from surgery    Current pain level: 3/10,     Patient is currently working   No fever or chills.         Patient Active Problem List   Diagnosis     Hx of diabetes mellitus     Alcohol dependence in remission (H)     Microalbuminuria     Essential hypertension, benign     Acute reaction to stress     Attention deficit hyperactivity disorder (ADHD), combined type     Chronic kidney disease, stage 1     AVN (avascular necrosis of bone) (H)     Chronic pain of left knee     Vitamin D deficiency     Avascular necrosis of femoral head (H)     Type 2 diabetes mellitus without complication (H)            Past Medical History:   Diagnosis Date     Acute pancreatitis 10/20/2016     ADHD (attention deficit hyperactivity disorder)      Alcohol abuse      Hx of diabetes mellitus 10/26/2016    Occurred post alcoholic pancreatitis. Resolved 1 yr later after alcohol cessation.       Hypertension 10/24/2016     Other chronic pain     Avascular necrosis in both hips     Pancreatic disease     pancreatitis - 2016            Past Surgical History:   Procedure Laterality Date     DECOMPRESSION HIP CORE Right 11/10/2022    Procedure: CORE DECOMPRESSION, FEMUR, HEAD;  Surgeon: Brady Delaney MD;  Location: [facility]     HERNIORRHAPHY INGUINAL Left 3/30/2018    Procedure: HERNIORRHAPHY INGUINAL;  Left Inguinal hernia repair with Mesh ;  Surgeon: Eduard Amaral MD;  Location: [facility] OR     HERNIORRHAPHY INGUINAL Right 3/18/2022    Procedure: OPEN RIGHT INGUINAL HERNIORRHAPHY WITH MESH;  Surgeon: Eduard Amaral MD;  Location: [facility] OR     wisdom teeth              Social History     Tobacco Use     Smoking status: Never     Smokeless tobacco: Never   Substance Use Topics     Alcohol use: Not Currently     Comment: sober x 6 yrs.            Family History   Problem Relation Age of Onset     Breast Cancer  Mother      Hypertension Father      Diabetes Type 2  Paternal Grandfather      Hypertension Paternal Grandfather      Diabetes Paternal Grandfather      Other Cancer Maternal Grandmother              No Known Allergies         Current Outpatient Medications   Medication Sig Dispense Refill     acetaminophen (TYLENOL) 325 MG tablet Take 2 tablets (650 mg) by mouth every 4 hours as needed for other (For optimal non-opioid multimodal pain management to improve pain control.) 60 tablet 0     amphetamine-dextroamphetamine (ADDERALL XR) 20 MG 24 hr capsule Take 1 capsule (20 mg) by mouth daily 30 capsule 0     aspirin (ASA) 81 MG EC tablet Take 2 tablets (162 mg) by mouth 2 times daily 120 tablet 0     aspirin 81 MG EC tablet Take 81 mg by mouth daily       Cholecalciferol (VITAMIN D-3) 125 MCG (5000 UT) TABS Take 1 tablet by mouth daily       hydrOXYzine (ATARAX) 10 MG tablet Take 1 tablet (10 mg) by mouth every 8 hours as needed for itching 40 tablet 0     methocarbamol (ROBAXIN) 500 MG tablet Take 1 tablet (500 mg) by mouth 4 times daily as needed for muscle spasms 30 tablet 0     Multiple Vitamin (MULTI VITAMIN MENS PO) Take 1 tablet by mouth daily       oxyCODONE (ROXICODONE) 5 MG tablet Take 1 tablet (5 mg) by mouth every 4 hours as needed for pain 26 tablet 0     polyethylene glycol (MIRALAX) 17 g packet Take 17 g by mouth daily 7 packet 0     senna-docusate (SENOKOT-S/PERICOLACE) 8.6-50 MG tablet Take 1 tablet by mouth daily 30 tablet 0          Review Of Systems  Skin: negative  Eyes: negative  Ears/Nose/Throat: negative  Respiratory: No shortness of breath, dyspnea on exertion, cough, or hemoptysis    O:  Physical Exam:  Wounds clean and dry.  Well epithelialized.  CMS intact.  No erythema or drainage.  Hip supple.  Leg length equal.    Images:  DATE/TIME: 12/22/2022 12:44 PM     INDICATION: Status post Hip surgery  COMPARISON: 1/26/2022 MRI.                                                                       IMPRESSION: Postoperative changes interval left total hip replacement with hardware in place and surgical drain. No dislocation or acute fracture.     Contralateral right femoral head osteonecrosis.         A:  L JAMES, core decompression R hip    P:  Continue rehab and wound care  See back one month with repeat images L hip  Notify if exacerbation symptoms.           In addition to the above assessment and plan each active problem on Sarbjit's problem list was evaluated today. This included the questioning of Sarbjit for any medication problems. We will continue the current treatment plan for these active problems except as noted.

## 2022-12-26 NOTE — PROGRESS NOTES
Answers for HPI/ROS submitted by the patient on 12/26/2022  Reason for Visit:: Hip Replacement PT  When problem began:: 12/22/2022  How problem occurred:: 12/22 was surgery date. Hip collapsed summer of 2018.  Number scale: 4/10  General health as reported by patient: good  Please check all that apply to your current or past medical history: chemical dependency, diabetes, high blood pressure, overweight, osteoarthritis  Medical allergies: none  Surgeries: orthopedic surgery, other  Other Surgery Detail: Hernia Repair: 1 in 2018, 1 in 2022  Medications you are currently taking: anti-inflammatory, muscle relaxants, pain medication  What are your primary job tasks: computer work, prolonged sitting  [facility] Initial Evaluation     Present: no    Subjective:  Sarbjit Perez is a 32 year old male with complaints of left hip . Presents to PT for aftercare s/p L JAMES on 12/22/22 via Dr. Delaney after history of avascular necrosis of the left hip. Pt reports that he had a L JAMES after having issues for 4 years now, since the surgery feeling good thus far. Getting better each day. Worse with walking, standing, stairs, and weightbearing. Denies vague symptoms. Long term wants to get more active long term, bulid up overall functional capacity without issue.      Symptoms commenced as a result of: surgery/other. Condition occurred in the following environment: other. Onset of symptoms: 12/22/22(DOS). Location of symptoms: left hip. Pain level on number scale: 4/10. Quality of pain: dull, ache. Associated symptoms: none. Pain frequency (constant/intermittent): constant. Symptoms are exacerbated by: walking, standing, weightbearing. Symptoms are relieved by: rest/avoidance, pain meds. Progression of symptoms since onset: improving. Imaging: yes see Epic. Previous treatment: surgery. Response to previous treatment: yes. General health as reported by patient is good. Pertinent medical history includes:  See Epic. Medical allergies: see Epic. Other pertinent surgeries: see Epic. Current medications: See Epic. 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 Work/restriction status: none. Primary job tasks: sitting, computer, work. Barriers at home/work: None reported by patient. Red flags: None reported by patient.    Objective  Gait:mod limp with walker left    Screening: negative    Flexibility: unremarkable    Hip PROM (* = pain) Right Left   FL 90 90   EXT NT NT   ER 30 10   IR 0 0     Palpation tenderness: unremarkable    Function: poor SLS left fair on the right     Other Tests: none    Key Findings  Aftercare s/p L JAMES with routine healing.  Assessment/Plan:    Patient is a 32 year old male with left side hipleft side hip complaints.    Patient has the following significant findings with corresponding treatment plan.                Diagnosis 1:  Left hip pain, aftercare s/p L hip JAMES  Pain -  manual therapy, self management, education and home program  Decreased ROM/flexibility - manual therapy and therapeutic exercise  Decreased joint mobility - manual therapy and therapeutic exercise  Decreased strength - therapeutic exercise and therapeutic activities  Impaired balance - neuro re-education and therapeutic activities  Impaired gait - gait training  Impaired muscle performance - neuro re-education  Decreased function - therapeutic activities    Therapy Evaluation Codes:     1) History comprised of:   Personal factors that impact the plan of care:      Past/current experiences and Time since onset of symptoms.    Comorbidity factors that impact the plan of care are:      Chemical Dependency and Overweight.     Medications impacting care: Anti-inflammatory and Muscle relaxant.  2) Examination of Body Systems comprised of:   Body structures and functions that impact the plan of care:      Hip.   Activity limitations that impact the plan of care are:      Jumping, Lifting, Running, Sports, Squatting/kneeling, Stairs, Standing  and Walking.  3) Clinical presentation characteristics are:   Stable/Uncomplicated.  4) Decision-Making    Low complexity using standardized patient assessment instrument and/or measureable assessment of functional outcome.    Cumulative Therapy Evaluation is: Low complexity.    Previous and current functional limitations:  (See Goal Flow Sheet for this information)    Short term and Long term goals: (See Goal Flow Sheet for this information)     Communication ability:  Patient appears to be able to clearly communicate and understand verbal and written communication and follow directions correctly.  Treatment Explanation - The following has been discussed with the patient:   RX ordered/plan of care  Anticipated outcomes  Possible risks and side effects  This patient would benefit from PT intervention to resume normal activities.   Rehab potential is good.    Frequency:  1 X week, once daily  Duration:  for 10 weeks  Discharge Plan:  Achieve all LTG.  Independent in home treatment program.  Reach maximal therapeutic benefit.    Please refer to the daily flowsheet for treatment today, total treatment time and time spent performing 1:1 timed codes.     Inquires  Rush Wright PT, DPT, CSCS  Physical Therapist  [facility], [address] [address] [phone]

## 2022-12-27 ENCOUNTER — OFFICE VISIT (OUTPATIENT)
Dept: ORTHOPEDICS | Facility: CLINIC | Age: 32
End: 2022-12-27
Payer: COMMERCIAL

## 2022-12-27 DIAGNOSIS — Z96.642 HISTORY OF TOTAL LEFT HIP REPLACEMENT: Primary | ICD-10-CM

## 2022-12-27 PROCEDURE — 99024 POSTOP FOLLOW-UP VISIT: CPT | Performed by: ORTHOPAEDIC SURGERY

## 2022-12-27 NOTE — LETTER
12/27/2022         RE: Sarbjit Perez  [address]  [facility] [address]        Dear Colleague,    Thank you for referring your patient, Sarbjit Perez, to the [facility] [place]. Please see a copy of my visit note below.    S:Patient is a 32 year old,  male seen today in follow up for s/p left JAMES.    They were previously evaluated on 12/22,  they are 5 days out from surgery    Current pain level: 3/10,     Patient is currently working   No fever or chills.         Patient Active Problem List   Diagnosis     Hx of diabetes mellitus     Alcohol dependence in remission (H)     Microalbuminuria     Essential hypertension, benign     Acute reaction to stress     Attention deficit hyperactivity disorder (ADHD), combined type     Chronic kidney disease, stage 1     AVN (avascular necrosis of bone) (H)     Chronic pain of left knee     Vitamin D deficiency     Avascular necrosis of femoral head (H)     Type 2 diabetes mellitus without complication (H)            Past Medical History:   Diagnosis Date     Acute pancreatitis 10/20/2016     ADHD (attention deficit hyperactivity disorder)      Alcohol abuse      Hx of diabetes mellitus 10/26/2016    Occurred post alcoholic pancreatitis. Resolved 1 yr later after alcohol cessation.       Hypertension 10/24/2016     Other chronic pain     Avascular necrosis in both hips     Pancreatic disease     pancreatitis - 2016            Past Surgical History:   Procedure Laterality Date     DECOMPRESSION HIP CORE Right 11/10/2022    Procedure: CORE DECOMPRESSION, FEMUR, HEAD;  Surgeon: Brady Delaney MD;  Location: [facility] OR     HERNIORRHAPHY INGUINAL Left 3/30/2018    Procedure: HERNIORRHAPHY INGUINAL;  Left Inguinal hernia repair with Mesh ;  Surgeon: Eduard Amaral MD;  Location: [facility] OR     HERNIORRHAPHY INGUINAL Right 3/18/2022    Procedure: OPEN RIGHT INGUINAL HERNIORRHAPHY WITH MESH;  Surgeon: Eduard Amaral MD;  Location: [facility] OR     wisdom  teeth              Social History     Tobacco Use     Smoking status: Never     Smokeless tobacco: Never   Substance Use Topics     Alcohol use: Not Currently     Comment: sober x 6 yrs.            Family History   Problem Relation Age of Onset     Breast Cancer Mother      Hypertension Father      Diabetes Type 2  Paternal Grandfather      Hypertension Paternal Grandfather      Diabetes Paternal Grandfather      Other Cancer Maternal Grandmother              No Known Allergies         Current Outpatient Medications   Medication Sig Dispense Refill     acetaminophen (TYLENOL) 325 MG tablet Take 2 tablets (650 mg) by mouth every 4 hours as needed for other (For optimal non-opioid multimodal pain management to improve pain control.) 60 tablet 0     amphetamine-dextroamphetamine (ADDERALL XR) 20 MG 24 hr capsule Take 1 capsule (20 mg) by mouth daily 30 capsule 0     aspirin (ASA) 81 MG EC tablet Take 2 tablets (162 mg) by mouth 2 times daily 120 tablet 0     aspirin 81 MG EC tablet Take 81 mg by mouth daily       Cholecalciferol (VITAMIN D-3) 125 MCG (5000 UT) TABS Take 1 tablet by mouth daily       hydrOXYzine (ATARAX) 10 MG tablet Take 1 tablet (10 mg) by mouth every 8 hours as needed for itching 40 tablet 0     methocarbamol (ROBAXIN) 500 MG tablet Take 1 tablet (500 mg) by mouth 4 times daily as needed for muscle spasms 30 tablet 0     Multiple Vitamin (MULTI VITAMIN MENS PO) Take 1 tablet by mouth daily       oxyCODONE (ROXICODONE) 5 MG tablet Take 1 tablet (5 mg) by mouth every 4 hours as needed for pain 26 tablet 0     polyethylene glycol (MIRALAX) 17 g packet Take 17 g by mouth daily 7 packet 0     senna-docusate (SENOKOT-S/PERICOLACE) 8.6-50 MG tablet Take 1 tablet by mouth daily 30 tablet 0          Review Of Systems  Skin: negative  Eyes: negative  Ears/Nose/Throat: negative  Respiratory: No shortness of breath, dyspnea on exertion, cough, or hemoptysis    O:  Physical Exam:  Wounds clean and dry.  Well  epithelialized.  CMS intact.  No erythema or drainage.  Hip supple.  Leg length equal.    Images:  DATE/TIME: 12/22/2022 12:44 PM     INDICATION: Status post Hip surgery  COMPARISON: 1/26/2022 MRI.                                                                      IMPRESSION: Postoperative changes interval left total hip replacement with hardware in place and surgical drain. No dislocation or acute fracture.     Contralateral right femoral head osteonecrosis.         A:  L JAMES, core decompression R hip    P:  Continue rehab and wound care  See back one month with repeat images L hip  Notify if exacerbation symptoms.           In addition to the above assessment and plan each active problem on Sarbjit's problem list was evaluated today. This included the questioning of Sarbjit for any medication problems. We will continue the current treatment plan for these active problems except as noted.        Again, thank you for allowing me to participate in the care of your patient.        Sincerely,        DILMA ADAMS MD

## 2022-12-27 NOTE — PATIENT INSTRUCTIONS
Thank you for choosing [facility]    Dr. Delaney Locations:    [facility] [address], [address]  [address] [address]   Appointments: [phone] Appointments: [phone]   Fax: [phone] Fax: [phone]       Follow up: 4 weeks, x-ray will be obtained that this time.   Please call [phone] to schedule your follow up appointment.     Continue with hip precautions.   Ok to get incision site wet in shower, pat area dry.     For any questions please contact my office, [phone].

## 2022-12-29 LAB
PATH REPORT.COMMENTS IMP SPEC: NORMAL
PATH REPORT.FINAL DX SPEC: NORMAL
PATH REPORT.GROSS SPEC: NORMAL
PATH REPORT.MICROSCOPIC SPEC OTHER STN: NORMAL
PATH REPORT.RELEVANT HX SPEC: NORMAL
PHOTO IMAGE: NORMAL

## 2022-12-29 PROCEDURE — 88311 DECALCIFY TISSUE: CPT | Mod: 26 | Performed by: PATHOLOGY

## 2022-12-29 PROCEDURE — 88305 TISSUE EXAM BY PATHOLOGIST: CPT | Mod: 26 | Performed by: PATHOLOGY

## 2022-12-30 ENCOUNTER — MYC REFILL (OUTPATIENT)
Dept: FAMILY MEDICINE | Facility: CLINIC | Age: 32
End: 2022-12-30

## 2022-12-30 DIAGNOSIS — F90.2 ATTENTION DEFICIT HYPERACTIVITY DISORDER (ADHD), COMBINED TYPE: ICD-10-CM

## 2023-01-02 ENCOUNTER — THERAPY VISIT (OUTPATIENT)
Dept: PHYSICAL THERAPY | Facility: CLINIC | Age: 33
End: 2023-01-02
Payer: COMMERCIAL

## 2023-01-02 DIAGNOSIS — Z96.642 AFTERCARE FOLLOWING LEFT HIP JOINT REPLACEMENT SURGERY: ICD-10-CM

## 2023-01-02 DIAGNOSIS — M25.552 HIP PAIN, LEFT: Primary | ICD-10-CM

## 2023-01-02 DIAGNOSIS — Z47.1 AFTERCARE FOLLOWING LEFT HIP JOINT REPLACEMENT SURGERY: ICD-10-CM

## 2023-01-02 PROCEDURE — 97110 THERAPEUTIC EXERCISES: CPT | Mod: GP | Performed by: PHYSICAL THERAPIST

## 2023-01-02 PROCEDURE — 97112 NEUROMUSCULAR REEDUCATION: CPT | Mod: GP | Performed by: PHYSICAL THERAPIST

## 2023-01-02 RX ORDER — DEXTROAMPHETAMINE SACCHARATE, AMPHETAMINE ASPARTATE MONOHYDRATE, DEXTROAMPHETAMINE SULFATE AND AMPHETAMINE SULFATE 5; 5; 5; 5 MG/1; MG/1; MG/1; MG/1
20 CAPSULE, EXTENDED RELEASE ORAL DAILY
Qty: 30 CAPSULE | Refills: 0 | Status: SHIPPED | OUTPATIENT
Start: 2023-01-02 | End: 2023-04-27

## 2023-01-02 NOTE — PROGRESS NOTES
Prior Authorization **APPROVED**    Authorization Effective Date: 12/22/2022  Authorization Expiration Date: 1/21/2023  Medication: Oxycodone 5mg tabs **APPROVED**  Approved Dose/Quantity: -  Reference #: CoverMyMeds Key: [id number] - PA Case ID: [id number]   Insurance Company: [employer] - Phone [phone] Fax [phone]  Expected CoPay: $2.00     CoPay Card Available: No    Foundation Assistance Needed: n/a  Which Pharmacy is filling the prescription (Not needed for infusion/clinic administered): [facility] [place], [place] - [address]  Pharmacy Notified: Yes  Patient Notified: Yes  Comments:  Plan limits to 7 tablets of opioids per 90 days. PA required for quantities beyond these limits.          Julianne Olea CPhT  [facility] Liaison  Pronouns: She/Her/Hers    [facility]  [address] [facility]  [address] [facility] [address] [address]   [email or web address]  [email or web address]   Phone: [phone]  Pager: [phone]  Fax: [phone]

## 2023-01-05 NOTE — PROGRESS NOTES
Occupational Therapy Evaluation 12/23/22 0800   Appointment Info   Signing Clinician's Name / Credentials (OT) Rebekah Borrego, OTR/L   Living Environment   People in Home spouse;child(manolo), dependent   Current Living Arrangements house   Home Accessibility stairs to enter home;stairs within home   Number of Stairs, Main Entrance 2   Stair Railings, Main Entrance none   Number of Stairs, Within Home, Primary six   Stair Railings, Within Home, Primary railing on left side (ascending)   Transportation Anticipated family or friend will provide   Living Environment Comments Pt lives w/ his 2.6 yo twins and wife. Walk-in shower w/ a short lip/step to enter.   Self-Care   Usual Activity Tolerance good   Current Activity Tolerance moderate   Equipment Currently Used at Home cane, quad;crutches;cane, straight   Fall history within last six months no   Activity/Exercise/Self-Care Comment Pt was IND with I/ADLs at baseline. Pt works from home.   General Information   Onset of Illness/Injury or Date of Surgery 12/22/22   Referring Physician Rachele Raines MD   Additional Occupational Profile Info/Pertinent History of Current Problem Sarbjit Perez is a 32 year old male s/p left total hip arthroplasty on 12/22/22 with Dr. Delaney. Doing well.   Existing Precautions/Restrictions no pivoting or twisting;no hip hyperextension;no active hip ABD;no hip ER   Left Lower Extremity (Weight-bearing Status) weight-bearing as tolerated (WBAT)   Cognitive Status Examination   Orientation Status orientation to person, place and time   Pain Assessment   Patient Currently in Pain No   Posture   Posture not impaired   Range of Motion Comprehensive   General Range of Motion bilateral upper extremity ROM WFL   Strength Comprehensive (MMT)   General Manual Muscle Testing (MMT) Assessment no strength deficits identified   Bed Mobility   Bed Mobility supine-sit;sit-supine   Supine-Sit [place] (Bed Mobility) modified independence   Sit-Supine  Infant weaned to Q4H morphine. Breast feeding frequency changed to every 2-3 hours with good results of DANIELLE  scores reduced. Developmental care reviewed with mother. Mother independent in basic care of infant.     [place] (Bed Mobility) modified independence   Transfers   Transfers sit-stand transfer   Sit-Stand Transfer   Sit-Stand [place] (Transfers) supervision   Activities of Daily Living   BADL Assessment/Intervention lower body dressing;toileting;bathing   Bathing Assessment/Intervention   Comment, (Bathing) Per clinical judgement anticipate mod I w/ AE   Lower Body Dressing Assessment/Training   [place] Level (Lower Body Dressing) verbal cues;set up   Toileting   [place] Level (Toileting) independent   Clinical Impression   Criteria for Skilled Therapeutic Interventions Met (OT) Yes, treatment indicated   OT Diagnosis Decreased I/ADL IND   OT Problem List-Impairments impacting ADL problems related to;activity tolerance impaired;balance;mobility;strength;post-surgical precautions   Assessment of Occupational Performance 1-3 Performance Deficits   Identified Performance Deficits LB dressing, bathing, home mgmt   Planned Therapy Interventions (OT) ADL retraining;IADL retraining;bed mobility training   Clinical Decision Making Complexity (OT) low complexity   Anticipated Equipment Needs Upon Discharge (OT) shower chair;walker, standard  (long handled sponge or loofa)   Risk & Benefits of therapy have been explained evaluation/treatment results reviewed;care plan/treatment goals reviewed;risks/benefits reviewed;current/potential barriers reviewed;participants voiced agreement with care plan;participants included;patient;spouse/significant other   OT Total Evaluation Time   OT Eval, Low Complexity Minutes (62850) 5   OT Goals   Therapy Frequency (OT) One time eval and treatment   OT Predicted Duration/Target Date for Goal Attainment 12/23/22   OT Goals Lower Body Dressing;Lower Body Bathing;Transfers   OT: Lower Body Dressing Modified independent;within precautions   OT: Lower Body Bathing Modified independent;with precautions   OT: Transfer Modified independent;within precautions  (shower transfer)   OT  Discharge Planning   OT Discharge Recommendation (DC Rec) home with assist   OT Rationale for DC Rec Pt presents below functional baseline but is safe to return home w/ A from wife as needed   Total Session Time   Total Session Time (sum of timed and untimed services) 5

## 2023-01-09 ENCOUNTER — THERAPY VISIT (OUTPATIENT)
Dept: PHYSICAL THERAPY | Facility: CLINIC | Age: 33
End: 2023-01-09
Payer: COMMERCIAL

## 2023-01-09 DIAGNOSIS — Z47.1 AFTERCARE FOLLOWING LEFT HIP JOINT REPLACEMENT SURGERY: ICD-10-CM

## 2023-01-09 DIAGNOSIS — Z96.642 AFTERCARE FOLLOWING LEFT HIP JOINT REPLACEMENT SURGERY: ICD-10-CM

## 2023-01-09 DIAGNOSIS — M25.552 HIP PAIN, LEFT: Primary | ICD-10-CM

## 2023-01-09 PROCEDURE — 97112 NEUROMUSCULAR REEDUCATION: CPT | Mod: GP | Performed by: PHYSICAL THERAPIST

## 2023-01-09 PROCEDURE — 97110 THERAPEUTIC EXERCISES: CPT | Mod: GP | Performed by: PHYSICAL THERAPIST

## 2023-01-09 ASSESSMENT — ACTIVITIES OF DAILY LIVING (ADL)
PUTTING_ON_SOCKS_AND_SHOES: SLIGHT DIFFICULTY
GOING_UP_1_FLIGHT_OF_STAIRS: NO DIFFICULTY AT ALL
GOING_DOWN_1_FLIGHT_OF_STAIRS: NO DIFFICULTY AT ALL
WALKING_15_MINUTES_OR_GREATER: SLIGHT DIFFICULTY
GETTING_INTO_AND_OUT_OF_AN_AVERAGE_CAR: SLIGHT DIFFICULTY
WALKING_APPROXIMATELY_10_MINUTES: SLIGHT DIFFICULTY
TWISTING/PIVOTING_ON_INVOLVED_LEG: UNABLE TO DO
DEEP_SQUATTING: MODERATE DIFFICULTY
HOS_ADL_SCORE(%): 73.53
HEAVY_WORK: SLIGHT DIFFICULTY
GETTING_INTO_AND_OUT_OF_A_BATHTUB: SLIGHT DIFFICULTY
WALKING_DOWN_STEEP_HILLS: NO DIFFICULTY AT ALL
ROLLING_OVER_IN_BED: SLIGHT DIFFICULTY
HOS_ADL_COUNT: 17
HOS_ADL_HIGHEST_POTENTIAL_SCORE: 68
HOW_WOULD_YOU_RATE_YOUR_CURRENT_LEVEL_OF_FUNCTION_DURING_YOUR_USUAL_ACTIVITIES_OF_DAILY_LIVING_FROM_0_TO_100_WITH_100_BEING_YOUR_LEVEL_OF_FUNCTION_PRIOR_TO_YOUR_HIP_PROBLEM_AND_0_BEING_THE_INABILITY_TO_PERFORM_ANY_OF_YOUR_USUAL_DAILY_ACTIVITIES?: 75
LIGHT_TO_MODERATE_WORK: SLIGHT DIFFICULTY
HOS_ADL_ITEM_SCORE_TOTAL: 50
SITTING_FOR_15_MINUTES: NO DIFFICULTY AT ALL
STANDING_FOR_15_MINUTES: NO DIFFICULTY AT ALL
STEPPING_UP_AND_DOWN_CURBS: NO DIFFICULTY AT ALL
RECREATIONAL_ACTIVITIES: UNABLE TO DO
WALKING_INITIALLY: SLIGHT DIFFICULTY
WALKING_UP_STEEP_HILLS: NO DIFFICULTY AT ALL

## 2023-01-19 ENCOUNTER — THERAPY VISIT (OUTPATIENT)
Dept: PHYSICAL THERAPY | Facility: CLINIC | Age: 33
End: 2023-01-19
Payer: COMMERCIAL

## 2023-01-19 DIAGNOSIS — Z47.1 AFTERCARE FOLLOWING LEFT HIP JOINT REPLACEMENT SURGERY: ICD-10-CM

## 2023-01-19 DIAGNOSIS — Z96.642 AFTERCARE FOLLOWING LEFT HIP JOINT REPLACEMENT SURGERY: ICD-10-CM

## 2023-01-19 DIAGNOSIS — M25.552 HIP PAIN, LEFT: Primary | ICD-10-CM

## 2023-01-19 PROCEDURE — 97112 NEUROMUSCULAR REEDUCATION: CPT | Mod: GP | Performed by: PHYSICAL THERAPIST

## 2023-01-19 PROCEDURE — 97110 THERAPEUTIC EXERCISES: CPT | Mod: GP | Performed by: PHYSICAL THERAPIST

## 2023-01-20 ENCOUNTER — VIRTUAL VISIT (OUTPATIENT)
Dept: FAMILY MEDICINE | Facility: CLINIC | Age: 33
End: 2023-01-20
Attending: PHYSICIAN ASSISTANT
Payer: COMMERCIAL

## 2023-01-20 DIAGNOSIS — F90.2 ATTENTION DEFICIT HYPERACTIVITY DISORDER (ADHD), COMBINED TYPE: Primary | ICD-10-CM

## 2023-01-20 PROCEDURE — 99213 OFFICE O/P EST LOW 20 MIN: CPT | Mod: 95 | Performed by: PHYSICIAN ASSISTANT

## 2023-01-20 RX ORDER — DEXTROAMPHETAMINE SACCHARATE, AMPHETAMINE ASPARTATE MONOHYDRATE, DEXTROAMPHETAMINE SULFATE AND AMPHETAMINE SULFATE 5; 5; 5; 5 MG/1; MG/1; MG/1; MG/1
20 CAPSULE, EXTENDED RELEASE ORAL DAILY
Qty: 30 CAPSULE | Refills: 0 | Status: SHIPPED | OUTPATIENT
Start: 2023-02-01 | End: 2023-03-14

## 2023-01-20 NOTE — PROGRESS NOTES
Sarbjit is a 32 year old who is being evaluated via a billable video visit.      How would you like to obtain your AVS? MyChart  If the video visit is dropped, the invitation should be resent by: Text to cell phone: [phone]  Will anyone else be joining your video visit? No        Assessment & Plan     Attention deficit hyperactivity disorder (ADHD), combined type  imrpoved on 20 mg dose and stable with XR. However, nightly seems mildly distracted. Discussed possible early PM IR 10 mg dose. However, given mild symptoms, decided by myself and patient to maintain current regimen and monitor for now. If stable, recheck in 6 months.   - amphetamine-dextroamphetamine (ADDERALL XR) 20 MG 24 hr capsule; Take 1 capsule (20 mg) by mouth daily  -Medication use and side effects discussed with the patient. Patient is in complete understanding and agreement with plan.          Return in about 6 months (around 7/20/2023) for add follow up virtual. .    Bill Mg PA-C  [facility]   Sarbjit is a 32 year old accompanied by his self, presenting for the following health issues:  Recheck Medication      History of Present Illness       Reason for visit:  ADHD Medication Follow Up    He eats 2-3 servings of fruits and vegetables daily.He consumes 0 sweetened beverage(s) daily.He exercises with enough effort to increase his heart rate 20 to 29 minutes per day.  He exercises with enough effort to increase his heart rate 5 days per week.   He is taking medications regularly.     ADHD Follow-Up (Adult)  Concerns: none. Though does not mild distraction at evening doing things he enjoys such conversing with partner and watching and TV and video games.   Changes since last visit: Improving  Taking controlled (daily) medications as prescribed: Yes  Sleep: no problems  Adult ADHD Self-Reporting form given to patient?:  No  Currently in counseling: No    Medication Benefits:   Controlled  "symptoms: Attention span, Distractability, Finishing tasks and Frustration tolerance  Uncontrolled symptoms:  None    Medication Side Effects:  Reports:  none    ++++++++++++++++++++++++++++++++++++++++++++++++    Employer Concerns/Feedback: Improving  Coworker Concerns:   Improving  Home/Family Concerns: Improving        Review of Systems   Constitutional, HEENT, cardiovascular, pulmonary, GI, , musculoskeletal, neuro, skin, endocrine and psych systems are negative, except as otherwise noted.      Objective    Vitals - Patient Reported  Weight (Patient Reported): 77.1 kg (170 lb)  Height (Patient Reported): 185.4 cm (6' 1\")  BMI (Based on Pt Reported Ht/Wt): 22.43      Vitals:  No vitals were obtained today due to virtual visit.    Physical Exam   GENERAL: Healthy, alert and no distress  EYES: Eyes grossly normal to inspection.  No discharge or erythema, or obvious scleral/conjunctival abnormalities.  RESP: No audible wheeze, cough, or visible cyanosis.  No visible retractions or increased work of breathing.    SKIN: Visible skin clear. No significant rash, abnormal pigmentation or lesions.  NEURO: Cranial nerves grossly intact.  Mentation and speech appropriate for age.  PSYCH: Mentation appears normal, affect normal/bright, judgement and insight intact, normal speech and appearance well-groomed.            Video-Visit Details    Type of service:  Video Visit     Originating Location (pt. Location): Home  Distant Location (provider location):  Off-site  Platform used for Video Visit: Alonzo"

## 2023-01-26 ENCOUNTER — THERAPY VISIT (OUTPATIENT)
Dept: PHYSICAL THERAPY | Facility: CLINIC | Age: 33
End: 2023-01-26
Payer: COMMERCIAL

## 2023-01-26 DIAGNOSIS — M25.552 HIP PAIN, LEFT: Primary | ICD-10-CM

## 2023-01-26 DIAGNOSIS — Z96.642 AFTERCARE FOLLOWING LEFT HIP JOINT REPLACEMENT SURGERY: ICD-10-CM

## 2023-01-26 DIAGNOSIS — Z47.1 AFTERCARE FOLLOWING LEFT HIP JOINT REPLACEMENT SURGERY: ICD-10-CM

## 2023-01-26 PROCEDURE — 97110 THERAPEUTIC EXERCISES: CPT | Mod: GP | Performed by: PHYSICAL THERAPIST

## 2023-01-26 PROCEDURE — 97112 NEUROMUSCULAR REEDUCATION: CPT | Mod: GP | Performed by: PHYSICAL THERAPIST

## 2023-02-09 ENCOUNTER — THERAPY VISIT (OUTPATIENT)
Dept: PHYSICAL THERAPY | Facility: CLINIC | Age: 33
End: 2023-02-09
Payer: COMMERCIAL

## 2023-02-09 DIAGNOSIS — M25.552 HIP PAIN, LEFT: Primary | ICD-10-CM

## 2023-02-09 DIAGNOSIS — Z47.1 AFTERCARE FOLLOWING LEFT HIP JOINT REPLACEMENT SURGERY: ICD-10-CM

## 2023-02-09 DIAGNOSIS — Z96.642 AFTERCARE FOLLOWING LEFT HIP JOINT REPLACEMENT SURGERY: ICD-10-CM

## 2023-02-09 PROCEDURE — 97110 THERAPEUTIC EXERCISES: CPT | Mod: GP | Performed by: PHYSICAL THERAPIST

## 2023-02-09 PROCEDURE — 97112 NEUROMUSCULAR REEDUCATION: CPT | Mod: GP | Performed by: PHYSICAL THERAPIST

## 2023-02-09 ASSESSMENT — ACTIVITIES OF DAILY LIVING (ADL)
TWISTING/PIVOTING_ON_INVOLVED_LEG: UNABLE TO DO
WALKING_APPROXIMATELY_10_MINUTES: NO DIFFICULTY AT ALL
WALKING_UP_STEEP_HILLS: NO DIFFICULTY AT ALL
ROLLING_OVER_IN_BED: NO DIFFICULTY AT ALL
GOING_UP_1_FLIGHT_OF_STAIRS: NO DIFFICULTY AT ALL
PUTTING_ON_SOCKS_AND_SHOES: NO DIFFICULTY AT ALL
HOS_ADL_SCORE(%): 83.82
WALKING_DOWN_STEEP_HILLS: NO DIFFICULTY AT ALL
STEPPING_UP_AND_DOWN_CURBS: NO DIFFICULTY AT ALL
HOS_ADL_HIGHEST_POTENTIAL_SCORE: 68
SITTING_FOR_15_MINUTES: NO DIFFICULTY AT ALL
DEEP_SQUATTING: SLIGHT DIFFICULTY
RECREATIONAL_ACTIVITIES: UNABLE TO DO
WALKING_15_MINUTES_OR_GREATER: NO DIFFICULTY AT ALL
GETTING_INTO_AND_OUT_OF_A_BATHTUB: SLIGHT DIFFICULTY
WALKING_INITIALLY: NO DIFFICULTY AT ALL
STANDING_FOR_15_MINUTES: NO DIFFICULTY AT ALL
HOS_ADL_ITEM_SCORE_TOTAL: 57
GOING_DOWN_1_FLIGHT_OF_STAIRS: NO DIFFICULTY AT ALL
HOS_ADL_COUNT: 17
GETTING_INTO_AND_OUT_OF_AN_AVERAGE_CAR: NO DIFFICULTY AT ALL
LIGHT_TO_MODERATE_WORK: NO DIFFICULTY AT ALL
HOW_WOULD_YOU_RATE_YOUR_CURRENT_LEVEL_OF_FUNCTION_DURING_YOUR_USUAL_ACTIVITIES_OF_DAILY_LIVING_FROM_0_TO_100_WITH_100_BEING_YOUR_LEVEL_OF_FUNCTION_PRIOR_TO_YOUR_HIP_PROBLEM_AND_0_BEING_THE_INABILITY_TO_PERFORM_ANY_OF_YOUR_USUAL_DAILY_ACTIVITIES?: 90
HEAVY_WORK: SLIGHT DIFFICULTY

## 2023-03-14 ENCOUNTER — MYC REFILL (OUTPATIENT)
Dept: FAMILY MEDICINE | Facility: CLINIC | Age: 33
End: 2023-03-14
Payer: COMMERCIAL

## 2023-03-14 DIAGNOSIS — F90.2 ATTENTION DEFICIT HYPERACTIVITY DISORDER (ADHD), COMBINED TYPE: ICD-10-CM

## 2023-03-15 RX ORDER — DEXTROAMPHETAMINE SACCHARATE, AMPHETAMINE ASPARTATE MONOHYDRATE, DEXTROAMPHETAMINE SULFATE AND AMPHETAMINE SULFATE 5; 5; 5; 5 MG/1; MG/1; MG/1; MG/1
20 CAPSULE, EXTENDED RELEASE ORAL DAILY
Qty: 30 CAPSULE | Refills: 0 | Status: SHIPPED | OUTPATIENT
Start: 2023-03-15 | End: 2023-04-26

## 2023-03-23 ENCOUNTER — THERAPY VISIT (OUTPATIENT)
Dept: PHYSICAL THERAPY | Facility: CLINIC | Age: 33
End: 2023-03-23
Payer: COMMERCIAL

## 2023-03-23 DIAGNOSIS — Z96.642 AFTERCARE FOLLOWING LEFT HIP JOINT REPLACEMENT SURGERY: ICD-10-CM

## 2023-03-23 DIAGNOSIS — Z47.1 AFTERCARE FOLLOWING LEFT HIP JOINT REPLACEMENT SURGERY: ICD-10-CM

## 2023-03-23 DIAGNOSIS — M25.552 HIP PAIN, LEFT: Primary | ICD-10-CM

## 2023-03-23 PROCEDURE — 97110 THERAPEUTIC EXERCISES: CPT | Mod: GP | Performed by: PHYSICAL THERAPIST

## 2023-03-23 NOTE — PROGRESS NOTES
DISCHARGE REPORT    Progress reporting period is from 12/26/23 to 3/23/23.       SUBJECTIVE  Subjective changes noted by patient:  Subjective: Sarbjit reports continued improvement and hasn't noticed the hip at all at this point. Keeping up with the exercises as best as able. Feels ready to discharge at this time and will continue HEP.    Current pain level is 0/10 Current Pain level: 0/10.     Previous pain level was  4/10 Initial Pain level: 4/10.   Changes in function:  Yes (See Goal flowsheet attached for changes in current functional level)  Adverse reaction to treatment or activity: None    OBJECTIVE  Changes noted in objective findings:  Yes, improved ROM, strength, balance, control, gait, and function  Objective: ROM: Flexion 95, ER 45, IR 5 all pain free, normalized gait pattern without device or limp, good BW squat pain free, good SLS, 5/5 hip abduction/extension strength     ASSESSMENT/PLAN  Updated problem list and treatment plan: Diagnosis 1:  L hip pain, aftercare s/p L JAMES  Decreased strength - therapeutic exercise and therapeutic activities  Impaired muscle performance - neuro re-education  Decreased function - therapeutic activities  STG/LTGs have been met or progress has been made towards goals:  Yes (See Goal flow sheet completed today.)  Assessment of Progress: The patient has met all of their long term goals.  Self Management Plans:  Patient has been instructed in a home treatment program.  Patient is independent in a home treatment program.  Patient  has been instructed in self management of symptoms.  Patient is independent in self management of symptoms.  I have re-evaluated this patient and find that the nature, scope, duration and intensity of the therapy is appropriate for the medical condition of the patient.  Sarbjit continues to require the following intervention to meet STG and LTG's:  PT intervention is no longer required to meet STG/LTG.    Recommendations:  This patient is ready to be  discharged from therapy and continue their home treatment program.    Please refer to the daily flowsheet for treatment today, total treatment time and time spent performing 1:1 timed codes.    Inquires  Rush Wright PT, DPT, [facility]  Physical Therapist  [facility], [address] [address] [phone]

## 2023-03-29 ENCOUNTER — IMMUNIZATION (OUTPATIENT)
Dept: FAMILY MEDICINE | Facility: CLINIC | Age: 33
End: 2023-03-29
Payer: COMMERCIAL

## 2023-03-29 DIAGNOSIS — Z23 HIGH PRIORITY FOR 2019-NCOV VACCINE: Primary | ICD-10-CM

## 2023-03-29 PROCEDURE — 91312 COVID-19 VACCINE BIVALENT BOOSTER 12+ (PFIZER): CPT

## 2023-03-29 PROCEDURE — 0124A COVID-19 VACCINE BIVALENT BOOSTER 12+ (PFIZER): CPT

## 2023-03-29 PROCEDURE — 99207 PR NO CHARGE NURSE ONLY: CPT

## 2023-04-26 ENCOUNTER — MYC REFILL (OUTPATIENT)
Dept: FAMILY MEDICINE | Facility: CLINIC | Age: 33
End: 2023-04-26
Payer: COMMERCIAL

## 2023-04-26 DIAGNOSIS — F90.2 ATTENTION DEFICIT HYPERACTIVITY DISORDER (ADHD), COMBINED TYPE: ICD-10-CM

## 2023-04-27 RX ORDER — DEXTROAMPHETAMINE SACCHARATE, AMPHETAMINE ASPARTATE MONOHYDRATE, DEXTROAMPHETAMINE SULFATE AND AMPHETAMINE SULFATE 5; 5; 5; 5 MG/1; MG/1; MG/1; MG/1
20 CAPSULE, EXTENDED RELEASE ORAL DAILY
Qty: 30 CAPSULE | Refills: 0 | Status: SHIPPED | OUTPATIENT
Start: 2023-04-27 | End: 2023-06-07

## 2023-04-28 ENCOUNTER — MYC MEDICAL ADVICE (OUTPATIENT)
Dept: ORTHOPEDICS | Facility: CLINIC | Age: 33
End: 2023-04-28
Payer: COMMERCIAL

## 2023-04-28 DIAGNOSIS — Z96.642 HISTORY OF TOTAL LEFT HIP REPLACEMENT: Primary | ICD-10-CM

## 2023-04-28 RX ORDER — AMOXICILLIN 500 MG/1
2000 CAPSULE ORAL
Qty: 4 CAPSULE | Refills: 3 | Status: SHIPPED | OUTPATIENT
Start: 2023-04-28 | End: 2023-11-28

## 2023-06-07 ENCOUNTER — MYC MEDICAL ADVICE (OUTPATIENT)
Dept: FAMILY MEDICINE | Facility: CLINIC | Age: 33
End: 2023-06-07
Payer: COMMERCIAL

## 2023-06-07 DIAGNOSIS — F90.2 ATTENTION DEFICIT HYPERACTIVITY DISORDER (ADHD), COMBINED TYPE: ICD-10-CM

## 2023-06-07 RX ORDER — LISDEXAMFETAMINE DIMESYLATE 50 MG/1
50 CAPSULE ORAL EVERY MORNING
Qty: 30 CAPSULE | Refills: 0 | Status: SHIPPED | OUTPATIENT
Start: 2023-06-07 | End: 2023-08-03

## 2023-06-07 NOTE — TELEPHONE ENCOUNTER
Please call patient and schedule a follow up in person adhd appt. May be with me or extender but is due in 1 month.     -noel chen, pac

## 2023-06-13 ENCOUNTER — TELEPHONE (OUTPATIENT)
Dept: FAMILY MEDICINE | Facility: CLINIC | Age: 33
End: 2023-06-13

## 2023-06-13 NOTE — TELEPHONE ENCOUNTER
Prior Authorization Retail Medication Request    Medication/Dose: Prior Authorization -  Vyvanse 50MG  ICD code (if different than what is on RX):    Previously Tried and Failed:    Rationale:      Insurance Name:    Insurance ID:        Pharmacy Information (if different than what is on RX)  Name:    Phone:      [id number] [phone] Fax

## 2023-06-16 NOTE — TELEPHONE ENCOUNTER
PA Initiation    Medication: VYVANSE 50 MG PO CAPS  Insurance Company: [employer] - Phone [phone] Fax [phone]  Ph[facility]illing [id number] [place], [place] - [address]  Filling Pharmacy Phone: [phone]  Start Date: 6/16/2023

## 2023-06-19 NOTE — TELEPHONE ENCOUNTER
Prior Authorization Approval    Medication: VYVANSE 50 MG PO CAPS  Authorization Effective Date: 6/16/2023  Authorization Expiration Date: 6/15/2026  Approved Dose/Quantity: 30 for 30 days  Insurance Company: [employer] - Phone [phone] Fax [phone]  Which Pharmacy is filling the prescription: [id number] - [place], [place] - [address]  Pharmacy Notified: Yes  Patient Notified: Yes Pharmacy will notify patient once order is ready.

## 2023-06-28 ENCOUNTER — OFFICE VISIT (OUTPATIENT)
Dept: FAMILY MEDICINE | Facility: CLINIC | Age: 33
End: 2023-06-28
Payer: COMMERCIAL

## 2023-06-28 VITALS
WEIGHT: 176.5 LBS | BODY MASS INDEX: 23.39 KG/M2 | RESPIRATION RATE: 18 BRPM | SYSTOLIC BLOOD PRESSURE: 130 MMHG | TEMPERATURE: 97.9 F | HEART RATE: 85 BPM | HEIGHT: 73 IN | OXYGEN SATURATION: 99 % | DIASTOLIC BLOOD PRESSURE: 79 MMHG

## 2023-06-28 DIAGNOSIS — F90.2 ATTENTION DEFICIT HYPERACTIVITY DISORDER (ADHD), COMBINED TYPE: Primary | ICD-10-CM

## 2023-06-28 PROBLEM — E11.9 TYPE 2 DIABETES MELLITUS WITHOUT COMPLICATION (H): Status: RESOLVED | Noted: 2018-06-08 | Resolved: 2023-06-28

## 2023-06-28 PROCEDURE — 99213 OFFICE O/P EST LOW 20 MIN: CPT | Performed by: PHYSICIAN ASSISTANT

## 2023-06-28 NOTE — PROGRESS NOTES
"  Assessment & Plan     Attention deficit hyperactivity disorder (ADHD), combined type  Has yet to start vyvanse due to coverage concerns. Recently obtained. Will inform me if concerns with dose and check bp at pharmacy after 1 month. If concerns for cost in future, may return to xr 20 of adderall of possible 20 mg bid IR of adderall in future. Recheck in 6 months.         [facility]    Tyson Schaffer is a 33 year old, presenting for the following health issues:  A.D.H.D (Follow up)         No data to display              History of Present Illness       Reason for visit:  ADHD Check-Up    He eats 2-3 servings of fruits and vegetables daily.He consumes 0 sweetened beverage(s) daily.He exercises with enough effort to increase his heart rate 20 to 29 minutes per day.  He exercises with enough effort to increase his heart rate 4 days per week.   ADHD Follow-Up (Adult)  Concerns: No   Changes since last visit: Stable  Taking controlled (daily) medications as prescribed: Yes-has not started med yet  Sleep: restless    Adult ADHD Self-Reporting form given to patient?:  No  Currently in counseling: No    Medication Benefits:   Controlled symptoms: Patient has not started medication yet.  Uncontrolled symptoms:  NA    Medication Side Effects:  Reports:  NA  Sleep Problems? NA    ++++++++++++++++++++++++++++++++++++++++++++++++    Employer Concerns/Feedback: Stable  Coworker Concerns:   Stable  Home/Family Concerns: Stable           Review of Systems   Constitutional, HEENT, cardiovascular, pulmonary, GI, , musculoskeletal, neuro, skin, endocrine and psych systems are negative, except as otherwise noted.      Objective    /79 (BP Location: Right arm, Patient Position: Sitting, Cuff Size: Adult Regular)   Pulse 85   Temp 97.9  F (36.6  C) (Oral)   Resp 18   Ht 1.854 m (6' 1\")   Wt 80.1 kg (176 lb 8 oz)   SpO2 99%   BMI 23.29 kg/m    Body mass index is " 23.29 kg/m .  Physical Exam   GENERAL: healthy, alert and no distress  RESP: lungs clear to auscultation - no rales, rhonchi or wheezes  CV: regular rates and rhythm, normal S1 S2, no S3 or S4 and no murmur, click or rub  PSYCH: mentation appears normal, affect normal/bright

## 2023-08-03 ENCOUNTER — MYC REFILL (OUTPATIENT)
Dept: FAMILY MEDICINE | Facility: CLINIC | Age: 33
End: 2023-08-03
Payer: COMMERCIAL

## 2023-08-03 DIAGNOSIS — F90.2 ATTENTION DEFICIT HYPERACTIVITY DISORDER (ADHD), COMBINED TYPE: ICD-10-CM

## 2023-08-03 RX ORDER — LISDEXAMFETAMINE DIMESYLATE 50 MG/1
50 CAPSULE ORAL EVERY MORNING
Qty: 30 CAPSULE | Refills: 0 | Status: SHIPPED | OUTPATIENT
Start: 2023-08-03 | End: 2023-09-13

## 2023-09-13 ENCOUNTER — MYC REFILL (OUTPATIENT)
Dept: FAMILY MEDICINE | Facility: CLINIC | Age: 33
End: 2023-09-13
Payer: COMMERCIAL

## 2023-09-13 DIAGNOSIS — F90.2 ATTENTION DEFICIT HYPERACTIVITY DISORDER (ADHD), COMBINED TYPE: ICD-10-CM

## 2023-09-14 RX ORDER — LISDEXAMFETAMINE DIMESYLATE 50 MG/1
50 CAPSULE ORAL EVERY MORNING
Qty: 30 CAPSULE | Refills: 0 | Status: SHIPPED | OUTPATIENT
Start: 2023-09-14 | End: 2023-10-18

## 2023-10-18 ENCOUNTER — MYC REFILL (OUTPATIENT)
Dept: FAMILY MEDICINE | Facility: CLINIC | Age: 33
End: 2023-10-18
Payer: COMMERCIAL

## 2023-10-18 DIAGNOSIS — F90.2 ATTENTION DEFICIT HYPERACTIVITY DISORDER (ADHD), COMBINED TYPE: ICD-10-CM

## 2023-10-19 RX ORDER — LISDEXAMFETAMINE DIMESYLATE 50 MG/1
50 CAPSULE ORAL EVERY MORNING
Qty: 30 CAPSULE | Refills: 0 | Status: SHIPPED | OUTPATIENT
Start: 2023-10-19 | End: 2023-11-17

## 2023-11-06 ENCOUNTER — TRANSFERRED RECORDS (OUTPATIENT)
Dept: HEALTH INFORMATION MANAGEMENT | Facility: CLINIC | Age: 33
End: 2023-11-06
Payer: COMMERCIAL

## 2023-11-06 LAB — RETINOPATHY: NEGATIVE

## 2023-11-16 ENCOUNTER — MYC MEDICAL ADVICE (OUTPATIENT)
Dept: FAMILY MEDICINE | Facility: CLINIC | Age: 33
End: 2023-11-16
Payer: COMMERCIAL

## 2023-11-16 ENCOUNTER — TELEPHONE (OUTPATIENT)
Dept: FAMILY MEDICINE | Facility: CLINIC | Age: 33
End: 2023-11-16
Payer: COMMERCIAL

## 2023-11-16 DIAGNOSIS — F90.2 ATTENTION DEFICIT HYPERACTIVITY DISORDER (ADHD), COMBINED TYPE: Primary | ICD-10-CM

## 2023-11-16 NOTE — TELEPHONE ENCOUNTER
Patient Quality Outreach    Patient is due for the following:   Diabetes -  Eye Exam    Next Steps:   No follow up needed at this time.    Type of outreach:    Chart review performed, no outreach needed. and patient has been seen with [facility] on 11-      Questions for provider review:    None           Keely Patel MA

## 2023-11-17 RX ORDER — DEXTROAMPHETAMINE SACCHARATE, AMPHETAMINE ASPARTATE, DEXTROAMPHETAMINE SULFATE AND AMPHETAMINE SULFATE 5; 5; 5; 5 MG/1; MG/1; MG/1; MG/1
20 TABLET ORAL 2 TIMES DAILY
Qty: 60 TABLET | Refills: 0 | Status: SHIPPED | OUTPATIENT
Start: 2023-11-17

## 2023-11-28 ENCOUNTER — OFFICE VISIT (OUTPATIENT)
Dept: FAMILY MEDICINE | Facility: CLINIC | Age: 33
End: 2023-11-28
Payer: COMMERCIAL

## 2023-11-28 VITALS
HEIGHT: 73 IN | WEIGHT: 178 LBS | TEMPERATURE: 97.9 F | OXYGEN SATURATION: 100 % | DIASTOLIC BLOOD PRESSURE: 80 MMHG | SYSTOLIC BLOOD PRESSURE: 132 MMHG | RESPIRATION RATE: 14 BRPM | HEART RATE: 82 BPM | BODY MASS INDEX: 23.59 KG/M2

## 2023-11-28 DIAGNOSIS — Z00.00 ROUTINE GENERAL MEDICAL EXAMINATION AT A HEALTH CARE FACILITY: Primary | ICD-10-CM

## 2023-11-28 DIAGNOSIS — N18.1 CHRONIC KIDNEY DISEASE, STAGE 1: ICD-10-CM

## 2023-11-28 DIAGNOSIS — F41.9 ANXIETY: ICD-10-CM

## 2023-11-28 DIAGNOSIS — F90.2 ATTENTION DEFICIT HYPERACTIVITY DISORDER (ADHD), COMBINED TYPE: ICD-10-CM

## 2023-11-28 LAB
ANION GAP SERPL CALCULATED.3IONS-SCNC: 9 MMOL/L (ref 7–15)
BUN SERPL-MCNC: 13.3 MG/DL (ref 6–20)
CALCIUM SERPL-MCNC: 9.6 MG/DL (ref 8.6–10)
CHLORIDE SERPL-SCNC: 104 MMOL/L (ref 98–107)
CHOLEST SERPL-MCNC: 158 MG/DL
CREAT SERPL-MCNC: 0.92 MG/DL (ref 0.67–1.17)
CREAT UR-MCNC: 216 MG/DL
CREAT UR-MCNC: 216 MG/DL
DEPRECATED HCO3 PLAS-SCNC: 26 MMOL/L (ref 22–29)
EGFRCR SERPLBLD CKD-EPI 2021: >90 ML/MIN/1.73M2
GLUCOSE SERPL-MCNC: 110 MG/DL (ref 70–99)
HBA1C MFR BLD: 5.1 % (ref 0–5.6)
HDLC SERPL-MCNC: 53 MG/DL
HOLD SPECIMEN: NORMAL
HOLD SPECIMEN: NORMAL
LDLC SERPL CALC-MCNC: 89 MG/DL
MICROALBUMIN UR-MCNC: 367 MG/L
MICROALBUMIN/CREAT UR: 169.91 MG/G CR (ref 0–17)
NONHDLC SERPL-MCNC: 105 MG/DL
POTASSIUM SERPL-SCNC: 4 MMOL/L (ref 3.4–5.3)
SODIUM SERPL-SCNC: 139 MMOL/L (ref 135–145)
TRIGL SERPL-MCNC: 81 MG/DL

## 2023-11-28 PROCEDURE — 82570 ASSAY OF URINE CREATININE: CPT | Mod: 59 | Performed by: PHYSICIAN ASSISTANT

## 2023-11-28 PROCEDURE — 99395 PREV VISIT EST AGE 18-39: CPT | Mod: 25 | Performed by: PHYSICIAN ASSISTANT

## 2023-11-28 PROCEDURE — 82043 UR ALBUMIN QUANTITATIVE: CPT | Performed by: PHYSICIAN ASSISTANT

## 2023-11-28 PROCEDURE — 90471 IMMUNIZATION ADMIN: CPT | Performed by: PHYSICIAN ASSISTANT

## 2023-11-28 PROCEDURE — 80048 BASIC METABOLIC PNL TOTAL CA: CPT | Performed by: PHYSICIAN ASSISTANT

## 2023-11-28 PROCEDURE — 83036 HEMOGLOBIN GLYCOSYLATED A1C: CPT | Performed by: PHYSICIAN ASSISTANT

## 2023-11-28 PROCEDURE — 90686 IIV4 VACC NO PRSV 0.5 ML IM: CPT | Performed by: PHYSICIAN ASSISTANT

## 2023-11-28 PROCEDURE — G0480 DRUG TEST DEF 1-7 CLASSES: HCPCS | Performed by: PHYSICIAN ASSISTANT

## 2023-11-28 PROCEDURE — 99214 OFFICE O/P EST MOD 30 MIN: CPT | Mod: 25 | Performed by: PHYSICIAN ASSISTANT

## 2023-11-28 PROCEDURE — 36415 COLL VENOUS BLD VENIPUNCTURE: CPT | Performed by: PHYSICIAN ASSISTANT

## 2023-11-28 PROCEDURE — 80061 LIPID PANEL: CPT | Performed by: PHYSICIAN ASSISTANT

## 2023-11-28 RX ORDER — DEXTROAMPHETAMINE SACCHARATE, AMPHETAMINE ASPARTATE, DEXTROAMPHETAMINE SULFATE AND AMPHETAMINE SULFATE 5; 5; 5; 5 MG/1; MG/1; MG/1; MG/1
20 TABLET ORAL 2 TIMES DAILY
Qty: 60 TABLET | Refills: 0 | Status: SHIPPED | OUTPATIENT
Start: 2024-01-16 | End: 2024-02-15

## 2023-11-28 RX ORDER — DEXTROAMPHETAMINE SACCHARATE, AMPHETAMINE ASPARTATE, DEXTROAMPHETAMINE SULFATE AND AMPHETAMINE SULFATE 5; 5; 5; 5 MG/1; MG/1; MG/1; MG/1
20 TABLET ORAL 2 TIMES DAILY
Qty: 60 TABLET | Refills: 0 | Status: SHIPPED | OUTPATIENT
Start: 2023-12-17 | End: 2024-01-16

## 2023-11-28 RX ORDER — DEXTROAMPHETAMINE SACCHARATE, AMPHETAMINE ASPARTATE, DEXTROAMPHETAMINE SULFATE AND AMPHETAMINE SULFATE 5; 5; 5; 5 MG/1; MG/1; MG/1; MG/1
20 TABLET ORAL 2 TIMES DAILY
Qty: 60 TABLET | Refills: 0 | Status: SHIPPED | OUTPATIENT
Start: 2024-02-15 | End: 2024-03-16

## 2023-11-28 ASSESSMENT — ANXIETY QUESTIONNAIRES
IF YOU CHECKED OFF ANY PROBLEMS ON THIS QUESTIONNAIRE, HOW DIFFICULT HAVE THESE PROBLEMS MADE IT FOR YOU TO DO YOUR WORK, TAKE CARE OF THINGS AT HOME, OR GET ALONG WITH OTHER PEOPLE: SOMEWHAT DIFFICULT
2. NOT BEING ABLE TO STOP OR CONTROL WORRYING: SEVERAL DAYS
6. BECOMING EASILY ANNOYED OR IRRITABLE: SEVERAL DAYS
GAD7 TOTAL SCORE: 7
7. FEELING AFRAID AS IF SOMETHING AWFUL MIGHT HAPPEN: SEVERAL DAYS
GAD7 TOTAL SCORE: 7
3. WORRYING TOO MUCH ABOUT DIFFERENT THINGS: SEVERAL DAYS
1. FEELING NERVOUS, ANXIOUS, OR ON EDGE: SEVERAL DAYS
5. BEING SO RESTLESS THAT IT IS HARD TO SIT STILL: SEVERAL DAYS

## 2023-11-28 ASSESSMENT — PATIENT HEALTH QUESTIONNAIRE - PHQ9
SUM OF ALL RESPONSES TO PHQ QUESTIONS 1-9: 8
5. POOR APPETITE OR OVEREATING: SEVERAL DAYS

## 2023-11-28 NOTE — LETTER
[facility]  11/28/23  Patient: Sarbjit Perez  YOB: 1990  Medical Record Number: [id number]                                                                                  Non-Opioid Controlled Substance Agreement    This is an agreement between you and your provider regarding safe and appropriate use of controlled substances prescribed by your care team. Controlled substances are?medicines that can cause physical and mental dependence (abuse).     There are strict laws about having and using these medicines. We here at [facility] are  committed to working with you in your efforts to get better. To support you in this work, we'll help you schedule regular office appointments for medicine refills. If we must cancel or change your appointment for any reason, we'll make sure you have enough medicine to last until your next appointment.     As a Provider, I will:   Listen carefully to your concerns while treating you with respect.   Recommend a treatment plan that I believe is in your best interest and may involve therapies other than medicine.    Talk with you often about the possible benefits and the risk of harm of any medicine that we prescribe for you.  Assess the safety of this medicine and check how well it works.    Provide a plan on how to taper (discontinue or go off) using this medicine if the decision is made to stop its use.      ::  As a Patient, I understand controlled substances:     Are prescribed by my care provider to help me function or work and manage my condition(s).?  Are strong medicines and can cause serious side effects.     Need to be taken exactly as prescribed.?Combining controlled substances with certain medicines or chemicals (such as illegal drugs, alcohol, sedatives, sleeping pills, and benzodiazepines) can be dangerous or even fatal.? If I stop taking my medicines suddenly, I may have severe withdrawal symptoms.     The risks, benefits,  and side effects of these medicine(s) were explained to me. I agree that:    I will take part in other treatments as advised by my care team. This may be psychiatry or counseling, physical therapy, behavioral therapy, group treatment or a referral to specialist.    I will keep all my appointments and understand this is part of the monitoring of controlled substances.?My care team may require an office visit for EVERY controlled substance refill. If I miss appointments or don t follow instructions, my care team may stop my medicine    I will take my medicines as prescribed. I will not change the dose or schedule unless my care team tells me to. There will be no refills if I run out early.      I may be asked to come to the clinic and complete a urine drug test or complete a pill count. If I don t give a urine sample or participate in a pill count, the care team may stop my medicine.    I will only receive controlled substance prescriptions from this clinic. If I am treated by another provider, I will tell them that I am taking controlled substances and that I have a treatment agreement with this provider. I will inform my [facility] care team within one business day if I am given a prescription for any controlled substance by another healthcare provider. My [facility] care team can contact other providers and pharmacists about my use of any medicines.    It is up to me to make sure that I don't run out of my medicines on weekends or holidays.?If my care team is willing to refill my prescription without a visit, I must request refills only during office hours. Refills may take up to 3 business days to process. I will use one pharmacy to fill all my controlled substance prescriptions. I will notify the clinic about any changes to my insurance or medicine availability.    I am responsible for my prescriptions. If the medicine/prescription is lost, stolen or destroyed, it will not be replaced.?I also agree  not to share controlled substance medicines with anyone.     I am aware I should not use any illegal or recreational drugs. I agree not to drink alcohol unless my care team says I can.     If I enroll in the [place] Medical Cannabis program, I will tell my care team before my next refill.    I will tell my care team right away if I become pregnant, have a new medical problem treated outside of my regular clinic, or have a change in my medicines.     I understand that this medicine can affect my thinking, judgment and reaction time.? Alcohol and drugs affect the brain and body, which can affect the safety of my driving. Being under the influence of alcohol or drugs can affect my decision-making, behaviors, personal safety and the safety of others. Driving while impaired (DWI) can occur if a person is driving, operating or in physical control of a car, motorcycle, boat, snowmobile, ATV, motorbike, off-road vehicle or any other motor vehicle ([place] Statute 169A.20). I understand the risk if I choose to drive or operate any vehicle or machinery.    I understand that if I do not follow any of the conditions above, my prescriptions or treatment may be stopped or changed.   I agree that my provider, clinic care team and pharmacy may work with any city, state or federal law enforcement agency that investigates the misuse, sale or other diversion of my controlled medicine. I will allow my provider to discuss my care with, or share a copy of, this agreement with any other treating provider, pharmacy or emergency room where I receive care.     I have read this agreement and have asked questions about anything I did not understand.    ________________________________________________________  Patient Signature - Sarbjit Perez     ___________________                   Date     ________________________________________________________  Provider Signature - Bill Mg PA-C       ___________________                   Date      ________________________________________________________  Witness Signature (required if provider not present while patient signing)          ___________________                   Date

## 2023-11-28 NOTE — PROGRESS NOTES
SUBJECTIVE:   Sarbjit is a 33 year old, presenting for the following:  Physical and Anxiety        11/28/2023     8:53 AM   Additional Questions   Roomed by Mariah SUE       History of Present Illness       Reason for visit:  Annual Checkup    He eats 2-3 servings of fruits and vegetables daily.He consumes 0 sweetened beverage(s) daily.He exercises with enough effort to increase his heart rate 20 to 29 minutes per day.  He exercises with enough effort to increase his heart rate 4 days per week. He is missing 1 dose(s) of medications per week.  He is not taking prescribed medications regularly due to remembering to take.  Healthy Habits:     Getting at least 3 servings of Calcium per day:  Yes    Bi-annual eye exam:  Yes    Dental care twice a year:  Yes    Sleep apnea or symptoms of sleep apnea:  None    Diet:  Regular (no restrictions)    Frequency of exercise:  2-3 days/week    Duration of exercise:  15-30 minutes    Taking medications regularly:  1    Barriers to taking medications:  Remembering to take    Medication side effects:  Other    Additional concerns today:  Yes          Anxiety Follow-Up  How are you doing with your anxiety since your last visit? Worsened   Are you having other symptoms that might be associated with anxiety? No  Have you had a significant life event? No   Are you feeling depressed? Yes:  mild  Do you have any concerns with your use of alcohol or other drugs? No    Social History     Tobacco Use    Smoking status: Never    Smokeless tobacco: Never   Vaping Use    Vaping Use: Never used   Substance Use Topics    Alcohol use: Not Currently     Comment: sober x 6 yrs.    Drug use: No         3/15/2021     2:07 PM 3/15/2022    10:49 AM 11/28/2023     9:30 AM   SERA-7 SCORE   Total Score 1 (minimal anxiety) 0 (minimal anxiety)    Total Score 1 0 7         1/11/2022     9:39 AM 3/15/2022    10:37 AM 11/28/2023     9:30 AM   PHQ   PHQ-9 Total Score 5 5 8   Q9: Thoughts of better off dead/self-harm  past 2 weeks Not at all Not at all Not at all         11/28/2023     9:30 AM   Last PHQ-9   1.  Little interest or pleasure in doing things 1   2.  Feeling down, depressed, or hopeless 1   3.  Trouble falling or staying asleep, or sleeping too much 1   4.  Feeling tired or having little energy 1   5.  Poor appetite or overeating 1   6.  Feeling bad about yourself 1   7.  Trouble concentrating 2   8.  Moving slowly or restless 0   Q9: Thoughts of better off dead/self-harm past 2 weeks 0   PHQ-9 Total Score 8   Difficulty at work, home, or with people Somewhat difficult         11/28/2023     9:30 AM   SERA-7    1. Feeling nervous, anxious, or on edge 1   2. Not being able to stop or control worrying 1   3. Worrying too much about different things 1   4. Trouble relaxing 1   5. Being so restless that it is hard to sit still 1   6. Becoming easily annoyed or irritable 1   7. Feeling afraid, as if something awful might happen 1   SERA-7 Total Score 7   If you checked any problems, how difficult have they made it for you to do your work, take care of things at home, or get along with other people? Somewhat difficult         Social History     Tobacco Use    Smoking status: Never    Smokeless tobacco: Never   Substance Use Topics    Alcohol use: Not Currently     Comment: sober x 6 yrs.             12/20/2021     7:04 PM   Alcohol Use   Prescreen: >3 drinks/day or >7 drinks/week? Not Applicable          No data to display                Last PSA:   PSA Tumor Marker   Date Value Ref Range Status   11/22/2022 0.64 ng/mL Final     Comment:     No reference ranges have been established for patients under 40 years.       Reviewed orders with patient. Reviewed health maintenance and updated orders accordingly - Yes  BP Readings from Last 3 Encounters:   11/28/23 132/80   06/28/23 130/79   12/23/22 (!) 148/98    Wt Readings from Last 3 Encounters:   11/28/23 80.7 kg (178 lb)   06/28/23 80.1 kg (176 lb 8 oz)   12/22/22 80 kg (176  lb 5.9 oz)             ADHD Follow-Up (Adult)  Concerns: none   Changes since last visit: Stable  Taking controlled (daily) medications as prescribed: Yes  Sleep: no problems  Currently in counseling: No    Medication Benefits:   Controlled symptoms: Attention span, Distractability, Finishing tasks, Impulse control, Frustration tolerance, Accepting limits, and Peer relations  Uncontrolled symptoms:  None    Medication Side Effects:  Reports:  none  Sleep Problems? no  ++++++++++++++++++++++++++++++++++++++++++++++++    Employer Concerns/Feedback: Stable  Coworker Concerns:   Stable  Home/Family Concerns: Stable       On adderall 20 mg IR bid for 2 weeks. Manages symptoms as well as vyvanse. Was changed due to supply concerns of vyvanse and getting medication.     Patient Active Problem List   Diagnosis    Hx of diabetes mellitus    Alcohol dependence in remission (H)    Microalbuminuria    Essential hypertension, benign    Acute reaction to stress    Attention deficit hyperactivity disorder (ADHD), combined type    Chronic kidney disease, stage 1    AVN (avascular necrosis of bone) (H)    Chronic pain of left knee    Vitamin D deficiency    Avascular necrosis of femoral head (H)    Anxiety     Past Surgical History:   Procedure Laterality Date    ARTHROPLASTY HIP ANTERIOR Left 12/22/2022    Procedure: ARTHROPLASTY, LEFT HIP, TOTAL;  Surgeon: Brady Delaney MD;  Location: [facility]    DECOMPRESSION HIP CORE Right 11/10/2022    Procedure: CORE DECOMPRESSION, FEMUR, HEAD;  Surgeon: Brady Delaney MD;  Location: [facility] OR    HERNIORRHAPHY INGUINAL Left 3/30/2018    Procedure: HERNIORRHAPHY INGUINAL;  Left Inguinal hernia repair with Mesh ;  Surgeon: Eduard Amaral MD;  Location: [facility] OR    HERNIORRHAPHY INGUINAL Right 3/18/2022    Procedure: OPEN RIGHT INGUINAL HERNIORRHAPHY WITH MESH;  Surgeon: Eduard Amaral MD;  Location: [facility] OR    wisdom teeth         Social History     Tobacco Use    Smoking status: Never    Smokeless  tobacco: Never   Substance Use Topics    Alcohol use: Not Currently     Comment: sober x 6 yrs.     Family History   Problem Relation Age of Onset    Breast Cancer Mother     Hypertension Father     Diabetes Type 2  Paternal Grandfather     Hypertension Paternal Grandfather     Diabetes Paternal Grandfather     Other Cancer Maternal Grandmother          Current Outpatient Medications   Medication Sig Dispense Refill    [START ON 12/17/2023] amphetamine-dextroamphetamine (ADDERALL) 20 MG tablet Take 1 tablet (20 mg) by mouth 2 times daily for 30 days 60 tablet 0    [START ON 1/16/2024] amphetamine-dextroamphetamine (ADDERALL) 20 MG tablet Take 1 tablet (20 mg) by mouth 2 times daily for 30 days 60 tablet 0    [START ON 2/15/2024] amphetamine-dextroamphetamine (ADDERALL) 20 MG tablet Take 1 tablet (20 mg) by mouth 2 times daily for 30 days 60 tablet 0    amphetamine-dextroamphetamine (ADDERALL) 20 MG tablet Take 1 tablet (20 mg) by mouth 2 times daily 60 tablet 0    aspirin (ASA) 81 MG EC tablet Take 2 tablets (162 mg) by mouth 2 times daily 120 tablet 0    Multiple Vitamin (MULTI VITAMIN MENS PO) Take 1 tablet by mouth daily       No Known Allergies  Recent Labs   Lab Test 11/28/23  0902 11/22/22  0949 03/15/22  1126 12/21/21  0936 12/21/21  0936 09/01/20  1044 10/07/19  0700 03/06/18  0909 10/24/17  0921 10/26/16  0847 10/22/16  0929 10/21/16  0745 10/20/16  0245   A1C 5.1 5.5  --   --  5.3 5.4 5.0   < > 5.1   < >  --   --  9.5*   LDL  --  94  --   --  78 84 97   < > 97   < >  --   --   --    HDL  --  51  --   --  54 47 43   < > 45   < >  --   --   --    TRIG  --  54  --   --  44 68 100   < > 77   < >  --   --   --    ALT  --   --   --   --   --   --   --   --  42  --  93*  --  286*   CR  --  0.97 0.92   < > 0.80 0.92 0.95   < > 1.22   < > 0.87   < > 0.82   GFRESTIMATED  --  >90 >90   < > >90 >90 >90   < > 70   < > >90  Non  GFR Calc     < > >90  Non  GFR Calc     GFRESTBLACK  --   --  "  --   --   --  >90 >90   < > 85   < > >90   GFR Calc     < > >90   GFR Calc     POTASSIUM  --  4.9 4.5   < > 4.1 4.4 3.9   < > 3.9   < > 3.7   < > 3.7   TSH  --   --   --   --  1.51  1.42  --  2.58  --   --    < >  --   --   --     < > = values in this interval not displayed.        Reviewed and updated as needed this visit by clinical staff   Tobacco  Allergies  Meds  Problems  Med Hx  Surg Hx  Fam Hx          Reviewed and updated as needed this visit by Provider   Tobacco  Allergies  Meds  Problems  Med Hx  Surg Hx  Fam Hx             Review of Systems: other than above,   CONSTITUTIONAL: NEGATIVE for fever, chills, change in weight  INTEGUMENTARY/SKIN: NEGATIVE for worrisome rashes, moles or lesions  EYES: NEGATIVE for vision changes or irritation  ENT: NEGATIVE for ear, mouth and throat problems  RESP: NEGATIVE for significant cough or SOB  CV: NEGATIVE for chest pain, palpitations or peripheral edema  GI: NEGATIVE for nausea, abdominal pain, heartburn, or change in bowel habits   male: negative for dysuria, hematuria, decreased urinary stream, erectile dysfunction, urethral discharge  MUSCULOSKELETAL: NEGATIVE for significant arthralgias or myalgia  NEURO: NEGATIVE for weakness, dizziness or paresthesias  PSYCHIATRIC: NEGATIVE for changes in mood or affect    OBJECTIVE:   /80   Pulse 82   Temp 97.9  F (36.6  C) (Oral)   Resp 14   Ht 1.854 m (6' 1\")   Wt 80.7 kg (178 lb)   SpO2 100%   BMI 23.48 kg/m      Physical Exam  GENERAL: healthy, alert and no distress  EYES: Eyes grossly normal to inspection, PERRL and conjunctivae and sclerae normal  HENT: ear canals and TM's normal, nose and mouth without ulcers or lesions  NECK: no adenopathy, no asymmetry, masses, or scars and thyroid normal to palpation  RESP: lungs clear to auscultation - no rales, rhonchi or wheezes  CV: regular rate and rhythm, normal S1 S2, no S3 or S4, no murmur, click or rub, no " peripheral edema and peripheral pulses strong  ABDOMEN: soft, nontender, no hepatosplenomegaly, no masses and bowel sounds normal  MS: no gross musculoskeletal defects noted, no edema  SKIN: no suspicious lesions or rashes  NEURO: Normal strength and tone, mentation intact and speech normal  PSYCH: mentation appears normal, affect normal/bright  LYMPH: no cervical adenopathy    Diagnostic Test Results:  Results for orders placed or performed in visit on 11/28/23 (from the past 24 hour(s))   HEMOGLOBIN A1C   Result Value Ref Range    Hemoglobin A1C 5.1 0.0 - 5.6 %   Extra Tube    Narrative    The following orders were created for panel order Extra Tube.  Procedure                               Abnormality         Status                     ---------                               -----------         ------                     Extra Red Top Tube[[id number]]                               Final result               Extra Green Top (Lithium...[[id number]]                      Final result                 Please view results for these tests on the individual orders.   Extra Red Top Tube   Result Value Ref Range    Hold Specimen [facility]    Extra Green Top (Lithium Heparin) Tube   Result Value Ref Range    Hold Specimen [facility]    [id number] - Urine Drug Confirmation Panel (Comprehensive)    Narrative    The following orders were created for panel order [id number] - Urine Drug Confirmation Panel (Comprehensive).  Procedure                               Abnormality         Status                     ---------                               -----------         ------                     Urine Drug Confirmation ...[[id number]]                      In process                 Urine Creatinine for Martin...[[id number]]                      In process                   Please view results for these tests on the individual orders.       ASSESSMENT/PLAN:   (Z00.00) Routine general medical examination at a health care facility  (primary encounter  diagnosis)  Comment: stable wellness.   Plan: Lipid panel reflex to direct LDL Non-fasting            (F41.9) Anxiety  Comment: discussed in detail. Has had success with cbt in past. Will restart. Consider lexapro in future if medication needed or symptoms worsening.   Plan: Adult Mental Health  Referral            (F90.2) Attention deficit hyperactivity disorder (ADHD), combined type  Comment: stable with change in stimulants. Will maintain current regimen and recheck in 6 months. Csa and urine tox utd today.   Plan: [id number] - Urine Drug Confirmation Panel         (Comprehensive), amphetamine-dextroamphetamine         (ADDERALL) 20 MG tablet,         amphetamine-dextroamphetamine (ADDERALL) 20 MG         tablet, amphetamine-dextroamphetamine         (ADDERALL) 20 MG tablet        Medication use and side effects discussed with the patient. Patient is in complete understanding and agreement with plan.       (N18.1) Chronic kidney disease, stage 1  Comment: albumin pending today. Intolerant of lisinopril in past. Consider losartan low dose if still present. Otherwise, 10 mg of jardiance to be considered.   Plan: HEMOGLOBIN A1C, BASIC METABOLIC PANEL, Albumin         Random Urine Quantitative with Creat Ratio            Patient has been advised of split billing requirements and indicates understanding: Yes      COUNSELING:   Reviewed preventive health counseling, as reflected in patient instructions       Regular exercise       Healthy diet/nutrition       Immunizations  Vaccinated for: Influenza and Declined: Covid-19 due to Conscientious objector            He reports that he has never smoked. He has never used smokeless tobacco.          MAYELA [facility]

## 2023-11-29 RX ORDER — LOSARTAN POTASSIUM 25 MG/1
12.5 TABLET ORAL DAILY
Qty: 45 TABLET | Refills: 3 | Status: SHIPPED | OUTPATIENT
Start: 2023-11-29

## 2023-11-30 LAB
AMPHET UR CFM-MCNC: ABNORMAL NG/ML
AMPHET/CREAT UR: ABNORMAL

## 2023-12-21 ENCOUNTER — MYC REFILL (OUTPATIENT)
Dept: FAMILY MEDICINE | Facility: CLINIC | Age: 33
End: 2023-12-21
Payer: COMMERCIAL

## 2023-12-21 DIAGNOSIS — F90.2 ATTENTION DEFICIT HYPERACTIVITY DISORDER (ADHD), COMBINED TYPE: ICD-10-CM

## 2023-12-21 RX ORDER — DEXTROAMPHETAMINE SACCHARATE, AMPHETAMINE ASPARTATE, DEXTROAMPHETAMINE SULFATE AND AMPHETAMINE SULFATE 5; 5; 5; 5 MG/1; MG/1; MG/1; MG/1
20 TABLET ORAL 2 TIMES DAILY
Qty: 60 TABLET | Refills: 0 | OUTPATIENT
Start: 2023-12-21

## 2024-02-29 ENCOUNTER — MYC MEDICAL ADVICE (OUTPATIENT)
Dept: FAMILY MEDICINE | Facility: CLINIC | Age: 34
End: 2024-02-29
Payer: COMMERCIAL

## 2024-02-29 NOTE — TELEPHONE ENCOUNTER
Sergey- see [employer] message below.  Perla Schmitt RN      Date/Time Action Taken User Additional Information   04/28/23 1130 Sign Blake Dominguez RN Ordering Mode: Verbal with readback   04/28/23 1135 Verbal Linign Brady Delaney MD    11/28/23 0919 Discontinue Bill Mg PA-C      Outpatient Medication Detail     Disp Refills Start End SUMIT   amoxicillin (AMOXIL) 500 MG capsule (Discontinued) 4 capsule 3 4/28/2023 11/28/2023 --   Sig - Route: Take 4 capsules (2,000 mg) by mouth once as needed (Take 1 hour prior to dental procedure) - Oral   Patient not taking: Reported on 6/28/2023   Sent to pharmacy as: Amoxicillin 500 MG Oral Capsule (AMOXIL)   Class: E-Prescribe   Order: [id number]   E-Prescribing Status: Receipt confirmed by pharmacy (4/28/2023 11:30 AM CDT)   E-Cancel Status: Request approved by pharmacy (11/28/2023  9:20 AM CST)       E-Cancel Status Note: No prior dispensing     Blake Dominguez RN   to Sarbjit Perez   KB      4/28/23 11:29 AM  Good morning, Sarbjit,      Yes, you should be taking antibiotics prior to dental procedures. I will order them for you now and give you 3 refills.      Blake Elias RN    Last read by Sarbjit Perez at 10:19 AM on 2/29/2024.

## 2024-03-08 ENCOUNTER — TELEPHONE (OUTPATIENT)
Dept: ORTHOPEDICS | Facility: CLINIC | Age: 34
End: 2024-03-08
Payer: COMMERCIAL

## 2024-03-08 DIAGNOSIS — M87.051 AVASCULAR NECROSIS OF RIGHT FEMORAL HEAD (H): Primary | ICD-10-CM

## 2024-03-19 ENCOUNTER — HOSPITAL ENCOUNTER (OUTPATIENT)
Dept: MRI IMAGING | Facility: CLINIC | Age: 34
Discharge: HOME | End: 2024-03-19
Attending: ORTHOPAEDIC SURGERY | Admitting: ORTHOPAEDIC SURGERY
Payer: COMMERCIAL

## 2024-03-19 ENCOUNTER — E-VISIT (OUTPATIENT)
Dept: FAMILY MEDICINE | Facility: CLINIC | Age: 34
End: 2024-03-19
Payer: COMMERCIAL

## 2024-03-19 DIAGNOSIS — M87.051 AVASCULAR NECROSIS OF RIGHT FEMORAL HEAD (H): ICD-10-CM

## 2024-03-19 DIAGNOSIS — M54.6 ACUTE LEFT-SIDED THORACIC BACK PAIN: Primary | ICD-10-CM

## 2024-03-19 PROCEDURE — 73721 MRI JNT OF LWR EXTRE W/O DYE: CPT | Mod: 26 | Performed by: RADIOLOGY

## 2024-03-19 PROCEDURE — 73721 MRI JNT OF LWR EXTRE W/O DYE: CPT | Mod: RT

## 2024-03-19 PROCEDURE — 99421 OL DIG E/M SVC 5-10 MIN: CPT | Performed by: PHYSICIAN ASSISTANT

## 2024-03-19 RX ORDER — METHYLPREDNISOLONE 4 MG
TABLET, DOSE PACK ORAL
Qty: 21 TABLET | Refills: 0 | Status: SHIPPED | OUTPATIENT
Start: 2024-03-19 | End: 2024-07-25

## 2024-03-19 RX ORDER — CYCLOBENZAPRINE HCL 10 MG
10 TABLET ORAL 3 TIMES DAILY PRN
Qty: 30 TABLET | Refills: 0 | Status: SHIPPED | OUTPATIENT
Start: 2024-03-19 | End: 2024-07-25

## 2024-03-19 NOTE — PATIENT INSTRUCTIONS
Thank you for choosing us for your care. I have placed an order for a prescription so that you can start treatment. View your full visit summary for details by clicking on the link below. Your pharmacist will able to address any questions you may have about the medication.      If you're not feeling better within 2-3 weeks please schedule an appointment.  You can schedule an appointment right here in [facility], or call [phone]  If the visit is for the same symptoms as your eVisit, we'll refund the cost of your eVisit if seen within seven days.

## 2024-03-19 NOTE — TELEPHONE ENCOUNTER
Order has been signed by SHAKIRA Servin as Dr. Delaney is currently in a surgical case. Attempted to call Nazia to let her know that a new order has been placed, however, there was no answer at the number provided. Was able to speak with Neda by calling the main radiology department phone and she states that she will deliver the message to Nazia as she is currently unavailable.     Serena Sy RN   [facility]

## 2024-03-19 NOTE — TELEPHONE ENCOUNTER
[facility] Call Center    Phone Message    May a detailed message be left on voicemail: yes     Reason for Call: Other: Nazia from [facility] called she said the order for the MRI was signed by a nurse but it needs to be signed by Dr. Delaney or one of his associates otherwise the patient cannot have the MRI done. He is scheduled for today at 3:00. Please have the doctor sign the order.    Action Taken: Other: [facility]    Travel Screening: Not Applicable

## 2024-03-19 NOTE — TELEPHONE ENCOUNTER
Dr. Delaney is in the operating room and cannot sign this MRI and the patient is waiting.  The nursing team asked me to sign the MRI and I did.

## 2024-07-25 ENCOUNTER — VIRTUAL VISIT (OUTPATIENT)
Dept: FAMILY MEDICINE | Facility: CLINIC | Age: 34
End: 2024-07-25
Payer: COMMERCIAL

## 2024-07-25 DIAGNOSIS — F90.2 ATTENTION DEFICIT HYPERACTIVITY DISORDER (ADHD), COMBINED TYPE: ICD-10-CM

## 2024-07-25 DIAGNOSIS — M54.12 CERVICAL RADICULOPATHY: Primary | ICD-10-CM

## 2024-07-25 PROCEDURE — G2211 COMPLEX E/M VISIT ADD ON: HCPCS | Mod: 95 | Performed by: PHYSICIAN ASSISTANT

## 2024-07-25 PROCEDURE — 99213 OFFICE O/P EST LOW 20 MIN: CPT | Mod: 95 | Performed by: PHYSICIAN ASSISTANT

## 2024-07-25 RX ORDER — DEXTROAMPHETAMINE SACCHARATE, AMPHETAMINE ASPARTATE, DEXTROAMPHETAMINE SULFATE AND AMPHETAMINE SULFATE 5; 5; 5; 5 MG/1; MG/1; MG/1; MG/1
20 TABLET ORAL 2 TIMES DAILY
Qty: 60 TABLET | Refills: 0 | Status: SHIPPED | OUTPATIENT
Start: 2024-09-23 | End: 2024-10-23

## 2024-07-25 RX ORDER — DEXTROAMPHETAMINE SACCHARATE, AMPHETAMINE ASPARTATE, DEXTROAMPHETAMINE SULFATE AND AMPHETAMINE SULFATE 5; 5; 5; 5 MG/1; MG/1; MG/1; MG/1
20 TABLET ORAL 2 TIMES DAILY
Qty: 60 TABLET | Refills: 0 | Status: SHIPPED | OUTPATIENT
Start: 2024-07-25 | End: 2024-08-24

## 2024-07-25 RX ORDER — DEXTROAMPHETAMINE SACCHARATE, AMPHETAMINE ASPARTATE, DEXTROAMPHETAMINE SULFATE AND AMPHETAMINE SULFATE 5; 5; 5; 5 MG/1; MG/1; MG/1; MG/1
20 TABLET ORAL 2 TIMES DAILY
Qty: 60 TABLET | Refills: 0 | Status: SHIPPED | OUTPATIENT
Start: 2024-08-24 | End: 2024-09-23

## 2024-07-25 RX ORDER — DEXTROAMPHETAMINE SACCHARATE, AMPHETAMINE ASPARTATE, DEXTROAMPHETAMINE SULFATE AND AMPHETAMINE SULFATE 5; 5; 5; 5 MG/1; MG/1; MG/1; MG/1
20 TABLET ORAL 2 TIMES DAILY
Qty: 60 TABLET | Refills: 0 | Status: CANCELLED | OUTPATIENT
Start: 2024-07-25

## 2024-07-25 NOTE — PROGRESS NOTES
Sarbjit is a 34 year old who is being evaluated via a billable video visit.    How would you like to obtain your AVS? MyChart  If the video visit is dropped, the invitation should be resent by: Text to cell phone: [phone]  Will anyone else be joining your video visit? No      Assessment & Plan     Attention deficit hyperactivity disorder (ADHD), combined type  Stable on current regimen.  Will maintain and recheck at annual in person visit which she is due for in approximately 4 months.  - amphetamine-dextroamphetamine (ADDERALL) 20 MG tablet; Take 1 tablet (20 mg) by mouth 2 times daily for 30 days  - amphetamine-dextroamphetamine (ADDERALL) 20 MG tablet; Take 1 tablet (20 mg) by mouth 2 times daily for 30 days  - amphetamine-dextroamphetamine (ADDERALL) 20 MG tablet; Take 1 tablet (20 mg) by mouth 2 times daily for 30 days  Medication use and side effects discussed with the patient. Patient is in complete understanding and agreement with plan.     Cervical radiculopathy  Referral placed per request.  - Physical Therapy  Referral; Future          Subjective   Sarbjit is a 34 year old, presenting for the following health issues:  No chief complaint on file.      7/25/2024     6:57 AM   Additional Questions   Roomed by MONIKA Turner CMA([facility])     HPI     Patient is a 34-year-old male with a past history of ADHD who presents today for his routine ADHD follow-up and Adderall med check.  He states there are multiple days he forgets to take this twice daily but overall no concerns or side effects.  When taken routinely and as prescribed manages symptoms well.  CSA and urine toxicology are both up-to-date.    Since his last visit with me, patient has seen [facility] and was discovered to have a bulging cervical disc.  He was recommended physical therapy and further conservative care.  He has not undergone the physical therapy and would like a referral into the [facility] system since he has good success  with this in the past.  The plan was if failed conservative therapy, to contact the orthopedic clinic and consider a possible steroid injection.      Objective           Vitals:  No vitals were obtained today due to virtual visit.    Physical Exam   GENERAL: alert and no distress  EYES: Eyes grossly normal to inspection.  No discharge or erythema, or obvious scleral/conjunctival abnormalities.  RESP: No audible wheeze, cough, or visible cyanosis.    SKIN: Visible skin clear. No significant rash, abnormal pigmentation or lesions.  NEURO: Cranial nerves grossly intact.  Mentation and speech appropriate for age.  PSYCH: Appropriate affect, tone, and pace of words        Video-Visit Details    Type of service:  Video Visit   Originating Location (pt. Location): Home    Distant Location (provider location):  On-site  Platform used for Video Visit: Alonzo  Signed Electronically by: Bill Mg PA-C

## 2024-11-30 ENCOUNTER — HEALTH MAINTENANCE LETTER (OUTPATIENT)
Age: 34
End: 2024-11-30

## 2024-12-02 ENCOUNTER — MYC REFILL (OUTPATIENT)
Dept: FAMILY MEDICINE | Facility: CLINIC | Age: 34
End: 2024-12-02
Payer: COMMERCIAL

## 2024-12-02 DIAGNOSIS — F90.2 ATTENTION DEFICIT HYPERACTIVITY DISORDER (ADHD), COMBINED TYPE: ICD-10-CM

## 2024-12-02 NOTE — TELEPHONE ENCOUNTER
Clinic RN: Please investigate patient's chart or contact patient if the information cannot be found because patient should have run out of this medication in December 2023. Break in medication >90 days. Confirm patient is taking this medication as prescribed. Document findings and route refill encounter to provider for approval or denial.     Lisa MONTAÑO RN  [facility]

## 2024-12-03 RX ORDER — DEXTROAMPHETAMINE SACCHARATE, AMPHETAMINE ASPARTATE, DEXTROAMPHETAMINE SULFATE AND AMPHETAMINE SULFATE 5; 5; 5; 5 MG/1; MG/1; MG/1; MG/1
20 TABLET ORAL 2 TIMES DAILY
Qty: 60 TABLET | Refills: 0 | Status: SHIPPED | OUTPATIENT
Start: 2024-12-03

## 2024-12-03 NOTE — TELEPHONE ENCOUNTER
Called patient. He reports he has been taking the medication consistently. He mentions it was last filled at  on 7/25. He states he was without the medication recently for about six weeks because the pharmacy did not have the medication, however, he is in need of a refill now. Patient scheduled for 12/10    Neftaly Gregory RN

## 2024-12-10 ENCOUNTER — OFFICE VISIT (OUTPATIENT)
Dept: FAMILY MEDICINE | Facility: CLINIC | Age: 34
End: 2024-12-10
Attending: PHYSICIAN ASSISTANT
Payer: COMMERCIAL

## 2024-12-10 VITALS
SYSTOLIC BLOOD PRESSURE: 138 MMHG | DIASTOLIC BLOOD PRESSURE: 83 MMHG | RESPIRATION RATE: 16 BRPM | OXYGEN SATURATION: 100 % | BODY MASS INDEX: 25.12 KG/M2 | TEMPERATURE: 98.3 F | HEIGHT: 73 IN | HEART RATE: 92 BPM | WEIGHT: 189.5 LBS

## 2024-12-10 DIAGNOSIS — Z00.00 ROUTINE GENERAL MEDICAL EXAMINATION AT A HEALTH CARE FACILITY: Primary | ICD-10-CM

## 2024-12-10 DIAGNOSIS — M54.12 CERVICAL RADICULOPATHY: ICD-10-CM

## 2024-12-10 DIAGNOSIS — F90.2 ATTENTION DEFICIT HYPERACTIVITY DISORDER (ADHD), COMBINED TYPE: ICD-10-CM

## 2024-12-10 DIAGNOSIS — N18.1 CHRONIC KIDNEY DISEASE, STAGE 1: ICD-10-CM

## 2024-12-10 LAB
ANION GAP SERPL CALCULATED.3IONS-SCNC: 11 MMOL/L (ref 7–15)
BUN SERPL-MCNC: 13.1 MG/DL (ref 6–20)
CALCIUM SERPL-MCNC: 9.9 MG/DL (ref 8.8–10.4)
CHLORIDE SERPL-SCNC: 104 MMOL/L (ref 98–107)
CHOLEST SERPL-MCNC: 161 MG/DL
CREAT SERPL-MCNC: 0.87 MG/DL (ref 0.67–1.17)
CREAT UR-MCNC: 197 MG/DL
CREAT UR-MCNC: 197 MG/DL
EGFRCR SERPLBLD CKD-EPI 2021: >90 ML/MIN/1.73M2
EST. AVERAGE GLUCOSE BLD GHB EST-MCNC: 111 MG/DL
FASTING STATUS PATIENT QL REPORTED: NO
FASTING STATUS PATIENT QL REPORTED: NO
GLUCOSE SERPL-MCNC: 100 MG/DL (ref 70–99)
HBA1C MFR BLD: 5.5 % (ref 0–5.6)
HCO3 SERPL-SCNC: 25 MMOL/L (ref 22–29)
HDLC SERPL-MCNC: 52 MG/DL
LDLC SERPL CALC-MCNC: 92 MG/DL
MICROALBUMIN UR-MCNC: 226 MG/L
MICROALBUMIN/CREAT UR: 114.72 MG/G CR (ref 0–17)
NONHDLC SERPL-MCNC: 109 MG/DL
POTASSIUM SERPL-SCNC: 4.4 MMOL/L (ref 3.4–5.3)
SODIUM SERPL-SCNC: 140 MMOL/L (ref 135–145)
TRIGL SERPL-MCNC: 84 MG/DL

## 2024-12-10 PROCEDURE — 80061 LIPID PANEL: CPT | Performed by: PHYSICIAN ASSISTANT

## 2024-12-10 PROCEDURE — 99214 OFFICE O/P EST MOD 30 MIN: CPT | Mod: 25 | Performed by: PHYSICIAN ASSISTANT

## 2024-12-10 PROCEDURE — 99395 PREV VISIT EST AGE 18-39: CPT | Mod: 25 | Performed by: PHYSICIAN ASSISTANT

## 2024-12-10 PROCEDURE — 96127 BRIEF EMOTIONAL/BEHAV ASSMT: CPT | Performed by: PHYSICIAN ASSISTANT

## 2024-12-10 PROCEDURE — 83036 HEMOGLOBIN GLYCOSYLATED A1C: CPT | Performed by: PHYSICIAN ASSISTANT

## 2024-12-10 PROCEDURE — 82043 UR ALBUMIN QUANTITATIVE: CPT | Performed by: PHYSICIAN ASSISTANT

## 2024-12-10 PROCEDURE — 82570 ASSAY OF URINE CREATININE: CPT | Performed by: PHYSICIAN ASSISTANT

## 2024-12-10 PROCEDURE — 36415 COLL VENOUS BLD VENIPUNCTURE: CPT | Performed by: PHYSICIAN ASSISTANT

## 2024-12-10 PROCEDURE — 90656 IIV3 VACC NO PRSV 0.5 ML IM: CPT | Performed by: PHYSICIAN ASSISTANT

## 2024-12-10 PROCEDURE — 80048 BASIC METABOLIC PNL TOTAL CA: CPT | Performed by: PHYSICIAN ASSISTANT

## 2024-12-10 PROCEDURE — 90471 IMMUNIZATION ADMIN: CPT | Performed by: PHYSICIAN ASSISTANT

## 2024-12-10 RX ORDER — LISDEXAMFETAMINE DIMESYLATE 50 MG/1
50 CAPSULE ORAL DAILY
Qty: 30 CAPSULE | Refills: 0 | Status: SHIPPED | OUTPATIENT
Start: 2025-02-08 | End: 2025-03-10

## 2024-12-10 RX ORDER — ASPIRIN 81 MG/1
81 TABLET, CHEWABLE ORAL DAILY
COMMUNITY

## 2024-12-10 RX ORDER — LISDEXAMFETAMINE DIMESYLATE 50 MG/1
50 CAPSULE ORAL DAILY
Qty: 30 CAPSULE | Refills: 0 | Status: SHIPPED | OUTPATIENT
Start: 2025-01-09 | End: 2025-02-08

## 2024-12-10 RX ORDER — LOSARTAN POTASSIUM 25 MG/1
12.5 TABLET ORAL DAILY
Qty: 45 TABLET | Refills: 3 | Status: SHIPPED | OUTPATIENT
Start: 2024-12-10

## 2024-12-10 RX ORDER — LISDEXAMFETAMINE DIMESYLATE 50 MG/1
50 CAPSULE ORAL DAILY
Qty: 30 CAPSULE | Refills: 0 | Status: SHIPPED | OUTPATIENT
Start: 2024-12-10 | End: 2025-01-09

## 2024-12-10 SDOH — HEALTH STABILITY: PHYSICAL HEALTH: ON AVERAGE, HOW MANY DAYS PER WEEK DO YOU ENGAGE IN MODERATE TO STRENUOUS EXERCISE (LIKE A BRISK WALK)?: 3 DAYS

## 2024-12-10 ASSESSMENT — ANXIETY QUESTIONNAIRES
1. FEELING NERVOUS, ANXIOUS, OR ON EDGE: NEARLY EVERY DAY
GAD7 TOTAL SCORE: 15
5. BEING SO RESTLESS THAT IT IS HARD TO SIT STILL: MORE THAN HALF THE DAYS
3. WORRYING TOO MUCH ABOUT DIFFERENT THINGS: MORE THAN HALF THE DAYS
6. BECOMING EASILY ANNOYED OR IRRITABLE: MORE THAN HALF THE DAYS
GAD7 TOTAL SCORE: 15
2. NOT BEING ABLE TO STOP OR CONTROL WORRYING: MORE THAN HALF THE DAYS
7. FEELING AFRAID AS IF SOMETHING AWFUL MIGHT HAPPEN: SEVERAL DAYS
IF YOU CHECKED OFF ANY PROBLEMS ON THIS QUESTIONNAIRE, HOW DIFFICULT HAVE THESE PROBLEMS MADE IT FOR YOU TO DO YOUR WORK, TAKE CARE OF THINGS AT HOME, OR GET ALONG WITH OTHER PEOPLE: VERY DIFFICULT

## 2024-12-10 ASSESSMENT — PATIENT HEALTH QUESTIONNAIRE - PHQ9
5. POOR APPETITE OR OVEREATING: NEARLY EVERY DAY
SUM OF ALL RESPONSES TO PHQ QUESTIONS 1-9: 15

## 2024-12-10 ASSESSMENT — SOCIAL DETERMINANTS OF HEALTH (SDOH): HOW OFTEN DO YOU GET TOGETHER WITH FRIENDS OR RELATIVES?: ONCE A WEEK

## 2024-12-10 NOTE — PROGRESS NOTES
Preventive Care Visit  [facility]  Bill Mg PA-C, Family Medicine  Dec 10, 2024      Assessment & Plan     Routine general medical examination at a health care facility  Overall stable wellness.  Weight remains well-controlled.  Blood pressure is stable and he monitors this at home.  No concerns.  Fasting labs to be obtained.  If stable/within normal limits recheck annually.  - HEMOGLOBIN A1C; Future  - Lipid panel reflex to direct LDL Fasting; Future  - HEMOGLOBIN A1C  - Lipid panel reflex to direct LDL Fasting    Chronic kidney disease, stage 1  As above.  Stable on losartan.  Will continue to monitor microalbuminuria.  If worsening creatinine/GFR seen or microalbuminuria exceeds 300 despite losartan, consider nephrology consultation.  Otherwise annual rechecks.  - BASIC METABOLIC PANEL; Future  - Albumin Random Urine Quantitative with Creat Ratio; Future  - losartan (COZAAR) 25 MG tablet; Take 0.5 tablets (12.5 mg) by mouth daily.  - BASIC METABOLIC PANEL  - Albumin Random Urine Quantitative with Creat Ratio  Medication use and side effects discussed with the patient. Patient is in complete understanding and agreement with plan.     Cervical radiculopathy  Has yet to schedule physical therapy.  His symptoms do come and go.  Renewed physical therapy order.  - Physical Therapy  Referral; Future    Attention deficit hyperactivity disorder (ADHD), combined type  Overall Adderall does manage his attention well.  However does admit to ongoing emotional liability and blunting and at times anger.  Unclear if side effect of Adderall versus possible worsening depression/anxiety symptoms.  It is curious he noticed the symptoms did improve when not taking Adderall for a few weeks.  Also when comparing PHQ-9 and SERA-7 this year compared to last year (around time Vyvanse was changed to Adderall) his numbers are much worse.  Vyvanse was stopped due to shortage concerns and  "difficulty obtaining medication.  Will reattempt Vyvanse.  If able to obtain medication regularly and presumed side effects resolved, to maintain this and recheck in 6 months.  Otherwise did discuss Lexapro use if symptoms of potential anxiety and depression continue.  If this is started, to recheck in approximately 1 month virtually.  - [id number] - Urine Drug Confirmation Panel (Comprehensive); Future  - lisdexamfetamine (VYVANSE) 50 MG capsule; Take 1 capsule (50 mg) by mouth daily.  - lisdexamfetamine (VYVANSE) 50 MG capsule; Take 1 capsule (50 mg) by mouth daily.  - lisdexamfetamine (VYVANSE) 50 MG capsule; Take 1 capsule (50 mg) by mouth daily.  - [id number] - Urine Drug Confirmation Panel (Comprehensive)  Medication use and side effects discussed with the patient. Patient is in complete understanding and agreement with plan.     Patient has been advised of split billing requirements and indicates understanding: Yes        BMI  Estimated body mass index is 25.35 kg/m  as calculated from the following:    Height as of this encounter: 1.842 m (6' 0.5\").    Weight as of this encounter: 86 kg (189 lb 8 oz).       Depression Screening Follow Up        12/10/2024    11:20 AM   PHQ   PHQ-9 Total Score 15   Q9: Thoughts of better off dead/self-harm past 2 weeks Not at all         12/10/2024    11:20 AM   Last PHQ-9   1.  Little interest or pleasure in doing things 3   2.  Feeling down, depressed, or hopeless 1   3.  Trouble falling or staying asleep, or sleeping too much 2   4.  Feeling tired or having little energy 3   5.  Poor appetite or overeating 2   6.  Feeling bad about yourself 1   7.  Trouble concentrating 2   8.  Moving slowly or restless 1   Q9: Thoughts of better off dead/self-harm past 2 weeks 0   PHQ-9 Total Score 15   Difficulty at work, home, or with people Somewhat difficult         3/15/2022    10:49 AM 11/28/2023     9:30 AM 12/10/2024    11:20 AM   SERA-7 SCORE   Total Score 0 (minimal anxiety)     Total " "Score 0 7 15           Follow Up Actions Taken  Crisis resource information provided in After Visit Summary  Adjusted patient's anti-depressant medication.     Counseling  Appropriate preventive services were addressed with this patient via screening, questionnaire, or discussion as appropriate for fall prevention, nutrition, physical activity, Tobacco-use cessation, social engagement, weight loss and cognition.  Checklist reviewing preventive services available has been given to the patient.  Reviewed patient's diet, addressing concerns and/or questions.   He is at risk for lack of exercise and has been provided with information to increase physical activity for the benefit of his well-being.   He is at risk for psychosocial distress and has been provided with information to reduce risk.   The patient's PHQ-9 score is consistent with moderate depression. He was provided with information regarding depression.         Tyson Schaffer is a 34 year old, presenting for the following:  Physical and Recheck Medication (Discuss adderall medication)        12/10/2024    10:50 AM   Additional Questions   Roomed by Carly KIM CMA   Accompanied by Self          HPI  ADHD Follow-Up (Adult)  Concerns: States having more irritability and anger which he describes in the terms of depression and anxiety symptoms..  Of note, did stop his Adderall for 3 weeks due to his pharmacy not having it in stock.  He noted these symptoms resolving however his attention did worsen.  Changes since last visit: Stable  Taking controlled (daily) medications as prescribed: Yes  Sleep: trouble falling asleep    Medication Benefits:   Controlled symptoms: Attention span, Distractability, Finishing tasks, Impulse control, Frustration tolerance, Accepting limits, and Peer relations  Uncontrolled symptoms:  None    Medication Side Effects:  Reports:  emotional lability and \"zombie\" effect  Sleep Problems? Yes Details: As " above  ++++++++++++++++++++++++++++++++++++++++++++++++    Employer Concerns/Feedback: None  Coworker Concerns:   None  Home/Family Concerns: None    Patient also has a past history of microalbuminuria.  Also past history of hypertension however this has been resolved since weight loss.  He continues taking his losartan at 12.5 mg without side effects.    Health Care Directive  Patient does not have a Health Care Directive: Discussed advance care planning with patient; however, patient declined at this time.      12/10/2024   General Health   How would you rate your overall physical health? Good   Feel stress (tense, anxious, or unable to sleep) Rather much      (!) STRESS CONCERN      12/10/2024   Nutrition   Three or more servings of calcium each day? Yes   Diet: Regular (no restrictions)   How many servings of fruit and vegetables per day? (!) 2-3   How many sweetened beverages each day? 0-1            12/10/2024   Exercise   Days per week of moderate/strenous exercise 3 days            12/10/2024   Social Factors   Frequency of gathering with friends or relatives Once a week   Worry food won't last until get money to buy more No   Food not last or not have enough money for food? No   Do you have housing? (Housing is defined as stable permanent housing and does not include staying ouside in a car, in a tent, in an abandoned building, in an overnight shelter, or couch-surfing.) Yes   Are you worried about losing your housing? No   Lack of transportation? No   Unable to get utilities (heat,electricity)? No            12/10/2024   Dental   Dentist two times every year? Yes            12/10/2024   TB Screening   Were you born outside of the [place]? No                  12/10/2024   Substance Use   Alcohol more than 3/day or more than 7/wk Not Applicable   Do you use any other substances recreationally? No        Social History     Tobacco Use     Smoking status: Never     Passive exposure: Never     Smokeless tobacco:  "Never   Vaping Use     Vaping status: Never Used   Substance Use Topics     Alcohol use: Not Currently     Comment: sober x 6 yrs.     Drug use: No           12/10/2024   STI Screening   New sexual partner(s) since last STI/HIV test? No            12/10/2024   Contraception/Family Planning   Questions about contraception or family planning No           Reviewed and updated as needed this visit by Provider   Tobacco  Allergies  Meds  Problems  Med Hx  Surg Hx  Fam Hx                   Objective    Exam  /83 (BP Location: Left arm, Patient Position: Sitting, Cuff Size: Adult Regular)   Pulse 92   Temp 98.3  F (36.8  C) (Oral)   Resp 16   Ht 1.842 m (6' 0.5\")   Wt 86 kg (189 lb 8 oz)   SpO2 100%   BMI 25.35 kg/m     Estimated body mass index is 25.35 kg/m  as calculated from the following:    Height as of this encounter: 1.842 m (6' 0.5\").    Weight as of this encounter: 86 kg (189 lb 8 oz).    Physical Exam  GENERAL: alert and no distress  EYES: Eyes grossly normal to inspection, PERRL and conjunctivae and sclerae normal  HENT: ear canals and TM's normal, nose and mouth without ulcers or lesions  NECK: no adenopathy, no asymmetry, masses, or scars  RESP: lungs clear to auscultation - no rales, rhonchi or wheezes  CV: regular rate and rhythm, normal S1 S2, no S3 or S4, no murmur, click or rub, no peripheral edema  ABDOMEN: soft, nontender, no hepatosplenomegaly, no masses and bowel sounds normal  MS: no gross musculoskeletal defects noted, no edema  SKIN: no suspicious lesions or rashes  NEURO: Normal strength and tone, mentation intact and speech normal  PSYCH: mentation appears normal, affect normal/bright  LYMPH: no cervical adenopathy        Signed Electronically by: Bill Mg PA-C    "

## 2024-12-10 NOTE — LETTER
[facility]  12/10/24  Patient: Sarbjit Perez  YOB: 1990  Medical Record Number: [id number]                                                                                  Non-Opioid Controlled Substance Agreement    This is an agreement between you and your provider regarding safe and appropriate use of controlled substances prescribed by your care team. Controlled substances are?medicines that can cause physical and mental dependence (abuse).     There are strict laws about having and using these medicines. We here at [facility] are  committed to working with you in your efforts to get better. To support you in this work, we'll help you schedule regular office appointments for medicine refills. If we must cancel or change your appointment for any reason, we'll make sure you have enough medicine to last until your next appointment.     As a Provider, I will:   Listen carefully to your concerns while treating you with respect.   Recommend a treatment plan that I believe is in your best interest and may involve therapies other than medicine.    Talk with you often about the possible benefits and the risk of harm of any medicine that we prescribe for you.  Assess the safety of this medicine and check how well it works.    Provide a plan on how to taper (discontinue or go off) using this medicine if the decision is made to stop its use.      ::  As a Patient, I understand controlled substances:     Are prescribed by my care provider to help me function or work and manage my condition(s).?  Are strong medicines and can cause serious side effects.     Need to be taken exactly as prescribed.?Combining controlled substances with certain medicines or chemicals (such as illegal drugs, alcohol, sedatives, sleeping pills, and benzodiazepines) can be dangerous or even fatal.? If I stop taking my medicines suddenly, I may have severe withdrawal symptoms.     The risks,  benefits, and side effects of these medicine(s) were explained to me. I agree that:    I will take part in other treatments as advised by my care team. This may be psychiatry or counseling, physical therapy, behavioral therapy, group treatment or a referral to specialist.    I will keep all my appointments and understand this is part of the monitoring of controlled substances.?My care team may require an office visit for EVERY controlled substance refill. If I miss appointments or don t follow instructions, my care team may stop my medicine    I will take my medicines as prescribed. I will not change the dose or schedule unless my care team tells me to. There will be no refills if I run out early.      I may be asked to come to the clinic and complete a urine drug test or complete a pill count. If I don t give a urine sample or participate in a pill count, the care team may stop my medicine.    I will only receive controlled substance prescriptions from this clinic. If I am treated by another provider, I will tell them that I am taking controlled substances and that I have a treatment agreement with this provider. I will inform my [facility] care team within one business day if I am given a prescription for any controlled substance by another healthcare provider. My [facility] care team can contact other providers and pharmacists about my use of any medicines.    It is up to me to make sure that I don't run out of my medicines on weekends or holidays.?If my care team is willing to refill my prescription without a visit, I must request refills only during office hours. Refills may take up to 3 business days to process. I will use one pharmacy to fill all my controlled substance prescriptions. I will notify the clinic about any changes to my insurance or medicine availability.    I am responsible for my prescriptions. If the medicine/prescription is lost, stolen or destroyed, it will not be replaced.?I  also agree not to share controlled substance medicines with anyone.     I am aware I should not use any illegal or recreational drugs. I agree not to drink alcohol unless my care team says I can.     If I enroll in the [place] [facility] Cannabis program, I will tell my care team before my next refill.    I will tell my care team right away if I become pregnant, have a new medical problem treated outside of my regular clinic, or have a change in my medicines.     I understand that this medicine can affect my thinking, judgment and reaction time.? Alcohol and drugs affect the brain and body, which can affect the safety of my driving. Being under the influence of alcohol or drugs can affect my decision-making, behaviors, personal safety and the safety of others. Driving while impaired (DWI) can occur if a person is driving, operating or in physical control of a car, motorcycle, boat, snowmobile, ATV, motorbike, off-road vehicle or any other motor vehicle ([place] Statute 169A.20). I understand the risk if I choose to drive or operate any vehicle or machinery.    I understand that if I do not follow any of the conditions above, my prescriptions or treatment may be stopped or changed.   I agree that my provider, clinic care team and pharmacy may work with any city, state or federal law enforcement agency that investigates the misuse, sale or other diversion of my controlled medicine. I will allow my provider to discuss my care with, or share a copy of, this agreement with any other treating provider, pharmacy or emergency room where I receive care.     I have read this agreement and have asked questions about anything I did not understand.    ________________________________________________________  Patient Signature - Sarbjit Perez     ___________________                   Date     ________________________________________________________  Provider Signature - Bill Mg PA-C       ___________________                    Date     ________________________________________________________  Witness Signature (required if provider not present while patient signing)          ___________________                   Date

## 2024-12-10 NOTE — PATIENT INSTRUCTIONS
Patient Education   Preventive Care Advice   This is general advice given by our system to help you stay healthy. However, your care team may have specific advice just for you. Please talk to your care team about your preventive care needs.  Nutrition  Eat 5 or more servings of fruits and vegetables each day.  Try wheat bread, brown rice and whole grain pasta (instead of white bread, rice, and pasta).  Get enough calcium and vitamin D. Check the label on foods and aim for 100% of the RDA (recommended daily allowance).  Lifestyle  Exercise at least 150 minutes each week  (30 minutes a day, 5 days a week).  Do muscle strengthening activities 2 days a week. These help control your weight and prevent disease.  No smoking.  Wear sunscreen to prevent skin cancer.  Have a dental exam and cleaning every 6 months.  Yearly exams  See your health care team every year to talk about:  Any changes in your health.  Any medicines your care team has prescribed.  Preventive care, family planning, and ways to prevent chronic diseases.  Shots (vaccines)   HPV shots (up to age 26), if you've never had them before.  Hepatitis B shots (up to age 59), if you've never had them before.  COVID-19 shot: Get this shot when it's due.  Flu shot: Get a flu shot every year.  Tetanus shot: Get a tetanus shot every 10 years.  Pneumococcal, hepatitis A, and RSV shots: Ask your care team if you need these based on your risk.  Shingles shot (for age 50 and up)  General health tests  Diabetes screening:  Starting at age 35, Get screened for diabetes at least every 3 years.  If you are younger than age 35, ask your care team if you should be screened for diabetes.  Cholesterol test: At age 39, start having a cholesterol test every 5 years, or more often if advised.  Bone density scan (DEXA): At age 50, ask your care team if you should have this scan for osteoporosis (brittle bones).  Hepatitis C: Get tested at least once in your life.  STIs (sexually  transmitted infections)  Before age 24: Ask your care team if you should be screened for STIs.  After age 24: Get screened for STIs if you're at risk. You are at risk for STIs (including HIV) if:  You are sexually active with more than one person.  You don't use condoms every time.  You or a partner was diagnosed with a sexually transmitted infection.  If you are at risk for HIV, ask about PrEP medicine to prevent HIV.  Get tested for HIV at least once in your life, whether you are at risk for HIV or not.  Cancer screening tests  Cervical cancer screening: If you have a cervix, begin getting regular cervical cancer screening tests starting at age 21.  Breast cancer scan (mammogram): If you've ever had breasts, begin having regular mammograms starting at age 40. This is a scan to check for breast cancer.  Colon cancer screening: It is important to start screening for colon cancer at age 45.  Have a colonoscopy test every 10 years (or more often if you're at risk) Or, ask your provider about stool tests like a FIT test every year or Cologuard test every 3 years.  To learn more about your testing options, visit:   .  For help making a decision, visit:   https://bit.ly/ww97251.  Prostate cancer screening test: If you have a prostate, ask your care team if a prostate cancer screening test (PSA) at age 55 is right for you.  Lung cancer screening: If you are a current or former smoker ages 50 to 80, ask your care team if ongoing lung cancer screenings are right for you.  For informational purposes only. Not to replace the advice of your health care provider. Copyright   2023 [facility]. All rights reserved. Clinically reviewed by the [facility]. makerSQR [id number] - REV 01/24.  Learning About Stress  What is stress?     Stress is your body's response to a hard situation. Your body can have a physical, emotional, or mental response. Stress is a fact of life for most people, and it  affects everyone differently. What causes stress for you may not be stressful for someone else.  A lot of things can cause stress. You may feel stress when you go on a job interview, take a test, or run a race. This kind of short-term stress is normal and even useful. It can help you if you need to work hard or react quickly. For example, stress can help you finish an important job on time.  Long-term stress is caused by ongoing stressful situations or events. Examples of long-term stress include long-term health problems, ongoing problems at work, or conflicts in your family. Long-term stress can harm your health.  How does stress affect your health?  When you are stressed, your body responds as though you are in danger. It makes hormones that speed up your heart, make you breathe faster, and give you a burst of energy. This is called the fight-or-flight stress response. If the stress is over quickly, your body goes back to normal and no harm is done.  But if stress happens too often or lasts too long, it can have bad effects. Long-term stress can make you more likely to get sick, and it can make symptoms of some diseases worse. If you tense up when you are stressed, you may develop neck, shoulder, or low back pain. Stress is linked to high blood pressure and heart disease.  Stress also harms your emotional health. It can make you ocampo, tense, or depressed. Your relationships may suffer, and you may not do well at work or school.  What can you do to manage stress?  You can try these things to help manage stress:   Do something active. Exercise or activity can help reduce stress. Walking is a great way to get started. Even everyday activities such as housecleaning or yard work can help.  Try yoga or josey chi. These techniques combine exercise and meditation. You may need some training at first to learn them.  Do something you enjoy. For example, listen to music or go to a movie. Practice your hobby or do volunteer  "work.  Meditate. This can help you relax, because you are not worrying about what happened before or what may happen in the future.  Do guided imagery. Imagine yourself in any setting that helps you feel calm. You can use online videos, books, or a teacher to guide you.  Do breathing exercises. For example:  From a standing position, bend forward from the waist with your knees slightly bent. Let your arms dangle close to the floor.  Breathe in slowly and deeply as you return to a standing position. Roll up slowly and lift your head last.  Hold your breath for just a few seconds in the standing position.  Breathe out slowly and bend forward from the waist.  Let your feelings out. Talk, laugh, cry, and express anger when you need to. Talking with supportive friends or family, a counselor, or a francis leader about your feelings is a healthy way to relieve stress. Avoid discussing your feelings with people who make you feel worse.  Write. It may help to write about things that are bothering you. This helps you find out how much stress you feel and what is causing it. When you know this, you can find better ways to cope.  What can you do to prevent stress?  You might try some of these things to help prevent stress:  Manage your time. This helps you find time to do the things you want and need to do.  Get enough sleep. Your body recovers from the stresses of the day while you are sleeping.  Get support. Your family, friends, and community can make a difference in how you experience stress.  Limit your news feed. Avoid or limit time on social media or news that may make you feel stressed.  Do something active. Exercise or activity can help reduce stress. Walking is a great way to get started.  Where can you learn more?  Go to https://www.[employer].net/patiented  Enter N032 in the search box to learn more about \"Learning About Stress.\"  Current as of: October 24, 2023  Content Version: 14.2 2024 [employer]. "   Care instructions adapted under license by your healthcare professional. If you have questions about a medical condition or this instruction, always ask your healthcare professional. [employer] disclaims any warranty or liability for your use of this information.

## 2025-01-23 ENCOUNTER — THERAPY VISIT (OUTPATIENT)
Dept: PHYSICAL THERAPY | Facility: CLINIC | Age: 35
End: 2025-01-23
Attending: PHYSICIAN ASSISTANT
Payer: COMMERCIAL

## 2025-01-23 DIAGNOSIS — M54.12 CERVICAL RADICULOPATHY: ICD-10-CM

## 2025-01-23 NOTE — PROGRESS NOTES
PHYSICAL THERAPY EVALUATION  Type of Visit: Evaluation        Fall Risk Screen:  Fall screen completed by: PT  Have you fallen 2 or more times in the past year?: No  Have you fallen and had an injury in the past year?: No  Is patient a fall risk?: No    Subjective   Pt is a 35yo M presenting to PT with neck pain complaints on R side of neck and into shoulder blade . Symptoms began ~7mo and have been improving since more localized to neck compared to when they were in his arm. Reports no aggravating/alleviating factors. Pain is currently 3-4/10, worst 7-8/10, best 2/10. Every now and then notices the numbness and tingling into his arm/hand. Sleep is usually not impacted. He had an MRI 6mo ago reporting C6-C7 disc bulging. He reports his back in general feels stiff. Denies vague sx.        Presenting condition or subjective complaint: Neck and back pain  Date of onset: 06/01/24    Relevant medical history: Diabetes; High blood pressure   Dates & types of surgery: Hip Replacement - December 2022; Core Decompression - October 2022, Hernia Repair - March 2022, Hernia Repair - March 2019    Prior diagnostic imaging/testing results: MRI     Prior therapy history for the same diagnosis, illness or injury: No        Living Environment  Social support: With a significant other or spouse   Type of home: House   Stairs to enter the home: Yes 2 Is there a railing: Yes     Ramp: Yes   Stairs inside the home: Yes 10 Is there a railing: Yes     Help at home: None  Equipment owned: Four-point cane; Crutches     Employment: Yes   Hobbies/Interests: Outdoors, Cooking, Sports, Video Games, Movies, etc..    Patient goals for therapy: Move freely withoit worrying I am going to make anything worse.       Objective   CERVICAL SPINE EVALUATION    ROM:   (Degrees) Left AROM Right AROM    Cervical Flexion WNL    Cervical Extension WNL    Cervical Side bend Equal motion side to side More difficult     Cervical Rotation WNL WNL     Cervical Retraction WNL    Thoracic Flexion WNL    Thoracic Extension Min loss    Thoracic Rotation Equal side to side Pt report more difficult    Shoulder motion WNL B    MYOTOMES: WNL    Joint mobility: Cervical CPA/UPAs, normal mobility throughout, no sx reported  Distraction: reports feels good but no impact on sx     Assessment & Plan   CLINICAL IMPRESSIONS  Medical Diagnosis: Cervical radiculopathy (M54.12)    Treatment Diagnosis: Neck pain with mobility deficits   Impression/Assessment: Patient is a 34 year old male with Neck pain/stiffness  complaints.  The following significant findings have been identified: Decreased ROM/flexibility, Decreased strength, Impaired muscle performance, Decreased activity tolerance, and Impaired posture. These impairments interfere with their ability to perform self care tasks, work tasks, recreational activities, household chores, driving , household mobility, and community mobility as compared to previous level of function.     Clinical Decision Making (Complexity):  Clinical Presentation: Stable/Uncomplicated  Clinical Presentation Rationale: based on medical and personal factors listed in PT evaluation  Clinical Decision Making (Complexity): Low complexity    PLAN OF CARE  Treatment Interventions:  Modalities: Cryotherapy  Interventions: Manual Therapy, Neuromuscular Re-education, Therapeutic Activity, Therapeutic Exercise, Self-Care/Home Management    Long Term Goals     PT Goal 1  Goal Identifier: HEP  Goal Description: Pt will be IND with HEP in order to manage condition on his own.  Goal Progress: new goal  Target Date: 04/03/25  PT Goal 2  Goal Identifier: fear avoidance  Goal Description: Pt will fully participate in activities without concern for aggravating sx in order to improve QOL.  Goal Progress: new goal  Target Date: 04/03/25      Frequency of Treatment: 1x/week  Duration of Treatment: 10 weeks    Recommended Referrals to Other Professionals:   None  Education Assessment:   Learner/Method: Patient;Listening;Reading;Demonstration;Pictures/Video    Risks and benefits of evaluation/treatment have been explained.   Patient/Family/caregiver agrees with Plan of Care.     Evaluation Time:     PT Eval, Low Complexity Minutes (47377): 10     Signing Clinician: Rush Wright PT

## 2025-02-03 ENCOUNTER — THERAPY VISIT (OUTPATIENT)
Dept: PHYSICAL THERAPY | Facility: CLINIC | Age: 35
End: 2025-02-03
Attending: PHYSICIAN ASSISTANT
Payer: COMMERCIAL

## 2025-02-03 DIAGNOSIS — M54.2 CERVICAL PAIN: Primary | ICD-10-CM

## 2025-02-03 PROCEDURE — 97110 THERAPEUTIC EXERCISES: CPT | Mod: GP | Performed by: PHYSICAL THERAPIST

## 2025-03-07 ENCOUNTER — MYC MEDICAL ADVICE (OUTPATIENT)
Dept: FAMILY MEDICINE | Facility: CLINIC | Age: 35
End: 2025-03-07
Payer: COMMERCIAL

## 2025-03-07 DIAGNOSIS — F39 MOOD DISORDER: Primary | ICD-10-CM

## 2025-03-09 RX ORDER — ESCITALOPRAM OXALATE 10 MG/1
TABLET ORAL
Qty: 30 TABLET | Refills: 1 | Status: SHIPPED | OUTPATIENT
Start: 2025-03-09

## 2025-03-10 ENCOUNTER — THERAPY VISIT (OUTPATIENT)
Dept: PHYSICAL THERAPY | Facility: CLINIC | Age: 35
End: 2025-03-10
Payer: COMMERCIAL

## 2025-03-10 DIAGNOSIS — M54.2 CERVICAL PAIN: Primary | ICD-10-CM

## 2025-03-10 PROCEDURE — 97110 THERAPEUTIC EXERCISES: CPT | Mod: GP | Performed by: PHYSICAL THERAPIST

## 2025-03-26 ENCOUNTER — MYC REFILL (OUTPATIENT)
Dept: FAMILY MEDICINE | Facility: CLINIC | Age: 35
End: 2025-03-26
Payer: COMMERCIAL

## 2025-03-26 DIAGNOSIS — F90.2 ATTENTION DEFICIT HYPERACTIVITY DISORDER (ADHD), COMBINED TYPE: ICD-10-CM

## 2025-03-26 RX ORDER — DEXTROAMPHETAMINE SACCHARATE, AMPHETAMINE ASPARTATE, DEXTROAMPHETAMINE SULFATE AND AMPHETAMINE SULFATE 5; 5; 5; 5 MG/1; MG/1; MG/1; MG/1
20 TABLET ORAL 2 TIMES DAILY
Qty: 60 TABLET | Refills: 0 | Status: CANCELLED | OUTPATIENT
Start: 2025-03-26

## 2025-03-26 RX ORDER — LISDEXAMFETAMINE DIMESYLATE 50 MG/1
50 CAPSULE ORAL DAILY
Qty: 30 CAPSULE | Refills: 0 | Status: SHIPPED | OUTPATIENT
Start: 2025-05-25 | End: 2025-06-24

## 2025-03-26 RX ORDER — LISDEXAMFETAMINE DIMESYLATE 50 MG/1
50 CAPSULE ORAL DAILY
Qty: 30 CAPSULE | Refills: 0 | Status: SHIPPED | OUTPATIENT
Start: 2025-03-26 | End: 2025-04-25

## 2025-03-26 RX ORDER — LISDEXAMFETAMINE DIMESYLATE 50 MG/1
50 CAPSULE ORAL DAILY
Qty: 30 CAPSULE | Refills: 0 | Status: SHIPPED | OUTPATIENT
Start: 2025-04-25 | End: 2025-05-25

## 2025-04-02 DIAGNOSIS — F39 MOOD DISORDER: ICD-10-CM

## 2025-04-02 NOTE — TELEPHONE ENCOUNTER
Please verify patient's Lexapro dose, see how things are going then route to PCP.    Ender Torres RN     [facility]

## 2025-04-07 NOTE — TELEPHONE ENCOUNTER
Called to verify dose of medication and discuss how patient is doing on medication. Route to pcp with patient response    Neftaly Gregory RN

## 2025-04-15 ENCOUNTER — VIRTUAL VISIT (OUTPATIENT)
Dept: FAMILY MEDICINE | Facility: CLINIC | Age: 35
End: 2025-04-15
Payer: COMMERCIAL

## 2025-04-15 DIAGNOSIS — F39 MOOD DISORDER: ICD-10-CM

## 2025-04-15 PROCEDURE — 98005 SYNCH AUDIO-VIDEO EST LOW 20: CPT | Performed by: PHYSICIAN ASSISTANT

## 2025-04-15 PROCEDURE — 96127 BRIEF EMOTIONAL/BEHAV ASSMT: CPT | Mod: 95 | Performed by: PHYSICIAN ASSISTANT

## 2025-04-15 RX ORDER — ESCITALOPRAM OXALATE 10 MG/1
TABLET ORAL
Qty: 90 TABLET | Refills: 1 | OUTPATIENT
Start: 2025-04-15

## 2025-04-15 RX ORDER — ESCITALOPRAM OXALATE 10 MG/1
10 TABLET ORAL DAILY
Qty: 90 TABLET | Refills: 1 | Status: SHIPPED | OUTPATIENT
Start: 2025-04-15

## 2025-04-15 ASSESSMENT — ANXIETY QUESTIONNAIRES
1. FEELING NERVOUS, ANXIOUS, OR ON EDGE: SEVERAL DAYS
5. BEING SO RESTLESS THAT IT IS HARD TO SIT STILL: SEVERAL DAYS
7. FEELING AFRAID AS IF SOMETHING AWFUL MIGHT HAPPEN: NOT AT ALL
6. BECOMING EASILY ANNOYED OR IRRITABLE: SEVERAL DAYS
7. FEELING AFRAID AS IF SOMETHING AWFUL MIGHT HAPPEN: NOT AT ALL
IF YOU CHECKED OFF ANY PROBLEMS ON THIS QUESTIONNAIRE, HOW DIFFICULT HAVE THESE PROBLEMS MADE IT FOR YOU TO DO YOUR WORK, TAKE CARE OF THINGS AT HOME, OR GET ALONG WITH OTHER PEOPLE: NOT DIFFICULT AT ALL
3. WORRYING TOO MUCH ABOUT DIFFERENT THINGS: NOT AT ALL
8. IF YOU CHECKED OFF ANY PROBLEMS, HOW DIFFICULT HAVE THESE MADE IT FOR YOU TO DO YOUR WORK, TAKE CARE OF THINGS AT HOME, OR GET ALONG WITH OTHER PEOPLE?: NOT DIFFICULT AT ALL
GAD7 TOTAL SCORE: 4
GAD7 TOTAL SCORE: 4
4. TROUBLE RELAXING: SEVERAL DAYS
GAD7 TOTAL SCORE: 4
2. NOT BEING ABLE TO STOP OR CONTROL WORRYING: NOT AT ALL

## 2025-04-15 ASSESSMENT — PATIENT HEALTH QUESTIONNAIRE - PHQ9
SUM OF ALL RESPONSES TO PHQ QUESTIONS 1-9: 5
10. IF YOU CHECKED OFF ANY PROBLEMS, HOW DIFFICULT HAVE THESE PROBLEMS MADE IT FOR YOU TO DO YOUR WORK, TAKE CARE OF THINGS AT HOME, OR GET ALONG WITH OTHER PEOPLE: SOMEWHAT DIFFICULT
SUM OF ALL RESPONSES TO PHQ QUESTIONS 1-9: 5

## 2025-04-15 NOTE — PROGRESS NOTES
Sarbjit is a 34 year old who is being evaluated via a billable video visit.    How would you like to obtain your AVS? Nominumhart  If the video visit is dropped, the invitation should be resent by: Text to cell phone: [phone]  Will anyone else be joining your video visit? No    (F39) Mood disorder  Comment: Overall improved with Lexapro.  Will maintain.  Recheck at typical 6-month follow-up with ADHD.  Plan: escitalopram (LEXAPRO) 10 MG tablet        Medication use and side effects discussed with the patient. Patient is in complete understanding and agreement with plan.         Subjective   Sarbjit is a 34 year old, presenting for the following health issues:  Recheck Medication        4/15/2025    12:19 PM   Additional Questions   Roomed by Carly KIM CMA   Accompanied by Self     History of Present Illness       Mental Health Follow-up:  Patient presents to follow-up on Depression & Anxiety.Patient's depression since last visit has been:  Better  The patient is not having other symptoms associated with depression.  Patient's anxiety since last visit has been:  Better  The patient is not having other symptoms associated with anxiety.  Any significant life events: No  Patient is not feeling anxious or having panic attacks.  Patient has no concerns about alcohol or drug use.    He eats 2-3 servings of fruits and vegetables daily.He consumes 0 sweetened beverage(s) daily.He exercises with enough effort to increase his heart rate 30 to 60 minutes per day.  He exercises with enough effort to increase his heart rate 3 or less days per week.   He is taking medications regularly.      Patient presents today for follow-up of his Lexapro.  He started this approximately 1 month ago after [employer] message was sent to due to worsening mood and depression with anxiety.  Since starting Lexapro, symptoms are much improved.  Overall stable.  Does have trouble waking up in the morning due to grogginess otherwise no other side effects.   This does not bother patient at this time.    In regards to ADHD stable on Vyvanse.  Future refills on file.  No side effects.        11/28/2023     9:30 AM 12/10/2024    11:20 AM 4/15/2025    11:42 AM   PHQ   PHQ-9 Total Score 8 15 5    Q9: Thoughts of better off dead/self-harm past 2 weeks Not at all Not at all Not at all       Patient-reported         11/28/2023     9:30 AM 12/10/2024    11:20 AM 4/15/2025    11:43 AM   SERA-7 SCORE   Total Score   4 (minimal anxiety)   Total Score 7 15 4        Patient-reported               Objective           Vitals:  No vitals were obtained today due to virtual visit.    Physical Exam   GENERAL: alert and no distress  EYES: Eyes grossly normal to inspection.  No discharge or erythema, or obvious scleral/conjunctival abnormalities.  RESP: No audible wheeze, cough, or visible cyanosis.    SKIN: Visible skin clear. No significant rash, abnormal pigmentation or lesions.  NEURO: Cranial nerves grossly intact.  Mentation and speech appropriate for age.  PSYCH: Appropriate affect, tone, and pace of words        Video-Visit Details    Type of service:  Video Visit   Originating Location (pt. Location): Home  Distant Location (provider location):  Off-site  Platform used for Video Visit: Alonzo  Signed Electronically by: Bill Mg PA-C  The longitudinal plan of care for the diagnosis(es)/condition(s) as documented were addressed during this visit. Due to the added complexity in care, I will continue to support Sarbijt in the subsequent management and with ongoing continuity of care.

## 2025-04-16 PROBLEM — M54.2 CERVICAL PAIN: Status: RESOLVED | Noted: 2025-02-03 | Resolved: 2025-04-16

## 2025-08-15 ENCOUNTER — MYC MEDICAL ADVICE (OUTPATIENT)
Dept: FAMILY MEDICINE | Facility: CLINIC | Age: 35
End: 2025-08-15
Payer: COMMERCIAL

## 2025-08-15 DIAGNOSIS — F90.2 ATTENTION DEFICIT HYPERACTIVITY DISORDER (ADHD), COMBINED TYPE: ICD-10-CM

## 2025-08-18 RX ORDER — LISDEXAMFETAMINE DIMESYLATE 50 MG/1
50 CAPSULE ORAL DAILY
Qty: 30 CAPSULE | Refills: 0 | Status: SHIPPED | OUTPATIENT
Start: 2025-08-18

## (undated) DEVICE — EYE PREP BETADINE 5% SOLUTION 30ML 0065-0411-30

## (undated) DEVICE — SUCTION MANIFOLD NEPTUNE 2 SYS 4 PORT 0702-020-000

## (undated) DEVICE — LINEN TOWEL PACK X10 5473

## (undated) DEVICE — LINEN HALF SHEET 5512

## (undated) DEVICE — DRAPE LAP W/ARMBOARD 29410

## (undated) DEVICE — NDL BX BONE MARROW 8GA X 25CM RAN-825CM

## (undated) DEVICE — PACK MINOR CUSTOM RIDGES SBA32RMRMA

## (undated) DEVICE — NDL SPINAL 18GA 3.5" 405184

## (undated) DEVICE — DRSG XEROFORM 1X8"

## (undated) DEVICE — SU VICRYL 3-0 SH 27" UND J416H

## (undated) DEVICE — BLADE SAW RECIP STRK 70X6X0.025MM 0277-096-250S5

## (undated) DEVICE — DRSG KERLIX 4 1/2"X4YDS ROLL 6730

## (undated) DEVICE — DRAPE C-ARM W/STRAPS 42X72" 07-CA104

## (undated) DEVICE — DRSG TEGADERM ALGINATE AG 4X5" 90303

## (undated) DEVICE — SU VICRYL 2-0 CT-2 27" UND J269H

## (undated) DEVICE — PREP CHLORAPREP 26ML TINTED HI-LITE ORANGE 930815

## (undated) DEVICE — STRAP KNEE/BODY 31143004

## (undated) DEVICE — DRSG AQUACEL AG HYDROFIBER 3.5X6" 422604

## (undated) DEVICE — NDL 22GA 1.5"

## (undated) DEVICE — Device

## (undated) DEVICE — SU VICRYL 0 CT-2 27" J334H

## (undated) DEVICE — BAG CLEAR TRASH 1.3M 39X33" P4040C

## (undated) DEVICE — TUBE SUCTION STD KAMVAC 5300-00-500

## (undated) DEVICE — BLADE KNIFE SURG 15 371115

## (undated) DEVICE — BLADE CLIPPER 3M 9670

## (undated) DEVICE — SU VICRYL 0 CT-1 27" UND J260H

## (undated) DEVICE — DRAPE CONVERTORS U-DRAPE 60X72" 8476

## (undated) DEVICE — GLOVE PROTEXIS W/NEU-THERA 7.5  2D73TE75

## (undated) DEVICE — PREP SKIN SCRUB TRAY 4461A

## (undated) DEVICE — POSITIONER ABDUCTION PILLOW FOAM MED FP-ABDUCTM

## (undated) DEVICE — LINEN FULL SHEET 5511

## (undated) DEVICE — DRSG STERI STRIP 1/2X4" R1547

## (undated) DEVICE — LINEN MAYO STAND COVER OVERSIZE PACK 5458

## (undated) DEVICE — WIRE GUIDE SYN 2.8X450MM THRD 900.726

## (undated) DEVICE — PACK UNIVERSAL SPLIT 29131

## (undated) DEVICE — CLEANSER JET LAVAGE IRRISEPT 0.05% CHG IRRISEPT45USA

## (undated) DEVICE — DRAPE U-DRAPE 1015NSD NON-STERILE

## (undated) DEVICE — LINEN TOWEL PACK X5 5464

## (undated) DEVICE — DECANTER TRANSFER DEVICE 2008S

## (undated) DEVICE — ESU GROUND PAD ADULT W/CORD E7507

## (undated) DEVICE — SU PDS II 2-0 SH 27" Z317H

## (undated) DEVICE — GOWN IMPERVIOUS SPECIALTY XLG/XLONG 32474

## (undated) DEVICE — HOOD FLYTE W/PEELAWAY 408-800-100

## (undated) DEVICE — SOL WATER IRRIG 1000ML BOTTLE 2F7114

## (undated) DEVICE — DRAPE STERI TOWEL LG 1010

## (undated) DEVICE — DRAPE C-ARMOR 5 SIDED 5523

## (undated) DEVICE — BLADE CLIPPER SGL USE 9680

## (undated) DEVICE — SU MONOCRYL 3-0 PS-2 18" UND Y497G

## (undated) DEVICE — ESU PENCIL W/SMOKE EVAC NEPTUNE STRYKER 0703-046-000

## (undated) DEVICE — SOL NACL 0.9% IRRIG 1000ML BOTTLE 2F7124

## (undated) DEVICE — DRAPE MAYO STAND 23X54 8337

## (undated) DEVICE — GLOVE PROTEXIS BLUE W/NEU-THERA 8.0  2D73EB80

## (undated) DEVICE — SYR EAR BULB 3OZ 0035830

## (undated) DEVICE — TUBING SUCTION MEDI-VAC SOFT 3/16"X20' N520A

## (undated) DEVICE — SU VICRYL 1 CT-1 36" UND J947H

## (undated) DEVICE — PACK TOTAL HIP W/U DRAPE RIVERSIDE LATEX FREE

## (undated) DEVICE — SU VICRYL 4-0 PS-2 18" UND J496H

## (undated) DEVICE — SOL NACL 0.9% IRRIG 3000ML BAG 2B7477

## (undated) DEVICE — PREP SCRUB SOL EXIDINE 4% CHG 4OZ 29002-404

## (undated) DEVICE — DRAPE IOBAN INCISE 23X17" 6650EZ

## (undated) DEVICE — DECANTER VIAL 2006S

## (undated) DEVICE — SU VICRYL 3-0 TIE 12X18" J904T

## (undated) DEVICE — SU VICRYL 2-0 CT-1 27" UND J259H

## (undated) DEVICE — SUCTION IRR SYSTEM W/O TIP INTERPULSE HANDPIECE 0210-100-000

## (undated) DEVICE — DRSG TEGADERM 4X10" 1627

## (undated) DEVICE — LINEN BACK PACK 5440

## (undated) DEVICE — MITT SURGICAL PREP HAIR REMOVER LATEX FREE 617142

## (undated) DEVICE — STPL SKIN 35W ROTATING HEAD PRW35

## (undated) DEVICE — BRUSH SURGICAL SCRUB W/4% CHLORHEXIDINE GLUCONATE SOL 4458A

## (undated) DEVICE — DRAIN PENROSE 0.50"X18" LATEX FREE GR203

## (undated) DEVICE — CLEANSER WOUND IRRISEPT 0.05% CHG IRRISEPT-403

## (undated) DEVICE — NDL BLUNT 18GA 1" W/O FILTER 305181

## (undated) DEVICE — SU STRATAFIX PDS PLUS 1 CT-1 18" SXPP1A404

## (undated) DEVICE — GLOVE BIOGEL PI MICRO INDICATOR UNDERGLOVE SZ 8.0 48980

## (undated) DEVICE — GLOVE PROTEXIS W/NEU-THERA 8.5  2D73TE85

## (undated) DEVICE — BONE CLEANING TIP INTERPULSE  0210-010-000

## (undated) DEVICE — DRAPE U SPLIT 74X120" 29440

## (undated) DEVICE — GLOVE PROTEXIS BLUE W/NEU-THERA 6.0  2D73EB60

## (undated) DEVICE — ADH SKIN CLOSURE PREMIERPRO EXOFIN 1.0ML 3470

## (undated) DEVICE — KIT PATIENT CARE HANA PROFX W/BOOT LINER SUPINE 6851

## (undated) DEVICE — SPONGE LAP 18X18" X8435

## (undated) DEVICE — DRAIN JACKSON PRATT ROUND W/TROCAR 19FR JP-HUR195

## (undated) DEVICE — LINEN ORTHO PACK 5446

## (undated) DEVICE — STRAP STIRRUP W/SLIP 30187-030

## (undated) RX ORDER — CEFAZOLIN SODIUM 1 G/3ML
INJECTION, POWDER, FOR SOLUTION INTRAMUSCULAR; INTRAVENOUS
Status: DISPENSED
Start: 2022-11-10

## (undated) RX ORDER — FENTANYL CITRATE-0.9 % NACL/PF 10 MCG/ML
PLASTIC BAG, INJECTION (ML) INTRAVENOUS
Status: DISPENSED
Start: 2022-12-22

## (undated) RX ORDER — HYDROMORPHONE HYDROCHLORIDE 1 MG/ML
INJECTION, SOLUTION INTRAMUSCULAR; INTRAVENOUS; SUBCUTANEOUS
Status: DISPENSED
Start: 2022-11-10

## (undated) RX ORDER — OXYCODONE HYDROCHLORIDE 5 MG/1
TABLET ORAL
Status: DISPENSED
Start: 2022-12-22

## (undated) RX ORDER — GLYCOPYRROLATE 0.2 MG/ML
INJECTION INTRAMUSCULAR; INTRAVENOUS
Status: DISPENSED
Start: 2022-11-10

## (undated) RX ORDER — ONDANSETRON 2 MG/ML
INJECTION INTRAMUSCULAR; INTRAVENOUS
Status: DISPENSED
Start: 2022-12-22

## (undated) RX ORDER — FENTANYL CITRATE-0.9 % NACL/PF 10 MCG/ML
PLASTIC BAG, INJECTION (ML) INTRAVENOUS
Status: DISPENSED
Start: 2022-11-10

## (undated) RX ORDER — ONDANSETRON 2 MG/ML
INJECTION INTRAMUSCULAR; INTRAVENOUS
Status: DISPENSED
Start: 2022-03-18

## (undated) RX ORDER — FENTANYL CITRATE 50 UG/ML
INJECTION, SOLUTION INTRAMUSCULAR; INTRAVENOUS
Status: DISPENSED
Start: 2018-03-30

## (undated) RX ORDER — BUPIVACAINE HYDROCHLORIDE AND EPINEPHRINE 2.5; 5 MG/ML; UG/ML
INJECTION, SOLUTION EPIDURAL; INFILTRATION; INTRACAUDAL; PERINEURAL
Status: DISPENSED
Start: 2022-03-18

## (undated) RX ORDER — OXYCODONE HYDROCHLORIDE 5 MG/1
TABLET ORAL
Status: DISPENSED
Start: 2018-03-30

## (undated) RX ORDER — HYDROMORPHONE HCL IN WATER/PF 6 MG/30 ML
PATIENT CONTROLLED ANALGESIA SYRINGE INTRAVENOUS
Status: DISPENSED
Start: 2022-11-10

## (undated) RX ORDER — PROPOFOL 10 MG/ML
INJECTION, EMULSION INTRAVENOUS
Status: DISPENSED
Start: 2022-11-10

## (undated) RX ORDER — DEXAMETHASONE SODIUM PHOSPHATE 4 MG/ML
INJECTION, SOLUTION INTRA-ARTICULAR; INTRALESIONAL; INTRAMUSCULAR; INTRAVENOUS; SOFT TISSUE
Status: DISPENSED
Start: 2022-03-18

## (undated) RX ORDER — FENTANYL CITRATE 50 UG/ML
INJECTION, SOLUTION INTRAMUSCULAR; INTRAVENOUS
Status: DISPENSED
Start: 2022-11-10

## (undated) RX ORDER — PROPOFOL 10 MG/ML
INJECTION, EMULSION INTRAVENOUS
Status: DISPENSED
Start: 2018-03-30

## (undated) RX ORDER — NEOSTIGMINE METHYLSULFATE 1 MG/ML
VIAL (ML) INJECTION
Status: DISPENSED
Start: 2018-03-30

## (undated) RX ORDER — CEFAZOLIN SODIUM/WATER 2 G/20 ML
SYRINGE (ML) INTRAVENOUS
Status: DISPENSED
Start: 2022-03-18

## (undated) RX ORDER — LIDOCAINE HYDROCHLORIDE 20 MG/ML
INJECTION, SOLUTION EPIDURAL; INFILTRATION; INTRACAUDAL; PERINEURAL
Status: DISPENSED
Start: 2022-11-10

## (undated) RX ORDER — GLYCOPYRROLATE 0.2 MG/ML
INJECTION INTRAMUSCULAR; INTRAVENOUS
Status: DISPENSED
Start: 2022-12-22

## (undated) RX ORDER — PROPOFOL 10 MG/ML
INJECTION, EMULSION INTRAVENOUS
Status: DISPENSED
Start: 2022-12-22

## (undated) RX ORDER — CEFAZOLIN SODIUM 1 G/3ML
INJECTION, POWDER, FOR SOLUTION INTRAMUSCULAR; INTRAVENOUS
Status: DISPENSED
Start: 2022-12-22

## (undated) RX ORDER — GLYCOPYRROLATE 0.2 MG/ML
INJECTION INTRAMUSCULAR; INTRAVENOUS
Status: DISPENSED
Start: 2018-03-30

## (undated) RX ORDER — BUPIVACAINE HYDROCHLORIDE 2.5 MG/ML
INJECTION, SOLUTION EPIDURAL; INFILTRATION; INTRACAUDAL
Status: DISPENSED
Start: 2022-12-22

## (undated) RX ORDER — CEFAZOLIN SODIUM/WATER 2 G/20 ML
SYRINGE (ML) INTRAVENOUS
Status: DISPENSED
Start: 2022-11-10

## (undated) RX ORDER — LIDOCAINE HYDROCHLORIDE 10 MG/ML
INJECTION, SOLUTION EPIDURAL; INFILTRATION; INTRACAUDAL; PERINEURAL
Status: DISPENSED
Start: 2022-03-18

## (undated) RX ORDER — FENTANYL CITRATE 50 UG/ML
INJECTION, SOLUTION INTRAMUSCULAR; INTRAVENOUS
Status: DISPENSED
Start: 2022-03-18

## (undated) RX ORDER — FENTANYL CITRATE 50 UG/ML
INJECTION, SOLUTION INTRAMUSCULAR; INTRAVENOUS
Status: DISPENSED
Start: 2022-12-22

## (undated) RX ORDER — OXYCODONE HYDROCHLORIDE 5 MG/1
TABLET ORAL
Status: DISPENSED
Start: 2022-03-18

## (undated) RX ORDER — BUPIVACAINE HYDROCHLORIDE AND EPINEPHRINE 2.5; 5 MG/ML; UG/ML
INJECTION, SOLUTION INFILTRATION; PERINEURAL
Status: DISPENSED
Start: 2022-12-22

## (undated) RX ORDER — CELECOXIB 200 MG/1
CAPSULE ORAL
Status: DISPENSED
Start: 2022-12-22

## (undated) RX ORDER — DEXAMETHASONE SODIUM PHOSPHATE 4 MG/ML
INJECTION, SOLUTION INTRA-ARTICULAR; INTRALESIONAL; INTRAMUSCULAR; INTRAVENOUS; SOFT TISSUE
Status: DISPENSED
Start: 2018-03-30

## (undated) RX ORDER — HYDROMORPHONE HYDROCHLORIDE 1 MG/ML
INJECTION, SOLUTION INTRAMUSCULAR; INTRAVENOUS; SUBCUTANEOUS
Status: DISPENSED
Start: 2022-12-22

## (undated) RX ORDER — HYDROCODONE BITARTRATE AND ACETAMINOPHEN 5; 325 MG/1; MG/1
TABLET ORAL
Status: DISPENSED
Start: 2022-11-10

## (undated) RX ORDER — LIDOCAINE HYDROCHLORIDE 10 MG/ML
INJECTION, SOLUTION EPIDURAL; INFILTRATION; INTRACAUDAL; PERINEURAL
Status: DISPENSED
Start: 2018-03-30

## (undated) RX ORDER — PROPOFOL 10 MG/ML
INJECTION, EMULSION INTRAVENOUS
Status: DISPENSED
Start: 2022-03-18

## (undated) RX ORDER — ONDANSETRON 2 MG/ML
INJECTION INTRAMUSCULAR; INTRAVENOUS
Status: DISPENSED
Start: 2022-11-10

## (undated) RX ORDER — ONDANSETRON 2 MG/ML
INJECTION INTRAMUSCULAR; INTRAVENOUS
Status: DISPENSED
Start: 2018-03-30

## (undated) RX ORDER — ACETAMINOPHEN 325 MG/1
TABLET ORAL
Status: DISPENSED
Start: 2022-12-22

## (undated) RX ORDER — CEFAZOLIN SODIUM 2 G/100ML
INJECTION, SOLUTION INTRAVENOUS
Status: DISPENSED
Start: 2018-03-30

## (undated) RX ORDER — CEFAZOLIN SODIUM/WATER 2 G/20 ML
SYRINGE (ML) INTRAVENOUS
Status: DISPENSED
Start: 2022-12-22

## (undated) RX ORDER — BUPIVACAINE HYDROCHLORIDE 2.5 MG/ML
INJECTION, SOLUTION INFILTRATION; PERINEURAL
Status: DISPENSED
Start: 2022-11-10

## (undated) RX ORDER — DEXAMETHASONE SODIUM PHOSPHATE 4 MG/ML
INJECTION, SOLUTION INTRA-ARTICULAR; INTRALESIONAL; INTRAMUSCULAR; INTRAVENOUS; SOFT TISSUE
Status: DISPENSED
Start: 2022-12-22